# Patient Record
Sex: FEMALE | Race: WHITE | NOT HISPANIC OR LATINO | Employment: UNEMPLOYED | URBAN - METROPOLITAN AREA
[De-identification: names, ages, dates, MRNs, and addresses within clinical notes are randomized per-mention and may not be internally consistent; named-entity substitution may affect disease eponyms.]

---

## 2019-11-07 ENCOUNTER — HOSPITAL ENCOUNTER (OUTPATIENT)
Facility: MEDICAL CENTER | Age: 44
DRG: 477 | End: 2019-11-07
Admitting: HOSPITALIST
Payer: COMMERCIAL

## 2019-11-07 ENCOUNTER — HOSPITAL ENCOUNTER (INPATIENT)
Facility: MEDICAL CENTER | Age: 44
LOS: 6 days | DRG: 477 | End: 2019-11-13
Attending: HOSPITALIST | Admitting: INTERNAL MEDICINE
Payer: COMMERCIAL

## 2019-11-07 DIAGNOSIS — J44.1 CHRONIC OBSTRUCTIVE PULMONARY DISEASE WITH ACUTE EXACERBATION (HCC): ICD-10-CM

## 2019-11-07 DIAGNOSIS — J18.9 HCAP (HEALTHCARE-ASSOCIATED PNEUMONIA): ICD-10-CM

## 2019-11-07 DIAGNOSIS — Z85.3 HISTORY OF BREAST CANCER: ICD-10-CM

## 2019-11-07 DIAGNOSIS — R91.8 LUNG MASS: ICD-10-CM

## 2019-11-07 PROBLEM — J96.11 CHRONIC RESPIRATORY FAILURE WITH HYPOXIA (HCC): Status: ACTIVE | Noted: 2019-11-07

## 2019-11-07 PROBLEM — J44.9 COPD (CHRONIC OBSTRUCTIVE PULMONARY DISEASE) (HCC): Status: ACTIVE | Noted: 2019-11-07

## 2019-11-07 PROCEDURE — 87205 SMEAR GRAM STAIN: CPT

## 2019-11-07 PROCEDURE — 770020 HCHG ROOM/CARE - TELE (206)

## 2019-11-07 PROCEDURE — 36415 COLL VENOUS BLD VENIPUNCTURE: CPT

## 2019-11-07 PROCEDURE — 99223 1ST HOSP IP/OBS HIGH 75: CPT | Performed by: INTERNAL MEDICINE

## 2019-11-07 PROCEDURE — 87070 CULTURE OTHR SPECIMN AEROBIC: CPT

## 2019-11-07 PROCEDURE — 84145 PROCALCITONIN (PCT): CPT

## 2019-11-07 RX ORDER — TAMOXIFEN CITRATE 10 MG/1
20 TABLET ORAL DAILY
Status: DISCONTINUED | OUTPATIENT
Start: 2019-11-08 | End: 2019-11-09

## 2019-11-07 RX ORDER — CHOLECALCIFEROL (VITAMIN D3) 125 MCG
1000 CAPSULE ORAL DAILY
COMMUNITY

## 2019-11-07 RX ORDER — TAMOXIFEN CITRATE 10 MG/1
20 TABLET ORAL
COMMUNITY

## 2019-11-07 RX ORDER — TIZANIDINE 4 MG/1
4 TABLET ORAL EVERY 8 HOURS
Status: DISCONTINUED | OUTPATIENT
Start: 2019-11-07 | End: 2019-11-13 | Stop reason: HOSPADM

## 2019-11-07 RX ORDER — CLONAZEPAM 1 MG/1
1 TABLET ORAL 3 TIMES DAILY
COMMUNITY

## 2019-11-07 RX ORDER — IPRATROPIUM BROMIDE AND ALBUTEROL SULFATE 2.5; .5 MG/3ML; MG/3ML
3 SOLUTION RESPIRATORY (INHALATION)
Status: DISCONTINUED | OUTPATIENT
Start: 2019-11-07 | End: 2019-11-09

## 2019-11-07 RX ORDER — HEPARIN SODIUM 5000 [USP'U]/ML
5000 INJECTION, SOLUTION INTRAVENOUS; SUBCUTANEOUS EVERY 8 HOURS
Status: DISCONTINUED | OUTPATIENT
Start: 2019-11-08 | End: 2019-11-13 | Stop reason: HOSPADM

## 2019-11-07 RX ORDER — AMOXICILLIN 250 MG
2 CAPSULE ORAL 2 TIMES DAILY
Status: DISCONTINUED | OUTPATIENT
Start: 2019-11-07 | End: 2019-11-13 | Stop reason: HOSPADM

## 2019-11-07 RX ORDER — CITALOPRAM 40 MG/1
40 TABLET ORAL DAILY
COMMUNITY

## 2019-11-07 RX ORDER — CARVEDILOL 6.25 MG/1
6.25 TABLET ORAL 2 TIMES DAILY
COMMUNITY

## 2019-11-07 RX ORDER — DICYCLOMINE HYDROCHLORIDE 10 MG/1
10 CAPSULE ORAL 3 TIMES DAILY
Status: DISCONTINUED | OUTPATIENT
Start: 2019-11-08 | End: 2019-11-13 | Stop reason: HOSPADM

## 2019-11-07 RX ORDER — POLYETHYLENE GLYCOL 3350 17 G/17G
1 POWDER, FOR SOLUTION ORAL
Status: DISCONTINUED | OUTPATIENT
Start: 2019-11-07 | End: 2019-11-13 | Stop reason: HOSPADM

## 2019-11-07 RX ORDER — CLONAZEPAM 1 MG/1
1 TABLET ORAL 3 TIMES DAILY
Status: DISCONTINUED | OUTPATIENT
Start: 2019-11-08 | End: 2019-11-13 | Stop reason: HOSPADM

## 2019-11-07 RX ORDER — TIZANIDINE 4 MG/1
4 TABLET ORAL EVERY 8 HOURS
COMMUNITY

## 2019-11-07 RX ORDER — CARVEDILOL 6.25 MG/1
6.25 TABLET ORAL 2 TIMES DAILY
Status: DISCONTINUED | OUTPATIENT
Start: 2019-11-07 | End: 2019-11-10

## 2019-11-07 RX ORDER — CHOLECALCIFEROL (VITAMIN D3) 125 MCG
1000 CAPSULE ORAL DAILY
Status: DISCONTINUED | OUTPATIENT
Start: 2019-11-08 | End: 2019-11-13 | Stop reason: HOSPADM

## 2019-11-07 RX ORDER — LACTOBACILLUS RHAMNOSUS GG 10B CELL
1 CAPSULE ORAL
Status: DISCONTINUED | OUTPATIENT
Start: 2019-11-08 | End: 2019-11-13 | Stop reason: HOSPADM

## 2019-11-07 RX ORDER — BISACODYL 10 MG
10 SUPPOSITORY, RECTAL RECTAL
Status: DISCONTINUED | OUTPATIENT
Start: 2019-11-07 | End: 2019-11-13 | Stop reason: HOSPADM

## 2019-11-07 RX ORDER — DICYCLOMINE HYDROCHLORIDE 10 MG/1
10 CAPSULE ORAL 3 TIMES DAILY
COMMUNITY

## 2019-11-07 RX ORDER — OXYCODONE AND ACETAMINOPHEN 10; 325 MG/1; MG/1
0.5 TABLET ORAL 3 TIMES DAILY PRN
Status: DISCONTINUED | OUTPATIENT
Start: 2019-11-07 | End: 2019-11-11

## 2019-11-07 RX ORDER — DOXYCYCLINE 100 MG/1
100 TABLET ORAL EVERY 12 HOURS
Status: COMPLETED | OUTPATIENT
Start: 2019-11-07 | End: 2019-11-12

## 2019-11-07 RX ORDER — ALBUTEROL SULFATE 90 UG/1
2 AEROSOL, METERED RESPIRATORY (INHALATION) EVERY 6 HOURS
COMMUNITY

## 2019-11-07 RX ORDER — CETIRIZINE HYDROCHLORIDE 10 MG/1
10 TABLET ORAL DAILY
Status: DISCONTINUED | OUTPATIENT
Start: 2019-11-08 | End: 2019-11-13 | Stop reason: HOSPADM

## 2019-11-07 RX ORDER — OXYCODONE AND ACETAMINOPHEN 10; 325 MG/1; MG/1
0.5 TABLET ORAL 3 TIMES DAILY PRN
Status: ON HOLD | COMMUNITY
End: 2019-12-25 | Stop reason: SDUPTHER

## 2019-11-07 RX ORDER — CITALOPRAM 20 MG/1
40 TABLET ORAL DAILY
Status: DISCONTINUED | OUTPATIENT
Start: 2019-11-08 | End: 2019-11-13 | Stop reason: HOSPADM

## 2019-11-07 RX ORDER — METHYLPREDNISOLONE SODIUM SUCCINATE 40 MG/ML
40 INJECTION, POWDER, LYOPHILIZED, FOR SOLUTION INTRAMUSCULAR; INTRAVENOUS EVERY 12 HOURS
Status: COMPLETED | OUTPATIENT
Start: 2019-11-07 | End: 2019-11-10

## 2019-11-07 RX ORDER — ALBUTEROL SULFATE 90 UG/1
2 AEROSOL, METERED RESPIRATORY (INHALATION) EVERY 6 HOURS
Status: DISCONTINUED | OUTPATIENT
Start: 2019-11-08 | End: 2019-11-08

## 2019-11-07 RX ORDER — CETIRIZINE HYDROCHLORIDE 10 MG/1
10 TABLET ORAL DAILY
COMMUNITY

## 2019-11-07 ASSESSMENT — LIFESTYLE VARIABLES
TOTAL SCORE: 0
HAVE PEOPLE ANNOYED YOU BY CRITICIZING YOUR DRINKING: NO
AVERAGE NUMBER OF DAYS PER WEEK YOU HAVE A DRINK CONTAINING ALCOHOL: 0
EVER FELT BAD OR GUILTY ABOUT YOUR DRINKING: NO
HAVE YOU EVER FELT YOU SHOULD CUT DOWN ON YOUR DRINKING: NO
EVER HAD A DRINK FIRST THING IN THE MORNING TO STEADY YOUR NERVES TO GET RID OF A HANGOVER: NO
DOES PATIENT WANT TO STOP DRINKING: NO
EVER_SMOKED: YES
ALCOHOL_USE: NO
CONSUMPTION TOTAL: NEGATIVE
HOW MANY TIMES IN THE PAST YEAR HAVE YOU HAD 5 OR MORE DRINKS IN A DAY: 0
TOTAL SCORE: 0
ON A TYPICAL DAY WHEN YOU DRINK ALCOHOL HOW MANY DRINKS DO YOU HAVE: 0
TOTAL SCORE: 0

## 2019-11-07 ASSESSMENT — ENCOUNTER SYMPTOMS
INSOMNIA: 0
EYE PAIN: 0
SPUTUM PRODUCTION: 1
FEVER: 0
HEMOPTYSIS: 0
BLOOD IN STOOL: 0
SHORTNESS OF BREATH: 1
EYE REDNESS: 0
NERVOUS/ANXIOUS: 0
MYALGIAS: 0
LOSS OF CONSCIOUSNESS: 0
CONSTIPATION: 0
WEAKNESS: 0
NAUSEA: 0
VOMITING: 0
PALPITATIONS: 0
SEIZURES: 0
DIARRHEA: 0
HEADACHES: 0
ABDOMINAL PAIN: 0
TREMORS: 0
FALLS: 0
WHEEZING: 1
DIZZINESS: 0
FOCAL WEAKNESS: 0
COUGH: 1
CHILLS: 0

## 2019-11-07 ASSESSMENT — COGNITIVE AND FUNCTIONAL STATUS - GENERAL
SUGGESTED CMS G CODE MODIFIER MOBILITY: CH
MOBILITY SCORE: 24
SUGGESTED CMS G CODE MODIFIER DAILY ACTIVITY: CH
DAILY ACTIVITIY SCORE: 24

## 2019-11-07 ASSESSMENT — PATIENT HEALTH QUESTIONNAIRE - PHQ9
2. FEELING DOWN, DEPRESSED, IRRITABLE, OR HOPELESS: NOT AT ALL
1. LITTLE INTEREST OR PLEASURE IN DOING THINGS: NOT AT ALL
SUM OF ALL RESPONSES TO PHQ9 QUESTIONS 1 AND 2: 0

## 2019-11-08 ENCOUNTER — PATIENT OUTREACH (OUTPATIENT)
Dept: HEALTH INFORMATION MANAGEMENT | Facility: OTHER | Age: 44
End: 2019-11-08

## 2019-11-08 PROBLEM — J96.21 ACUTE ON CHRONIC RESPIRATORY FAILURE WITH HYPOXIA (HCC): Status: ACTIVE | Noted: 2019-11-07

## 2019-11-08 LAB
ALBUMIN SERPL BCP-MCNC: 3.3 G/DL (ref 3.2–4.9)
ALBUMIN/GLOB SERPL: 1.3 G/DL
ALP SERPL-CCNC: 65 U/L (ref 30–99)
ALT SERPL-CCNC: 5 U/L (ref 2–50)
ANION GAP SERPL CALC-SCNC: 8 MMOL/L (ref 0–11.9)
AST SERPL-CCNC: 9 U/L (ref 12–45)
BASOPHILS # BLD AUTO: 0.1 % (ref 0–1.8)
BASOPHILS # BLD: 0.04 K/UL (ref 0–0.12)
BILIRUB SERPL-MCNC: 0.2 MG/DL (ref 0.1–1.5)
BUN SERPL-MCNC: 15 MG/DL (ref 8–22)
CALCIUM SERPL-MCNC: 9.1 MG/DL (ref 8.5–10.5)
CHLORIDE SERPL-SCNC: 115 MMOL/L (ref 96–112)
CO2 SERPL-SCNC: 17 MMOL/L (ref 20–33)
CREAT SERPL-MCNC: 0.91 MG/DL (ref 0.5–1.4)
EOSINOPHIL # BLD AUTO: 0 K/UL (ref 0–0.51)
EOSINOPHIL NFR BLD: 0 % (ref 0–6.9)
ERYTHROCYTE [DISTWIDTH] IN BLOOD BY AUTOMATED COUNT: 53 FL (ref 35.9–50)
GLOBULIN SER CALC-MCNC: 2.5 G/DL (ref 1.9–3.5)
GLUCOSE SERPL-MCNC: 163 MG/DL (ref 65–99)
GRAM STN SPEC: NORMAL
HCT VFR BLD AUTO: 30.6 % (ref 37–47)
HGB BLD-MCNC: 10.3 G/DL (ref 12–16)
IMM GRANULOCYTES # BLD AUTO: 0.33 K/UL (ref 0–0.11)
IMM GRANULOCYTES NFR BLD AUTO: 1.2 % (ref 0–0.9)
LYMPHOCYTES # BLD AUTO: 1.3 K/UL (ref 1–4.8)
LYMPHOCYTES NFR BLD: 4.7 % (ref 22–41)
MCH RBC QN AUTO: 34 PG (ref 27–33)
MCHC RBC AUTO-ENTMCNC: 33.7 G/DL (ref 33.6–35)
MCV RBC AUTO: 101 FL (ref 81.4–97.8)
MONOCYTES # BLD AUTO: 1.03 K/UL (ref 0–0.85)
MONOCYTES NFR BLD AUTO: 3.7 % (ref 0–13.4)
NEUTROPHILS # BLD AUTO: 25.02 K/UL (ref 2–7.15)
NEUTROPHILS NFR BLD: 90.3 % (ref 44–72)
NRBC # BLD AUTO: 0.02 K/UL
NRBC BLD-RTO: 0.1 /100 WBC
PLATELET # BLD AUTO: 310 K/UL (ref 164–446)
PMV BLD AUTO: 9.9 FL (ref 9–12.9)
POTASSIUM SERPL-SCNC: 3.8 MMOL/L (ref 3.6–5.5)
PROCALCITONIN SERPL-MCNC: 1.97 NG/ML
PROT SERPL-MCNC: 5.8 G/DL (ref 6–8.2)
RBC # BLD AUTO: 3.03 M/UL (ref 4.2–5.4)
SIGNIFICANT IND 70042: NORMAL
SITE SITE: NORMAL
SODIUM SERPL-SCNC: 140 MMOL/L (ref 135–145)
SOURCE SOURCE: NORMAL
WBC # BLD AUTO: 27.7 K/UL (ref 4.8–10.8)

## 2019-11-08 PROCEDURE — 80053 COMPREHEN METABOLIC PANEL: CPT

## 2019-11-08 PROCEDURE — 700102 HCHG RX REV CODE 250 W/ 637 OVERRIDE(OP): Performed by: INTERNAL MEDICINE

## 2019-11-08 PROCEDURE — 700101 HCHG RX REV CODE 250: Performed by: INTERNAL MEDICINE

## 2019-11-08 PROCEDURE — 94669 MECHANICAL CHEST WALL OSCILL: CPT

## 2019-11-08 PROCEDURE — 700111 HCHG RX REV CODE 636 W/ 250 OVERRIDE (IP): Performed by: INTERNAL MEDICINE

## 2019-11-08 PROCEDURE — A9270 NON-COVERED ITEM OR SERVICE: HCPCS | Performed by: INTERNAL MEDICINE

## 2019-11-08 PROCEDURE — 99253 IP/OBS CNSLTJ NEW/EST LOW 45: CPT | Performed by: INTERNAL MEDICINE

## 2019-11-08 PROCEDURE — 94667 MNPJ CHEST WALL 1ST: CPT

## 2019-11-08 PROCEDURE — 770020 HCHG ROOM/CARE - TELE (206)

## 2019-11-08 PROCEDURE — 85025 COMPLETE CBC W/AUTO DIFF WBC: CPT

## 2019-11-08 PROCEDURE — 36415 COLL VENOUS BLD VENIPUNCTURE: CPT

## 2019-11-08 PROCEDURE — 700105 HCHG RX REV CODE 258: Performed by: INTERNAL MEDICINE

## 2019-11-08 PROCEDURE — 94668 MNPJ CHEST WALL SBSQ: CPT

## 2019-11-08 PROCEDURE — 94760 N-INVAS EAR/PLS OXIMETRY 1: CPT

## 2019-11-08 PROCEDURE — 94640 AIRWAY INHALATION TREATMENT: CPT

## 2019-11-08 PROCEDURE — 99233 SBSQ HOSP IP/OBS HIGH 50: CPT | Performed by: INTERNAL MEDICINE

## 2019-11-08 RX ORDER — ALBUTEROL SULFATE 90 UG/1
2 AEROSOL, METERED RESPIRATORY (INHALATION) EVERY 4 HOURS PRN
Status: DISCONTINUED | OUTPATIENT
Start: 2019-11-08 | End: 2019-11-13 | Stop reason: HOSPADM

## 2019-11-08 RX ADMIN — IPRATROPIUM BROMIDE AND ALBUTEROL SULFATE 3 ML: .5; 3 SOLUTION RESPIRATORY (INHALATION) at 14:33

## 2019-11-08 RX ADMIN — METHYLPREDNISOLONE SODIUM SUCCINATE 40 MG: 40 INJECTION, POWDER, FOR SOLUTION INTRAMUSCULAR; INTRAVENOUS at 17:59

## 2019-11-08 RX ADMIN — OXYCODONE HYDROCHLORIDE AND ACETAMINOPHEN 0.5 TABLET: 10; 325 TABLET ORAL at 12:40

## 2019-11-08 RX ADMIN — DICYCLOMINE HYDROCHLORIDE 10 MG: 10 CAPSULE ORAL at 17:58

## 2019-11-08 RX ADMIN — OXYCODONE HYDROCHLORIDE AND ACETAMINOPHEN 0.5 TABLET: 10; 325 TABLET ORAL at 17:58

## 2019-11-08 RX ADMIN — CARVEDILOL 6.25 MG: 6.25 TABLET, FILM COATED ORAL at 00:31

## 2019-11-08 RX ADMIN — HEPARIN SODIUM 5000 UNITS: 5000 INJECTION, SOLUTION INTRAVENOUS; SUBCUTANEOUS at 15:03

## 2019-11-08 RX ADMIN — DOXYCYCLINE 100 MG: 100 TABLET, FILM COATED ORAL at 00:31

## 2019-11-08 RX ADMIN — CLONAZEPAM 1 MG: 1 TABLET ORAL at 17:54

## 2019-11-08 RX ADMIN — METHYLPREDNISOLONE SODIUM SUCCINATE 40 MG: 40 INJECTION, POWDER, FOR SOLUTION INTRAMUSCULAR; INTRAVENOUS at 00:31

## 2019-11-08 RX ADMIN — IPRATROPIUM BROMIDE AND ALBUTEROL SULFATE 3 ML: .5; 3 SOLUTION RESPIRATORY (INHALATION) at 10:06

## 2019-11-08 RX ADMIN — DICYCLOMINE HYDROCHLORIDE 10 MG: 10 CAPSULE ORAL at 12:37

## 2019-11-08 RX ADMIN — CARVEDILOL 6.25 MG: 6.25 TABLET, FILM COATED ORAL at 17:54

## 2019-11-08 RX ADMIN — IPRATROPIUM BROMIDE AND ALBUTEROL SULFATE 3 ML: .5; 3 SOLUTION RESPIRATORY (INHALATION) at 03:10

## 2019-11-08 RX ADMIN — HEPARIN SODIUM 5000 UNITS: 5000 INJECTION, SOLUTION INTRAVENOUS; SUBCUTANEOUS at 04:34

## 2019-11-08 RX ADMIN — TAMOXIFEN CITRATE 20 MG: 10 TABLET, FILM COATED ORAL at 04:33

## 2019-11-08 RX ADMIN — CITALOPRAM HYDROBROMIDE 40 MG: 20 TABLET ORAL at 04:33

## 2019-11-08 RX ADMIN — TIZANIDINE 4 MG: 4 TABLET ORAL at 21:56

## 2019-11-08 RX ADMIN — DOXYCYCLINE 100 MG: 100 TABLET, FILM COATED ORAL at 17:54

## 2019-11-08 RX ADMIN — Medication 1 CAPSULE: at 04:33

## 2019-11-08 RX ADMIN — CYANOCOBALAMIN TAB 500 MCG 1000 MCG: 500 TAB at 04:33

## 2019-11-08 RX ADMIN — CLONAZEPAM 1 MG: 1 TABLET ORAL at 12:37

## 2019-11-08 RX ADMIN — METHYLPREDNISOLONE SODIUM SUCCINATE 40 MG: 40 INJECTION, POWDER, FOR SOLUTION INTRAMUSCULAR; INTRAVENOUS at 04:34

## 2019-11-08 RX ADMIN — CEFTRIAXONE SODIUM 2 G: 2 INJECTION, POWDER, FOR SOLUTION INTRAMUSCULAR; INTRAVENOUS at 17:53

## 2019-11-08 RX ADMIN — TIZANIDINE 4 MG: 4 TABLET ORAL at 15:04

## 2019-11-08 RX ADMIN — TIZANIDINE 4 MG: 4 TABLET ORAL at 04:33

## 2019-11-08 RX ADMIN — DOXYCYCLINE 100 MG: 100 TABLET, FILM COATED ORAL at 04:33

## 2019-11-08 RX ADMIN — DICYCLOMINE HYDROCHLORIDE 10 MG: 10 CAPSULE ORAL at 04:33

## 2019-11-08 RX ADMIN — CEFTRIAXONE SODIUM 2 G: 2 INJECTION, POWDER, FOR SOLUTION INTRAMUSCULAR; INTRAVENOUS at 00:31

## 2019-11-08 RX ADMIN — HEPARIN SODIUM 5000 UNITS: 5000 INJECTION, SOLUTION INTRAVENOUS; SUBCUTANEOUS at 21:56

## 2019-11-08 RX ADMIN — TIZANIDINE 4 MG: 4 TABLET ORAL at 00:30

## 2019-11-08 RX ADMIN — CARVEDILOL 6.25 MG: 6.25 TABLET, FILM COATED ORAL at 04:33

## 2019-11-08 RX ADMIN — CLONAZEPAM 1 MG: 1 TABLET ORAL at 04:34

## 2019-11-08 RX ADMIN — CETIRIZINE HYDROCHLORIDE 10 MG: 10 TABLET, FILM COATED ORAL at 04:33

## 2019-11-08 SDOH — ECONOMIC STABILITY: FOOD INSECURITY: WITHIN THE PAST 12 MONTHS, YOU WORRIED THAT YOUR FOOD WOULD RUN OUT BEFORE YOU GOT MONEY TO BUY MORE.: NEVER TRUE

## 2019-11-08 SDOH — ECONOMIC STABILITY: FOOD INSECURITY: WITHIN THE PAST 12 MONTHS, THE FOOD YOU BOUGHT JUST DIDN'T LAST AND YOU DIDN'T HAVE MONEY TO GET MORE.: NEVER TRUE

## 2019-11-08 SDOH — ECONOMIC STABILITY: TRANSPORTATION INSECURITY
IN THE PAST 12 MONTHS, HAS THE LACK OF TRANSPORTATION KEPT YOU FROM MEDICAL APPOINTMENTS OR FROM GETTING MEDICATIONS?: YES

## 2019-11-08 SDOH — ECONOMIC STABILITY: INCOME INSECURITY: HOW HARD IS IT FOR YOU TO PAY FOR THE VERY BASICS LIKE FOOD, HOUSING, MEDICAL CARE, AND HEATING?: NOT HARD AT ALL

## 2019-11-08 SDOH — ECONOMIC STABILITY: TRANSPORTATION INSECURITY
IN THE PAST 12 MONTHS, HAS LACK OF TRANSPORTATION KEPT YOU FROM MEETINGS, WORK, OR FROM GETTING THINGS NEEDED FOR DAILY LIVING?: NO

## 2019-11-08 ASSESSMENT — ENCOUNTER SYMPTOMS
NAUSEA: 0
CHILLS: 0
COUGH: 1
CONSTIPATION: 0
BLURRED VISION: 0
SEIZURES: 0
VOMITING: 0
FEVER: 0
SHORTNESS OF BREATH: 1
FALLS: 0
DIARRHEA: 0
ABDOMINAL PAIN: 0

## 2019-11-08 ASSESSMENT — PATIENT HEALTH QUESTIONNAIRE - PHQ9
1. LITTLE INTEREST OR PLEASURE IN DOING THINGS: NOT AT ALL
SUM OF ALL RESPONSES TO PHQ9 QUESTIONS 1 AND 2: 0
SUM OF ALL RESPONSES TO PHQ9 QUESTIONS 1 AND 2: 0
2. FEELING DOWN, DEPRESSED, IRRITABLE, OR HOPELESS: NOT AT ALL
1. LITTLE INTEREST OR PLEASURE IN DOING THINGS: NOT AT ALL

## 2019-11-08 ASSESSMENT — COPD QUESTIONNAIRES
DO YOU EVER COUGH UP ANY MUCUS OR PHLEGM?: YES, A FEW DAYS A WEEK OR MONTH
COPD SCREENING SCORE: 4
HAVE YOU SMOKED AT LEAST 100 CIGARETTES IN YOUR ENTIRE LIFE: YES
DURING THE PAST 4 WEEKS HOW MUCH DID YOU FEEL SHORT OF BREATH: NONE/LITTLE OF THE TIME

## 2019-11-08 ASSESSMENT — LIFESTYLE VARIABLES: EVER_SMOKED: YES

## 2019-11-08 NOTE — CONSULTS
Pulmonary  Care Consultation    Date of consult: 11/8/2019    Referring Physician  Rhonda Velásquez M.D.    Reason for Consultation  Pneumonia with discovery of lung mass, and possible spine lesion, history of breast cancer    History of Presenting Illness  44 y.o. female who presented 11/7/2019 with pneumonia and was found on imaging to have a lung mass.  She has breast cancer diagnosed over one year ago, had right mastectomy, and has been on tamoxifen.  Imaging not yet available from the outside hospital but reportedly has a lung mass as well as a spine lesion, lytic, unifying diagnosis may be breast cancer metastatic but also consider underlying bronchogenic malignancy with her prior smoking history.  She stopped smoking over one year ago and has been careful not to resume, does not presently have hemoptysis or significant purulence.  However underlying COPD, prior need for oxygen, also tells me she has a prior history of lung related damage from sepsis, I wonder if this might be post ARDS fibrotic change but that also remains to be determined with current imaging not yet available.    Code Status  Full Code    ROS    Constitutional: Recently had chills and fever. No complaints presently  HENT: Negative for congestion and sore throat.    Eyes: Negative for photophobia and discharge.   Respiratory:positive for cough, sputum production, shortness of breath and wheezing.    Cardiovascular: Negative for chest pain, palpitations and leg swelling.   Gastrointestinal: Negative for abdominal pain, diarrhea, nausea and vomiting.   Musculoskeletal: Negative for back pain and myalgias. Specifically no pain in the region of the reported lytic lesion  Neurological: Negative for focal weakness, weakness and headaches.   Past Medical History   has no past medical history on file.    Surgical History   has no past surgical history on file.    Family History  family history is not on file.    Social History        Medications  Home Medications     Reviewed by Shawn Redd R.N. (Registered Nurse) on 11/07/19 at 2250  Med List Status: Complete   Medication Last Dose Status   albuterol (VENTOLIN HFA) 108 (90 Base) MCG/ACT Aero Soln inhalation aerosol 11/7/2019 Active   carvedilol (COREG) 6.25 MG Tab 11/7/2019 Active   cetirizine (ZYRTEC) 10 MG Tab 11/7/2019 Active   citalopram (CELEXA) 40 MG Tab 11/7/2019 Active   clonazePAM (KLONOPIN) 1 MG Tab 11/7/2019 Active   cyanocobalamin (VITAMIN B-12) 500 MCG Tab 11/7/2019 Active   dicyclomine (BENTYL) 10 MG Cap na Active   oxyCODONE-acetaminophen (PERCOCET-10)  MG Tab 11/7/2019 Active   tamoxifen (NOLVADEX) 10 MG Tab na Active   tizanidine (ZANAFLEX) 4 MG Tab na Active              Current Facility-Administered Medications   Medication Dose Route Frequency Provider Last Rate Last Dose   • albuterol inhaler 2 Puff  2 Puff Inhalation Q4HRS PRN Yimi Javier M.D.       • senna-docusate (PERICOLACE or SENOKOT S) 8.6-50 MG per tablet 2 Tab  2 Tab Oral BID Jose R Etienne M.D.        And   • polyethylene glycol/lytes (MIRALAX) PACKET 1 Packet  1 Packet Oral QDAY PRN Jose R Etienne M.D.        And   • magnesium hydroxide (MILK OF MAGNESIA) suspension 30 mL  30 mL Oral QDAY PRN Jose R Etienne M.D.        And   • bisacodyl (DULCOLAX) suppository 10 mg  10 mg Rectal QDAY PRN Jose R Etienne M.D.       • heparin injection 5,000 Units  5,000 Units Subcutaneous Q8HRS Jose R Etienne M.D.   5,000 Units at 11/08/19 0434   • Respiratory Care per Protocol   Nebulization Continuous RT Jose R Etienne M.D.       • ipratropium-albuterol (DUONEB) nebulizer solution  3 mL Nebulization Q4HRS (RT) Jose R Etienne M.D.   3 mL at 11/08/19 1006   • doxycycline monohydrate (ADOXA) tablet 100 mg  100 mg Oral Q12HRS Jose R Etienne M.D.   100 mg at 11/08/19 0433   • methylPREDNISolone (SOLU-MEDROL) 40 MG injection 40 mg  40 mg Intravenous Q12HRS Jose R Etienne M.D.   40 mg at  11/08/19 0434   • cefTRIAXone (ROCEPHIN) 2 g in  mL IVPB  2 g Intravenous Q24HRS Jose R Etienne M.D.   Stopped at 11/08/19 0101   • lactobacillus rhamnosus (CULTURELLE) capsule 1 Cap  1 Cap Oral QDAY with Breakfast Jose R Etienne M.D.   1 Cap at 11/08/19 0433   • carvedilol (COREG) tablet 6.25 mg  6.25 mg Oral BID Jose R Etienne M.D.   6.25 mg at 11/08/19 0433   • cetirizine (ZYRTEC) tablet 10 mg  10 mg Oral DAILY Jose R Etienne M.D.   10 mg at 11/08/19 0433   • citalopram (CELEXA) tablet 40 mg  40 mg Oral DAILY Jose R Etienne M.D.   40 mg at 11/08/19 0433   • clonazePAM (KLONOPIN) tablet 1 mg  1 mg Oral TID Jose R Etienne M.D.   1 mg at 11/08/19 1237   • cyanocobalamin (VITAMIN B-12) tablet 1,000 mcg  1,000 mcg Oral DAILY Jose R Etienne M.D.   1,000 mcg at 11/08/19 0433   • dicyclomine (BENTYL) capsule 10 mg  10 mg Oral TID Jose R Etienne M.D.   10 mg at 11/08/19 1237   • oxyCODONE-acetaminophen (PERCOCET-10)  MG per tablet 0.5 Tab  0.5 Tab Oral TID PRN Jose R Etienne M.D.   0.5 Tab at 11/08/19 1240   • tamoxifen (NOLVADEX) tablet 20 mg  20 mg Oral DAILY Jose R Etienne M.D.   20 mg at 11/08/19 0433   • tizanidine (ZANAFLEX) tablet 4 mg  4 mg Oral Q8HRS Jose R Etienne M.D.   4 mg at 11/08/19 0433       Allergies  Allergies   Allergen Reactions   • Lyrica Hives   • Morphine Unspecified     Was told she is very allergic after surgery.    • Motrin [Ibuprofen] Hives   • Neurontin [Gabapentin] Swelling     Messes with kidneys   • Pcn [Penicillins] Hives     Burning sensation throughout body       Vital Signs last 24 hours  Temp:  [36.7 °C (98.1 °F)-36.9 °C (98.5 °F)] 36.8 °C (98.3 °F)  Pulse:  [61-80] 80  Resp:  [16-17] 16  BP: (102-127)/(43-69) 102/62  SpO2:  [94 %-99 %] 96 %      Physical Exam  PHYSICAL EXAM      Constitutional: Alert, cooperative, not in acute distress.  On nasal prong oxygen without tachypnea  HEENT: Normocephalic, Atraumatic.  Edentulous  Eyes: PERRLA,  EOMI, Conjunctiva not injected.  No scleral icterus    Neck: Trachea is midline;  no thyromegaly   Lymphatic: No lymphadenopathy noted cervical or supraclavicular  Cardiovascular:  Regular rate and rhythm, no distinct murmur or gallop; neck veins flat  Chest & Lungs: Scattered but not localizing wheezing, rhonchi; no  rales  Abdomen: Soft non-tender, no rebound, no guarding.    Skin: Warm, Dry, No erythema, No rash.   Neurologic: Alert & oriented , cranial nervesgrossly intact, Normal motor function;  No focal deficits noted.   Psychiatric: Affect normal,  Mood normal.   Extremities: Well-perfused, no mottling, no asymmetric edema  Joints: No new swelling or acute arthritis in visible joints  Fluids  No intake or output data in the 24 hours ending 11/08/19 1309    Laboratory  Recent Results (from the past 48 hour(s))   Procalcitonin    Collection Time: 11/07/19 11:23 PM   Result Value Ref Range    Procalcitonin 1.97 (H) <0.25 ng/mL   GRAM STAIN    Collection Time: 11/07/19 11:25 PM   Result Value Ref Range    Significant Indicator .     Source RESP     Site SPUTUM     Gram Stain Result       Moderate WBCs.  Rare Gram negative rods.  Rare Gram positive cocci.  Specimen Quality Score: 2+     CBC WITH DIFFERENTIAL    Collection Time: 11/08/19 11:48 AM   Result Value Ref Range    WBC 27.7 (H) 4.8 - 10.8 K/uL    RBC 3.03 (L) 4.20 - 5.40 M/uL    Hemoglobin 10.3 (L) 12.0 - 16.0 g/dL    Hematocrit 30.6 (L) 37.0 - 47.0 %    .0 (H) 81.4 - 97.8 fL    MCH 34.0 (H) 27.0 - 33.0 pg    MCHC 33.7 33.6 - 35.0 g/dL    RDW 53.0 (H) 35.9 - 50.0 fL    Platelet Count 310 164 - 446 K/uL    MPV 9.9 9.0 - 12.9 fL    Neutrophils-Polys 90.30 (H) 44.00 - 72.00 %    Lymphocytes 4.70 (L) 22.00 - 41.00 %    Monocytes 3.70 0.00 - 13.40 %    Eosinophils 0.00 0.00 - 6.90 %    Basophils 0.10 0.00 - 1.80 %    Immature Granulocytes 1.20 (H) 0.00 - 0.90 %    Nucleated RBC 0.10 /100 WBC    Neutrophils (Absolute) 25.02 (H) 2.00 - 7.15 K/uL    Lymphs  (Absolute) 1.30 1.00 - 4.80 K/uL    Monos (Absolute) 1.03 (H) 0.00 - 0.85 K/uL    Eos (Absolute) 0.00 0.00 - 0.51 K/uL    Baso (Absolute) 0.04 0.00 - 0.12 K/uL    Immature Granulocytes (abs) 0.33 (H) 0.00 - 0.11 K/uL    NRBC (Absolute) 0.02 K/uL   Comp Metabolic Panel    Collection Time: 11/08/19 11:48 AM   Result Value Ref Range    Sodium 140 135 - 145 mmol/L    Potassium 3.8 3.6 - 5.5 mmol/L    Chloride 115 (H) 96 - 112 mmol/L    Co2 17 (L) 20 - 33 mmol/L    Anion Gap 8.0 0.0 - 11.9    Glucose 163 (H) 65 - 99 mg/dL    Bun 15 8 - 22 mg/dL    Creatinine 0.91 0.50 - 1.40 mg/dL    Calcium 9.1 8.5 - 10.5 mg/dL    AST(SGOT) 9 (L) 12 - 45 U/L    ALT(SGPT) 5 2 - 50 U/L    Alkaline Phosphatase 65 30 - 99 U/L    Total Bilirubin 0.2 0.1 - 1.5 mg/dL    Albumin 3.3 3.2 - 4.9 g/dL    Total Protein 5.8 (L) 6.0 - 8.2 g/dL    Globulin 2.5 1.9 - 3.5 g/dL    A-G Ratio 1.3 g/dL   ESTIMATED GFR    Collection Time: 11/08/19 11:48 AM   Result Value Ref Range    GFR If African American >60 >60 mL/min/1.73 m 2    GFR If Non African American >60 >60 mL/min/1.73 m 2       Imaging  No orders to display       Assessment/Plan  * Acute on chronic respiratory failure with hypoxia (HCC)  Assessment & Plan  Multifactorial with underlying COPD, prior smoking history, pneumonia, and lung mass with prior breast cancer and potential for bronchogenic malignancy as well    Lung mass  Assessment & Plan  Reported from outside facility, may be related to underlying breast cancer, but does have a prior smoking history.  Review outside images, and may need needle biopsy.  Also reportedly has lytic spine lesion, unifying diagnosis more likely breast cancer, diagnosed over one year ago.  Remains to be determined.  In the meantime treating pneumonia with leukocytosis evident.    COPD (chronic obstructive pulmonary disease) ([facility])  Assessment & Plan  Prior smoking history, no active bronchospasm presently, but requiring oxygen and nebulized treatments and  respiratory therapy on board    History of breast cancer  Assessment & Plan  Currently on tamoxifen, prior right mastectomy, new lung lesion and spine lesion may be related but not yet determined        Discussed patient condition and risk of morbidity and/or mortality with Hospitalist and Patient     Nicole Moreland MD , [facility], Pulmonary Service.

## 2019-11-08 NOTE — PROGRESS NOTES
2 RN skin check complete with Sanjiv RN  Devices in place na.  Skin assessed under devices oxygen tubing  Confirmed pressure ulcers found on na.  New potential pressure ulcers noted on na. Wound consult placed na.  The following interventions in place na*    No skin issues noted

## 2019-11-08 NOTE — ASSESSMENT & PLAN NOTE
Multifactorial with underlying COPD, prior smoking history, pneumonia, and lung mass with prior breast cancer and potential for bronchogenic malignancy as well

## 2019-11-08 NOTE — ASSESSMENT & PLAN NOTE
Reported from outside facility, may be related to underlying breast cancer, but does have a prior smoking history.  Review outside images, and may need needle biopsy.  Also reportedly has lytic spine lesion, unifying diagnosis more likely breast cancer, diagnosed over one year ago.  Remains to be determined.  In the meantime treating pneumonia with leukocytosis evident.

## 2019-11-08 NOTE — PROGRESS NOTES
Direct admit from [facility], referred by Dr. Fields. For Lung mass. Admitting MD is Dr. Javier. Dr. Aniceto LANZA agreed to see the patient tomorrow for pulmonary medicine. Upon patient's arrival release signed and held orders, page admit hospitalist on call for orders.

## 2019-11-08 NOTE — ASSESSMENT & PLAN NOTE
H/o breast cancer, also with spine mets, given breast cancer history worry about recurrence with mets  Pulm consulted: recommending repeat CT thorax without tomorrow to assess mass for PNA vs malignancy, which showed decreased size of lesion however again showed spine mets, consult IR for bone bx tomorrow (NPO)  Meanwhile treat acute issues first: PNA and COPD with improving  Waiting for bone biopsy result.

## 2019-11-08 NOTE — PROGRESS NOTES
Community Health Worker Intake  • Social determinates of health intake complete.   • Identified barriers to tranportation.  • Contact information provided to Roxy Cota.  • Has PCP appointment scheduled for Dr. Story.  • Scheduled 11/20 at 7:45am.        11/8. CHW Veda met with pt to introduce CCM services. Pt indicated she sees Dr. Livia Story from [facility], but only temporarily because Dr. Story will be leaving January 2020. She sometimes have transportation issues getting to medical appointments. Currently relying on  for rides. No trouble paying for resources such as care, and housing. No food insecurity. She lives in a home with . Pt is unsure if she is confident in managing her health after d/c. She indicated her confidence will depend on what they find on her biopsy.     Plan: follow up call after d/c.     11/19. Outcome. Outbound call to Roxy. Roxy was sleeping, spoke to  Boone. He indicated she is doing well after d/c, but they are frustrated with her pcp Dr. Story. He indicated Dr. Story won't give her test results from the hospital until her pcp appointment on 11/20 at 7:45am. Boone asked me if I could look up her test results, and I informed him that I do not have access to that information. Roxy does not have transportation issues getting to the appointment as  will be taking her. Roxy picked up her medications. Boone indicated she is taking them regularly, and is assisting her. No further questions regarding how to take them. Boone indicated they do not need any other resources at this time. I advised Boone to give me a call if Roxy needs any other resources. Roxy met all goals, and will be d/c from CCM services.

## 2019-11-08 NOTE — H&P
Hospital Medicine History & Physical Note    Date of Service  11/7/2019    Primary Care Physician  Pcp Unknown    Consultants  Pulmonology    Code Status  full    Chief Complaint  Lung mass    History of Presenting Illness  44 y.o. female who presented 11/7/2019 transfer from [facility] for lung mass.  History of breast cancer on tamoxifen.   History of COPD on chronic O2  Presented to [facility] on 11/6 for a few days history of shortness of breath, productive cough.   There she was diagnosed to have acute upper lobe pneumonia along with COPD exacerbation. She was started on antibiotics and steroids along with O2 and respiratory care per protocol. She did improve. Imaging also showed a 2.5cm mass. According to the hospitalist there, her spine was imaged to have an osteolytic lesion. She mentioned it was an MRI but I do not have the record of that at [facility].  Dr. Douglass, Pulmonology consulted and patient was transferred.  Former smoker.   When I saw her at telemetry, no acute distress. Thin. Wheezing.     Review of Systems  Review of Systems   Constitutional: Negative for chills and fever.   HENT: Negative for congestion, hearing loss and nosebleeds.    Eyes: Negative for pain and redness.   Respiratory: Positive for cough, sputum production, shortness of breath and wheezing. Negative for hemoptysis.    Cardiovascular: Negative for chest pain and palpitations.   Gastrointestinal: Negative for abdominal pain, blood in stool, constipation, diarrhea, nausea and vomiting.   Genitourinary: Negative for dysuria, frequency and hematuria.   Musculoskeletal: Negative for falls, joint pain and myalgias.   Skin: Negative for rash.   Neurological: Negative for dizziness, tremors, focal weakness, seizures, loss of consciousness, weakness and headaches.   Psychiatric/Behavioral: The patient is not nervous/anxious and does not have insomnia.    All other systems reviewed and are negative.      Past Medical History   has no  past medical history on file.  Breast cancer  Fibromyalgia, hypothyroidism, COPD    Surgical History   has no past surgical history on file.     Family History  family history is not on file.   Mother had thyroid cancer  Grandmother had colon cancer.  Social History     Quit smoking.  Allergies  Allergies   Allergen Reactions   • Lyrica Hives   • Morphine Unspecified     Was told she is very allergic after surgery.    • Motrin [Ibuprofen] Hives   • Neurontin [Gabapentin] Swelling     Messes with kidneys   • Pcn [Penicillins] Hives     Burning sensation throughout body       Medications  Prior to Admission Medications   Prescriptions Last Dose Informant Patient Reported? Taking?   albuterol (VENTOLIN HFA) 108 (90 Base) MCG/ACT Aero Soln inhalation aerosol 11/7/2019 at 0600  Yes Yes   Sig: Inhale 2 Puffs by mouth every 6 hours.   carvedilol (COREG) 6.25 MG Tab 11/7/2019 at 0900  Yes Yes   Sig: Take 6.25 mg by mouth 2 times a day.   cetirizine (ZYRTEC) 10 MG Tab 11/7/2019 at 0900  Yes Yes   Sig: Take 10 mg by mouth every day.   citalopram (CELEXA) 40 MG Tab 11/7/2019 at 0900  Yes Yes   Sig: Take 40 mg by mouth every day.   clonazePAM (KLONOPIN) 1 MG Tab 11/7/2019 at 1400  Yes Yes   Sig: Take 1 mg by mouth 3 times a day.   cyanocobalamin (VITAMIN B-12) 500 MCG Tab 11/7/2019 at 0900  Yes Yes   Sig: Take 1,000 mcg by mouth every day.   dicyclomine (BENTYL) 10 MG Cap na  Yes Yes   Sig: Take 10 mg by mouth 3 times a day.   oxyCODONE-acetaminophen (PERCOCET-10)  MG Tab 11/7/2019 at 1545  Yes Yes   Sig: Take 0.5 Tabs by mouth 3 times a day as needed for Severe Pain.   tamoxifen (NOLVADEX) 10 MG Tab na  Yes Yes   Sig: Take 20 mg by mouth.   tizanidine (ZANAFLEX) 4 MG Tab na  Yes Yes   Sig: Take 4 mg by mouth every 8 hours.      Facility-Administered Medications: None       Physical Exam  Temp:  [36.8 °C (98.2 °F)] 36.8 °C (98.2 °F)  Pulse:  [76] 76  Resp:  [16] 16  BP: (127)/(69) 127/69  SpO2:  [99 %] 99 %    Physical  Exam  Vitals signs and nursing note reviewed.   Constitutional:       General: She is not in acute distress.     Comments: Thin, malaised   HENT:      Head: Normocephalic and atraumatic.      Right Ear: External ear normal.      Left Ear: External ear normal.      Nose: Nose normal.      Mouth/Throat:      Mouth: Mucous membranes are moist.   Eyes:      General: No scleral icterus.     Conjunctiva/sclera: Conjunctivae normal.   Neck:      Musculoskeletal: Normal range of motion and neck supple. No neck rigidity.   Cardiovascular:      Rate and Rhythm: Normal rate and regular rhythm.      Pulses: Normal pulses.      Heart sounds: No murmur. No friction rub. No gallop.    Pulmonary:      Effort: Pulmonary effort is normal. No respiratory distress.      Breath sounds: No stridor. Wheezing present. No rhonchi or rales.   Chest:      Chest wall: No tenderness.   Abdominal:      General: Abdomen is flat. Bowel sounds are normal. There is no distension.      Palpations: Abdomen is soft.      Tenderness: There is no tenderness. There is no guarding or rebound.   Musculoskeletal: Normal range of motion.         General: No swelling, tenderness or deformity.   Skin:     General: Skin is warm.      Coloration: Skin is not jaundiced.   Neurological:      General: No focal deficit present.      Mental Status: She is alert and oriented to person, place, and time. Mental status is at baseline.   Psychiatric:         Mood and Affect: Mood normal.         Behavior: Behavior normal.         Thought Content: Thought content normal.         Judgment: Judgment normal.         Laboratory:          No results for input(s): ALTSGPT, ASTSGOT, ALKPHOSPHAT, TBILIRUBIN, DBILIRUBIN, GAMMAGT, AMYLASE, LIPASE, ALB, PREALBUMIN, GLUCOSE in the last 72 hours.      No results for input(s): NTPROBNP in the last 72 hours.      No results for input(s): TROPONINT in the last 72 hours.    Urinalysis:    No results found     Imaging:  No orders to display          Assessment/Plan:  I anticipate this patient will require at least two midnights for appropriate medical management, necessitating inpatient admission.    * Lung mass  Assessment & Plan  Pulmonology consulted and will see tomorrow  Discuss with Pulmonology re: biopsy versus treating pneumonia and reimaging  Obtain MRI record regarding osteolytic lesion of the spine and consider having this biopsied through IR.  Meanwhile treat acute issues first: PNA and COPD.    Chronic respiratory failure with hypoxia (HCC)  Assessment & Plan  Resp and O2 per protocol    HCAP (healthcare-associated pneumonia)  Assessment & Plan  Continue Rocephin  Add doxycycline  Oredered flu screen  Follow procalcitonin    COPD (chronic obstructive pulmonary disease) (HCC)  Assessment & Plan  Resp and O2 per prtocol  COntinue IV steroids, titrate down    History of breast cancer  Assessment & Plan  Noted.      VTE prophylaxis: Heparin SQ hold prior to procedure  Reviewed vitals, labs, imaging, staff notes.  Discussed assessment and plan with Roxy Cota   Discussed with telemetry RN

## 2019-11-08 NOTE — ASSESSMENT & PLAN NOTE
Currently on tamoxifen, prior right mastectomy, new lung lesion and spine lesion may be related but not yet determined

## 2019-11-08 NOTE — ASSESSMENT & PLAN NOTE
In 2016   Treated with surgery and no chemo needed at that time   Holding tamoxifen per onc recs  Waiting for bone biopsy result.

## 2019-11-08 NOTE — PROGRESS NOTES
Pt in room resting, no s/s of distress. Admit profile done. No complaints at this time. Bed in low locked position, side rail sup x2, call light within reach, SR on monitor, will cont to monitor.

## 2019-11-08 NOTE — PROGRESS NOTES
Pulmonary evaluation requested by Dr. Velásquez, transferred from outside hospital with COPD, pneumonia, but also breast cancer on tamoxifen.  Reportedly has a lung mass and lytic lesion in the spine.  May need needle biopsy of either spine lesion or lung mass and oncology input if this is metastatic breast cancer, but awaiting imaging.  None available within the system presently, but Dr. Velásquez is going to contact the referring facility, obtain imaging and then formal consultation will follow.  Nicole Moreland MD , FCCP, Pulmonary Service

## 2019-11-08 NOTE — RESPIRATORY CARE
"COPD EDUCATION by COPD CLINICAL EDUCATOR  2019  at  10:25 AM by Vania Carolina     Patient interviewed by COPD education team.  Patient unable to participate in full program.  Short intervention has been conducted.  A comprehensive packet including information about COPD education, home treatments with spacer instruct. Action plan done. Patient quit smoking .       Roxy Cota   2019  9:43 PM   Admission   MRN: [id number]   Description: Female : 1975 [facility] [address] Dept Phone: [phone]   MY COPD ACTION PLAN      It is recommended that patients and physicians /healthcare providers complete this action plan together. This plan should be discussed at each physician visit and updated as needed.     The green, yellow and red zones show groups of symptoms of COPD. This list of symptoms is not comprehensive, and you may experience other symptoms. In the \"Actions\" column, your healthcare provider has recommended actions for you to take based on your symptoms.     Patient Name: Roxy Cota   YOB: 1975   Last Updated on:             Green Zone:  I am doing well today Actions   •  Usual activitiy and exercise level •  Take daily medications   •  Usual amounts of cough and phlegm/mucus •  Use oxygen as prescribed   •  Sleep well at night •  Continue regular exercise/diet plan   •  Appetite is good •  At all times avoid cigarette smoke, inhaled irritants      Daily Medications (these medications are taken every day):   Albuterol (Accuneb, Proair, Proventil, Ventolin) 2 Puffs Every 4 hours PRN         Yellow Zone:  I am having a bad day or a COPD flare Actions   •  More breathless than usual •  Continue daily medications   •  I have less energy for my daily activities •  Use quick relief inhaler as ordered   •  Increased or thicker phlegm/mucus •  Use oxygen as prescribed   •  Using quick relief inhaler/nebulizer more often •  Get plenty of rest   •  " "Swelling of ankles more than usual •  Use pursed lip breathing   •  More coughing than usual •  At all times avoid cigarette smoke, inhaled irritants   •  I feel like I have a \"chest cold\"   •  Poor sleep and my symptoms woke me up   •  My appetite is not good   •  My medicine is not helping    •  Call provider immediately if symptoms don’t improve      Continue daily medications, add rescue medications:   Albuterol 2 Puffs Every 4 hours         Medications to be used during a flare up, (as Discussed with Provider):               Red Zone:  I need urgent medical care Actions   •  Severe shortness of breath even at rest •  Call 911 or seek medical care immediately   •  Not able to do any activity because of breathing     •  Fever or shaking chills     •  Feeling confused or very drowsy      •  Chest pains     •  Coughing up blood             "

## 2019-11-08 NOTE — ASSESSMENT & PLAN NOTE
Continue Rocephin-->cefdinir (pcn allergy) today/ doxycycline 11/7-**, will plan to treat x 7 days  Procalcitonin downtrending  Sputum cx negative

## 2019-11-08 NOTE — CARE PLAN
Problem: Oxygenation:  Goal: Maintain adequate oxygenation dependent on patient condition  Outcome: PROGRESSING AS EXPECTED   2L Home O2 regimen    Problem: Bronchoconstriction:  Goal: Improve in air movement and diminished wheezing  Outcome: PROGRESSING AS EXPECTED  Duo Q4     Problem: Bronchopulmonary Hygiene:  Goal: Increase mobilization of retained secretions  Outcome: PROGRESSING AS EXPECTED   Flutter QID

## 2019-11-08 NOTE — ASSESSMENT & PLAN NOTE
Prior smoking history, no active bronchospasm presently, but requiring oxygen and nebulized treatments and respiratory therapy on board

## 2019-11-08 NOTE — PROGRESS NOTES
Chart Check Complete    Monitor Summary:    Rhythm- SR   Rate- 55-70  Ectopy- na  Measurements- 0.12/0.10/0.40

## 2019-11-09 ENCOUNTER — HOSPITAL ENCOUNTER (OUTPATIENT)
Dept: RADIOLOGY | Facility: MEDICAL CENTER | Age: 44
End: 2019-11-09

## 2019-11-09 PROBLEM — N17.9 AKI (ACUTE KIDNEY INJURY) (HCC): Status: ACTIVE | Noted: 2019-11-09

## 2019-11-09 PROBLEM — D53.9 MACROCYTIC ANEMIA: Status: ACTIVE | Noted: 2019-11-09

## 2019-11-09 PROBLEM — D72.829 LEUKOCYTOSIS: Status: ACTIVE | Noted: 2019-11-09

## 2019-11-09 LAB
ANION GAP SERPL CALC-SCNC: 9 MMOL/L (ref 0–11.9)
BASOPHILS # BLD AUTO: 0.3 % (ref 0–1.8)
BASOPHILS # BLD: 0.06 K/UL (ref 0–0.12)
BUN SERPL-MCNC: 19 MG/DL (ref 8–22)
CALCIUM SERPL-MCNC: 8.8 MG/DL (ref 8.5–10.5)
CHLORIDE SERPL-SCNC: 113 MMOL/L (ref 96–112)
CO2 SERPL-SCNC: 18 MMOL/L (ref 20–33)
CREAT SERPL-MCNC: 1.01 MG/DL (ref 0.5–1.4)
EOSINOPHIL # BLD AUTO: 0 K/UL (ref 0–0.51)
EOSINOPHIL NFR BLD: 0 % (ref 0–6.9)
ERYTHROCYTE [DISTWIDTH] IN BLOOD BY AUTOMATED COUNT: 56.4 FL (ref 35.9–50)
FOLATE SERPL-MCNC: 4.9 NG/ML
GLUCOSE SERPL-MCNC: 137 MG/DL (ref 65–99)
HCT VFR BLD AUTO: 30.3 % (ref 37–47)
HGB BLD-MCNC: 9.7 G/DL (ref 12–16)
IMM GRANULOCYTES # BLD AUTO: 0.26 K/UL (ref 0–0.11)
IMM GRANULOCYTES NFR BLD AUTO: 1.1 % (ref 0–0.9)
IRON SATN MFR SERPL: 50 % (ref 15–55)
IRON SERPL-MCNC: 124 UG/DL (ref 40–170)
LYMPHOCYTES # BLD AUTO: 1.39 K/UL (ref 1–4.8)
LYMPHOCYTES NFR BLD: 5.9 % (ref 22–41)
MCH RBC QN AUTO: 33.8 PG (ref 27–33)
MCHC RBC AUTO-ENTMCNC: 32 G/DL (ref 33.6–35)
MCV RBC AUTO: 105.6 FL (ref 81.4–97.8)
MONOCYTES # BLD AUTO: 0.8 K/UL (ref 0–0.85)
MONOCYTES NFR BLD AUTO: 3.4 % (ref 0–13.4)
NEUTROPHILS # BLD AUTO: 21.12 K/UL (ref 2–7.15)
NEUTROPHILS NFR BLD: 89.3 % (ref 44–72)
NRBC # BLD AUTO: 0.03 K/UL
NRBC BLD-RTO: 0.1 /100 WBC
PLATELET # BLD AUTO: 314 K/UL (ref 164–446)
PMV BLD AUTO: 10 FL (ref 9–12.9)
POTASSIUM SERPL-SCNC: 4.1 MMOL/L (ref 3.6–5.5)
PROCALCITONIN SERPL-MCNC: 0.88 NG/ML
RBC # BLD AUTO: 2.87 M/UL (ref 4.2–5.4)
SODIUM SERPL-SCNC: 140 MMOL/L (ref 135–145)
TIBC SERPL-MCNC: 246 UG/DL (ref 250–450)
VIT B12 SERPL-MCNC: 728 PG/ML (ref 211–911)
WBC # BLD AUTO: 23.6 K/UL (ref 4.8–10.8)

## 2019-11-09 PROCEDURE — 36415 COLL VENOUS BLD VENIPUNCTURE: CPT

## 2019-11-09 PROCEDURE — 700105 HCHG RX REV CODE 258: Performed by: INTERNAL MEDICINE

## 2019-11-09 PROCEDURE — 85025 COMPLETE CBC W/AUTO DIFF WBC: CPT

## 2019-11-09 PROCEDURE — 94760 N-INVAS EAR/PLS OXIMETRY 1: CPT

## 2019-11-09 PROCEDURE — 94668 MNPJ CHEST WALL SBSQ: CPT

## 2019-11-09 PROCEDURE — 99233 SBSQ HOSP IP/OBS HIGH 50: CPT | Performed by: INTERNAL MEDICINE

## 2019-11-09 PROCEDURE — 94669 MECHANICAL CHEST WALL OSCILL: CPT

## 2019-11-09 PROCEDURE — 82607 VITAMIN B-12: CPT

## 2019-11-09 PROCEDURE — A9270 NON-COVERED ITEM OR SERVICE: HCPCS | Performed by: INTERNAL MEDICINE

## 2019-11-09 PROCEDURE — 700102 HCHG RX REV CODE 250 W/ 637 OVERRIDE(OP): Performed by: INTERNAL MEDICINE

## 2019-11-09 PROCEDURE — 700111 HCHG RX REV CODE 636 W/ 250 OVERRIDE (IP): Performed by: INTERNAL MEDICINE

## 2019-11-09 PROCEDURE — 94640 AIRWAY INHALATION TREATMENT: CPT

## 2019-11-09 PROCEDURE — 84145 PROCALCITONIN (PCT): CPT

## 2019-11-09 PROCEDURE — 700101 HCHG RX REV CODE 250: Performed by: INTERNAL MEDICINE

## 2019-11-09 PROCEDURE — 83540 ASSAY OF IRON: CPT

## 2019-11-09 PROCEDURE — 82746 ASSAY OF FOLIC ACID SERUM: CPT

## 2019-11-09 PROCEDURE — 80048 BASIC METABOLIC PNL TOTAL CA: CPT

## 2019-11-09 PROCEDURE — 770020 HCHG ROOM/CARE - TELE (206)

## 2019-11-09 PROCEDURE — 83550 IRON BINDING TEST: CPT

## 2019-11-09 RX ORDER — SODIUM CHLORIDE, SODIUM LACTATE, POTASSIUM CHLORIDE, CALCIUM CHLORIDE 600; 310; 30; 20 MG/100ML; MG/100ML; MG/100ML; MG/100ML
INJECTION, SOLUTION INTRAVENOUS CONTINUOUS
Status: DISCONTINUED | OUTPATIENT
Start: 2019-11-09 | End: 2019-11-10

## 2019-11-09 RX ORDER — IPRATROPIUM BROMIDE AND ALBUTEROL SULFATE 2.5; .5 MG/3ML; MG/3ML
3 SOLUTION RESPIRATORY (INHALATION)
Status: DISCONTINUED | OUTPATIENT
Start: 2019-11-09 | End: 2019-11-09

## 2019-11-09 RX ORDER — IPRATROPIUM BROMIDE AND ALBUTEROL SULFATE 2.5; .5 MG/3ML; MG/3ML
3 SOLUTION RESPIRATORY (INHALATION)
Status: DISCONTINUED | OUTPATIENT
Start: 2019-11-09 | End: 2019-11-13 | Stop reason: HOSPADM

## 2019-11-09 RX ADMIN — DICYCLOMINE HYDROCHLORIDE 10 MG: 10 CAPSULE ORAL at 17:56

## 2019-11-09 RX ADMIN — Medication 1 CAPSULE: at 07:32

## 2019-11-09 RX ADMIN — SODIUM CHLORIDE, POTASSIUM CHLORIDE, SODIUM LACTATE AND CALCIUM CHLORIDE: 600; 310; 30; 20 INJECTION, SOLUTION INTRAVENOUS at 23:28

## 2019-11-09 RX ADMIN — TIZANIDINE 4 MG: 4 TABLET ORAL at 06:04

## 2019-11-09 RX ADMIN — DICYCLOMINE HYDROCHLORIDE 10 MG: 10 CAPSULE ORAL at 12:25

## 2019-11-09 RX ADMIN — METHYLPREDNISOLONE SODIUM SUCCINATE 40 MG: 40 INJECTION, POWDER, FOR SOLUTION INTRAMUSCULAR; INTRAVENOUS at 17:55

## 2019-11-09 RX ADMIN — HEPARIN SODIUM 5000 UNITS: 5000 INJECTION, SOLUTION INTRAVENOUS; SUBCUTANEOUS at 14:34

## 2019-11-09 RX ADMIN — HEPARIN SODIUM 5000 UNITS: 5000 INJECTION, SOLUTION INTRAVENOUS; SUBCUTANEOUS at 06:01

## 2019-11-09 RX ADMIN — DICYCLOMINE HYDROCHLORIDE 10 MG: 10 CAPSULE ORAL at 06:04

## 2019-11-09 RX ADMIN — OXYCODONE HYDROCHLORIDE AND ACETAMINOPHEN 0.5 TABLET: 10; 325 TABLET ORAL at 06:11

## 2019-11-09 RX ADMIN — HEPARIN SODIUM 5000 UNITS: 5000 INJECTION, SOLUTION INTRAVENOUS; SUBCUTANEOUS at 20:09

## 2019-11-09 RX ADMIN — CLONAZEPAM 1 MG: 1 TABLET ORAL at 12:25

## 2019-11-09 RX ADMIN — DOXYCYCLINE 100 MG: 100 TABLET, FILM COATED ORAL at 17:55

## 2019-11-09 RX ADMIN — CITALOPRAM HYDROBROMIDE 40 MG: 20 TABLET ORAL at 06:03

## 2019-11-09 RX ADMIN — CARVEDILOL 6.25 MG: 6.25 TABLET, FILM COATED ORAL at 06:04

## 2019-11-09 RX ADMIN — OXYCODONE HYDROCHLORIDE AND ACETAMINOPHEN 0.5 TABLET: 10; 325 TABLET ORAL at 23:13

## 2019-11-09 RX ADMIN — IPRATROPIUM BROMIDE AND ALBUTEROL SULFATE 3 ML: .5; 3 SOLUTION RESPIRATORY (INHALATION) at 06:52

## 2019-11-09 RX ADMIN — CLONAZEPAM 1 MG: 1 TABLET ORAL at 06:04

## 2019-11-09 RX ADMIN — SODIUM CHLORIDE, POTASSIUM CHLORIDE, SODIUM LACTATE AND CALCIUM CHLORIDE: 600; 310; 30; 20 INJECTION, SOLUTION INTRAVENOUS at 12:26

## 2019-11-09 RX ADMIN — METHYLPREDNISOLONE SODIUM SUCCINATE 40 MG: 40 INJECTION, POWDER, FOR SOLUTION INTRAMUSCULAR; INTRAVENOUS at 05:59

## 2019-11-09 RX ADMIN — TAMOXIFEN CITRATE 20 MG: 10 TABLET, FILM COATED ORAL at 06:03

## 2019-11-09 RX ADMIN — CLONAZEPAM 1 MG: 1 TABLET ORAL at 17:55

## 2019-11-09 RX ADMIN — CETIRIZINE HYDROCHLORIDE 10 MG: 10 TABLET, FILM COATED ORAL at 06:04

## 2019-11-09 RX ADMIN — DOXYCYCLINE 100 MG: 100 TABLET, FILM COATED ORAL at 06:03

## 2019-11-09 RX ADMIN — TIZANIDINE 4 MG: 4 TABLET ORAL at 14:35

## 2019-11-09 RX ADMIN — CEFTRIAXONE SODIUM 2 G: 2 INJECTION, POWDER, FOR SOLUTION INTRAMUSCULAR; INTRAVENOUS at 17:55

## 2019-11-09 RX ADMIN — TIZANIDINE 4 MG: 4 TABLET ORAL at 20:09

## 2019-11-09 RX ADMIN — CYANOCOBALAMIN TAB 500 MCG 1000 MCG: 500 TAB at 06:03

## 2019-11-09 RX ADMIN — OXYCODONE HYDROCHLORIDE AND ACETAMINOPHEN 0.5 TABLET: 10; 325 TABLET ORAL at 15:18

## 2019-11-09 ASSESSMENT — ENCOUNTER SYMPTOMS
CHILLS: 0
COUGH: 1
ABDOMINAL PAIN: 0
BLURRED VISION: 0
VOMITING: 0
SHORTNESS OF BREATH: 1
CONSTIPATION: 0
FEVER: 0
DIARRHEA: 0
SEIZURES: 0
NAUSEA: 0
FALLS: 0

## 2019-11-09 NOTE — ASSESSMENT & PLAN NOTE
In setting of PNA +/- inflammation from malignancy?  Improving with abx however today back up slightly, procalcitonin improving and patient clinically improving but will need to continue to monitor

## 2019-11-09 NOTE — CARE PLAN
Problem: Oxygenation:  Goal: Maintain adequate oxygenation dependent on patient condition  Intervention: Manage oxygen therapy by monitoring pulse oximetry and/or ABG values  Note:   2L [place] Home reg     Problem: Bronchoconstriction:  Goal: Improve in air movement and diminished wheezing  Intervention: Implement inhaled treatments  Note:   Duoneb Q4     Problem: Bronchopulmonary Hygiene:  Goal: Increase mobilization of retained secretions  Intervention: Perform bronchopulmonary therapy as indicated by assessment  Note:   Alice FAIRCHILDD

## 2019-11-09 NOTE — PROGRESS NOTES
Dr Teran, hospitalist notified of continued nausea and pt unable to hold down PO pain med.  MD does not want to add additional pain med due to pregnancy and will alternate antinausea meds.

## 2019-11-09 NOTE — PROGRESS NOTES
Assumed care of patient.  Pt A,Ox4.  Safety precautions in place:  Bed in lowest, locked position, call bell in reach and pt using appropriately.

## 2019-11-09 NOTE — CARE PLAN
Problem: Bronchoconstriction:  Goal: Improve in air movement and diminished wheezing  Outcome: PROGRESSING AS EXPECTED   Duoneb QIDONNA Jenkins QIDONNA

## 2019-11-09 NOTE — PROGRESS NOTES
[facility] Medicine Daily Progress Note    Date of Service  11/9/2019    Chief Complaint  44 y.o. female with a history of breast cancer (on oral tamoxifen), COPD (on chronic O2), who was transferred from [place] for pneumonia and lung mass on 11/7/2019.    Hospital Course    44 y.o. female with a history of breast cancer (on oral tamoxifen), COPD (on chronic O2), who was transferred from [place] for pneumonia and lung mass on 11/7/2019.        Interval Problem Update  - overall feeling better, breathing better  - afebrile, on RA  - WBC down slightly 27-->23  - poor po intake, no appetite, no n/v  - images up-loaded into our system  - patient asking about dc, has [facility]    Consultants/Specialty  Pulmonology    Code Status  Full    Disposition  Inpatient    Review of Systems  Review of Systems   Constitutional: Positive for malaise/fatigue. Negative for chills and fever.   HENT: Negative.    Eyes: Negative for blurred vision.   Respiratory: Positive for cough and shortness of breath.    Cardiovascular: Negative for leg swelling.   Gastrointestinal: Negative for abdominal pain, constipation, diarrhea, nausea and vomiting.   Genitourinary: Negative for dysuria.   Musculoskeletal: Negative for falls.   Skin: Negative for rash.   Neurological: Negative for seizures.        Physical Exam  Temp:  [36.3 °C (97.4 °F)-37.2 °C (98.9 °F)] 37.1 °C (98.7 °F)  Pulse:  [45-87] 45  Resp:  [16-18] 17  BP: (102-148)/(55-66) 145/63  SpO2:  [94 %-99 %] 99 %    Physical Exam  Constitutional:       General: She is not in acute distress.     Appearance: Normal appearance. She is not toxic-appearing or diaphoretic.   HENT:      Head: Normocephalic and atraumatic.      Nose: No congestion.      Mouth/Throat:      Mouth: Mucous membranes are moist.   Eyes:      General: No scleral icterus.     Conjunctiva/sclera: Conjunctivae normal.   Neck:      Musculoskeletal: Normal range of motion.      Vascular: No JVD.   Cardiovascular:      Rate and  Rhythm: Normal rate and regular rhythm.      Pulses: Normal pulses.      Heart sounds: No murmur. No friction rub. No gallop.    Pulmonary:      Effort: Pulmonary effort is normal. No respiratory distress.      Breath sounds: No wheezing, rhonchi or rales.      Comments: On RA, decreased breath sounds at bases  Abdominal:      General: Abdomen is flat. Bowel sounds are normal. There is no distension.      Palpations: Abdomen is soft.   Musculoskeletal:         General: No swelling.   Skin:     General: Skin is warm and dry.   Neurological:      General: No focal deficit present.      Mental Status: She is alert and oriented to person, place, and time. Mental status is at baseline.   Psychiatric:         Mood and Affect: Mood normal.         Fluids  No intake or output data in the 24 hours ending 11/09/19 1121    Laboratory  Recent Labs     11/08/19  1148 11/09/19  0219   WBC 27.7* 23.6*   RBC 3.03* 2.87*   HEMOGLOBIN 10.3* 9.7*   HEMATOCRIT 30.6* 30.3*   .0* 105.6*   MCH 34.0* 33.8*   MCHC 33.7 32.0*   RDW 53.0* 56.4*   PLATELETCT 310 314   MPV 9.9 10.0     Recent Labs     11/08/19  1148 11/09/19  0219   SODIUM 140 140   POTASSIUM 3.8 4.1   CHLORIDE 115* 113*   CO2 17* 18*   GLUCOSE 163* 137*   BUN 15 19   CREATININE 0.91 1.01   CALCIUM 9.1 8.8                   Imaging  OUTSIDE IMAGES-DX CHEST   Final Result      OUTSIDE IMAGES-CT CHEST   Final Result           Assessment/Plan  * Acute on chronic respiratory failure with hypoxia (HCC)  Assessment & Plan  Resp and O2 per protocol    Lung mass  Assessment & Plan  H/o breast cancer, also with spinal lesion, concerned about breast ca with mets  Pulmonology consulted  Pulm consulted, will look at imaging and decide if mass is at a spot that can bx  Will also talk with oncology today  Meanwhile treat acute issues first: PNA and COPD.  Poor appetite, consult nutrition    JANUSZ (acute kidney injury) (HCC)  Assessment & Plan  Likely in setting of infection  Cr  increased from yesterday, not eating well, will restart LR    Leukocytosis- (present on admission)  Assessment & Plan  In setting of PNA  Improving with abx  CTM    Macrocytic anemia  Assessment & Plan  Hemoglobin 9.7 today, , LFTs normal  Will check folate and B12 as well as iron studies    HCAP (healthcare-associated pneumonia)  Assessment & Plan  Continue Rocephin/ doxycycline 11/7-**  Follow procalcitonin  Sputum culture pending    COPD (chronic obstructive pulmonary disease) (HCC)  Assessment & Plan  Resp and O2 per prtocol  COntinue IV steroids, titrate down    History of breast cancer  Assessment & Plan  Noted.       VTE prophylaxis: heparin SC

## 2019-11-09 NOTE — CARE PLAN
Problem: Communication  Goal: The ability to communicate needs accurately and effectively will improve  Outcome: PROGRESSING AS EXPECTED   Pt's whiteboard updated. Pt has been updated on POC. All questions have been answered.    Problem: Knowledge Deficit  Goal: Knowledge of disease process/condition, treatment plan, diagnostic tests, and medications will improve  Outcome: PROGRESSING AS EXPECTED

## 2019-11-09 NOTE — PROGRESS NOTES
[facility] Medicine Daily Progress Note    Date of Service  11/8/2019    Chief Complaint  44 y.o. female with a history of breast cancer (on oral tamoxifen), COPD (on chronic O2), who was transferred from [place] for pneumonia and lung mass on 11/7/2019.    Hospital Course    44 y.o. female with a history of breast cancer (on oral tamoxifen), COPD (on chronic O2), who was transferred from [place] for pneumonia and lung mass on 11/7/2019.        Interval Problem Update  -Admitted overnight  - Started on ceftriaxone, doxycycline and methylprednisolone  -On 2 L nasal cannula  -Procalcitonin elevated 1.97, sputum culture pending  -White blood cell count 27    Consultants/Specialty  Pulmonology    Code Status  Full    Disposition  Inpatient    Review of Systems  Review of Systems   Constitutional: Positive for malaise/fatigue. Negative for chills and fever.   HENT: Negative.    Eyes: Negative for blurred vision.   Respiratory: Positive for cough and shortness of breath.    Cardiovascular: Negative for leg swelling.   Gastrointestinal: Negative for abdominal pain, constipation, diarrhea, nausea and vomiting.   Genitourinary: Negative for dysuria.   Musculoskeletal: Negative for falls.   Skin: Negative for rash.   Neurological: Negative for seizures.        Physical Exam  Temp:  [36.7 °C (98.1 °F)-36.9 °C (98.5 °F)] 36.7 °C (98.1 °F)  Pulse:  [61-87] 67  Resp:  [16-18] 16  BP: (102-131)/(43-69) 131/55  SpO2:  [94 %-99 %] 96 %    Physical Exam  Constitutional:       General: She is not in acute distress.     Appearance: Normal appearance. She is not toxic-appearing or diaphoretic.   HENT:      Head: Normocephalic and atraumatic.      Nose: No congestion.      Mouth/Throat:      Mouth: Mucous membranes are moist.   Eyes:      General: No scleral icterus.     Conjunctiva/sclera: Conjunctivae normal.   Neck:      Musculoskeletal: Normal range of motion.      Vascular: No JVD.   Cardiovascular:      Rate and Rhythm: Normal rate and  regular rhythm.      Pulses: Normal pulses.      Heart sounds: No murmur. No friction rub. No gallop.    Pulmonary:      Effort: Pulmonary effort is normal. No respiratory distress.      Breath sounds: Rhonchi present. No wheezing or rales.   Abdominal:      General: Abdomen is flat. Bowel sounds are normal. There is no distension.      Palpations: Abdomen is soft.   Musculoskeletal:         General: No swelling.   Skin:     General: Skin is warm and dry.   Neurological:      General: No focal deficit present.      Mental Status: She is alert and oriented to person, place, and time. Mental status is at baseline.   Psychiatric:         Mood and Affect: Mood normal.         Fluids  No intake or output data in the 24 hours ending 11/08/19 1923    Laboratory  Recent Labs     11/08/19  1148   WBC 27.7*   RBC 3.03*   HEMOGLOBIN 10.3*   HEMATOCRIT 30.6*   .0*   MCH 34.0*   MCHC 33.7   RDW 53.0*   PLATELETCT 310   MPV 9.9     Recent Labs     11/08/19  1148   SODIUM 140   POTASSIUM 3.8   CHLORIDE 115*   CO2 17*   GLUCOSE 163*   BUN 15   CREATININE 0.91   CALCIUM 9.1                   Imaging  No orders to display        Assessment/Plan  * Acute on chronic respiratory failure with hypoxia (HCC)  Assessment & Plan  Resp and O2 per protocol    Lung mass  Assessment & Plan  Pulmonology consulted  Discuss with Pulmonology re: biopsy versus treating pneumonia and reimaging, patient did not come with discs or outside hospital imaging we will try to obtain  Obtain MRI record regarding osteolytic lesion of the spine and consider having this biopsied through IR.  Meanwhile treat acute issues first: PNA and COPD.    HCAP (healthcare-associated pneumonia)  Assessment & Plan  Continue Rocephin/ doxycycline  Follow procalcitonin  Sputum culture pending    COPD (chronic obstructive pulmonary disease) (HCC)  Assessment & Plan  Resp and O2 per prtocol  COntinue IV steroids, titrate down    History of breast cancer  Assessment &  Plan  Noted.       VTE prophylaxis: heparin SC

## 2019-11-09 NOTE — PROGRESS NOTES
Pulmonary Care Progress Note    Date of admission  11/7/2019    Chief Complaint  44 y.o. female admitted 11/7/2019 with pneumonia and possible lung mass    Hospital Course    44 y.o. female who presented 11/7/2019 with pneumonia and was found on imaging to have a lung mass.  She has breast cancer diagnosed over one year ago, had right mastectomy, and has been on tamoxifen.  Imaging not yet available from the outside hospital but reportedly has a lung mass as well as a spine lesion, lytic, unifying diagnosis may be breast cancer metastatic but also consider underlying bronchogenic malignancy with her prior smoking history.  She stopped smoking over one year ago and has been careful not to resume, does not presently have hemoptysis or significant purulence.  However underlying COPD, prior need for oxygen, also tells me she has a prior history of lung related damage from sepsis, I wonder if this might be post ARDS fibrotic change but that also remains to be determined with current imaging not yet available.         Interval Problem Update  Chart review from the past 24 hours includes imaging, laboratory studies, vital signs and notes available.  Pertinent data for today's visit includes my personal review of her outside CT scan and imaging, by my review these appear to be more infiltrative than nodular or masslike.  Multifocal areas in the right lung and left lung and apex suggest this could be pneumonia rather than malignancy.  Might be valuable to continue antibiotics and then reimage and compare with CT scan noncontrast in 2 to 3 days and if these infiltrates are evolving, less likely to be related to metastatic breast cancer.  However the lytic lesion in the spine does raise that suspicion and this is discussed with her hospitalist, Dr. Christen VEGA   Constitutional: Recently had chills and fever. No complaints presently  HENT: Negative for congestion and sore throat.    Eyes: Negative for photophobia and  discharge.   Respiratory:positive for cough, sputum production, shortness of breath and wheezing.    Cardiovascular: Negative for chest pain, palpitations and leg swelling.   Gastrointestinal: Negative for abdominal pain, diarrhea, nausea and vomiting.   Musculoskeletal: Negative for back pain and myalgias. Specifically no pain in the region of the reported lytic lesion  Neurological: Negative for focal weakness, weakness and headaches.     Vital Signs for last 24 hours   Temp:  [36.3 °C (97.4 °F)-37.2 °C (98.9 °F)] 36.7 °C (98.1 °F)  Pulse:  [49-87] 52  Resp:  [16-18] 18  BP: (102-148)/(55-66) 148/66  SpO2:  [94 %-96 %] 95 %      Physical Exam  Constitutional: Alert, cooperative, not in acute distress.  On nasal prong oxygen without tachypnea  HEENT: Normocephalic, Atraumatic.  Edentulous  Eyes: PERRLA, EOMI, Conjunctiva not injected.  No scleral icterus    Neck: Trachea is midline;  no thyromegaly   Lymphatic: No lymphadenopathy noted cervical or supraclavicular  Cardiovascular:  Regular rate and rhythm, no distinct murmur or gallop; neck veins flat  Chest & Lungs: Scattered but not localizing wheezing, rhonchi; no  rales  Abdomen: Soft non-tender, no rebound, no guarding.    Skin: Warm, Dry, No erythema, No rash.   Neurologic: Alert & oriented , cranial nervesgrossly intact, Normal motor function;  No focal deficits noted.   Psychiatric: Affect normal,  Mood normal.   Extremities: Well-perfused, no mottling, no asymmetric edema  Joints: No new swelling or acute arthritis in visible joints    Medications  Current Facility-Administered Medications   Medication Dose Route Frequency Provider Last Rate Last Dose   • ipratropium-albuterol (DUONEB) nebulizer solution  3 mL Nebulization 4X/DAY (RT) Rhonda Velásquez M.D.       • albuterol inhaler 2 Puff  2 Puff Inhalation Q4HRS PRN Yimi Javier M.D.       • senna-docusate (PERICOLACE or SENOKOT S) 8.6-50 MG per tablet 2 Tab  2 Tab Oral BID Jose R Etienne M.D.         And   • polyethylene glycol/lytes (MIRALAX) PACKET 1 Packet  1 Packet Oral QDAY PRN Jose R Etienne M.D.        And   • magnesium hydroxide (MILK OF MAGNESIA) suspension 30 mL  30 mL Oral QDAY PRN Jose R Etienne M.D.        And   • bisacodyl (DULCOLAX) suppository 10 mg  10 mg Rectal QDAY PRN Jose R Etienne M.D.       • heparin injection 5,000 Units  5,000 Units Subcutaneous Q8HRS Jose R Etienne M.D.   5,000 Units at 11/09/19 0601   • Respiratory Care per Protocol   Nebulization Continuous RT Jose R Etienne M.D.       • doxycycline monohydrate (ADOXA) tablet 100 mg  100 mg Oral Q12HRS Jose R Etienne M.D.   100 mg at 11/09/19 0603   • methylPREDNISolone (SOLU-MEDROL) 40 MG injection 40 mg  40 mg Intravenous Q12HRS Jose R Etienne M.D.   40 mg at 11/09/19 0559   • cefTRIAXone (ROCEPHIN) 2 g in  mL IVPB  2 g Intravenous Q24HRS Jose R Etienne M.D.   Stopped at 11/08/19 1823   • lactobacillus rhamnosus (CULTURELLE) capsule 1 Cap  1 Cap Oral QDAY with Breakfast Jose R Etienne M.D.   1 Cap at 11/09/19 0732   • carvedilol (COREG) tablet 6.25 mg  6.25 mg Oral BID Jose R Etienne M.D.   6.25 mg at 11/09/19 0604   • cetirizine (ZYRTEC) tablet 10 mg  10 mg Oral DAILY Jose R Etienne M.D.   10 mg at 11/09/19 0604   • citalopram (CELEXA) tablet 40 mg  40 mg Oral DAILY Jose R Etienne M.D.   40 mg at 11/09/19 0603   • clonazePAM (KLONOPIN) tablet 1 mg  1 mg Oral TID Jose R Etienne M.D.   1 mg at 11/09/19 0604   • cyanocobalamin (VITAMIN B-12) tablet 1,000 mcg  1,000 mcg Oral DAILY Jose R Etienne M.D.   1,000 mcg at 11/09/19 0603   • dicyclomine (BENTYL) capsule 10 mg  10 mg Oral TID Jose R Etienne M.D.   10 mg at 11/09/19 0604   • oxyCODONE-acetaminophen (PERCOCET-10)  MG per tablet 0.5 Tab  0.5 Tab Oral TID PRN Jose R Etienne M.D.   0.5 Tab at 11/09/19 0611   • tamoxifen (NOLVADEX) tablet 20 mg  20 mg Oral DAILY Jose R Etienne M.D.   20 mg at 11/09/19 0603   •  tizanidine (ZANAFLEX) tablet 4 mg  4 mg Oral Q8HRS Jose R Etienne M.D.   4 mg at 11/09/19 0604       Fluids  No intake or output data in the 24 hours ending 11/09/19 0800    Laboratory          Recent Labs     11/08/19  1148 11/09/19  0219   SODIUM 140 140   POTASSIUM 3.8 4.1   CHLORIDE 115* 113*   CO2 17* 18*   BUN 15 19   CREATININE 0.91 1.01   CALCIUM 9.1 8.8     Recent Labs     11/08/19  1148 11/09/19  0219   ALTSGPT 5  --    ASTSGOT 9*  --    ALKPHOSPHAT 65  --    TBILIRUBIN 0.2  --    GLUCOSE 163* 137*     Recent Labs     11/08/19  1148 11/09/19 0219   WBC 27.7* 23.6*   NEUTSPOLYS 90.30* 89.30*   LYMPHOCYTES 4.70* 5.90*   MONOCYTES 3.70 3.40   EOSINOPHILS 0.00 0.00   BASOPHILS 0.10 0.30   ASTSGOT 9*  --    ALTSGPT 5  --    ALKPHOSPHAT 65  --    TBILIRUBIN 0.2  --      Recent Labs     11/08/19  1148 11/09/19 0219   RBC 3.03* 2.87*   HEMOGLOBIN 10.3* 9.7*   HEMATOCRIT 30.6* 30.3*   PLATELETCT 310 314         Assessment/Plan  * Acute on chronic respiratory failure with hypoxia (HCC)  Assessment & Plan  Multifactorial with underlying COPD, prior smoking history, pneumonia, and lung mass with prior breast cancer and potential for bronchogenic malignancy as well    Lung mass  Assessment & Plan  Reported from outside facility, may be related to underlying breast cancer, but does have a prior smoking history.  Review outside images, and may need needle biopsy.  Also reportedly has lytic spine lesion, unifying diagnosis more likely breast cancer, diagnosed over one year ago.  Remains to be determined.  In the meantime treating pneumonia with leukocytosis evident.    HCAP (healthcare-associated pneumonia)  Assessment & Plan  Patient has leukocytosis, elevated pro [employer], on Rocephin and doxycycline    COPD (chronic obstructive pulmonary disease) (HCC)  Assessment & Plan  Prior smoking history, no active bronchospasm presently, but requiring oxygen and nebulized treatments and respiratory therapy on board    History of  breast cancer  Assessment & Plan  Currently on tamoxifen, prior right mastectomy, new lung lesion and spine lesion may be related but not yet determined       11/9, my personal review of her outside CT scan and imaging, by my review these appear to be more infiltrative than nodular or masslike.  Multifocal areas in the right lung and left lung and apex suggest this could be pneumonia rather than malignancy.  Might be valuable to continue antibiotics and then reimage and compare with CT scan noncontrast in 2 to 3 days and if these infiltrates are evolving, less likely to be related to metastatic breast cancer.  However the lytic lesion in the spine does raise that suspicion and this is discussed with her hospitalist, Dr. Velásquez      I have performed a physical exam and reviewed and updated ROS and Plan today (11/9/2019). In review of yesterday's note (11/8/2019), there are no changes except as documented above.     Discussed patient condition and risk of morbidity and/or mortality with Hospitalist and Patient    Nicole Moreland MD , FCCP, Pulmonary Service

## 2019-11-09 NOTE — CARE PLAN
Problem: Respiratory:  Goal: Respiratory status will improve  Outcome: PROGRESSING AS EXPECTED     Problem: Pain Management  Goal: Pain level will decrease to patient's comfort goal  Outcome: PROGRESSING AS EXPECTED

## 2019-11-10 LAB
ANION GAP SERPL CALC-SCNC: 7 MMOL/L (ref 0–11.9)
BACTERIA SPEC RESP CULT: NORMAL
BUN SERPL-MCNC: 18 MG/DL (ref 8–22)
CALCIUM SERPL-MCNC: 8.5 MG/DL (ref 8.5–10.5)
CHLORIDE SERPL-SCNC: 109 MMOL/L (ref 96–112)
CO2 SERPL-SCNC: 20 MMOL/L (ref 20–33)
CREAT SERPL-MCNC: 0.92 MG/DL (ref 0.5–1.4)
GLUCOSE SERPL-MCNC: 151 MG/DL (ref 65–99)
GRAM STN SPEC: NORMAL
MAGNESIUM SERPL-MCNC: 1.8 MG/DL (ref 1.5–2.5)
POTASSIUM SERPL-SCNC: 4 MMOL/L (ref 3.6–5.5)
SIGNIFICANT IND 70042: NORMAL
SITE SITE: NORMAL
SODIUM SERPL-SCNC: 136 MMOL/L (ref 135–145)
SOURCE SOURCE: NORMAL

## 2019-11-10 PROCEDURE — 99233 SBSQ HOSP IP/OBS HIGH 50: CPT | Performed by: INTERNAL MEDICINE

## 2019-11-10 PROCEDURE — 700102 HCHG RX REV CODE 250 W/ 637 OVERRIDE(OP): Performed by: INTERNAL MEDICINE

## 2019-11-10 PROCEDURE — 36415 COLL VENOUS BLD VENIPUNCTURE: CPT

## 2019-11-10 PROCEDURE — 770020 HCHG ROOM/CARE - TELE (206)

## 2019-11-10 PROCEDURE — 83735 ASSAY OF MAGNESIUM: CPT

## 2019-11-10 PROCEDURE — 80048 BASIC METABOLIC PNL TOTAL CA: CPT

## 2019-11-10 PROCEDURE — 700111 HCHG RX REV CODE 636 W/ 250 OVERRIDE (IP): Performed by: INTERNAL MEDICINE

## 2019-11-10 PROCEDURE — A9270 NON-COVERED ITEM OR SERVICE: HCPCS | Performed by: INTERNAL MEDICINE

## 2019-11-10 PROCEDURE — 700105 HCHG RX REV CODE 258: Performed by: INTERNAL MEDICINE

## 2019-11-10 RX ORDER — CARVEDILOL 3.12 MG/1
3.12 TABLET ORAL 2 TIMES DAILY
Status: DISCONTINUED | OUTPATIENT
Start: 2019-11-10 | End: 2019-11-13 | Stop reason: HOSPADM

## 2019-11-10 RX ORDER — CEFDINIR 300 MG/1
300 CAPSULE ORAL EVERY 12 HOURS
Status: DISCONTINUED | OUTPATIENT
Start: 2019-11-10 | End: 2019-11-10

## 2019-11-10 RX ORDER — CEFDINIR 300 MG/1
300 CAPSULE ORAL EVERY 12 HOURS
Status: DISCONTINUED | OUTPATIENT
Start: 2019-11-10 | End: 2019-11-13 | Stop reason: HOSPADM

## 2019-11-10 RX ADMIN — SODIUM CHLORIDE, POTASSIUM CHLORIDE, SODIUM LACTATE AND CALCIUM CHLORIDE: 600; 310; 30; 20 INJECTION, SOLUTION INTRAVENOUS at 10:01

## 2019-11-10 RX ADMIN — DICYCLOMINE HYDROCHLORIDE 10 MG: 10 CAPSULE ORAL at 16:54

## 2019-11-10 RX ADMIN — OXYCODONE HYDROCHLORIDE AND ACETAMINOPHEN 0.5 TABLET: 10; 325 TABLET ORAL at 18:02

## 2019-11-10 RX ADMIN — CYANOCOBALAMIN TAB 500 MCG 1000 MCG: 500 TAB at 05:36

## 2019-11-10 RX ADMIN — CLONAZEPAM 1 MG: 1 TABLET ORAL at 05:36

## 2019-11-10 RX ADMIN — CITALOPRAM HYDROBROMIDE 40 MG: 20 TABLET ORAL at 05:36

## 2019-11-10 RX ADMIN — DOXYCYCLINE 100 MG: 100 TABLET, FILM COATED ORAL at 16:54

## 2019-11-10 RX ADMIN — CETIRIZINE HYDROCHLORIDE 10 MG: 10 TABLET, FILM COATED ORAL at 05:36

## 2019-11-10 RX ADMIN — HEPARIN SODIUM 5000 UNITS: 5000 INJECTION, SOLUTION INTRAVENOUS; SUBCUTANEOUS at 13:37

## 2019-11-10 RX ADMIN — Medication 1 CAPSULE: at 05:36

## 2019-11-10 RX ADMIN — CLONAZEPAM 1 MG: 1 TABLET ORAL at 11:37

## 2019-11-10 RX ADMIN — METHYLPREDNISOLONE SODIUM SUCCINATE 40 MG: 40 INJECTION, POWDER, FOR SOLUTION INTRAMUSCULAR; INTRAVENOUS at 05:37

## 2019-11-10 RX ADMIN — TIZANIDINE 4 MG: 4 TABLET ORAL at 05:36

## 2019-11-10 RX ADMIN — TIZANIDINE 4 MG: 4 TABLET ORAL at 13:37

## 2019-11-10 RX ADMIN — CLONAZEPAM 1 MG: 1 TABLET ORAL at 16:54

## 2019-11-10 RX ADMIN — HEPARIN SODIUM 5000 UNITS: 5000 INJECTION, SOLUTION INTRAVENOUS; SUBCUTANEOUS at 05:36

## 2019-11-10 RX ADMIN — OXYCODONE HYDROCHLORIDE AND ACETAMINOPHEN 0.5 TABLET: 10; 325 TABLET ORAL at 08:22

## 2019-11-10 RX ADMIN — DICYCLOMINE HYDROCHLORIDE 10 MG: 10 CAPSULE ORAL at 05:36

## 2019-11-10 RX ADMIN — HEPARIN SODIUM 5000 UNITS: 5000 INJECTION, SOLUTION INTRAVENOUS; SUBCUTANEOUS at 21:08

## 2019-11-10 RX ADMIN — DICYCLOMINE HYDROCHLORIDE 10 MG: 10 CAPSULE ORAL at 11:37

## 2019-11-10 RX ADMIN — TIZANIDINE 4 MG: 4 TABLET ORAL at 21:08

## 2019-11-10 RX ADMIN — DOXYCYCLINE 100 MG: 100 TABLET, FILM COATED ORAL at 05:36

## 2019-11-10 RX ADMIN — CEFDINIR 300 MG: 300 CAPSULE ORAL at 16:54

## 2019-11-10 ASSESSMENT — ENCOUNTER SYMPTOMS
SEIZURES: 0
BLURRED VISION: 0
DIARRHEA: 0
SHORTNESS OF BREATH: 0
NAUSEA: 0
WEIGHT LOSS: 0
CHILLS: 0
VOMITING: 0
FEVER: 0
FALLS: 0
CONSTIPATION: 0
COUGH: 1
ABDOMINAL PAIN: 0

## 2019-11-10 NOTE — DIETARY
Nutrition Services: Day 3 of admit. Roxy Cota is a 44 y.o. female with admitting DX of Pneumonia, Lung mass.  Consult received for poor oral intake.     Pt stated she eats 1 meal per day at home which mostly consists of vegetables. Appetite is currently consistent with baseline and was lower than usual prior to admission per patient verbalization. Pt reports 9.1 kg (20 lb) weight gain over the last year unintentionally. Patient offered nutrition supplements and is agreeable. MD consented to nutrition supplements. Pt stated she does not consume nutrition supplements outside of the hospital due cost. Spoke with MD and left voicemail for  for Care Chest prescription so patient can obtain Boost Plus TID for weight gain/maintenance post-discharge.     Assessment:  Ht: 162.6 cm, Wt 11/9: 58.1 kg via stand up scale, BMI: 21.99 - normal  Diet/Intake: regular - Per chart pt PO % x 2 meals     Evaluation:   1. Meds: vitamin B12, culturelle  2. Last BM: 11/9    Malnutrition Risk: No criteria met at this time.     Recommendations/Plan:  1. Boost Plus TID.   2. Encourage intake of meals/supplements.  3. Document intake of all meals/supplements as % taken in ADL's to provide interdisciplinary communication across all shifts.   4. Monitor weight.  5. Nutrition rep will continue to see patient for ongoing meal and snack preferences.    RD following.

## 2019-11-10 NOTE — PROGRESS NOTES
[facility] Medicine Daily Progress Note    Date of Service  11/10/2019    Chief Complaint  44 y.o. female with a history of breast cancer (on oral tamoxifen), COPD (on chronic O2), who was transferred from [place] for pneumonia and lung mass on 11/7/2019.    Hospital Course    44 y.o. female with a history of breast cancer (on oral tamoxifen), COPD (on chronic O2), who was transferred from [place] for pneumonia and lung mass on 11/7/2019.        Interval Problem Update  - overall feeling better, breathing better  - afebrile, on RA  - reports she's gained ~20lbs and unsure why in last few months, h/o anorexia  - Rn called OSH regarding MRI but no MRI spine since 2015    Consultants/Specialty  Pulmonology    Code Status  Full    Disposition  Inpatient    Review of Systems  Review of Systems   Constitutional: Positive for malaise/fatigue. Negative for chills, fever and weight loss.   HENT: Negative.    Eyes: Negative for blurred vision.   Respiratory: Positive for cough. Negative for shortness of breath.    Cardiovascular: Negative for leg swelling.   Gastrointestinal: Negative for abdominal pain, constipation, diarrhea, nausea and vomiting.   Genitourinary: Negative for dysuria.   Musculoskeletal: Negative for falls.   Skin: Negative for rash.   Neurological: Negative for seizures.        Physical Exam  Temp:  [36.5 °C (97.7 °F)-37.3 °C (99.1 °F)] 37.3 °C (99.1 °F)  Pulse:  [40-47] 47  Resp:  [14-16] 16  BP: (149-164)/(57-76) 164/68  SpO2:  [94 %-98 %] 94 %    Physical Exam  Constitutional:       General: She is not in acute distress.     Appearance: Normal appearance. She is not toxic-appearing or diaphoretic.   HENT:      Head: Normocephalic and atraumatic.      Nose: No congestion.      Mouth/Throat:      Mouth: Mucous membranes are moist.   Eyes:      General: No scleral icterus.     Conjunctiva/sclera: Conjunctivae normal.   Neck:      Musculoskeletal: Normal range of motion.      Vascular: No JVD.   Cardiovascular:       Rate and Rhythm: Normal rate and regular rhythm.      Pulses: Normal pulses.      Heart sounds: No murmur. No friction rub. No gallop.    Pulmonary:      Effort: Pulmonary effort is normal. No respiratory distress.      Breath sounds: No wheezing, rhonchi or rales.      Comments: On RA, decreased breath sounds at bases  Abdominal:      General: Abdomen is flat. Bowel sounds are normal. There is no distension.      Palpations: Abdomen is soft.   Musculoskeletal:         General: No swelling.   Skin:     General: Skin is warm and dry.   Neurological:      General: No focal deficit present.      Mental Status: She is alert and oriented to person, place, and time. Mental status is at baseline.   Psychiatric:         Mood and Affect: Mood normal.         Fluids    Intake/Output Summary (Last 24 hours) at 11/10/2019 1233  Last data filed at 11/10/2019 0800  Gross per 24 hour   Intake 360 ml   Output --   Net 360 ml       Laboratory  Recent Labs     11/08/19  1148 11/09/19  0219   WBC 27.7* 23.6*   RBC 3.03* 2.87*   HEMOGLOBIN 10.3* 9.7*   HEMATOCRIT 30.6* 30.3*   .0* 105.6*   MCH 34.0* 33.8*   MCHC 33.7 32.0*   RDW 53.0* 56.4*   PLATELETCT 310 314   MPV 9.9 10.0     Recent Labs     11/08/19  1148 11/09/19  0219 11/10/19  0302   SODIUM 140 140 136   POTASSIUM 3.8 4.1 4.0   CHLORIDE 115* 113* 109   CO2 17* 18* 20   GLUCOSE 163* 137* 151*   BUN 15 19 18   CREATININE 0.91 1.01 0.92   CALCIUM 9.1 8.8 8.5                   Imaging  OUTSIDE IMAGES-DX CHEST   Final Result      OUTSIDE IMAGES-CT CHEST   Final Result           Assessment/Plan  * Acute on chronic respiratory failure with hypoxia (HCC)  Assessment & Plan  Resolved  Resp and O2 per protocol    Lung mass  Assessment & Plan  H/o breast cancer, also with spinal lesion? (will talk with radiology today regarding OSH CT), concerned about breast ca with mets  Pulmonology consulted  Pulm consulted: recommending repeat CT thorax without tomorrow to assess mass for  PNA vs malignancy  Meanwhile treat acute issues first: PNA and COPD.  Poor appetite, consult nutrition    JANUSZ (acute kidney injury) (HCC)  Assessment & Plan  Likely in setting of infection  Cr increased from yesterday, not eating well, will restart LR    Leukocytosis- (present on admission)  Assessment & Plan  In setting of PNA  Improving with abx  CTM    Macrocytic anemia  Assessment & Plan  Hemoglobin 9.7 today, , LFTs normal  Folate and B12 normal, iron studies normal     HCAP (healthcare-associated pneumonia)  Assessment & Plan  Continue Rocephin-->cefdinir (pcn allergy) today/ doxycycline 11/7-**, will plan to treat x 10 days  Procalcitonin downtrending  Sputum cx negative    COPD (chronic obstructive pulmonary disease) ([facility])  Assessment & Plan  Resp and O2 per prtocol  COntinue IV steroids, titrate down    History of breast cancer  Assessment & Plan  Holding tamoxifen per onc recs       VTE prophylaxis: heparin SC

## 2019-11-10 NOTE — PROGRESS NOTES
Bedside report received from night RN, pt care assumed. Patient sleeping during this time. Pt SB on the monitor. Bed in lowest, locked position, treaded socks on, call light and belongings within reach.

## 2019-11-10 NOTE — PROGRESS NOTES
Pulmonary Care Progress Note    Date of admission  11/7/2019    Chief Complaint  44 y.o. female admitted 11/7/2019 with pneumonia and possible lung mass    Hospital Course    44 y.o. female who presented 11/7/2019 with pneumonia and was found on imaging to have a lung mass.  She has breast cancer diagnosed over one year ago, had right mastectomy, and has been on tamoxifen.  Imaging not yet available from the outside hospital but reportedly has a lung mass as well as a spine lesion, lytic, unifying diagnosis may be breast cancer metastatic but also consider underlying bronchogenic malignancy with her prior smoking history.  She stopped smoking over one year ago and has been careful not to resume, does not presently have hemoptysis or significant purulence.  However underlying COPD, prior need for oxygen, also tells me she has a prior history of lung related damage from sepsis, I wonder if this might be post ARDS fibrotic change but that also remains to be determined with current imaging not yet available.         Interval Problem Update  11/9,   Pertinent data for today's visit includes my personal review of her outside CT scan and imaging, by my review these appear to be more infiltrative than nodular or masslike.  Multifocal areas in the right lung and left lung and apex suggest this could be pneumonia rather than malignancy.  Might be valuable to continue antibiotics and then reimage and compare with CT scan noncontrast in 2 to 3 days and if these infiltrates are evolving, less likely to be related to metastatic breast cancer.  However the lytic lesion in the spine does raise that suspicion and this is discussed with her hospitalist, Dr. Velásquez    11/10, Chart review from the past 24 hours includes imaging, laboratory studies, vital signs and notes available.  Pertinent data for today's visit includes no new fever, cough less prominent, white count has been decreasing on antibiotic therapy.  Obtaining films  regarding spine and lytic lesion, hopefully the abnormality seen on outside CT scan of her lung are pneumonia and appear to be more infiltrate than mass, but if repeat CAT scan shows same pattern then we may need to consider invasive diagnosis given the possibility of metastatic breast cancer.  Discussed again today with patient and Dr. Velásquez, hospitalist      GARY   Constitutional: Recently had chills and fever. No complaints presently  HENT: Negative for congestion and sore throat.    Eyes: Negative for photophobia and discharge.   Respiratory:positive for cough, sputum production, shortness of breath and wheezing.    Cardiovascular: Negative for chest pain, palpitations and leg swelling.   Gastrointestinal: Negative for abdominal pain, diarrhea, nausea and vomiting.   Musculoskeletal: Negative for back pain and myalgias. Specifically no pain in the region of the reported lytic lesion  Neurological: Negative for focal weakness, weakness and headaches.     Vital Signs for last 24 hours   Temp:  [36.5 °C (97.7 °F)-36.9 °C (98.4 °F)] 36.5 °C (97.7 °F)  Pulse:  [40-54] 41  Resp:  [14-17] 14  BP: (146-161)/(57-76) 149/64  SpO2:  [95 %-98 %] 96 %      Physical Exam  Constitutional: Alert, cooperative, not in acute distress.  On nasal prong oxygen without tachypnea  HEENT: Normocephalic, Atraumatic.  Edentulous  Eyes: PERRLA, EOMI, Conjunctiva not injected.  No scleral icterus    Neck: Trachea is midline;  no thyromegaly   Lymphatic: No lymphadenopathy noted cervical or supraclavicular  Cardiovascular:  Regular rate and rhythm, no distinct murmur or gallop; neck veins flat  Chest & Lungs: Scattered but not localizing wheezing, rhonchi; no  rales  Abdomen: Soft non-tender, no rebound, no guarding.    Skin: Warm, Dry, No erythema, No rash.   Neurologic: Alert & oriented , cranial nervesgrossly intact, Normal motor function;  No focal deficits noted.   Psychiatric: Affect normal,  Mood normal.   Extremities:  Well-perfused, no mottling, no asymmetric edema  Joints: No new swelling or acute arthritis in visible joints    Medications  Current Facility-Administered Medications   Medication Dose Route Frequency Provider Last Rate Last Dose   • ipratropium-albuterol (DUONEB) nebulizer solution  3 mL Nebulization Q4H PRN (RT) Rhonda Velásquez M.D.       • lactated ringers infusion   Intravenous Continuous Rhonda Velásquez M.D. 100 mL/hr at 11/09/19 2328     • albuterol inhaler 2 Puff  2 Puff Inhalation Q4HRS PRN Yimi Javier M.D.       • senna-docusate (PERICOLACE or SENOKOT S) 8.6-50 MG per tablet 2 Tab  2 Tab Oral BID Jose R Etienne M.D.        And   • polyethylene glycol/lytes (MIRALAX) PACKET 1 Packet  1 Packet Oral QDAY PRN Jose R Etienne M.D.        And   • magnesium hydroxide (MILK OF MAGNESIA) suspension 30 mL  30 mL Oral QDAY PRN Jose R Etienne M.D.        And   • bisacodyl (DULCOLAX) suppository 10 mg  10 mg Rectal QDAY PRN Jose R Etienne M.D.       • heparin injection 5,000 Units  5,000 Units Subcutaneous Q8HRS Jose R Etienne M.D.   5,000 Units at 11/10/19 0536   • Respiratory Care per Protocol   Nebulization Continuous RT Jose R Etienne M.D.       • doxycycline monohydrate (ADOXA) tablet 100 mg  100 mg Oral Q12HRS Jose R Etienne M.D.   100 mg at 11/10/19 0536   • cefTRIAXone (ROCEPHIN) 2 g in  mL IVPB  2 g Intravenous Q24HRS Rhonda Velásquez M.D.   Stopped at 11/09/19 1825   • lactobacillus rhamnosus (CULTURELLE) capsule 1 Cap  1 Cap Oral QDAY with Breakfast Jose R Etienne M.D.   1 Cap at 11/10/19 0536   • carvedilol (COREG) tablet 6.25 mg  6.25 mg Oral BID Jose R Etienne M.D.   Stopped at 11/09/19 1800   • cetirizine (ZYRTEC) tablet 10 mg  10 mg Oral DAILY Jose R Etienne M.D.   10 mg at 11/10/19 0536   • citalopram (CELEXA) tablet 40 mg  40 mg Oral DAILY Jose R Etienne M.D.   40 mg at 11/10/19 0536   • clonazePAM (KLONOPIN) tablet 1 mg  1 mg Oral TID Jose R Etienne  M.D.   1 mg at 11/10/19 0536   • cyanocobalamin (VITAMIN B-12) tablet 1,000 mcg  1,000 mcg Oral DAILY Jose R Etienne M.D.   1,000 mcg at 11/10/19 0536   • dicyclomine (BENTYL) capsule 10 mg  10 mg Oral TID Jose R Etienne M.D.   10 mg at 11/10/19 0536   • oxyCODONE-acetaminophen (PERCOCET-10)  MG per tablet 0.5 Tab  0.5 Tab Oral TID PRN Jose R Etienne M.D.   0.5 Tab at 11/10/19 0822   • tizanidine (ZANAFLEX) tablet 4 mg  4 mg Oral Q8HRS Jose R Etienne M.D.   4 mg at 11/10/19 0536       Fluids    Intake/Output Summary (Last 24 hours) at 11/10/2019 0852  Last data filed at 11/10/2019 0800  Gross per 24 hour   Intake 360 ml   Output --   Net 360 ml       Laboratory          Recent Labs     11/08/19  1148 11/09/19  0219 11/10/19  0302   SODIUM 140 140 136   POTASSIUM 3.8 4.1 4.0   CHLORIDE 115* 113* 109   CO2 17* 18* 20   BUN 15 19 18   CREATININE 0.91 1.01 0.92   MAGNESIUM  --   --  1.8   CALCIUM 9.1 8.8 8.5     Recent Labs     11/08/19  1148 11/09/19 0219 11/10/19  0302   ALTSGPT 5  --   --    ASTSGOT 9*  --   --    ALKPHOSPHAT 65  --   --    TBILIRUBIN 0.2  --   --    GLUCOSE 163* 137* 151*     Recent Labs     11/08/19  1148 11/09/19 0219   WBC 27.7* 23.6*   NEUTSPOLYS 90.30* 89.30*   LYMPHOCYTES 4.70* 5.90*   MONOCYTES 3.70 3.40   EOSINOPHILS 0.00 0.00   BASOPHILS 0.10 0.30   ASTSGOT 9*  --    ALTSGPT 5  --    ALKPHOSPHAT 65  --    TBILIRUBIN 0.2  --      Recent Labs     11/08/19  1148 11/09/19 0219 11/09/19  0753   RBC 3.03* 2.87*  --    HEMOGLOBIN 10.3* 9.7*  --    HEMATOCRIT 30.6* 30.3*  --    PLATELETCT 310 314  --    IRON  --   --  124   TOTIRONBC  --   --  246*         Assessment/Plan  * Acute on chronic respiratory failure with hypoxia (HCC)  Assessment & Plan  Multifactorial with underlying COPD, prior smoking history, pneumonia, and lung mass with prior breast cancer and potential for bronchogenic malignancy as well    Lung mass  Assessment & Plan  Reported from outside facility, may be  related to underlying breast cancer, but does have a prior smoking history.  Review outside images, and may need needle biopsy.  Also reportedly has lytic spine lesion, unifying diagnosis more likely breast cancer, diagnosed over one year ago.  Remains to be determined.  In the meantime treating pneumonia with leukocytosis evident.    HCAP (healthcare-associated pneumonia)  Assessment & Plan  Patient has leukocytosis, elevated pro [employer], on Rocephin and doxycycline    COPD (chronic obstructive pulmonary disease) (HCC)  Assessment & Plan  Prior smoking history, no active bronchospasm presently, but requiring oxygen and nebulized treatments and respiratory therapy on board    History of breast cancer  Assessment & Plan  Currently on tamoxifen, prior right mastectomy, new lung lesion and spine lesion may be related but not yet determined     11/9, my personal review of her outside CT scan and imaging, by my review these appear to be more infiltrative than nodular or masslike.  Multifocal areas in the right lung and left lung and apex suggest this could be pneumonia rather than malignancy.  Might be valuable to continue antibiotics and then reimage and compare with CT scan noncontrast in 2 to 3 days and if these infiltrates are evolving, less likely to be related to metastatic breast cancer.  However the lytic lesion in the spine does raise that suspicion and this is discussed with her hospitalist, Dr. Velásquez      I have performed a physical exam and reviewed and updated ROS and Plan today (11/10/2019). In review of yesterday's note (11/9/2019), there are no changes except as documented above.     Discussed patient condition and risk of morbidity and/or mortality with Hospitalist and Patient    Nicole Moreland MD , [facility], Pulmonary Service

## 2019-11-10 NOTE — CARE PLAN
Problem: Nutritional:  Goal: Achieve adequate nutritional intake  Description  Patient will consume >50% of meals/supplements.   Outcome: PROGRESSING SLOWER THAN EXPECTED

## 2019-11-10 NOTE — CARE PLAN
Problem: Bowel/Gastric:  Goal: Normal bowel function is maintained or improved  Outcome: PROGRESSING AS EXPECTED     Problem: Pain Management  Goal: Pain level will decrease to patient's comfort goal  Outcome: PROGRESSING AS EXPECTED

## 2019-11-10 NOTE — DISCHARGE PLANNING
"Care Transition Team Assessment  LSW met with pt at bedside to complete assessment and discuss discharge plans/barriers. Pt reported her goal is to return home and her  will be providing her transportation from the hospital. She does report she would like to see if she can get a cane to help with her walking and balance. Pt reported she tends to steady herself on furniture at home and does fall at times.     LSW discussed possible food insecurities brought up by dietitian and possible need for additional resources. Dietitian placed referral to  to determine if pt would qualify for [facility] for nutritional supplement drinks. However [facility] is a [place] agency that provides services to [place] residents, pt will not qualify for program as she is a [place] resident.   · Pt reported some difficulties affording food at times but does report she uses the local food bank and is on SNAP benefits $17/month. She reported \"I just don't eat a lot and haven't for the last 20 years.\" She denies her lack of appetite being due to inability to afford food.     Pt otherwise reported independent with her ADLs/iADL needs. She does report some difficulties at times when her pain is bad. Pt reported she does use oxygen at home mainly at night and her supplier is [employer]. She reported good support from her . She reported both her and her  are on SSDI and their combined monthly income is around $1500.     Information Source  Orientation : Oriented x 4  Information Given By: Patient  Informant's Name: Roxy  Who is responsible for making decisions for patient? : Patient    Readmission Evaluation  Is this a readmission?: No    Elopement Risk  Legal Hold: No  Ambulatory or Self Mobile in Wheelchair: Yes  Disoriented: No  Psychiatric Symptoms: None  History of Wandering: No  Elopement this Admit: No  Vocalizing Wanting to Leave: No  Displays Behaviors, Body Language Wanting to Leave: No-Not at Risk for " Elopement  Elopement Risk: Not at Risk for Elopement    Interdisciplinary Discharge Planning  Lives with - Patient's Self Care Capacity: Spouse  Patient or legal guardian wants to designate a caregiver (see row info): No  Support Systems: Spouse / Significant Other  Prior Services: Home-Independent  Patient Expects to be Discharged to:: Home   Durable Medical Equipment: Home Oxygen  DME Provider / Phone: [place]    Discharge Preparedness  What is your plan after discharge?: Home with help  What are your discharge supports?: Spouse  Prior Functional Level: Ambulatory, Independent with Activities of Daily Living, Independent with Medication Management  Difficulity with ADLs: None  Difficulity with IADLs: None    Functional Assesment  Prior Functional Level: Ambulatory, Independent with Activities of Daily Living, Independent with Medication Management    Finances  Financial Barriers to Discharge: No  Prescription Coverage: Yes    Vision / Hearing Impairment  Vision Impairment : Yes  Hearing Impairment : Yes  Hearing Impairment: Both Ears  Does Pt Need Special Equipment for the Hearing Impaired?: No    Advance Directive  Advance Directive?: None  Advance Directive offered?: AD Booklet given    Domestic Abuse  Have you ever been the victim of abuse or violence?: No  Physical Abuse or Sexual Abuse: No  Verbal Abuse or Emotional Abuse: No  Possible Abuse Reported to:: Not Applicable    Discharge Risks or Barriers  Discharge risks or barriers?: No    Anticipated Discharge Information  Anticipated discharge disposition: Home

## 2019-11-11 ENCOUNTER — APPOINTMENT (OUTPATIENT)
Dept: RADIOLOGY | Facility: MEDICAL CENTER | Age: 44
DRG: 477 | End: 2019-11-11
Attending: INTERNAL MEDICINE
Payer: COMMERCIAL

## 2019-11-11 PROBLEM — E83.42 HYPOMAGNESEMIA: Status: ACTIVE | Noted: 2019-11-11

## 2019-11-11 PROBLEM — M54.40 ACUTE BILATERAL LOW BACK PAIN WITH SCIATICA: Status: ACTIVE | Noted: 2019-11-11

## 2019-11-11 LAB
ANION GAP SERPL CALC-SCNC: 7 MMOL/L (ref 0–11.9)
ANISOCYTOSIS BLD QL SMEAR: ABNORMAL
BASOPHILS # BLD AUTO: 0 % (ref 0–1.8)
BASOPHILS # BLD: 0 K/UL (ref 0–0.12)
BUN SERPL-MCNC: 19 MG/DL (ref 8–22)
CALCIUM SERPL-MCNC: 8.1 MG/DL (ref 8.5–10.5)
CHLORIDE SERPL-SCNC: 109 MMOL/L (ref 96–112)
CO2 SERPL-SCNC: 23 MMOL/L (ref 20–33)
CREAT SERPL-MCNC: 0.86 MG/DL (ref 0.5–1.4)
EOSINOPHIL # BLD AUTO: 0 K/UL (ref 0–0.51)
EOSINOPHIL NFR BLD: 0 % (ref 0–6.9)
ERYTHROCYTE [DISTWIDTH] IN BLOOD BY AUTOMATED COUNT: 49.1 FL (ref 35.9–50)
GLUCOSE SERPL-MCNC: 121 MG/DL (ref 65–99)
HCT VFR BLD AUTO: 30.3 % (ref 37–47)
HGB BLD-MCNC: 10.2 G/DL (ref 12–16)
LG PLATELETS BLD QL SMEAR: NORMAL
LYMPHOCYTES # BLD AUTO: 2.52 K/UL (ref 1–4.8)
LYMPHOCYTES NFR BLD: 10.3 % (ref 22–41)
MACROCYTES BLD QL SMEAR: ABNORMAL
MAGNESIUM SERPL-MCNC: 1.7 MG/DL (ref 1.5–2.5)
MANUAL DIFF BLD: NORMAL
MCH RBC QN AUTO: 33.7 PG (ref 27–33)
MCHC RBC AUTO-ENTMCNC: 33.7 G/DL (ref 33.6–35)
MCV RBC AUTO: 100 FL (ref 81.4–97.8)
MONOCYTES # BLD AUTO: 3.6 K/UL (ref 0–0.85)
MONOCYTES NFR BLD AUTO: 14.7 % (ref 0–13.4)
MORPHOLOGY BLD-IMP: NORMAL
NEUTROPHILS # BLD AUTO: 18.38 K/UL (ref 2–7.15)
NEUTROPHILS NFR BLD: 75 % (ref 44–72)
NRBC # BLD AUTO: 0.04 K/UL
NRBC BLD-RTO: 0.2 /100 WBC
PLATELET # BLD AUTO: 358 K/UL (ref 164–446)
PLATELET BLD QL SMEAR: NORMAL
PMV BLD AUTO: 10 FL (ref 9–12.9)
POTASSIUM SERPL-SCNC: 3.3 MMOL/L (ref 3.6–5.5)
PROCALCITONIN SERPL-MCNC: 0.15 NG/ML
RBC # BLD AUTO: 3.03 M/UL (ref 4.2–5.4)
RBC BLD AUTO: PRESENT
SODIUM SERPL-SCNC: 139 MMOL/L (ref 135–145)
TSH SERPL DL<=0.005 MIU/L-ACNC: 1.92 UIU/ML (ref 0.38–5.33)
WBC # BLD AUTO: 24.5 K/UL (ref 4.8–10.8)

## 2019-11-11 PROCEDURE — 85027 COMPLETE CBC AUTOMATED: CPT

## 2019-11-11 PROCEDURE — 700102 HCHG RX REV CODE 250 W/ 637 OVERRIDE(OP): Performed by: INTERNAL MEDICINE

## 2019-11-11 PROCEDURE — A9270 NON-COVERED ITEM OR SERVICE: HCPCS | Performed by: INTERNAL MEDICINE

## 2019-11-11 PROCEDURE — 700111 HCHG RX REV CODE 636 W/ 250 OVERRIDE (IP): Performed by: INTERNAL MEDICINE

## 2019-11-11 PROCEDURE — 700102 HCHG RX REV CODE 250 W/ 637 OVERRIDE(OP): Performed by: HOSPITALIST

## 2019-11-11 PROCEDURE — A9270 NON-COVERED ITEM OR SERVICE: HCPCS | Performed by: HOSPITALIST

## 2019-11-11 PROCEDURE — 74177 CT ABD & PELVIS W/CONTRAST: CPT

## 2019-11-11 PROCEDURE — 84145 PROCALCITONIN (PCT): CPT

## 2019-11-11 PROCEDURE — 84443 ASSAY THYROID STIM HORMONE: CPT

## 2019-11-11 PROCEDURE — 99233 SBSQ HOSP IP/OBS HIGH 50: CPT | Performed by: INTERNAL MEDICINE

## 2019-11-11 PROCEDURE — 770020 HCHG ROOM/CARE - TELE (206)

## 2019-11-11 PROCEDURE — 80048 BASIC METABOLIC PNL TOTAL CA: CPT

## 2019-11-11 PROCEDURE — 83735 ASSAY OF MAGNESIUM: CPT

## 2019-11-11 PROCEDURE — 71250 CT THORAX DX C-: CPT

## 2019-11-11 PROCEDURE — 700117 HCHG RX CONTRAST REV CODE 255: Performed by: INTERNAL MEDICINE

## 2019-11-11 PROCEDURE — 97166 OT EVAL MOD COMPLEX 45 MIN: CPT

## 2019-11-11 PROCEDURE — 85007 BL SMEAR W/DIFF WBC COUNT: CPT

## 2019-11-11 PROCEDURE — 36415 COLL VENOUS BLD VENIPUNCTURE: CPT

## 2019-11-11 RX ORDER — HYDROMORPHONE HYDROCHLORIDE 1 MG/ML
0.5 INJECTION, SOLUTION INTRAMUSCULAR; INTRAVENOUS; SUBCUTANEOUS EVERY 4 HOURS PRN
Status: DISCONTINUED | OUTPATIENT
Start: 2019-11-11 | End: 2019-11-13 | Stop reason: HOSPADM

## 2019-11-11 RX ORDER — POTASSIUM CHLORIDE 20 MEQ/1
40 TABLET, EXTENDED RELEASE ORAL 2 TIMES DAILY
Status: COMPLETED | OUTPATIENT
Start: 2019-11-11 | End: 2019-11-11

## 2019-11-11 RX ORDER — OXYCODONE AND ACETAMINOPHEN 10; 325 MG/1; MG/1
1 TABLET ORAL 3 TIMES DAILY PRN
Status: DISCONTINUED | OUTPATIENT
Start: 2019-11-11 | End: 2019-11-13 | Stop reason: HOSPADM

## 2019-11-11 RX ORDER — ACETAMINOPHEN 325 MG/1
650 TABLET ORAL EVERY 6 HOURS PRN
Status: DISCONTINUED | OUTPATIENT
Start: 2019-11-11 | End: 2019-11-13 | Stop reason: HOSPADM

## 2019-11-11 RX ORDER — MAGNESIUM SULFATE HEPTAHYDRATE 40 MG/ML
2 INJECTION, SOLUTION INTRAVENOUS ONCE
Status: COMPLETED | OUTPATIENT
Start: 2019-11-11 | End: 2019-11-11

## 2019-11-11 RX ADMIN — CITALOPRAM HYDROBROMIDE 40 MG: 20 TABLET ORAL at 05:12

## 2019-11-11 RX ADMIN — OXYCODONE HYDROCHLORIDE AND ACETAMINOPHEN 0.5 TABLET: 10; 325 TABLET ORAL at 11:55

## 2019-11-11 RX ADMIN — TIZANIDINE 4 MG: 4 TABLET ORAL at 14:16

## 2019-11-11 RX ADMIN — DOXYCYCLINE 100 MG: 100 TABLET, FILM COATED ORAL at 17:18

## 2019-11-11 RX ADMIN — Medication 1 CAPSULE: at 05:53

## 2019-11-11 RX ADMIN — HYDROMORPHONE HYDROCHLORIDE 0.5 MG: 1 INJECTION, SOLUTION INTRAMUSCULAR; INTRAVENOUS; SUBCUTANEOUS at 14:24

## 2019-11-11 RX ADMIN — DOXYCYCLINE 100 MG: 100 TABLET, FILM COATED ORAL at 05:12

## 2019-11-11 RX ADMIN — HEPARIN SODIUM 5000 UNITS: 5000 INJECTION, SOLUTION INTRAVENOUS; SUBCUTANEOUS at 14:16

## 2019-11-11 RX ADMIN — OXYCODONE HYDROCHLORIDE AND ACETAMINOPHEN 0.5 TABLET: 10; 325 TABLET ORAL at 02:14

## 2019-11-11 RX ADMIN — DICYCLOMINE HYDROCHLORIDE 10 MG: 10 CAPSULE ORAL at 11:55

## 2019-11-11 RX ADMIN — CEFDINIR 300 MG: 300 CAPSULE ORAL at 17:18

## 2019-11-11 RX ADMIN — CLONAZEPAM 1 MG: 1 TABLET ORAL at 05:12

## 2019-11-11 RX ADMIN — CEFDINIR 300 MG: 300 CAPSULE ORAL at 05:12

## 2019-11-11 RX ADMIN — MAGNESIUM SULFATE IN WATER 2 G: 40 INJECTION, SOLUTION INTRAVENOUS at 08:05

## 2019-11-11 RX ADMIN — CLONAZEPAM 1 MG: 1 TABLET ORAL at 17:18

## 2019-11-11 RX ADMIN — DICYCLOMINE HYDROCHLORIDE 10 MG: 10 CAPSULE ORAL at 17:18

## 2019-11-11 RX ADMIN — HYDROMORPHONE HYDROCHLORIDE 0.5 MG: 1 INJECTION, SOLUTION INTRAMUSCULAR; INTRAVENOUS; SUBCUTANEOUS at 20:13

## 2019-11-11 RX ADMIN — TIZANIDINE 4 MG: 4 TABLET ORAL at 20:13

## 2019-11-11 RX ADMIN — CETIRIZINE HYDROCHLORIDE 10 MG: 10 TABLET, FILM COATED ORAL at 05:12

## 2019-11-11 RX ADMIN — CLONAZEPAM 1 MG: 1 TABLET ORAL at 11:55

## 2019-11-11 RX ADMIN — ACETAMINOPHEN 650 MG: 325 TABLET, FILM COATED ORAL at 05:53

## 2019-11-11 RX ADMIN — HEPARIN SODIUM 5000 UNITS: 5000 INJECTION, SOLUTION INTRAVENOUS; SUBCUTANEOUS at 05:12

## 2019-11-11 RX ADMIN — POTASSIUM CHLORIDE 40 MEQ: 1500 TABLET, EXTENDED RELEASE ORAL at 17:18

## 2019-11-11 RX ADMIN — IOHEXOL 80 ML: 350 INJECTION, SOLUTION INTRAVENOUS at 16:58

## 2019-11-11 RX ADMIN — CARVEDILOL 3.12 MG: 3.12 TABLET, FILM COATED ORAL at 17:22

## 2019-11-11 RX ADMIN — DICYCLOMINE HYDROCHLORIDE 10 MG: 10 CAPSULE ORAL at 05:12

## 2019-11-11 RX ADMIN — CYANOCOBALAMIN TAB 500 MCG 1000 MCG: 500 TAB at 05:12

## 2019-11-11 RX ADMIN — TIZANIDINE 4 MG: 4 TABLET ORAL at 05:12

## 2019-11-11 RX ADMIN — POTASSIUM CHLORIDE 40 MEQ: 1500 TABLET, EXTENDED RELEASE ORAL at 08:05

## 2019-11-11 RX ADMIN — OXYCODONE HYDROCHLORIDE AND ACETAMINOPHEN 1 TABLET: 10; 325 TABLET ORAL at 18:31

## 2019-11-11 RX ADMIN — ACETAMINOPHEN 650 MG: 325 TABLET, FILM COATED ORAL at 13:23

## 2019-11-11 ASSESSMENT — ENCOUNTER SYMPTOMS
CHILLS: 0
SEIZURES: 0
HEADACHES: 0
ABDOMINAL PAIN: 0
BLURRED VISION: 0
BACK PAIN: 1
COUGH: 1
CONSTIPATION: 0
FALLS: 0
FEVER: 0
VOMITING: 0
NAUSEA: 0
SHORTNESS OF BREATH: 0
WEIGHT LOSS: 0
DIARRHEA: 0

## 2019-11-11 ASSESSMENT — COGNITIVE AND FUNCTIONAL STATUS - GENERAL
HELP NEEDED FOR BATHING: A LITTLE
DRESSING REGULAR UPPER BODY CLOTHING: A LITTLE
TOILETING: A LITTLE
SUGGESTED CMS G CODE MODIFIER DAILY ACTIVITY: CK
DRESSING REGULAR LOWER BODY CLOTHING: A LOT
DAILY ACTIVITIY SCORE: 18
PERSONAL GROOMING: A LITTLE

## 2019-11-11 ASSESSMENT — ACTIVITIES OF DAILY LIVING (ADL): TOILETING: INDEPENDENT

## 2019-11-11 NOTE — CARE PLAN
Problem: Safety  Goal: Will remain free from falls  Outcome: PROGRESSING AS EXPECTED     Problem: Infection  Goal: Will remain free from infection  Outcome: PROGRESSING AS EXPECTED     Problem: Venous Thromboembolism (VTW)/Deep Vein Thrombosis (DVT) Prevention:  Goal: Patient will participate in Venous Thrombosis (VTE)/Deep Vein Thrombosis (DVT)Prevention Measures  Outcome: PROGRESSING AS EXPECTED     Problem: Respiratory:  Goal: Respiratory status will improve  Outcome: PROGRESSING AS EXPECTED     Problem: Pain Management  Goal: Pain level will decrease to patient's comfort goal  Outcome: PROGRESSING SLOWER THAN EXPECTED

## 2019-11-11 NOTE — CARE PLAN
Problem: Communication  Goal: The ability to communicate needs accurately and effectively will improve  Outcome: PROGRESSING AS EXPECTED   Pt's whiteboard updated. Pt has been updated on POC. All questions have been answered.    Problem: Respiratory:  Goal: Respiratory status will improve  Outcome: PROGRESSING AS EXPECTED   Patient regularly using IS

## 2019-11-11 NOTE — PROGRESS NOTES
Received report from previous nurse, Petra, regarding prior 12 hours.  POC reviewed with pt, white board updated, pt verbalized understanding, call light within reach.  Pt encouraged to call before getting up.  Bed in lowest position, treaded slippers on.

## 2019-11-11 NOTE — PROGRESS NOTES
Monitor summary: 0.14/0.08/0.48   Sinus Bradycardia/Sinus Rhythm 42 - 91 with a low of 42,   rare PVCs

## 2019-11-11 NOTE — PROGRESS NOTES
Bedside report received from night RN, pt care assumed. Bed in lowest, locked position, treaded socks on, call light and belongings within reach. Transport arrived to take patient down to CT. Monitor room notified.

## 2019-11-12 ENCOUNTER — APPOINTMENT (OUTPATIENT)
Dept: RADIOLOGY | Facility: MEDICAL CENTER | Age: 44
DRG: 477 | End: 2019-11-12
Attending: INTERNAL MEDICINE
Payer: COMMERCIAL

## 2019-11-12 PROBLEM — F39 MOOD DISORDER (HCC): Chronic | Status: ACTIVE | Noted: 2019-11-12

## 2019-11-12 LAB
ANION GAP SERPL CALC-SCNC: 9 MMOL/L (ref 0–11.9)
APTT PPP: 31.6 SEC (ref 24.7–36)
BASOPHILS # BLD AUTO: 0 % (ref 0–1.8)
BASOPHILS # BLD: 0 K/UL (ref 0–0.12)
BUN SERPL-MCNC: 15 MG/DL (ref 8–22)
CALCIUM SERPL-MCNC: 8.1 MG/DL (ref 8.5–10.5)
CHLORIDE SERPL-SCNC: 104 MMOL/L (ref 96–112)
CO2 SERPL-SCNC: 22 MMOL/L (ref 20–33)
CREAT SERPL-MCNC: 0.8 MG/DL (ref 0.5–1.4)
EOSINOPHIL # BLD AUTO: 0 K/UL (ref 0–0.51)
EOSINOPHIL NFR BLD: 0 % (ref 0–6.9)
ERYTHROCYTE [DISTWIDTH] IN BLOOD BY AUTOMATED COUNT: 49.5 FL (ref 35.9–50)
GLUCOSE SERPL-MCNC: 100 MG/DL (ref 65–99)
HCT VFR BLD AUTO: 31.4 % (ref 37–47)
HGB BLD-MCNC: 10.4 G/DL (ref 12–16)
INR PPP: 1.16 (ref 0.87–1.13)
LYMPHOCYTES # BLD AUTO: 3.96 K/UL (ref 1–4.8)
LYMPHOCYTES NFR BLD: 15.4 % (ref 22–41)
MAGNESIUM SERPL-MCNC: 1.8 MG/DL (ref 1.5–2.5)
MANUAL DIFF BLD: NORMAL
MCH RBC QN AUTO: 33.3 PG (ref 27–33)
MCHC RBC AUTO-ENTMCNC: 33.1 G/DL (ref 33.6–35)
MCV RBC AUTO: 100.6 FL (ref 81.4–97.8)
MONOCYTES # BLD AUTO: 1.64 K/UL (ref 0–0.85)
MONOCYTES NFR BLD AUTO: 6.4 % (ref 0–13.4)
MORPHOLOGY BLD-IMP: NORMAL
NEUTROPHILS # BLD AUTO: 20.1 K/UL (ref 2–7.15)
NEUTROPHILS NFR BLD: 76.4 % (ref 44–72)
NEUTS BAND NFR BLD MANUAL: 1.8 % (ref 0–10)
NRBC # BLD AUTO: 0.04 K/UL
NRBC BLD-RTO: 0.2 /100 WBC
PATHOLOGY CONSULT NOTE: NORMAL
PLATELET # BLD AUTO: 361 K/UL (ref 164–446)
PLATELET BLD QL SMEAR: NORMAL
PMV BLD AUTO: 9.8 FL (ref 9–12.9)
POTASSIUM SERPL-SCNC: 4.2 MMOL/L (ref 3.6–5.5)
PROTHROMBIN TIME: 15 SEC (ref 12–14.6)
RBC # BLD AUTO: 3.12 M/UL (ref 4.2–5.4)
RBC BLD AUTO: NORMAL
SODIUM SERPL-SCNC: 135 MMOL/L (ref 135–145)
WBC # BLD AUTO: 25.7 K/UL (ref 4.8–10.8)

## 2019-11-12 PROCEDURE — 88342 IMHCHEM/IMCYTCHM 1ST ANTB: CPT

## 2019-11-12 PROCEDURE — 85610 PROTHROMBIN TIME: CPT

## 2019-11-12 PROCEDURE — 700102 HCHG RX REV CODE 250 W/ 637 OVERRIDE(OP): Performed by: HOSPITALIST

## 2019-11-12 PROCEDURE — 770020 HCHG ROOM/CARE - TELE (206)

## 2019-11-12 PROCEDURE — 88311 DECALCIFY TISSUE: CPT

## 2019-11-12 PROCEDURE — 85730 THROMBOPLASTIN TIME PARTIAL: CPT

## 2019-11-12 PROCEDURE — 83735 ASSAY OF MAGNESIUM: CPT

## 2019-11-12 PROCEDURE — 99232 SBSQ HOSP IP/OBS MODERATE 35: CPT | Performed by: INTERNAL MEDICINE

## 2019-11-12 PROCEDURE — 700111 HCHG RX REV CODE 636 W/ 250 OVERRIDE (IP): Performed by: INTERNAL MEDICINE

## 2019-11-12 PROCEDURE — 20225 BONE BIOPSY TROCAR/NDL DEEP: CPT

## 2019-11-12 PROCEDURE — 700102 HCHG RX REV CODE 250 W/ 637 OVERRIDE(OP): Performed by: INTERNAL MEDICINE

## 2019-11-12 PROCEDURE — 88307 TISSUE EXAM BY PATHOLOGIST: CPT

## 2019-11-12 PROCEDURE — 80048 BASIC METABOLIC PNL TOTAL CA: CPT

## 2019-11-12 PROCEDURE — 88360 TUMOR IMMUNOHISTOCHEM/MANUAL: CPT

## 2019-11-12 PROCEDURE — A9270 NON-COVERED ITEM OR SERVICE: HCPCS | Performed by: HOSPITALIST

## 2019-11-12 PROCEDURE — 85027 COMPLETE CBC AUTOMATED: CPT

## 2019-11-12 PROCEDURE — A9270 NON-COVERED ITEM OR SERVICE: HCPCS | Performed by: INTERNAL MEDICINE

## 2019-11-12 PROCEDURE — 77012 CT SCAN FOR NEEDLE BIOPSY: CPT

## 2019-11-12 PROCEDURE — 700111 HCHG RX REV CODE 636 W/ 250 OVERRIDE (IP)

## 2019-11-12 PROCEDURE — 36415 COLL VENOUS BLD VENIPUNCTURE: CPT

## 2019-11-12 PROCEDURE — 700102 HCHG RX REV CODE 250 W/ 637 OVERRIDE(OP): Performed by: RADIOLOGY

## 2019-11-12 PROCEDURE — 700111 HCHG RX REV CODE 636 W/ 250 OVERRIDE (IP): Performed by: HOSPITALIST

## 2019-11-12 PROCEDURE — 85007 BL SMEAR W/DIFF WBC COUNT: CPT

## 2019-11-12 PROCEDURE — 700111 HCHG RX REV CODE 636 W/ 250 OVERRIDE (IP): Performed by: RADIOLOGY

## 2019-11-12 PROCEDURE — 97162 PT EVAL MOD COMPLEX 30 MIN: CPT

## 2019-11-12 PROCEDURE — A9270 NON-COVERED ITEM OR SERVICE: HCPCS | Performed by: RADIOLOGY

## 2019-11-12 PROCEDURE — 88341 IMHCHEM/IMCYTCHM EA ADD ANTB: CPT

## 2019-11-12 PROCEDURE — 0QB33ZX EXCISION OF LEFT PELVIC BONE, PERCUTANEOUS APPROACH, DIAGNOSTIC: ICD-10-PCS | Performed by: RADIOLOGY

## 2019-11-12 RX ORDER — OXYCODONE HYDROCHLORIDE 5 MG/1
5 TABLET ORAL
Status: DISCONTINUED | OUTPATIENT
Start: 2019-11-12 | End: 2019-11-13 | Stop reason: HOSPADM

## 2019-11-12 RX ORDER — HYDROMORPHONE HYDROCHLORIDE 1 MG/ML
0.5 INJECTION, SOLUTION INTRAMUSCULAR; INTRAVENOUS; SUBCUTANEOUS ONCE
Status: COMPLETED | OUTPATIENT
Start: 2019-11-12 | End: 2019-11-12

## 2019-11-12 RX ORDER — OXYCODONE HYDROCHLORIDE 10 MG/1
10 TABLET ORAL
Status: DISCONTINUED | OUTPATIENT
Start: 2019-11-12 | End: 2019-11-13 | Stop reason: HOSPADM

## 2019-11-12 RX ORDER — MIDAZOLAM HYDROCHLORIDE 1 MG/ML
.5-2 INJECTION INTRAMUSCULAR; INTRAVENOUS PRN
Status: ACTIVE | OUTPATIENT
Start: 2019-11-12 | End: 2019-11-12

## 2019-11-12 RX ORDER — ONDANSETRON 2 MG/ML
4 INJECTION INTRAMUSCULAR; INTRAVENOUS PRN
Status: ACTIVE | OUTPATIENT
Start: 2019-11-12 | End: 2019-11-12

## 2019-11-12 RX ORDER — SODIUM CHLORIDE 9 MG/ML
500 INJECTION, SOLUTION INTRAVENOUS
Status: ACTIVE | OUTPATIENT
Start: 2019-11-12 | End: 2019-11-12

## 2019-11-12 RX ORDER — NALOXONE HYDROCHLORIDE 0.4 MG/ML
INJECTION, SOLUTION INTRAMUSCULAR; INTRAVENOUS; SUBCUTANEOUS
Status: COMPLETED
Start: 2019-11-12 | End: 2019-11-12

## 2019-11-12 RX ORDER — MIDAZOLAM HYDROCHLORIDE 1 MG/ML
INJECTION INTRAMUSCULAR; INTRAVENOUS
Status: COMPLETED
Start: 2019-11-12 | End: 2019-11-12

## 2019-11-12 RX ADMIN — FENTANYL CITRATE 50 MCG: 50 INJECTION, SOLUTION INTRAMUSCULAR; INTRAVENOUS at 13:20

## 2019-11-12 RX ADMIN — DICYCLOMINE HYDROCHLORIDE 10 MG: 10 CAPSULE ORAL at 04:47

## 2019-11-12 RX ADMIN — CITALOPRAM HYDROBROMIDE 40 MG: 20 TABLET ORAL at 04:48

## 2019-11-12 RX ADMIN — TIZANIDINE 4 MG: 4 TABLET ORAL at 23:04

## 2019-11-12 RX ADMIN — CLONAZEPAM 1 MG: 1 TABLET ORAL at 16:55

## 2019-11-12 RX ADMIN — OXYCODONE HYDROCHLORIDE 10 MG: 10 TABLET ORAL at 19:44

## 2019-11-12 RX ADMIN — DICYCLOMINE HYDROCHLORIDE 10 MG: 10 CAPSULE ORAL at 16:55

## 2019-11-12 RX ADMIN — DICYCLOMINE HYDROCHLORIDE 10 MG: 10 CAPSULE ORAL at 12:25

## 2019-11-12 RX ADMIN — CEFDINIR 300 MG: 300 CAPSULE ORAL at 04:48

## 2019-11-12 RX ADMIN — CLONAZEPAM 1 MG: 1 TABLET ORAL at 04:48

## 2019-11-12 RX ADMIN — MIDAZOLAM HYDROCHLORIDE 1 MG: 1 INJECTION, SOLUTION INTRAMUSCULAR; INTRAVENOUS at 13:13

## 2019-11-12 RX ADMIN — ACETAMINOPHEN 650 MG: 325 TABLET, FILM COATED ORAL at 22:04

## 2019-11-12 RX ADMIN — OXYCODONE HYDROCHLORIDE 10 MG: 10 TABLET ORAL at 15:53

## 2019-11-12 RX ADMIN — HEPARIN SODIUM 5000 UNITS: 5000 INJECTION, SOLUTION INTRAVENOUS; SUBCUTANEOUS at 23:04

## 2019-11-12 RX ADMIN — FENTANYL CITRATE 50 MCG: 50 INJECTION, SOLUTION INTRAMUSCULAR; INTRAVENOUS at 13:13

## 2019-11-12 RX ADMIN — TIZANIDINE 4 MG: 4 TABLET ORAL at 04:48

## 2019-11-12 RX ADMIN — DOXYCYCLINE 100 MG: 100 TABLET, FILM COATED ORAL at 04:48

## 2019-11-12 RX ADMIN — OXYCODONE HYDROCHLORIDE 10 MG: 10 TABLET ORAL at 23:04

## 2019-11-12 RX ADMIN — OXYCODONE HYDROCHLORIDE AND ACETAMINOPHEN 1 TABLET: 10; 325 TABLET ORAL at 08:58

## 2019-11-12 RX ADMIN — CETIRIZINE HYDROCHLORIDE 10 MG: 10 TABLET, FILM COATED ORAL at 04:48

## 2019-11-12 RX ADMIN — HYDROMORPHONE HYDROCHLORIDE 0.5 MG: 1 INJECTION, SOLUTION INTRAMUSCULAR; INTRAVENOUS; SUBCUTANEOUS at 02:36

## 2019-11-12 RX ADMIN — Medication 1 CAPSULE: at 04:49

## 2019-11-12 RX ADMIN — CARVEDILOL 3.12 MG: 3.12 TABLET, FILM COATED ORAL at 16:55

## 2019-11-12 RX ADMIN — OXYCODONE HYDROCHLORIDE AND ACETAMINOPHEN 1 TABLET: 10; 325 TABLET ORAL at 01:33

## 2019-11-12 RX ADMIN — MIDAZOLAM 1 MG: 1 INJECTION INTRAMUSCULAR; INTRAVENOUS at 13:13

## 2019-11-12 RX ADMIN — CYANOCOBALAMIN TAB 500 MCG 1000 MCG: 500 TAB at 04:48

## 2019-11-12 RX ADMIN — HYDROMORPHONE HYDROCHLORIDE 0.5 MG: 1 INJECTION, SOLUTION INTRAMUSCULAR; INTRAVENOUS; SUBCUTANEOUS at 00:33

## 2019-11-12 RX ADMIN — MIDAZOLAM 1 MG: 1 INJECTION INTRAMUSCULAR; INTRAVENOUS at 13:20

## 2019-11-12 RX ADMIN — CLONAZEPAM 1 MG: 1 TABLET ORAL at 12:25

## 2019-11-12 RX ADMIN — TIZANIDINE 4 MG: 4 TABLET ORAL at 15:53

## 2019-11-12 RX ADMIN — CEFDINIR 300 MG: 300 CAPSULE ORAL at 16:55

## 2019-11-12 ASSESSMENT — GAIT ASSESSMENTS
DISTANCE (FEET): 110
GAIT LEVEL OF ASSIST: MINIMAL ASSIST
DEVIATION: STEP TO;DECREASED BASE OF SUPPORT;BRADYKINETIC;SHUFFLED GAIT
ASSISTIVE DEVICE: FRONT WHEEL WALKER

## 2019-11-12 ASSESSMENT — ENCOUNTER SYMPTOMS
CONSTIPATION: 0
FALLS: 0
ABDOMINAL PAIN: 0
COUGH: 1
BLURRED VISION: 0
WEIGHT LOSS: 0
CHILLS: 0
HEADACHES: 0
VOMITING: 0
SEIZURES: 0
NAUSEA: 0
DIARRHEA: 0
SHORTNESS OF BREATH: 0
BACK PAIN: 1
FEVER: 0

## 2019-11-12 ASSESSMENT — COGNITIVE AND FUNCTIONAL STATUS - GENERAL
MOVING FROM LYING ON BACK TO SITTING ON SIDE OF FLAT BED: A LITTLE
STANDING UP FROM CHAIR USING ARMS: A LITTLE
SUGGESTED CMS G CODE MODIFIER MOBILITY: CK
CLIMB 3 TO 5 STEPS WITH RAILING: A LOT
MOVING TO AND FROM BED TO CHAIR: A LITTLE
MOBILITY SCORE: 18
WALKING IN HOSPITAL ROOM: A LITTLE

## 2019-11-12 NOTE — CARE PLAN
Problem: Safety  Goal: Will remain free from injury  Outcome: PROGRESSING AS EXPECTED     Problem: Venous Thromboembolism (VTW)/Deep Vein Thrombosis (DVT) Prevention:  Goal: Patient will participate in Venous Thrombosis (VTE)/Deep Vein Thrombosis (DVT)Prevention Measures  Outcome: PROGRESSING AS EXPECTED     Problem: Discharge Barriers/Planning  Goal: Patient's continuum of care needs will be met  Outcome: PROGRESSING AS EXPECTED     Problem: Pain Management  Goal: Pain level will decrease to patient's comfort goal  Outcome: PROGRESSING AS EXPECTED     Problem: Mobility  Goal: Risk for activity intolerance will decrease  Outcome: PROGRESSING SLOWER THAN EXPECTED

## 2019-11-12 NOTE — THERAPY
"Physical Therapy Evaluation completed.   Bed Mobility:  Supine to Sit: Minimal Assist  Transfers: Sit to Stand: Minimal Assist  Gait: Level Of Assist: Minimal Assist with Front-Wheel Walker       Plan of Care: Will benefit from Physical Therapy 3 times per week  Discharge Recommendations: Equipment: Front-Wheel Walker.Recommend post-acute placement for continued physical therapy services prior to discharge home. Patient can tolerate post-acute therapies at a 5x/week frequency.       See \"Rehab Therapy-Acute\" Patient Summary Report for complete documentation.     Pt was recently admitted for PNA, lung mass, respiratory failure, and possible mets to spine. Pt has a hx of breast CA. Pt was recently transferred from [facility]. Pt presented to PT with impaired balance, impaired gait, weakness, pain, dec gross motor function, and dec activity tolerance. These impairments are affecting the patients ability to safely perfrom bed mobility, transfers, sit<>stands, and ambulation. Pt required signficant use with FWW. Pt was able to demonstrate Min to Mod A for all functioanl mobility, and had most difficulty with 1 step. Pt will continue to benefit from skilled PT while in house, with recommendation for post acute therapy prior to d/c home given current objective findings, age, IPLOF, and limited social support.   "

## 2019-11-12 NOTE — DISCHARGE PLANNING
Received Choice form at [id number]  Agency/Facility Name: [facility]  Referral sent per Choice form @ [id number]

## 2019-11-12 NOTE — FACE TO FACE
Face to Face Supporting Documentation - Home Health    The encounter with this patient was in whole or in part the primary reason for home health admission.    Date of encounter:   Patient:                    MRN:                       YOB: 2020  Roxy Cota  [id number]  1975     Home health to see patient for:  Physical Therapy evaluation and treatment  And OT.    Skilled need for:  Exacerbation of Chronic Disease State COPD    Skilled nursing interventions to include:  Line/Drain/Airway education and care    Homebound status evidenced by:  Need the aid of supportive devices such as crutches, canes, wheelchairs or walkers. Leaving home requires a considerable and taxing effort. There is a normal inability to leave the home.    Community Physician to provide follow up care: SCARLET ReyesNJANE.     Optional Interventions? No      I certify the face to face encounter for this home health care referral meets the CMS requirements and the encounter/clinical assessment with the patient was, in whole, or in part, for the medical condition(s) listed above, which is the primary reason for home health care. Based on my clinical findings: the service(s) are medically necessary, support the need for home health care, and the homebound criteria are met.  I certify that this patient has had a face to face encounter by myself.  Gigi Dumont M.D. - NPI: [id number]

## 2019-11-12 NOTE — THERAPY
"Occupational Therapy Evaluation completed.   Functional Status:  Supervision supine to sit.  Mod A for sock management and to don sweatpants.  Pt stood with min A.  Pt walked hallway w/FWW supervised.  Pt returned to supine in bed with min A.  Plan of Care: Will benefit from Occupational Therapy 3 times per week  Discharge Recommendations:  Equipment: Front-Wheel Walker and Will Continue to Assess for Equipment Needs. .Recommend post-acute placement for additional occupational therapy services prior to discharge home. Patient can tolerate post-acute therapies at a 5x/week frequency.    Pt is 45 y/o F seen for OT evaluation.  Pt admitted with pneumonia and found to have lung mass with possible spine mets.  Pt has hx of breast cancer and COPD.  Pt reports she has been getting weaker over the past 2 months.  Pt presents with impaired strength, functional mobility, balance, and activity tolerance; impacting self-care and functional mobility.  Pt will continue to benefit from acute OT services.  Pt would benefit from post acute placement prior to DC home.     See \"Rehab Therapy-Acute\" Patient Summary Report for complete documentation.    "

## 2019-11-12 NOTE — DISCHARGE PLANNING
Hospital Care management Discharge Planning     Anticipated Discharge Disposition:  · Home w/ HH and Walker     Action:  · This RN CM met with pt at bedside to obtain HH and DME choice.  · Patient HH Choice:  · (1) [facility]  · Patient DME Choice:  · (1) [facility]  · (2) [facility]  · This RN CM faxed choice form to [facility].     Barriers to Discharge:  · HH/DME acceptance and delivery.     Plan:  · Follow up with [facility] and treatment team.

## 2019-11-12 NOTE — PROGRESS NOTES
IR RN NOTE:  Pt consented by Dr. Jaimes prior to procedure, all questions answered. Pt, RN, MD signed consent, consent placed in chart.     Left iliac crest lesion biopsy performed by Dr. Jaimes, assisted by RT Frandy.    Left lower back access site    Access site dressed with gauze and tegaderm, CDI, soft.     Cores x 3 in formalin sent to pathology    ETCO2 31-34 intraprocedure.    Pt tolerated procedure, report given to KELVIN Freitas. Pt transported to [facility] and care transferred to Elise jordan RN.

## 2019-11-12 NOTE — DISCHARGE PLANNING
Acute [facility] Transitional Care Coordination     Referral from:  Dr. Rhonda Velásquez     Facesheet indicates: [employer]    Potential Rehab Diagnosis: Debility - Pneumonia with noted lung mass, possible spine metastases.    Chart review indicates patient does have on going medical management and therapy needs to possibly meet inpatient rehab facility criteria with the goal of returning to community.    D/C support: Spouse    Physiatry consult denied per protocol.     Chart notes reflect [employer] insurance which is not a provider for [facility]. Anticipate post acute care at an in-network facility.     Thank you for the referral.

## 2019-11-12 NOTE — CARE PLAN
Problem: Communication  Goal: The ability to communicate needs accurately and effectively will improve  Outcome: PROGRESSING AS EXPECTED     Problem: Infection  Goal: Will remain free from infection  Outcome: PROGRESSING AS EXPECTED     Problem: Bowel/Gastric:  Goal: Normal bowel function is maintained or improved  Outcome: PROGRESSING AS EXPECTED     Problem: Knowledge Deficit  Goal: Knowledge of disease process/condition, treatment plan, diagnostic tests, and medications will improve  Outcome: PROGRESSING AS EXPECTED     Problem: Respiratory:  Goal: Respiratory status will improve  Outcome: PROGRESSING AS EXPECTED

## 2019-11-12 NOTE — DISCHARGE PLANNING
Received Choice form at [phone]  Agency/Facility Name: [facility]  Referral sent per Choice form @ [phone]

## 2019-11-12 NOTE — OR SURGEON
Immediate Post- Operative Note        PostOp Diagnosis: BREAST CA WITH BONE METS    Procedure(s): CT BX OF LEFT POSTERIOR ILIUM    Estimated Blood Loss: Less than 5 ml        Complications: None            11/12/2019     1:30 PM     Gen Jaimes

## 2019-11-12 NOTE — ASSESSMENT & PLAN NOTE
Patient with acute on chronic back pain likely in setting of bony mets as well as sciatica.  Pain regimen:tylenol, percocet (watch tylenol amount), dilaudid for breakthrough pain, tizanidine 4mg q8h prn

## 2019-11-12 NOTE — PROGRESS NOTES
Hospital Medicine Daily Progress Note    Date of Service  11/11/2019    Chief Complaint  44 y.o. female with a history of breast cancer (on oral tamoxifen), COPD (on chronic O2), who was transferred from [place] for pneumonia and lung mass on 11/7/2019.    Hospital Course    44 y.o. female with a history of breast cancer (2016, on oral tamoxifen), COPD (on chronic O2), who was transferred from [place] for pneumonia and lung mass on 11/7/2019.  She was started on antibiotics (ceftriaxone and doxycycline) with improvement in her respiratory status, by hospital day #2 she was on room air.  Her procalcitonin began downtrending and she was switched to cefdinir (penicillin allergy) and doxycycline to complete 7-day course.  Pulmonology was consulted who recommended repeating CT chest here after a few days of treating pneumonia to see if regression of lung mass was more consistent with pneumonia.  CT chest obtained 11/11 with focal juxtapleural right lower lobe opacity somewhat decreased compared to prior on 11/7 favoring pneumonia however upon talking with radiologist scan also showed multiple vertebral bony mets.  Patient scheduled for IR bone biopsy.      Interval Problem Update  - overall feeling better, breathing better  - afebrile, on RA  -  focal juxtapleural right lower lobe opacity somewhat decreased compared to prior on 11/7 favoring pneumonia however upon talking with radiologist scan also showed multiple vertebral bony mets  -Procalcitonin downtrending 1.97-->0.88-->0.15 (on oral abx and tolerating well)  -Reports severe back pain mostly lower back with radiation down her legs, took a walk with nurse this morning without difficulty    Consultants/Specialty  Pulmonology  IR    Code Status  Full    Disposition  Inpatient, OT recommending SNF/rehab (order placed)  Patient from [facility], has [facility] gets medical care in [facility], has PCP and can get pulm/onc follow up from them    Review of Systems  Review of  Systems   Constitutional: Positive for malaise/fatigue. Negative for chills, fever and weight loss.   HENT: Positive for hearing loss.    Eyes: Negative for blurred vision.   Respiratory: Positive for cough. Negative for shortness of breath.    Cardiovascular: Negative for leg swelling.   Gastrointestinal: Negative for abdominal pain, constipation, diarrhea, nausea and vomiting.   Genitourinary: Negative for dysuria.   Musculoskeletal: Positive for back pain. Negative for falls.   Skin: Negative for rash.   Neurological: Negative for seizures and headaches.        Physical Exam  Temp:  [36.1 °C (96.9 °F)-37.3 °C (99.2 °F)] 37.1 °C (98.7 °F)  Pulse:  [47-75] 61  Resp:  [16-17] 17  BP: (110-139)/(47-60) 110/54  SpO2:  [94 %-97 %] 94 %    Physical Exam  Constitutional:       General: She is not in acute distress.     Appearance: Normal appearance. She is not toxic-appearing or diaphoretic.   HENT:      Head: Normocephalic and atraumatic.      Nose: No congestion.      Mouth/Throat:      Mouth: Mucous membranes are moist.   Eyes:      General: No scleral icterus.     Conjunctiva/sclera: Conjunctivae normal.   Neck:      Musculoskeletal: Normal range of motion.      Vascular: No JVD.   Cardiovascular:      Rate and Rhythm: Normal rate and regular rhythm.      Pulses: Normal pulses.      Heart sounds: No murmur. No friction rub. No gallop.    Pulmonary:      Effort: Pulmonary effort is normal. No respiratory distress.      Breath sounds: No wheezing, rhonchi or rales.   Abdominal:      General: Abdomen is flat. Bowel sounds are normal. There is no distension.      Palpations: Abdomen is soft.   Musculoskeletal:         General: No swelling.   Skin:     General: Skin is warm and dry.   Neurological:      General: No focal deficit present.      Mental Status: She is alert and oriented to person, place, and time. Mental status is at baseline.   Psychiatric:         Mood and Affect: Affect is tearful.          Fluids    Intake/Output Summary (Last 24 hours) at 11/11/2019 1822  Last data filed at 11/11/2019 1250  Gross per 24 hour   Intake 960 ml   Output --   Net 960 ml       Laboratory  Recent Labs     11/09/19  0219 11/11/19  0053   WBC 23.6* 24.5*   RBC 2.87* 3.03*   HEMOGLOBIN 9.7* 10.2*   HEMATOCRIT 30.3* 30.3*   .6* 100.0*   MCH 33.8* 33.7*   MCHC 32.0* 33.7   RDW 56.4* 49.1   PLATELETCT 314 358   MPV 10.0 10.0     Recent Labs     11/09/19  0219 11/10/19  0302 11/11/19  0053   SODIUM 140 136 139   POTASSIUM 4.1 4.0 3.3*   CHLORIDE 113* 109 109   CO2 18* 20 23   GLUCOSE 137* 151* 121*   BUN 19 18 19   CREATININE 1.01 0.92 0.86   CALCIUM 8.8 8.5 8.1*                   Imaging  CT-ABDOMEN-PELVIS WITH   Final Result      1.  Lytic lesions are identified in the visualized axial skeleton which are suspicious for bone metastases.      2.  Trace bilateral pleural effusions are identified.      3.   There is a small cyst likely arising from the left ovary.      4.  Small cyst in the left kidney.         CT-CHEST (THORAX) W/O   Final Result      1.  Focal juxtapleural right lower lobe opacity as noted above. Its size is somewhat decreased compared with the recent prior scan of 11/7, which favors a focal airspace process (pneumonia). Pulmonary malignancy can still not be excluded, and continued    CT surveillance is recommended.   2.  Small bilateral pleural effusions, right greater than left.   3.  Lingular opacities consistent with pneumonia, improved.            OUTSIDE IMAGES-DX CHEST   Final Result      OUTSIDE IMAGES-CT CHEST   Final Result      IR-NEEDLE BX-DEEP BONE    (Results Pending)        Assessment/Plan  * Acute on chronic respiratory failure with hypoxia (HCC)  Assessment & Plan  Resolved  Resp and O2 per protocol    Lung mass  Assessment & Plan  H/o breast cancer, also with spine mets, given breast cancer history worry about recurrence with mets  Pulmonology consulted   Pulm consulted:  recommending repeat CT thorax without tomorrow to assess mass for PNA vs malignancy, which showed decreased size of lesion however again showed spine mets, consult IR for bone bx tomorrow (NPO)  Meanwhile treat acute issues first: PNA and COPD.      JANUSZ (acute kidney injury) (HCC)  Assessment & Plan  Likely in setting of infection  Resolving with IVF    Leukocytosis- (present on admission)  Assessment & Plan  In setting of PNA +/- inflammation from malignancy?  Improving with abx however today back up slightly, procalcitonin improving and patient clinically improving but will need to continue to monitor      Macrocytic anemia  Assessment & Plan  MCV high, LFTs normal  Folate and B12 normal, iron studies normal     HCAP (healthcare-associated pneumonia)  Assessment & Plan  Continue Rocephin-->cefdinir (pcn allergy) today/ doxycycline 11/7-**, will plan to treat x 7 days  Procalcitonin downtrending  Sputum cx negative    COPD (chronic obstructive pulmonary disease) (HCC)  Assessment & Plan  Resp and O2 per prtocol  COntinue IV steroids, titrate down    History of breast cancer  Assessment & Plan  Holding tamoxifen per onc recs       VTE prophylaxis: heparin SC

## 2019-11-12 NOTE — FACE TO FACE
Face to Face Note  -  Durable Medical Equipment    Rhonda Velásquez M.D. - NPI: [id number]  I certify that this patient is under my care and that they had a durable medical equipment(DME)face to face encounter by myself that meets the physician DME face-to-face encounter requirements with this patient on:    Date of encounter:   Patient:                    MRN:                       YOB: 2020  Roxy Cota  [id number]  1975     The encounter with the patient was in whole, or in part, for the following medical condition, which is the primary reason for durable medical equipment:  Other - PNA, h/o breast cancer and new bony mets    I certify that, based on my findings, the following durable medical equipment is medically necessary:  Walkers.    HOME O2 Saturation Measurements:(Values must be present for Home Oxygen orders)         ,     ,         My Clinical findings support the need for the above equipment due to:  Frequent Falls    Supporting Symptoms: weakness

## 2019-11-13 ENCOUNTER — PATIENT OUTREACH (OUTPATIENT)
Dept: HEALTH INFORMATION MANAGEMENT | Facility: OTHER | Age: 44
End: 2019-11-13

## 2019-11-13 VITALS
HEIGHT: 64 IN | HEART RATE: 60 BPM | SYSTOLIC BLOOD PRESSURE: 92 MMHG | WEIGHT: 120.81 LBS | RESPIRATION RATE: 18 BRPM | OXYGEN SATURATION: 92 % | BODY MASS INDEX: 20.63 KG/M2 | TEMPERATURE: 99.3 F | DIASTOLIC BLOOD PRESSURE: 49 MMHG

## 2019-11-13 PROBLEM — N17.9 AKI (ACUTE KIDNEY INJURY) (HCC): Status: RESOLVED | Noted: 2019-11-09 | Resolved: 2019-11-13

## 2019-11-13 PROBLEM — E83.42 HYPOMAGNESEMIA: Status: RESOLVED | Noted: 2019-11-11 | Resolved: 2019-11-13

## 2019-11-13 PROBLEM — J96.21 ACUTE ON CHRONIC RESPIRATORY FAILURE WITH HYPOXIA (HCC): Status: RESOLVED | Noted: 2019-11-07 | Resolved: 2019-11-13

## 2019-11-13 LAB
ALBUMIN SERPL BCP-MCNC: 2.9 G/DL (ref 3.2–4.9)
ALBUMIN/GLOB SERPL: 1.4 G/DL
ALP SERPL-CCNC: 93 U/L (ref 30–99)
ALT SERPL-CCNC: 12 U/L (ref 2–50)
ANION GAP SERPL CALC-SCNC: 7 MMOL/L (ref 0–11.9)
ANISOCYTOSIS BLD QL SMEAR: ABNORMAL
AST SERPL-CCNC: 15 U/L (ref 12–45)
BASOPHILS # BLD AUTO: 0 % (ref 0–1.8)
BASOPHILS # BLD: 0 K/UL (ref 0–0.12)
BILIRUB SERPL-MCNC: 0.4 MG/DL (ref 0.1–1.5)
BUN SERPL-MCNC: 18 MG/DL (ref 8–22)
CALCIUM SERPL-MCNC: 8.3 MG/DL (ref 8.5–10.5)
CHLORIDE SERPL-SCNC: 104 MMOL/L (ref 96–112)
CO2 SERPL-SCNC: 27 MMOL/L (ref 20–33)
CREAT SERPL-MCNC: 0.97 MG/DL (ref 0.5–1.4)
EOSINOPHIL # BLD AUTO: 0 K/UL (ref 0–0.51)
EOSINOPHIL NFR BLD: 0 % (ref 0–6.9)
ERYTHROCYTE [DISTWIDTH] IN BLOOD BY AUTOMATED COUNT: 50.4 FL (ref 35.9–50)
GLOBULIN SER CALC-MCNC: 2.1 G/DL (ref 1.9–3.5)
GLUCOSE SERPL-MCNC: 125 MG/DL (ref 65–99)
HCT VFR BLD AUTO: 29.2 % (ref 37–47)
HGB BLD-MCNC: 9.6 G/DL (ref 12–16)
LYMPHOCYTES # BLD AUTO: 4.33 K/UL (ref 1–4.8)
LYMPHOCYTES NFR BLD: 18.2 % (ref 22–41)
MAGNESIUM SERPL-MCNC: 1.9 MG/DL (ref 1.5–2.5)
MANUAL DIFF BLD: NORMAL
MCH RBC QN AUTO: 33.3 PG (ref 27–33)
MCHC RBC AUTO-ENTMCNC: 32.9 G/DL (ref 33.6–35)
MCV RBC AUTO: 101.4 FL (ref 81.4–97.8)
MONOCYTES # BLD AUTO: 1.52 K/UL (ref 0–0.85)
MONOCYTES NFR BLD AUTO: 6.4 % (ref 0–13.4)
MORPHOLOGY BLD-IMP: NORMAL
MYELOCYTES NFR BLD MANUAL: 0.9 %
NEUTROPHILS # BLD AUTO: 17.73 K/UL (ref 2–7.15)
NEUTROPHILS NFR BLD: 70 % (ref 44–72)
NEUTS BAND NFR BLD MANUAL: 4.5 % (ref 0–10)
NRBC # BLD AUTO: 0 K/UL
NRBC BLD-RTO: 0 /100 WBC
PLATELET # BLD AUTO: 313 K/UL (ref 164–446)
PLATELET BLD QL SMEAR: NORMAL
PMV BLD AUTO: 10 FL (ref 9–12.9)
POLYCHROMASIA BLD QL SMEAR: NORMAL
POTASSIUM SERPL-SCNC: 4.1 MMOL/L (ref 3.6–5.5)
PROT SERPL-MCNC: 5 G/DL (ref 6–8.2)
RBC # BLD AUTO: 2.88 M/UL (ref 4.2–5.4)
RBC BLD AUTO: PRESENT
SODIUM SERPL-SCNC: 138 MMOL/L (ref 135–145)
WBC # BLD AUTO: 23.8 K/UL (ref 4.8–10.8)

## 2019-11-13 PROCEDURE — 700111 HCHG RX REV CODE 636 W/ 250 OVERRIDE (IP): Performed by: INTERNAL MEDICINE

## 2019-11-13 PROCEDURE — 99239 HOSP IP/OBS DSCHRG MGMT >30: CPT | Performed by: INTERNAL MEDICINE

## 2019-11-13 PROCEDURE — 85007 BL SMEAR W/DIFF WBC COUNT: CPT

## 2019-11-13 PROCEDURE — A9270 NON-COVERED ITEM OR SERVICE: HCPCS | Performed by: RADIOLOGY

## 2019-11-13 PROCEDURE — 700102 HCHG RX REV CODE 250 W/ 637 OVERRIDE(OP): Performed by: INTERNAL MEDICINE

## 2019-11-13 PROCEDURE — 700102 HCHG RX REV CODE 250 W/ 637 OVERRIDE(OP): Performed by: RADIOLOGY

## 2019-11-13 PROCEDURE — 83735 ASSAY OF MAGNESIUM: CPT

## 2019-11-13 PROCEDURE — 80053 COMPREHEN METABOLIC PANEL: CPT

## 2019-11-13 PROCEDURE — 85027 COMPLETE CBC AUTOMATED: CPT

## 2019-11-13 PROCEDURE — 36415 COLL VENOUS BLD VENIPUNCTURE: CPT

## 2019-11-13 PROCEDURE — A9270 NON-COVERED ITEM OR SERVICE: HCPCS | Performed by: INTERNAL MEDICINE

## 2019-11-13 RX ORDER — OXYCODONE HYDROCHLORIDE 5 MG/1
10 TABLET ORAL EVERY 8 HOURS PRN
Qty: 7 TAB | Refills: 0 | Status: SHIPPED | OUTPATIENT
Start: 2019-11-13 | End: 2019-11-13

## 2019-11-13 RX ORDER — OXYCODONE HYDROCHLORIDE 5 MG/1
10 TABLET ORAL EVERY 8 HOURS PRN
Qty: 7 TAB | Refills: 0 | Status: SHIPPED | OUTPATIENT
Start: 2019-11-13 | End: 2019-11-16

## 2019-11-13 RX ORDER — CEFDINIR 300 MG/1
300 CAPSULE ORAL EVERY 12 HOURS
Qty: 10 CAP | Refills: 0 | Status: SHIPPED | OUTPATIENT
Start: 2019-11-13 | End: 2019-11-18

## 2019-11-13 RX ADMIN — CETIRIZINE HYDROCHLORIDE 10 MG: 10 TABLET, FILM COATED ORAL at 06:03

## 2019-11-13 RX ADMIN — DICYCLOMINE HYDROCHLORIDE 10 MG: 10 CAPSULE ORAL at 06:04

## 2019-11-13 RX ADMIN — CEFDINIR 300 MG: 300 CAPSULE ORAL at 06:03

## 2019-11-13 RX ADMIN — OXYCODONE HYDROCHLORIDE 10 MG: 10 TABLET ORAL at 04:23

## 2019-11-13 RX ADMIN — CYANOCOBALAMIN TAB 500 MCG 1000 MCG: 500 TAB at 06:03

## 2019-11-13 RX ADMIN — HEPARIN SODIUM 5000 UNITS: 5000 INJECTION, SOLUTION INTRAVENOUS; SUBCUTANEOUS at 06:06

## 2019-11-13 RX ADMIN — CITALOPRAM HYDROBROMIDE 40 MG: 20 TABLET ORAL at 06:03

## 2019-11-13 RX ADMIN — SENNOSIDES AND DOCUSATE SODIUM 2 TABLET: 8.6; 5 TABLET ORAL at 06:04

## 2019-11-13 RX ADMIN — CARVEDILOL 3.12 MG: 3.12 TABLET, FILM COATED ORAL at 06:04

## 2019-11-13 RX ADMIN — Medication 1 CAPSULE: at 06:11

## 2019-11-13 RX ADMIN — TIZANIDINE 4 MG: 4 TABLET ORAL at 06:04

## 2019-11-13 RX ADMIN — CLONAZEPAM 1 MG: 1 TABLET ORAL at 06:03

## 2019-11-13 NOTE — DISCHARGE INSTRUCTIONS
Follow up with the oncologist in 1-2 weeks with the biopsy result.  Follow up with primary care for pain management.   Come back to the ED for any new symptoms   Gigi Dumont M.D.    Discharge Instructions    Discharged to home by car with relative. Discharged via wheelchair, hospital escort: Yes.  Special equipment needed: Oxygen    Be sure to schedule a follow-up appointment with your primary care doctor or any specialists as instructed.     Discharge Plan:   Diet Plan: Discussed  Activity Level: Discussed  Smoking Cessation Offered: Patient Refused  Confirmed Follow up Appointment: Patient to Call and Schedule Appointment  Confirmed Symptoms Management: Discussed  Medication Reconciliation Updated: Yes  Influenza Vaccine Indication: Not indicated: Previously immunized this influenza season and > 8 years of age    I understand that a diet low in cholesterol, fat, and sodium is recommended for good health. Unless I have been given specific instructions below for another diet, I accept this instruction as my diet prescription.   Other diet: Regular    Special Instructions: None    · Is patient discharged on Warfarin / Coumadin?   No     Depression / Suicide Risk    As you are discharged from this [facility] facility, it is important to learn how to keep safe from harming yourself.    Recognize the warning signs:  · Abrupt changes in personality, positive or negative- including increase in energy   · Giving away possessions  · Change in eating patterns- significant weight changes-  positive or negative  · Change in sleeping patterns- unable to sleep or sleeping all the time   · Unwillingness or inability to communicate  · Depression  · Unusual sadness, discouragement and loneliness  · Talk of wanting to die  · Neglect of personal appearance   · Rebelliousness- reckless behavior  · Withdrawal from people/activities they love  · Confusion- inability to concentrate     If you or a loved one observes any of  these behaviors or has concerns about self-harm, here's what you can do:  · Talk about it- your feelings and reasons for harming yourself  · Remove any means that you might use to hurt yourself (examples: pills, rope, extension cords, firearm)  · Get professional help from the community (Mental Health, Substance Abuse, psychological counseling)  · Do not be alone:Call your Safe Contact- someone whom you trust who will be there for you.  · Call your local CRISIS HOTLINE 148-3417 or 425-906-9019  · Call your local Children's Mobile Crisis Response Team [place] (105) 757-3062 or www.BuildMyMove  · Call the toll free National Suicide Prevention Hotlines   · National Suicide Prevention Lifeline 387-758-IWJK (4579)  · [employer] Line Network 800-SUICIDE (671-3093)      Acute Respiratory Failure, Adult  Acute respiratory failure occurs when there is not enough oxygen passing from your lungs to your body. When this happens, your lungs have trouble removing carbon dioxide from the blood. This causes your blood oxygen level to drop too low as carbon dioxide builds up.  Acute respiratory failure is a medical emergency. It can develop quickly, but it is temporary if treated promptly. Your lung capacity, or how much air your lungs can hold, may improve with time, exercise, and treatment.  What are the causes?  There are many possible causes of acute respiratory failure, including:  · Lung injury.  · Chest injury or damage to the ribs or tissues near the lungs.  · Lung conditions that affect the flow of air and blood into and out of the lungs, such as pneumonia, acute respiratory distress syndrome, and cystic fibrosis.  · Medical conditions, such as strokes or spinal cord injuries, that affect the muscles and nerves that control breathing.  · Blood infection (sepsis).  · Inflammation of the pancreas (pancreatitis).  · A blood clot in the lungs (pulmonary embolism).  · A large-volume blood  transfusion.  · Burns.  · Near-drowning.  · Seizure.  · Smoke inhalation.  · Reaction to medicines.  · Alcohol or drug overdose.  What increases the risk?  This condition is more likely to develop in people who have:  · A blocked airway.  · Asthma.  · A condition or disease that damages or weakens the muscles, nerves, bones, or tissues that are involved in breathing.  · A serious infection.  · A health problem that blocks the unconscious reflex that is involved in breathing, such as hypothyroidism or sleep apnea.  · A lung injury or trauma.  What are the signs or symptoms?  Trouble breathing is the main symptom of acute respiratory failure. Symptoms may also include:  · Rapid breathing.  · Restlessness or anxiety.  · Skin, lips, or fingernails that appear blue (cyanosis).  · Rapid heart rate.  · Abnormal heart rhythms (arrhythmias).  · Confusion or changes in behavior.  · Tiredness or loss of energy.  · Feeling sleepy or having a loss of consciousness.  How is this diagnosed?  Your health care provider can diagnose acute respiratory failure with a medical history and physical exam. During the exam, your health care provider will listen to your heart and check for crackling or wheezing sounds in your lungs. Your may also have tests to confirm the diagnosis and determine what is causing respiratory failure. These tests may include:  · Measuring the amount of oxygen in your blood (pulse oximetry). The measurement comes from a small device that is placed on your finger, earlobe, or toe.  · Other blood tests to measure blood gases and to look for signs of infection.  · Sampling your cerebral spinal fluid or tracheal fluid to check for infections.  · Chest X-ray to look for fluid in spaces that should be filled with air.  · Electrocardiogram (ECG) to look at the heart's electrical activity.  How is this treated?  Treatment for this condition usually takes places in a hospital intensive care unit (ICU). Treatment depends  on what is causing the condition. It may include one or more treatments until your symptoms improve. Treatment may include:  · Supplemental oxygen. Extra oxygen is given through a tube in the nose, a face mask, or a kee.  · A device such as a continuous positive airway pressure (CPAP) or bi-level positive airway pressure (BiPAP or BPAP) machine. This treatment uses mild air pressure to keep the airways open. A mask or other device will be placed over your nose or mouth. A tube that is connected to a motor will deliver oxygen through the mask.  · Ventilator. This treatment helps move air into and out of the lungs. This may be done with a bag and mask or a machine. For this treatment, a tube is placed in your windpipe (trachea) so air and oxygen can flow to the lungs.  · Extracorporeal membrane oxygenation (ECMO). This treatment temporarily takes over the function of the heart and lungs, supplying oxygen and removing carbon dioxide. ECMO gives the lungs a chance to recover. It may be used if a ventilator is not effective.  · Tracheostomy. This is a procedure that creates a hole in the neck to insert a breathing tube.  · Receiving fluids and medicines.  · Rocking the bed to help breathing.  Follow these instructions at home:  · Take over-the-counter and prescription medicines only as told by your health care provider.  · Return to normal activities as told by your health care provider. Ask your health care provider what activities are safe for you.  · Keep all follow-up visits as told by your health care provider. This is important.  How is this prevented?  Treating infections and medical conditions that may lead to acute respiratory failure can help prevent the condition from developing.  Contact a health care provider if:  · You have a fever.  · Your symptoms do not improve or they get worse.  Get help right away if:  · You are having trouble breathing.  · You lose consciousness.  · Your have cyanosis or turn  blue.  · You develop a rapid heart rate.  · You are confused.  These symptoms may represent a serious problem that is an emergency. Do not wait to see if the symptoms will go away. Get medical help right away. Call your local emergency services (911 in the [place] Do not drive yourself to the hospital.   This information is not intended to replace advice given to you by your health care provider. Make sure you discuss any questions you have with your health care provider.  Document Released: 12/23/2014 Document Revised: 07/15/2017 Document Reviewed: 07/05/2017  [employer] Interactive Patient Education © 2017 [employer]      Biopsy  Care After  These instructions give you information on caring for yourself after your procedure. Your doctor may also give you more specific instructions. Call your doctor if you have any problems or questions after your procedure.  HOME CARE   · Return to your normal diet and activities as told by your doctor.  · Change your bandages (dressings) as told by your doctor. If skin glue (adhesive) was used, it will peel off in 7 days.  · Only take medicines as told by your doctor.  · Ask your doctor when you can bathe and get your wound wet.  GET HELP RIGHT AWAY IF:  · You see more than a small spot of blood coming from the wound.  · You have redness, puffiness (swelling), or pain.  · You see yellowish-white fluid (pus) coming from the wound.  · You have a fever.  · You notice a bad smell coming from the wound or bandage.  · You have a rash, trouble breathing, or any allergy problems.  MAKE SURE YOU:   · Understand these instructions.  · Will watch your condition.  · Will get help right away if you are not doing well or get worse.  Document Released: 08/21/2012 Document Revised: 03/11/2013 Document Reviewed: 08/21/2012  [employer]® Patient Information ©2014 [employer].

## 2019-11-13 NOTE — PROGRESS NOTES
[facility] Medicine Daily Progress Note    Date of Service  11/12/2019    Chief Complaint  44 y.o. female was transferred from [place] for pneumonia and lung mass on 11/7/2019.    Hospital Course    44 y.o. female with a history of breast cancer ( treated with surgery and no chemo 2016, on oral tamoxifen), COPD (on chronic O2), who was transferred from [place] for pneumonia and lung mass on 11/7/2019.  She was started on antibiotics (ceftriaxone and doxycycline) with improvement in her respiratory status, by hospital day #2 she was on room air.  Her procalcitonin began downtrending and she was switched to cefdinir (penicillin allergy) and doxycycline to complete 7-day course.  Pulmonology was consulted who recommended repeating CT chest here after a few days of treating pneumonia to see if regression of lung mass was more consistent with pneumonia.  CT chest obtained 11/11 with focal juxtapleural right lower lobe opacity somewhat decreased compared to prior on 11/7 favoring pneumonia however upon talking with radiologist scan also showed multiple vertebral bony mets and bone biopsy was done on 11/12.        Interval Problem Update  -Overall doing better.   -biopsy was done today.   -refer to      Consultants/Specialty  Pulmonology  IR    Code Status  Full    Disposition  Inpatient, OT recommending SNF/rehab (order placed)  Patient from [facility], has [facility] medical care in [facility], has PCP and can get pulm/onc follow up from them    Review of Systems  Review of Systems   Constitutional: Positive for malaise/fatigue. Negative for chills, fever and weight loss.   HENT: Positive for hearing loss.    Eyes: Negative for blurred vision.   Respiratory: Positive for cough. Negative for shortness of breath.    Cardiovascular: Negative for leg swelling.   Gastrointestinal: Negative for abdominal pain, constipation, diarrhea, nausea and vomiting.   Genitourinary: Negative for dysuria.   Musculoskeletal: Positive for back  pain. Negative for falls.   Skin: Negative for rash.   Neurological: Negative for seizures and headaches.        Physical Exam  Temp:  [36.1 °C (96.9 °F)-37.7 °C (99.8 °F)] 37.5 °C (99.5 °F)  Pulse:  [55-86] 67  Resp:  [14-20] 20  BP: ()/(49-72) 99/59  SpO2:  [90 %-100 %] 94 %    Physical Exam  Constitutional:       General: She is not in acute distress.     Appearance: Normal appearance. She is not toxic-appearing or diaphoretic.   HENT:      Head: Normocephalic and atraumatic.      Nose: No congestion.      Mouth/Throat:      Mouth: Mucous membranes are moist.   Eyes:      General: No scleral icterus.     Conjunctiva/sclera: Conjunctivae normal.   Neck:      Musculoskeletal: Normal range of motion.      Vascular: No JVD.   Cardiovascular:      Rate and Rhythm: Normal rate and regular rhythm.      Pulses: Normal pulses.      Heart sounds: No murmur. No friction rub. No gallop.    Pulmonary:      Effort: Pulmonary effort is normal. No respiratory distress.      Breath sounds: No wheezing, rhonchi or rales.   Abdominal:      General: Abdomen is flat. Bowel sounds are normal. There is no distension.      Palpations: Abdomen is soft.   Musculoskeletal:         General: No swelling.   Skin:     General: Skin is warm and dry.   Neurological:      General: No focal deficit present.      Mental Status: She is alert and oriented to person, place, and time. Mental status is at baseline.   Psychiatric:         Mood and Affect: Affect is tearful.         Fluids  No intake or output data in the 24 hours ending 11/12/19 1754    Laboratory  Recent Labs     11/11/19 0053 11/12/19  0240   WBC 24.5* 25.7*   RBC 3.03* 3.12*   HEMOGLOBIN 10.2* 10.4*   HEMATOCRIT 30.3* 31.4*   .0* 100.6*   MCH 33.7* 33.3*   MCHC 33.7 33.1*   RDW 49.1 49.5   PLATELETCT 358 361   MPV 10.0 9.8     Recent Labs     11/10/19  0302 11/11/19  0053 11/12/19  0240   SODIUM 136 139 135   POTASSIUM 4.0 3.3* 4.2   CHLORIDE 109 109 104   CO2 20 23 22    GLUCOSE 151* 121* 100*   BUN 18 19 15   CREATININE 0.92 0.86 0.80   CALCIUM 8.5 8.1* 8.1*     Recent Labs     11/12/19  0240   APTT 31.6   INR 1.16*               Imaging  CT-NEEDLE BX-DEEP BONE   Final Result         1.  Successful CT-guided core biopsy of left posterior ilium.      CT-ABDOMEN-PELVIS WITH   Final Result      1.  Lytic lesions are identified in the visualized axial skeleton which are suspicious for bone metastases.      2.  Trace bilateral pleural effusions are identified.      3.   There is a small cyst likely arising from the left ovary.      4.  Small cyst in the left kidney.         CT-CHEST (THORAX) W/O   Final Result      1.  Focal juxtapleural right lower lobe opacity as noted above. Its size is somewhat decreased compared with the recent prior scan of 11/7, which favors a focal airspace process (pneumonia). Pulmonary malignancy can still not be excluded, and continued    CT surveillance is recommended.   2.  Small bilateral pleural effusions, right greater than left.   3.  Lingular opacities consistent with pneumonia, improved.            OUTSIDE IMAGES-DX CHEST   Final Result      OUTSIDE IMAGES-CT CHEST   Final Result           Assessment/Plan  * Acute on chronic respiratory failure with hypoxia (HCC)  Assessment & Plan  Due to CAP   improving   Resp and O2 per protocol  Cont abx for 7 days     Lung mass  Assessment & Plan  H/o breast cancer, also with spine mets, given breast cancer history worry about recurrence with mets  Pulm consulted: recommending repeat CT thorax without tomorrow to assess mass for PNA vs malignancy, which showed decreased size of lesion however again showed spine mets, consult IR for bone bx tomorrow (NPO)  Meanwhile treat acute issues first: PNA and COPD with improving  Waiting for bone biopsy result.       Acute bilateral low back pain with sciatica- (present on admission)  Assessment & Plan  Patient with acute on chronic back pain likely in setting of bony  mets as well as sciatica.  Pain regimen:tylenol, percocet (watch tylenol amount), dilaudid for breakthrough pain, tizanidine 4mg q8h prn    Hypomagnesemia  Assessment & Plan  S/p supplementation  CTM    JANUSZ (acute kidney injury) ([facility])  Assessment & Plan  Likely in setting of infection  Resolving with IVF    Leukocytosis- (present on admission)  Assessment & Plan  In setting of PNA +/- inflammation from malignancy?  Improving with abx however today back up slightly, procalcitonin improving and patient clinically improving but will need to continue to monitor      Macrocytic anemia  Assessment & Plan  MCV high, LFTs normal  Folate and B12 normal, iron studies normal     HCAP (healthcare-associated pneumonia)  Assessment & Plan  Continue Rocephin-->cefdinir (pcn allergy) today/ doxycycline 11/7-**, will plan to treat x 7 days  Procalcitonin downtrending  Sputum cx negative    COPD (chronic obstructive pulmonary disease) ([facility])  Assessment & Plan  Resp and O2 per prtocol  Finished IV steroids 3 days  Improving       History of breast cancer  Assessment & Plan  In 2016   Treated with surgery and no chemo needed at that time   Holding tamoxifen per onc recs  Waiting for bone biopsy result.     Mood disorder ([facility])- (present on admission)  Assessment & Plan  Stable   Cont home meds (celexa and clonazepam)         VTE prophylaxis: heparin SC

## 2019-11-13 NOTE — DISCHARGE SUMMARY
Discharge Summary    CHIEF COMPLAINT ON ADMISSION  No chief complaint on file.      Reason for Admission  Pneumonia     Admission Date  11/7/2019    CODE STATUS  Prior    HPI & HOSPITAL COURSE    44 y.o. female with a history of breast cancer ( treated with surgery and no chemo 2016, on oral tamoxifen), COPD (on chronic O2), who was transferred from [place] for pneumonia and lung mass on 11/7/2019.  She was started on antibiotics (ceftriaxone and doxycycline) with improvement in her respiratory status, by hospital day #2 she was on room air.  Her procalcitonin began downtrending and she was switched to cefdinir (penicillin allergy) and doxycycline to complete 7-day course.  Pulmonology was consulted who recommended repeating CT chest here after a few days of treating pneumonia to see if regression of lung mass was more consistent with pneumonia.  CT chest obtained 11/11 with focal juxtapleural right lower lobe opacity somewhat decreased compared to prior on 11/7 favoring pneumonia however upon talking with radiologist scan also showed multiple vertebral bony mets and bone biopsy was done on 11/12, the results of this is still pending on the day of discharge, I had long discussion with the patient and her  about the risk of recurrent cancer and the importance of following with oncology clinic.    The patient will speak and per physical therapy evaluation she needs physical therapy or home health, [place] health agency covers [place] area did not accept the patient, the patient and her  refused skilled nursing facility transition, I discussed the risk of fall weakness with the patient and her  and encouraged them for physical therapy however they insisted to leave against our recommendation, we provided her with a walker.     The patient has leukocytosis since admission slightly improved with antibiotics, procalcitonin was normal, and no fever, likely her  leukocytosis related to cancer activity, to follow-up with primary care doctor and oncology clinic.    Therefore, she is discharged in guarded and stable condition to home with close outpatient follow-up.    The patient met 2-midnight criteria for an inpatient stay at the time of discharge.    Discharge Date  11/13/19      FOLLOW UP ITEMS POST DISCHARGE  Follow-up the bone biopsy with oncology clinic  Follow-up with primary care doctor for home health agency      DISCHARGE DIAGNOSES  Principal Problem (Resolved):    Acute on chronic respiratory failure with hypoxia (HCC) POA: Unknown  Active Problems:    Lung mass POA: Unknown    History of breast cancer POA: Unknown    COPD (chronic obstructive pulmonary disease) (HCC) POA: Unknown    HCAP (healthcare-associated pneumonia) POA: Unknown    Macrocytic anemia POA: Unknown    Leukocytosis POA: Yes    Acute bilateral low back pain with sciatica POA: Yes    Mood disorder (HCC) (Chronic) POA: Yes  Resolved Problems:    JANUSZ (acute kidney injury) (HCC) POA: Unknown    Hypomagnesemia POA: Unknown      FOLLOW UP    Livia Story, F.N.P.  [address] [place] [address]  [phone]    Go on 11/20/2019  Please arrive at 7:45Am for your follow up appointment with your PCP. Thank you.      MEDICATIONS ON DISCHARGE     Medication List      START taking these medications      Instructions   cefdinir 300 MG Caps  Commonly known as:  OMNICEF   Take 1 Cap by mouth every 12 hours for 5 days.  Dose:  300 mg     oxyCODONE immediate-release 5 MG Tabs  Commonly known as:  ROXICODONE   Take 2 Tabs by mouth every 8 hours as needed for up to 3 days.  Dose:  10 mg        CONTINUE taking these medications      Instructions   carvedilol 6.25 MG Tabs  Commonly known as:  COREG   Take 6.25 mg by mouth 2 times a day.  Dose:  6.25 mg     cetirizine 10 MG Tabs  Commonly known as:  ZYRTEC   Take 10 mg by mouth every day.  Dose:  10 mg     citalopram 40 MG Tabs  Commonly known as:   CELEXA   Take 40 mg by mouth every day.  Dose:  40 mg     clonazePAM 1 MG Tabs  Commonly known as:  KLONOPIN   Take 1 mg by mouth 3 times a day.  Dose:  1 mg     cyanocobalamin 500 MCG Tabs  Commonly known as:  VITAMIN B-12   Take 1,000 mcg by mouth every day.  Dose:  1,000 mcg     dicyclomine 10 MG Caps  Commonly known as:  BENTYL   Take 10 mg by mouth 3 times a day.  Dose:  10 mg     oxyCODONE-acetaminophen  MG Tabs  Commonly known as:  PERCOCET-10   Take 0.5 Tabs by mouth 3 times a day as needed for Severe Pain.  Dose:  0.5 Tab     tamoxifen 10 MG Tabs  Commonly known as:  NOLVADEX   Take 20 mg by mouth.  Dose:  20 mg     tizanidine 4 MG Tabs  Commonly known as:  ZANAFLEX   Take 4 mg by mouth every 8 hours.  Dose:  4 mg     VENTOLIN  (90 Base) MCG/ACT Aers inhalation aerosol  Generic drug:  albuterol   Inhale 2 Puffs by mouth every 6 hours.  Dose:  2 Puff            Allergies  Allergies   Allergen Reactions   • Fish    • Lyrica Hives   • Morphine Unspecified     Was told she is very allergic after surgery.    • Motrin [Ibuprofen] Hives   • Neurontin [Gabapentin] Swelling     Messes with kidneys   • Pcn [Penicillins] Hives     Burning sensation throughout body       DIET  No orders of the defined types were placed in this encounter.      ACTIVITY  As tolerated.  Weight bearing as tolerated    CONSULTATIONS  Pulmonary    PROCEDURES  Bone biopsy on 11/12 by IR    LABORATORY  Lab Results   Component Value Date    SODIUM 138 11/13/2019    POTASSIUM 4.1 11/13/2019    CHLORIDE 104 11/13/2019    CO2 27 11/13/2019    GLUCOSE 125 (H) 11/13/2019    BUN 18 11/13/2019    CREATININE 0.97 11/13/2019        Lab Results   Component Value Date    WBC 23.8 (H) 11/13/2019    HEMOGLOBIN 9.6 (L) 11/13/2019    HEMATOCRIT 29.2 (L) 11/13/2019    PLATELETCT 313 11/13/2019        Total time of the discharge process exceeds 34 minutes.

## 2019-11-13 NOTE — DISCHARGE PLANNING
Agency/Facility Name: [facility]  Spoke To: Edin  Outcome: Patient declined as he stated they do not service the area.    Agency/Facility Name: [facility] (formerly [facility])  Spoke To: Lilly  Outcome: Per Lilly, they have the patient on service with them and will just need the order faxed over. I faxed order today. ( FAX: [phone])

## 2019-11-13 NOTE — PROGRESS NOTES
Received report from day shift RNFortino. Assumed care of pt. Pt reports 10/10 pain at this time, pain medicine given. Updated pt on plan of care. Pt resting in bed. Bed alarm on. Educated on use of call light. Hourly rounding and continuous monitoring in place.

## 2019-11-13 NOTE — DISCHARGE PLANNING
Agency/Facility Name: [facility]   Spoke To: fax notification  Outcome: Patient declined as they are not contracted with the patient's insurance.     Informed CLARA Mosher

## 2019-11-13 NOTE — DISCHARGE PLANNING
Agency/Facility Name: [facility]  Spoke To: Lilly  Outcome: Auth was submitted to insurance. However, with [employer] it can take up to 3 days for auth to be approved.     Informed CLARA Mosher

## 2019-11-19 ENCOUNTER — TELEPHONE (OUTPATIENT)
Dept: HOSPITALIST | Facility: MEDICAL CENTER | Age: 44
End: 2019-11-19

## 2019-11-19 NOTE — TELEPHONE ENCOUNTER
Called patient to discuss biopsy results.  Results of bone biopsy were metastatic breast carcinoma (HER2 results pending).  Patient has PCP appointment tomorrow (11/20) and was going to call prior oncologist to get appointment to be seen.  Will try and forward results of biopsy results to PCP as well as Dr. Brian Simon (prior oncologist).

## 2019-12-21 ENCOUNTER — APPOINTMENT (OUTPATIENT)
Dept: RADIOLOGY | Facility: MEDICAL CENTER | Age: 44
DRG: 542 | End: 2019-12-21
Attending: EMERGENCY MEDICINE
Payer: COMMERCIAL

## 2019-12-21 ENCOUNTER — HOSPITAL ENCOUNTER (INPATIENT)
Facility: MEDICAL CENTER | Age: 44
LOS: 4 days | DRG: 542 | End: 2019-12-25
Attending: EMERGENCY MEDICINE | Admitting: HOSPITALIST
Payer: COMMERCIAL

## 2019-12-21 DIAGNOSIS — R62.7 FTT (FAILURE TO THRIVE) IN ADULT: ICD-10-CM

## 2019-12-21 DIAGNOSIS — N17.9 AKI (ACUTE KIDNEY INJURY) (HCC): ICD-10-CM

## 2019-12-21 DIAGNOSIS — S72.111A CLOSED DISPLACED FRACTURE OF GREATER TROCHANTER OF RIGHT FEMUR, INITIAL ENCOUNTER (HCC): ICD-10-CM

## 2019-12-21 DIAGNOSIS — C50.919 METASTATIC BREAST CANCER: Primary | ICD-10-CM

## 2019-12-21 LAB
ALBUMIN SERPL BCP-MCNC: 3.3 G/DL (ref 3.2–4.9)
ALBUMIN/GLOB SERPL: 1.1 G/DL
ALP SERPL-CCNC: 128 U/L (ref 30–99)
ALT SERPL-CCNC: 7 U/L (ref 2–50)
ANION GAP SERPL CALC-SCNC: 10 MMOL/L (ref 0–11.9)
AST SERPL-CCNC: 13 U/L (ref 12–45)
BASOPHILS # BLD AUTO: 0.2 % (ref 0–1.8)
BASOPHILS # BLD: 0.05 K/UL (ref 0–0.12)
BILIRUB SERPL-MCNC: 0.3 MG/DL (ref 0.1–1.5)
BUN SERPL-MCNC: 50 MG/DL (ref 8–22)
CALCIUM SERPL-MCNC: 8.9 MG/DL (ref 8.5–10.5)
CHLORIDE SERPL-SCNC: 111 MMOL/L (ref 96–112)
CO2 SERPL-SCNC: 18 MMOL/L (ref 20–33)
CREAT SERPL-MCNC: 1.93 MG/DL (ref 0.5–1.4)
EOSINOPHIL # BLD AUTO: 0.11 K/UL (ref 0–0.51)
EOSINOPHIL NFR BLD: 0.4 % (ref 0–6.9)
ERYTHROCYTE [DISTWIDTH] IN BLOOD BY AUTOMATED COUNT: 56.8 FL (ref 35.9–50)
GLOBULIN SER CALC-MCNC: 3 G/DL (ref 1.9–3.5)
GLUCOSE SERPL-MCNC: 115 MG/DL (ref 65–99)
HCT VFR BLD AUTO: 26.8 % (ref 37–47)
HGB BLD-MCNC: 8.8 G/DL (ref 12–16)
IMM GRANULOCYTES # BLD AUTO: 0.41 K/UL (ref 0–0.11)
IMM GRANULOCYTES NFR BLD AUTO: 1.6 % (ref 0–0.9)
LACTATE BLD-SCNC: 1 MMOL/L (ref 0.5–2)
LYMPHOCYTES # BLD AUTO: 2.33 K/UL (ref 1–4.8)
LYMPHOCYTES NFR BLD: 9.2 % (ref 22–41)
MAGNESIUM SERPL-MCNC: 2 MG/DL (ref 1.5–2.5)
MCH RBC QN AUTO: 33 PG (ref 27–33)
MCHC RBC AUTO-ENTMCNC: 32.8 G/DL (ref 33.6–35)
MCV RBC AUTO: 100.4 FL (ref 81.4–97.8)
MONOCYTES # BLD AUTO: 2.01 K/UL (ref 0–0.85)
MONOCYTES NFR BLD AUTO: 7.9 % (ref 0–13.4)
NEUTROPHILS # BLD AUTO: 20.45 K/UL (ref 2–7.15)
NEUTROPHILS NFR BLD: 80.7 % (ref 44–72)
NRBC # BLD AUTO: 0.19 K/UL
NRBC BLD-RTO: 0.7 /100 WBC
NT-PROBNP SERPL IA-MCNC: 7432 PG/ML (ref 0–125)
PLATELET # BLD AUTO: 246 K/UL (ref 164–446)
PMV BLD AUTO: 9.8 FL (ref 9–12.9)
POTASSIUM SERPL-SCNC: 3.1 MMOL/L (ref 3.6–5.5)
PROCALCITONIN SERPL-MCNC: 0.94 NG/ML
PROT SERPL-MCNC: 6.3 G/DL (ref 6–8.2)
RBC # BLD AUTO: 2.67 M/UL (ref 4.2–5.4)
SODIUM SERPL-SCNC: 139 MMOL/L (ref 135–145)
TROPONIN T SERPL-MCNC: 24 NG/L (ref 6–19)
WBC # BLD AUTO: 25.4 K/UL (ref 4.8–10.8)

## 2019-12-21 PROCEDURE — 36415 COLL VENOUS BLD VENIPUNCTURE: CPT

## 2019-12-21 PROCEDURE — 71045 X-RAY EXAM CHEST 1 VIEW: CPT

## 2019-12-21 PROCEDURE — 770001 HCHG ROOM/CARE - MED/SURG/GYN PRIV*

## 2019-12-21 PROCEDURE — 700105 HCHG RX REV CODE 258: Performed by: EMERGENCY MEDICINE

## 2019-12-21 PROCEDURE — 83735 ASSAY OF MAGNESIUM: CPT

## 2019-12-21 PROCEDURE — 84484 ASSAY OF TROPONIN QUANT: CPT

## 2019-12-21 PROCEDURE — 96365 THER/PROPH/DIAG IV INF INIT: CPT

## 2019-12-21 PROCEDURE — 700111 HCHG RX REV CODE 636 W/ 250 OVERRIDE (IP): Performed by: EMERGENCY MEDICINE

## 2019-12-21 PROCEDURE — 99285 EMERGENCY DEPT VISIT HI MDM: CPT

## 2019-12-21 PROCEDURE — 83605 ASSAY OF LACTIC ACID: CPT

## 2019-12-21 PROCEDURE — 80053 COMPREHEN METABOLIC PANEL: CPT

## 2019-12-21 PROCEDURE — 84145 PROCALCITONIN (PCT): CPT

## 2019-12-21 PROCEDURE — 96367 TX/PROPH/DG ADDL SEQ IV INF: CPT

## 2019-12-21 PROCEDURE — 83880 ASSAY OF NATRIURETIC PEPTIDE: CPT

## 2019-12-21 PROCEDURE — 87040 BLOOD CULTURE FOR BACTERIA: CPT

## 2019-12-21 PROCEDURE — 85025 COMPLETE CBC W/AUTO DIFF WBC: CPT

## 2019-12-21 PROCEDURE — 71250 CT THORAX DX C-: CPT

## 2019-12-21 PROCEDURE — 96366 THER/PROPH/DIAG IV INF ADDON: CPT

## 2019-12-21 RX ORDER — SODIUM CHLORIDE 9 MG/ML
1000 INJECTION, SOLUTION INTRAVENOUS ONCE
Status: COMPLETED | OUTPATIENT
Start: 2019-12-21 | End: 2019-12-21

## 2019-12-21 RX ADMIN — CEFEPIME 2 G: 2 INJECTION, POWDER, FOR SOLUTION INTRAVENOUS at 21:42

## 2019-12-21 RX ADMIN — SODIUM CHLORIDE 1000 ML: 9 INJECTION, SOLUTION INTRAVENOUS at 21:11

## 2019-12-21 RX ADMIN — VANCOMYCIN HYDROCHLORIDE 1400 MG: 500 INJECTION, POWDER, LYOPHILIZED, FOR SOLUTION INTRAVENOUS at 22:29

## 2019-12-22 PROBLEM — R62.7 FAILURE TO THRIVE IN ADULT: Status: ACTIVE | Noted: 2019-12-22

## 2019-12-22 PROBLEM — C50.919 BREAST CANCER (HCC): Chronic | Status: ACTIVE | Noted: 2019-12-22

## 2019-12-22 PROBLEM — C50.919 BREAST CANCER (HCC): Status: ACTIVE | Noted: 2019-12-22

## 2019-12-22 PROBLEM — E87.6 HYPOKALEMIA: Status: ACTIVE | Noted: 2019-12-22

## 2019-12-22 PROBLEM — F39 MOOD DISORDER (HCC): Status: ACTIVE | Noted: 2019-11-12

## 2019-12-22 PROBLEM — R29.898 WEAKNESS OF RIGHT LOWER EXTREMITY: Status: ACTIVE | Noted: 2019-12-22

## 2019-12-22 LAB
ALBUMIN SERPL BCP-MCNC: 2.9 G/DL (ref 3.2–4.9)
ALBUMIN/GLOB SERPL: 1.1 G/DL
ALP SERPL-CCNC: 113 U/L (ref 30–99)
ALT SERPL-CCNC: 8 U/L (ref 2–50)
ANION GAP SERPL CALC-SCNC: 9 MMOL/L (ref 0–11.9)
APPEARANCE UR: CLEAR
AST SERPL-CCNC: 20 U/L (ref 12–45)
BACTERIA #/AREA URNS HPF: ABNORMAL /HPF
BASOPHILS # BLD AUTO: 0.2 % (ref 0–1.8)
BASOPHILS # BLD: 0.03 K/UL (ref 0–0.12)
BILIRUB SERPL-MCNC: 0.4 MG/DL (ref 0.1–1.5)
BILIRUB UR QL STRIP.AUTO: NEGATIVE
BUN SERPL-MCNC: 29 MG/DL (ref 8–22)
CALCIUM SERPL-MCNC: 9.2 MG/DL (ref 8.5–10.5)
CHLORIDE SERPL-SCNC: 116 MMOL/L (ref 96–112)
CO2 SERPL-SCNC: 18 MMOL/L (ref 20–33)
COLOR UR: YELLOW
CREAT SERPL-MCNC: 1.19 MG/DL (ref 0.5–1.4)
EKG IMPRESSION: NORMAL
EOSINOPHIL # BLD AUTO: 0.18 K/UL (ref 0–0.51)
EOSINOPHIL NFR BLD: 1.1 % (ref 0–6.9)
EPI CELLS #/AREA URNS HPF: NEGATIVE /HPF
ERYTHROCYTE [DISTWIDTH] IN BLOOD BY AUTOMATED COUNT: 58.4 FL (ref 35.9–50)
GLOBULIN SER CALC-MCNC: 2.7 G/DL (ref 1.9–3.5)
GLUCOSE SERPL-MCNC: 113 MG/DL (ref 65–99)
GLUCOSE UR STRIP.AUTO-MCNC: NEGATIVE MG/DL
HCT VFR BLD AUTO: 24.2 % (ref 37–47)
HGB BLD-MCNC: 8 G/DL (ref 12–16)
HGB RETIC QN AUTO: 25.8 PG/CELL (ref 29–35)
HYALINE CASTS #/AREA URNS LPF: ABNORMAL /LPF
IMM GRANULOCYTES # BLD AUTO: 0.39 K/UL (ref 0–0.11)
IMM GRANULOCYTES NFR BLD AUTO: 2.4 % (ref 0–0.9)
IMM RETICS NFR: 36.7 % (ref 9.3–17.4)
KETONES UR STRIP.AUTO-MCNC: NEGATIVE MG/DL
LEUKOCYTE ESTERASE UR QL STRIP.AUTO: NEGATIVE
LYMPHOCYTES # BLD AUTO: 2.23 K/UL (ref 1–4.8)
LYMPHOCYTES NFR BLD: 13.6 % (ref 22–41)
MAGNESIUM SERPL-MCNC: 1.8 MG/DL (ref 1.5–2.5)
MCH RBC QN AUTO: 33.6 PG (ref 27–33)
MCHC RBC AUTO-ENTMCNC: 33.1 G/DL (ref 33.6–35)
MCV RBC AUTO: 101.7 FL (ref 81.4–97.8)
MICRO URNS: ABNORMAL
MONOCYTES # BLD AUTO: 1.19 K/UL (ref 0–0.85)
MONOCYTES NFR BLD AUTO: 7.2 % (ref 0–13.4)
NEUTROPHILS # BLD AUTO: 12.43 K/UL (ref 2–7.15)
NEUTROPHILS NFR BLD: 75.5 % (ref 44–72)
NITRITE UR QL STRIP.AUTO: NEGATIVE
NRBC # BLD AUTO: 0.26 K/UL
NRBC BLD-RTO: 1.6 /100 WBC
PH UR STRIP.AUTO: 5.5 [PH] (ref 5–8)
PLATELET # BLD AUTO: 229 K/UL (ref 164–446)
PMV BLD AUTO: 9.6 FL (ref 9–12.9)
POTASSIUM SERPL-SCNC: 3.5 MMOL/L (ref 3.6–5.5)
PREALB SERPL-MCNC: 9 MG/DL (ref 18–38)
PROT SERPL-MCNC: 5.6 G/DL (ref 6–8.2)
PROT UR QL STRIP: 30 MG/DL
RBC # BLD AUTO: 2.38 M/UL (ref 4.2–5.4)
RBC # URNS HPF: ABNORMAL /HPF
RBC UR QL AUTO: ABNORMAL
RETICS # AUTO: 0.05 M/UL (ref 0.04–0.06)
RETICS/RBC NFR: 1.9 % (ref 0.8–2.1)
SODIUM SERPL-SCNC: 143 MMOL/L (ref 135–145)
SP GR UR STRIP.AUTO: 1.01
UROBILINOGEN UR STRIP.AUTO-MCNC: 0.2 MG/DL
VIT B12 SERPL-MCNC: 423 PG/ML (ref 211–911)
WBC # BLD AUTO: 16.5 K/UL (ref 4.8–10.8)
WBC #/AREA URNS HPF: ABNORMAL /HPF

## 2019-12-22 PROCEDURE — 93005 ELECTROCARDIOGRAM TRACING: CPT | Performed by: STUDENT IN AN ORGANIZED HEALTH CARE EDUCATION/TRAINING PROGRAM

## 2019-12-22 PROCEDURE — 700102 HCHG RX REV CODE 250 W/ 637 OVERRIDE(OP): Performed by: INTERNAL MEDICINE

## 2019-12-22 PROCEDURE — 700111 HCHG RX REV CODE 636 W/ 250 OVERRIDE (IP): Performed by: INTERNAL MEDICINE

## 2019-12-22 PROCEDURE — 81001 URINALYSIS AUTO W/SCOPE: CPT

## 2019-12-22 PROCEDURE — 700111 HCHG RX REV CODE 636 W/ 250 OVERRIDE (IP): Performed by: STUDENT IN AN ORGANIZED HEALTH CARE EDUCATION/TRAINING PROGRAM

## 2019-12-22 PROCEDURE — 99406 BEHAV CHNG SMOKING 3-10 MIN: CPT

## 2019-12-22 PROCEDURE — 700101 HCHG RX REV CODE 250: Performed by: STUDENT IN AN ORGANIZED HEALTH CARE EDUCATION/TRAINING PROGRAM

## 2019-12-22 PROCEDURE — 93010 ELECTROCARDIOGRAM REPORT: CPT | Performed by: INTERNAL MEDICINE

## 2019-12-22 PROCEDURE — 770001 HCHG ROOM/CARE - MED/SURG/GYN PRIV*

## 2019-12-22 PROCEDURE — A9270 NON-COVERED ITEM OR SERVICE: HCPCS | Performed by: INTERNAL MEDICINE

## 2019-12-22 PROCEDURE — 82607 VITAMIN B-12: CPT

## 2019-12-22 PROCEDURE — A9270 NON-COVERED ITEM OR SERVICE: HCPCS | Performed by: STUDENT IN AN ORGANIZED HEALTH CARE EDUCATION/TRAINING PROGRAM

## 2019-12-22 PROCEDURE — 94664 DEMO&/EVAL PT USE INHALER: CPT

## 2019-12-22 PROCEDURE — 36415 COLL VENOUS BLD VENIPUNCTURE: CPT

## 2019-12-22 PROCEDURE — 83735 ASSAY OF MAGNESIUM: CPT

## 2019-12-22 PROCEDURE — 99221 1ST HOSP IP/OBS SF/LOW 40: CPT | Mod: GC | Performed by: INTERNAL MEDICINE

## 2019-12-22 PROCEDURE — 80053 COMPREHEN METABOLIC PANEL: CPT

## 2019-12-22 PROCEDURE — 700102 HCHG RX REV CODE 250 W/ 637 OVERRIDE(OP): Performed by: STUDENT IN AN ORGANIZED HEALTH CARE EDUCATION/TRAINING PROGRAM

## 2019-12-22 PROCEDURE — 700105 HCHG RX REV CODE 258: Performed by: INTERNAL MEDICINE

## 2019-12-22 PROCEDURE — 85025 COMPLETE CBC W/AUTO DIFF WBC: CPT

## 2019-12-22 PROCEDURE — 85046 RETICYTE/HGB CONCENTRATE: CPT

## 2019-12-22 PROCEDURE — 700105 HCHG RX REV CODE 258: Performed by: STUDENT IN AN ORGANIZED HEALTH CARE EDUCATION/TRAINING PROGRAM

## 2019-12-22 PROCEDURE — 84134 ASSAY OF PREALBUMIN: CPT

## 2019-12-22 PROCEDURE — 97165 OT EVAL LOW COMPLEX 30 MIN: CPT

## 2019-12-22 RX ORDER — MORPHINE SULFATE 15 MG/1
7.5 TABLET ORAL EVERY 4 HOURS PRN
Status: DISCONTINUED | OUTPATIENT
Start: 2019-12-22 | End: 2019-12-22

## 2019-12-22 RX ORDER — LIDOCAINE 50 MG/G
1 PATCH TOPICAL EVERY 24 HOURS
Status: DISCONTINUED | OUTPATIENT
Start: 2019-12-22 | End: 2019-12-25 | Stop reason: HOSPADM

## 2019-12-22 RX ORDER — HYDROMORPHONE HYDROCHLORIDE 1 MG/ML
0.5 INJECTION, SOLUTION INTRAMUSCULAR; INTRAVENOUS; SUBCUTANEOUS
Status: DISCONTINUED | OUTPATIENT
Start: 2019-12-22 | End: 2019-12-25 | Stop reason: HOSPADM

## 2019-12-22 RX ORDER — NICOTINE 21 MG/24HR
14 PATCH, TRANSDERMAL 24 HOURS TRANSDERMAL
Status: DISCONTINUED | OUTPATIENT
Start: 2019-12-22 | End: 2019-12-25 | Stop reason: HOSPADM

## 2019-12-22 RX ORDER — TIZANIDINE 4 MG/1
4 TABLET ORAL EVERY 8 HOURS PRN
Status: DISCONTINUED | OUTPATIENT
Start: 2019-12-22 | End: 2019-12-25 | Stop reason: HOSPADM

## 2019-12-22 RX ORDER — CLONAZEPAM 1 MG/1
1 TABLET ORAL 3 TIMES DAILY
Status: DISCONTINUED | OUTPATIENT
Start: 2019-12-22 | End: 2019-12-22

## 2019-12-22 RX ORDER — CITALOPRAM 20 MG/1
40 TABLET ORAL DAILY
Status: DISCONTINUED | OUTPATIENT
Start: 2019-12-22 | End: 2019-12-25 | Stop reason: HOSPADM

## 2019-12-22 RX ORDER — BISACODYL 10 MG
10 SUPPOSITORY, RECTAL RECTAL
Status: DISCONTINUED | OUTPATIENT
Start: 2019-12-22 | End: 2019-12-22

## 2019-12-22 RX ORDER — ALBUTEROL SULFATE 90 UG/1
2 AEROSOL, METERED RESPIRATORY (INHALATION) EVERY 6 HOURS
Status: DISCONTINUED | OUTPATIENT
Start: 2019-12-22 | End: 2019-12-25 | Stop reason: HOSPADM

## 2019-12-22 RX ORDER — CLONAZEPAM 1 MG/1
1 TABLET ORAL 3 TIMES DAILY PRN
Status: DISCONTINUED | OUTPATIENT
Start: 2019-12-22 | End: 2019-12-25 | Stop reason: HOSPADM

## 2019-12-22 RX ORDER — HEPARIN SODIUM 5000 [USP'U]/ML
5000 INJECTION, SOLUTION INTRAVENOUS; SUBCUTANEOUS EVERY 8 HOURS
Status: DISCONTINUED | OUTPATIENT
Start: 2019-12-22 | End: 2019-12-22

## 2019-12-22 RX ORDER — MORPHINE SULFATE 15 MG/1
7.5 TABLET ORAL EVERY 8 HOURS PRN
Status: DISCONTINUED | OUTPATIENT
Start: 2019-12-22 | End: 2019-12-22

## 2019-12-22 RX ORDER — POLYETHYLENE GLYCOL 3350 17 G/17G
1 POWDER, FOR SOLUTION ORAL DAILY
Status: DISCONTINUED | OUTPATIENT
Start: 2019-12-22 | End: 2019-12-25 | Stop reason: HOSPADM

## 2019-12-22 RX ORDER — ACETAMINOPHEN 325 MG/1
650 TABLET ORAL EVERY 6 HOURS
Status: DISCONTINUED | OUTPATIENT
Start: 2019-12-22 | End: 2019-12-24

## 2019-12-22 RX ORDER — DOXYCYCLINE 100 MG/1
100 TABLET ORAL EVERY 12 HOURS
Status: DISCONTINUED | OUTPATIENT
Start: 2019-12-22 | End: 2019-12-25 | Stop reason: HOSPADM

## 2019-12-22 RX ORDER — OXYCODONE HYDROCHLORIDE 5 MG/1
5 TABLET ORAL EVERY 4 HOURS PRN
Status: DISCONTINUED | OUTPATIENT
Start: 2019-12-22 | End: 2019-12-24

## 2019-12-22 RX ORDER — TAMOXIFEN CITRATE 10 MG/1
20 TABLET ORAL DAILY
Status: DISCONTINUED | OUTPATIENT
Start: 2019-12-22 | End: 2019-12-25 | Stop reason: HOSPADM

## 2019-12-22 RX ORDER — BISACODYL 10 MG
10 SUPPOSITORY, RECTAL RECTAL
Status: DISCONTINUED | OUTPATIENT
Start: 2019-12-22 | End: 2019-12-25 | Stop reason: HOSPADM

## 2019-12-22 RX ORDER — MIRTAZAPINE 15 MG/1
15 TABLET, FILM COATED ORAL
Status: DISCONTINUED | OUTPATIENT
Start: 2019-12-22 | End: 2019-12-25 | Stop reason: HOSPADM

## 2019-12-22 RX ORDER — AMOXICILLIN 250 MG
2 CAPSULE ORAL 2 TIMES DAILY
Status: DISCONTINUED | OUTPATIENT
Start: 2019-12-22 | End: 2019-12-22

## 2019-12-22 RX ORDER — OXYCODONE HYDROCHLORIDE AND ACETAMINOPHEN 5; 325 MG/1; MG/1
1 TABLET ORAL 3 TIMES DAILY PRN
Status: DISCONTINUED | OUTPATIENT
Start: 2019-12-22 | End: 2019-12-22

## 2019-12-22 RX ORDER — OXYCODONE HYDROCHLORIDE AND ACETAMINOPHEN 5; 325 MG/1; MG/1
1 TABLET ORAL EVERY 4 HOURS PRN
Status: DISCONTINUED | OUTPATIENT
Start: 2019-12-22 | End: 2019-12-22

## 2019-12-22 RX ORDER — POTASSIUM CHLORIDE 20 MEQ/1
40 TABLET, EXTENDED RELEASE ORAL ONCE
Status: COMPLETED | OUTPATIENT
Start: 2019-12-22 | End: 2019-12-22

## 2019-12-22 RX ORDER — POLYETHYLENE GLYCOL 3350 17 G/17G
1 POWDER, FOR SOLUTION ORAL
Status: DISCONTINUED | OUTPATIENT
Start: 2019-12-22 | End: 2019-12-22

## 2019-12-22 RX ORDER — TIZANIDINE 4 MG/1
4 TABLET ORAL EVERY 8 HOURS
Status: DISCONTINUED | OUTPATIENT
Start: 2019-12-22 | End: 2019-12-22

## 2019-12-22 RX ORDER — SODIUM CHLORIDE, SODIUM LACTATE, POTASSIUM CHLORIDE, CALCIUM CHLORIDE 600; 310; 30; 20 MG/100ML; MG/100ML; MG/100ML; MG/100ML
INJECTION, SOLUTION INTRAVENOUS CONTINUOUS
Status: DISCONTINUED | OUTPATIENT
Start: 2019-12-22 | End: 2019-12-25 | Stop reason: HOSPADM

## 2019-12-22 RX ORDER — CARVEDILOL 6.25 MG/1
6.25 TABLET ORAL 2 TIMES DAILY
Status: DISCONTINUED | OUTPATIENT
Start: 2019-12-22 | End: 2019-12-25 | Stop reason: HOSPADM

## 2019-12-22 RX ORDER — DEXAMETHASONE 4 MG/1
4 TABLET ORAL 3 TIMES DAILY
Status: DISCONTINUED | OUTPATIENT
Start: 2019-12-22 | End: 2019-12-24

## 2019-12-22 RX ORDER — AMOXICILLIN 250 MG
2 CAPSULE ORAL 2 TIMES DAILY
Status: DISCONTINUED | OUTPATIENT
Start: 2019-12-22 | End: 2019-12-25 | Stop reason: HOSPADM

## 2019-12-22 RX ADMIN — GUAIFENESIN 200 MG: 100 SOLUTION ORAL at 07:26

## 2019-12-22 RX ADMIN — LIDOCAINE 1 PATCH: 50 PATCH TOPICAL at 07:26

## 2019-12-22 RX ADMIN — OXYCODONE HYDROCHLORIDE 5 MG: 5 TABLET ORAL at 21:29

## 2019-12-22 RX ADMIN — ALBUTEROL SULFATE 2 PUFF: 90 AEROSOL, METERED RESPIRATORY (INHALATION) at 17:29

## 2019-12-22 RX ADMIN — TIZANIDINE 4 MG: 4 TABLET ORAL at 13:28

## 2019-12-22 RX ADMIN — HEPARIN SODIUM 5000 UNITS: 5000 INJECTION, SOLUTION INTRAVENOUS; SUBCUTANEOUS at 13:28

## 2019-12-22 RX ADMIN — CARVEDILOL 6.25 MG: 6.25 TABLET, FILM COATED ORAL at 02:08

## 2019-12-22 RX ADMIN — DOXYCYCLINE 100 MG: 100 TABLET, FILM COATED ORAL at 18:22

## 2019-12-22 RX ADMIN — GUAIFENESIN 200 MG: 100 SOLUTION ORAL at 13:28

## 2019-12-22 RX ADMIN — MIRTAZAPINE 15 MG: 15 TABLET, FILM COATED ORAL at 21:29

## 2019-12-22 RX ADMIN — DEXAMETHASONE 4 MG: 4 TABLET ORAL at 13:28

## 2019-12-22 RX ADMIN — ACETAMINOPHEN 650 MG: 325 TABLET, FILM COATED ORAL at 13:28

## 2019-12-22 RX ADMIN — CLONAZEPAM 1 MG: 1 TABLET ORAL at 13:28

## 2019-12-22 RX ADMIN — CARVEDILOL 6.25 MG: 6.25 TABLET, FILM COATED ORAL at 13:28

## 2019-12-22 RX ADMIN — ALBUTEROL SULFATE 2 PUFF: 90 AEROSOL, METERED RESPIRATORY (INHALATION) at 13:29

## 2019-12-22 RX ADMIN — ACETAMINOPHEN 650 MG: 325 TABLET, FILM COATED ORAL at 17:29

## 2019-12-22 RX ADMIN — TIZANIDINE 4 MG: 4 TABLET ORAL at 05:33

## 2019-12-22 RX ADMIN — POLYETHYLENE GLYCOL 3350 1 PACKET: 17 POWDER, FOR SOLUTION ORAL at 07:26

## 2019-12-22 RX ADMIN — GUAIFENESIN 200 MG: 100 SOLUTION ORAL at 17:29

## 2019-12-22 RX ADMIN — SENNOSIDES AND DOCUSATE SODIUM 2 TABLET: 8.6; 5 TABLET ORAL at 06:00

## 2019-12-22 RX ADMIN — DEXAMETHASONE 4 MG: 4 TABLET ORAL at 17:29

## 2019-12-22 RX ADMIN — HEPARIN SODIUM 5000 UNITS: 5000 INJECTION, SOLUTION INTRAVENOUS; SUBCUTANEOUS at 05:33

## 2019-12-22 RX ADMIN — CLONAZEPAM 1 MG: 1 TABLET ORAL at 05:33

## 2019-12-22 RX ADMIN — MIRTAZAPINE 15 MG: 15 TABLET, FILM COATED ORAL at 01:27

## 2019-12-22 RX ADMIN — CITALOPRAM HYDROBROMIDE 40 MG: 20 TABLET ORAL at 05:33

## 2019-12-22 RX ADMIN — POTASSIUM CHLORIDE 40 MEQ: 1500 TABLET, EXTENDED RELEASE ORAL at 01:31

## 2019-12-22 RX ADMIN — ALBUTEROL SULFATE 2 PUFF: 90 AEROSOL, METERED RESPIRATORY (INHALATION) at 06:00

## 2019-12-22 RX ADMIN — OXYCODONE HYDROCHLORIDE AND ACETAMINOPHEN 1 TABLET: 5; 325 TABLET ORAL at 05:32

## 2019-12-22 RX ADMIN — SODIUM CHLORIDE, POTASSIUM CHLORIDE, SODIUM LACTATE AND CALCIUM CHLORIDE: 600; 310; 30; 20 INJECTION, SOLUTION INTRAVENOUS at 17:51

## 2019-12-22 RX ADMIN — OXYCODONE HYDROCHLORIDE AND ACETAMINOPHEN 1 TABLET: 5; 325 TABLET ORAL at 02:08

## 2019-12-22 RX ADMIN — CLONAZEPAM 1 MG: 1 TABLET ORAL at 17:28

## 2019-12-22 RX ADMIN — HEPARIN SODIUM 5000 UNITS: 5000 INJECTION, SOLUTION INTRAVENOUS; SUBCUTANEOUS at 01:31

## 2019-12-22 RX ADMIN — TAMOXIFEN CITRATE 20 MG: 10 TABLET, FILM COATED ORAL at 06:00

## 2019-12-22 RX ADMIN — OXYCODONE HYDROCHLORIDE 5 MG: 5 TABLET ORAL at 17:29

## 2019-12-22 RX ADMIN — SODIUM CHLORIDE, POTASSIUM CHLORIDE, SODIUM LACTATE AND CALCIUM CHLORIDE: 600; 310; 30; 20 INJECTION, SOLUTION INTRAVENOUS at 07:37

## 2019-12-22 RX ADMIN — CEFTRIAXONE SODIUM 1 G: 1 INJECTION, POWDER, FOR SOLUTION INTRAMUSCULAR; INTRAVENOUS at 18:23

## 2019-12-22 RX ADMIN — SENNOSIDES AND DOCUSATE SODIUM 2 TABLET: 8.6; 5 TABLET ORAL at 17:28

## 2019-12-22 RX ADMIN — NICOTINE 14 MG: 14 PATCH TRANSDERMAL at 05:31

## 2019-12-22 ASSESSMENT — LIFESTYLE VARIABLES
EVER HAD A DRINK FIRST THING IN THE MORNING TO STEADY YOUR NERVES TO GET RID OF A HANGOVER: NO
EVER FELT BAD OR GUILTY ABOUT YOUR DRINKING: NO
HAVE PEOPLE ANNOYED YOU BY CRITICIZING YOUR DRINKING: NO
HOW MANY TIMES IN THE PAST YEAR HAVE YOU HAD 5 OR MORE DRINKS IN A DAY: 0
ON A TYPICAL DAY WHEN YOU DRINK ALCOHOL HOW MANY DRINKS DO YOU HAVE: 0
TOTAL SCORE: 0
HAVE YOU EVER FELT YOU SHOULD CUT DOWN ON YOUR DRINKING: NO
ALCOHOL_USE: NO
TOTAL SCORE: 0
AVERAGE NUMBER OF DAYS PER WEEK YOU HAVE A DRINK CONTAINING ALCOHOL: 0
DOES PATIENT WANT TO STOP DRINKING: NO
EVER_SMOKED: YES
TOTAL SCORE: 0
CONSUMPTION TOTAL: NEGATIVE

## 2019-12-22 ASSESSMENT — COGNITIVE AND FUNCTIONAL STATUS - GENERAL
CLIMB 3 TO 5 STEPS WITH RAILING: A LOT
DAILY ACTIVITIY SCORE: 14
MOVING TO AND FROM BED TO CHAIR: A LITTLE
TOILETING: A LITTLE
DAILY ACTIVITIY SCORE: 19
EATING MEALS: A LOT
MOBILITY SCORE: 16
TOILETING: A LOT
SUGGESTED CMS G CODE MODIFIER MOBILITY: CK
DRESSING REGULAR UPPER BODY CLOTHING: A LITTLE
SUGGESTED CMS G CODE MODIFIER DAILY ACTIVITY: CK
SUGGESTED CMS G CODE MODIFIER DAILY ACTIVITY: CK
WALKING IN HOSPITAL ROOM: A LITTLE
DRESSING REGULAR LOWER BODY CLOTHING: A LITTLE
MOVING FROM LYING ON BACK TO SITTING ON SIDE OF FLAT BED: A LITTLE
TURNING FROM BACK TO SIDE WHILE IN FLAT BAD: A LITTLE
DRESSING REGULAR LOWER BODY CLOTHING: A LOT
PERSONAL GROOMING: A LITTLE
HELP NEEDED FOR BATHING: A LOT
STANDING UP FROM CHAIR USING ARMS: A LOT
HELP NEEDED FOR BATHING: A LITTLE
EATING MEALS: A LOT

## 2019-12-22 ASSESSMENT — ENCOUNTER SYMPTOMS
DIAPHORESIS: 0
WEAKNESS: 1
FOCAL WEAKNESS: 1
ABDOMINAL PAIN: 0
SEIZURES: 0
HEMOPTYSIS: 0
SENSORY CHANGE: 0
LOSS OF CONSCIOUSNESS: 0
DIZZINESS: 0
COUGH: 1
BLURRED VISION: 0
FEVER: 0
WHEEZING: 0
SPUTUM PRODUCTION: 0
BACK PAIN: 0
VOMITING: 0
NECK PAIN: 0
DOUBLE VISION: 0
PHOTOPHOBIA: 0
CHILLS: 0
PALPITATIONS: 0
HEADACHES: 0
ORTHOPNEA: 0
SPEECH CHANGE: 0
MYALGIAS: 0
DIARRHEA: 0
COUGH: 0
EYE PAIN: 0
TINGLING: 0
NAUSEA: 0
SHORTNESS OF BREATH: 0
HEARTBURN: 0
SPUTUM PRODUCTION: 1
TREMORS: 0

## 2019-12-22 ASSESSMENT — COPD QUESTIONNAIRES
DURING THE PAST 4 WEEKS HOW MUCH DID YOU FEEL SHORT OF BREATH: SOME OF THE TIME
HAVE YOU SMOKED AT LEAST 100 CIGARETTES IN YOUR ENTIRE LIFE: YES
DO YOU EVER COUGH UP ANY MUCUS OR PHLEGM?: YES, A FEW DAYS A WEEK OR MONTH
COPD SCREENING SCORE: 5

## 2019-12-22 ASSESSMENT — ACTIVITIES OF DAILY LIVING (ADL): TOILETING: INDEPENDENT

## 2019-12-22 NOTE — DIETARY
"Nutrition services: Day 1 of admit.  Roxy Cota is a 44 y.o. female with admitting DX of JANUSZ.    Problem list per Resident note:    #Acute kidney injury secondary to dehydration  #Adult failure to thrive  #Malignant neoplasm of the breast with osseous metastasis  #New onset right lower extremity weakness, concern for brain metastasis  #Adjustment disorder with depressed mood  #Hypokalemia  #Metabolic acidosis, lactic acid normal  #Macrocytic anemia    Consult received for FTT. Pt reports no appetite and upset stomach for past week, eating <50% of usual intake (bites of meals) with suspected wt loss as she noticed wt loss in her face. Pt states UBW is ~ 120#. Interview limited as pt appeared very tired/weak. Pt states she tries to do Boost at home but states it is expensive, pt agreeable to supplements while in hospital.       Assessment:  Height: 157.5 cm (5' 2\")  Weight: 55.6 kg/ 122 lbs per bedscale today.   Body mass index is 21.95 kg/m²., BMI classification: Normal wt.   Diet/Intake: Regular    Evaluation:   1. Pertinent Labs: K+ 3.1, BUN 50, Creat 1.93, Alk Phos 128.  2. Pertinent Meds: LR @ 100 ml/hr, Bowel protocol.  3. GI: last BM noted on 12/19.   4. Pt noted to have moderate muscle loss (slightly depressed temporal area and prominent/protruding clavicle bone).     Malnutrition Risk: Pt meets criteria for severe acute illness related malnutrition related to FTT as evidenced by PO less than 50% of energy needs for past week per pt report with moderate muscle loss.     Recommendations/Plan:  1. Supplement orders received this am, will add Boost Plus with all meals.   2. Encourage PO and document all intake as % taken in ADL's to provide interdisciplinary communication across all shifts.   3. Monitor weight, provide measured wt as able.   4. Nutrition rep will continue to see patient for ongoing meal and snack preferences.     RD following.             "

## 2019-12-22 NOTE — CARE PLAN
Problem: Nutritional:  Goal: Achieve adequate nutritional intake  Description  Patient will consume >50% of meals/supplements   Outcome: NOT MET

## 2019-12-22 NOTE — ASSESSMENT & PLAN NOTE
Creatinine 1.93 on admission. Likely pre-renal due to volume depletion , poor PO intake.   - IV maintenance fluids on admission.  -Resolved now.  -Cr 0.7 this AM.

## 2019-12-22 NOTE — ED NOTES
Patient is resting comfortably. Family at bedside. Pt sitting up in stretcher tolerating PO fluids.

## 2019-12-22 NOTE — ED PROVIDER NOTES
ED Provider Note    Scribed for Jesusita Vogt M.D. by Gian Vega. 12/21/2019  9:06 PM    Means of arrival: Med Flight   History obtained from: Patient  History limited by: none       CHIEF COMPLAINT  Chief Complaint   Patient presents with   • Failure to Thrive     Poor PO intake, pt recently admitted for PNA on 12/17/19       HPI  Roxy Cota is a 44 y.o. female with a history of breast cancer who presents to the Emergency Department for for failure to thrive which is worsened over the last week. She has associated weakness, fatigue, shortness of breath secondary to COPD, cough, and right sided chest wall pain. She states that she has not been able to tolerate food recently, but has had normal bowel movements. Her flank pain is exasperated when coughing. She denies any vomiting, diarrhea, or dysuria. She usually is on oxygen while at home. She has an upcoming appointment with an oncologist on 12/26 in [place]. She is currently taking Oxycodone.     REVIEW OF SYSTEMS  Pertinent positive include failure to thrive, weakness, fatigue, shortness of breath secondary to COPD, cough, and right sided flank pain . Pertinent negative include vomiting, diarrhea, or dysuria. All other systems reviewed and are negative.      PAST MEDICAL HISTORY   has a past medical history of Cancer (HCC).    SOCIAL HISTORY  Social History     Tobacco Use   • Smoking status: Former Smoker     Packs/day: 0.00   • Smokeless tobacco: Never Used   Substance and Sexual Activity   • Alcohol use: Not reviewed   • Drug use: Not on file   • Sexual activity: Not on file       SURGICAL HISTORY   has a past surgical history that includes mastectomy (Right).    CURRENT MEDICATIONS  Home Medications     Reviewed by Birdie Hameed R.N. (Registered Nurse) on 12/21/19 at 2101  Med List Status: <None>   Medication Last Dose Status   albuterol (VENTOLIN HFA) 108 (90 Base) MCG/ACT Aero Soln inhalation aerosol  Active   carvedilol (COREG) 6.25 MG Tab  " Active   cetirizine (ZYRTEC) 10 MG Tab  Active   citalopram (CELEXA) 40 MG Tab  Active   clonazePAM (KLONOPIN) 1 MG Tab  Active   cyanocobalamin (VITAMIN B-12) 500 MCG Tab  Active   dicyclomine (BENTYL) 10 MG Cap  Active   oxyCODONE-acetaminophen (PERCOCET-10)  MG Tab  Active   tamoxifen (NOLVADEX) 10 MG Tab  Active   tizanidine (ZANAFLEX) 4 MG Tab  Active                ALLERGIES  See char    PHYSICAL EXAM   VITAL SIGNS: BP (!) 84/52   Pulse (!) 108   Temp 36.7 °C (98.1 °F) (Temporal)   Resp 20   Ht 1.575 m (5' 2\")   Wt 54.4 kg (120 lb)   SpO2 89%   BMI 21.95 kg/m²    Constitutional: Ill appearing middle-aged female.  Alert in no apparent distress.  HENT: Normocephalic, Atraumatic. Bilateral external ears normal. Nose normal.  Moist mucous membranes.  Oropharynx clear.  Eyes: Pupils are equal and reactive. Conjunctiva normal.   Neck: Supple, full range of motion  Heart: Tachycardic rate and normal rhythm.  No murmurs.    Lungs: diffuse expiratory wheezing throughout lung field without respiratory distress  Abdomen Soft, no distention.  No tenderness to palpation.  Musculoskeletal: Atraumatic. Tenderness of anterior and lateral chest wall. No obvious deformities noted.   Skin: Warm, Dry.  No erythema, No rash.   Neurologic: Alert and oriented x3. Moving all extremities spontaneously without focal deficits.  Psychiatric: Affect normal, Mood normal, Appears appropriate and not intoxicated.      DIAGNOSTIC STUDIES      LABS  Personally reviewed by me  Labs Reviewed   CBC WITH DIFFERENTIAL - Abnormal; Notable for the following components:       Result Value    WBC 25.4 (*)     RBC 2.67 (*)     Hemoglobin 8.8 (*)     Hematocrit 26.8 (*)     .4 (*)     MCHC 32.8 (*)     RDW 56.8 (*)     Neutrophils-Polys 80.70 (*)     Lymphocytes 9.20 (*)     Immature Granulocytes 1.60 (*)     Neutrophils (Absolute) 20.45 (*)     Monos (Absolute) 2.01 (*)     Immature Granulocytes (abs) 0.41 (*)     All other " "components within normal limits   COMP METABOLIC PANEL - Abnormal; Notable for the following components:    Potassium 3.1 (*)     Co2 18 (*)     Glucose 115 (*)     Bun 50 (*)     Creatinine 1.93 (*)     Alkaline Phosphatase 128 (*)     All other components within normal limits   TROPONIN - Abnormal; Notable for the following components:    Troponin T 24 (*)     All other components within normal limits   PROBRAIN NATRIURETIC PEPTIDE, NT - Abnormal; Notable for the following components:    NT-proBNP 7432 (*)     All other components within normal limits   PROCALCITONIN - Abnormal; Notable for the following components:    Procalcitonin 0.94 (*)     All other components within normal limits   ESTIMATED GFR - Abnormal; Notable for the following components:    GFR If  34 (*)     GFR If Non  28 (*)     All other components within normal limits   LACTIC ACID   BLOOD CULTURE    Narrative:     Per Hospital Policy: Only change Specimen Src: to \"Line\" if  specified by physician order.   BLOOD CULTURE    Narrative:     Per Hospital Policy: Only change Specimen Src: to \"Line\" if  specified by physician order.   MAGNESIUM   URINALYSIS,CULTURE IF INDICATED          RADIOLOGY  Personally reviewed by me  CT-CHEST (THORAX) W/O   Final Result      1.  Pneumonia has resolved. No acute abnormality in the chest.   2.  Innumerable osseous metastases in the visualized axial and proximal appendicular skeleton.   3.  Stable sequela of right mastectomy.               DX-CHEST-PORTABLE (1 VIEW)   Final Result      Slight hyperinflation. No focal consolidation or pleural effusions.             ED COURSE  Vitals:    12/21/19 2052 12/21/19 2055 12/21/19 2105 12/21/19 2131   BP:   (!) 84/52 100/58   Pulse:  (!) 108  (!) 107   Resp:  20  (!) 11   Temp:  36.7 °C (98.1 °F)     TempSrc:  Temporal     SpO2:  89%  95%   Weight: 54.4 kg (120 lb)      Height: 1.575 m (5' 2\")            Medications " administered:  Medications   vancomycin (VANCOCIN) 1,400 mg in  mL IVPB (1,400 mg Intravenous New Bag 12/21/19 2229)   NS infusion 1,000 mL (0 mL Intravenous Stopped 12/21/19 2228)   cefepime (MAXIPIME) 2 g in  mL IVPB (0 g Intravenous Stopped 12/21/19 2228)       Patient was given IV fluids for tachycardia and elevated creatinine concerning for dehydration.  IV hydration was used because oral hydration was not adequate alone.  Following fluid administration patient's signs and hydration status were improved.     Old records personally reviewed:  seen in ED and admitted for pnenomia from 11/7-11/13/19. She has a history of breast cancer and COPD. She was diagnosied with a lung mass and multiple vertibal micheline matassisis for concerns of recurrent cancer.    9:06 PM Patient seen and examined at bedside. The patient presents with failure to thrive. Ordered for Ct-CTA chest pulmonary artery, Dx-chest, CBC with diff, CMP, Troponin, BNP, lactic acid, blood culture, UA with culture, and Procalcitonin to evaluate. Patient will be treated with Vancocin 1,400 mg IV, and maxipime 2 g IV for her symptoms.     MEDICAL DECISION MAKING  Patient with a diagnosis of metastatic breast cancer, not yet started on treatment who presents with generalized weakness and essentially failure to thrive.  The patient is afebrile, tachycardic and borderline hypotensive on arrival which improved with IV fluids.  Her labs demonstrate a significant leukocytosis however it is stable from her last admission 1 month prior.  Lactate is normal without signs of severe sepsis.  She has a slightly worsening chronic anemia.  She appears dehydrated with acute kidney injury.  Her troponin and BNP are elevated as well which may be due to volume overload and should be further evaluated with echocardiogram.  CT scan of the chest was performed showing no evidence of pneumonia however she does unfortunately have increasing burden of her metastatic  cancer.  The patient will be admitted for hydration and oncology consultation.  She was updated with the plan of care for admission and agreeable at this time.      11:05 PM I discussed the patient's case and the above findings with Dr. Flores ([facility] Internal med) who agrees to evaluate the patient for admission.       DISPOSITION:  Patient will be hospitalized by Dr. Flores in guarded condition.    IMPRESSION  (R62.7) FTT (failure to thrive) in adult  (N17.9) JANUSZ (acute kidney injury) (HCC)  (C50.919) Metastatic breast cancer (HCC)    Results, diagnoses, and treatment options were discussed with the patient and/or family. Patient verbalized understanding of plan of care.     Gian ALVARADO ([place]), am scribing for, and in the presence of, Jesusita Vogt M.D..    Electronically signed by: Gian Vega ([place]), 12/21/2019    IJesusita M.D. personally performed the services described in this documentation, as scribed by Gian Vega in my presence, and it is both accurate and complete. C.    The note accurately reflects work and decisions made by me.  Jesusita Vogt  12/22/2019  4:03 AM

## 2019-12-22 NOTE — ASSESSMENT & PLAN NOTE
Secondary to malignancy  - Improved PO intake since admission.  - Nutrition consult  - Check pre-albumin   - Boost supplements   - Trial of mirtazapine

## 2019-12-22 NOTE — ASSESSMENT & PLAN NOTE
-Chronic, likely reactive secondary to cancer. No signs of infectious at this time.   -f/u with PCP as out patient with repeat labs and  monitoring.

## 2019-12-22 NOTE — H&P
Internal Medicine Admitting History and Physical    Note Author: Violeta Carvalho M.D.       Name Roxy Cota     1975   Age/Sex 44 y.o. female   MRN [id number]   Code Status Full     After 5PM or if no immediate response to page, please call for cross-coverage  Attending/Team:  See Patient List for primary contact information  Call [phone] to page    1st Call - Day Intern (R1):    2nd Call - Day Sr. Resident (R2/R3):          Chief Complaint:   Failure to thrive    HPI:    Cipriano is 43 y/o Female with PMHx of Rt. breast cancer s/p mastectomy, COPD(3L O2 at home) presents to the hospital with complaints of failure to thrive.    Pt is very emotional and crying for most of the time and history is primarily elicited from her  who is at bedside reports that over the last 3-4 weeks patient has been withdrawn and didn't have any appetite, reportedly not eating anything which got progressively worse over the last 4-5 days and had only couple of eggs today and didn't eat anything over the last week.    Per chart review, pt. was recently admitted to the hospital  From - for pneumonia but continues to have cough which hasn't been resolved. She also complains of right lower abdominal pain which started over the last week.    Denies hematuria, chest pain, shortness of breath, nausea/vomiting, palpitations.  Pt's  also complains of new onset Rt.lower extremity weakness over the past 1 week.    CT chest done in the ER shows lytic and sclerotic osseous metastases in the axial and appendicular skeleton        Review of Systems   Constitutional: Positive for malaise/fatigue. Negative for chills, diaphoresis and fever.        Distressed female who has been crying and is worried   HENT: Negative for ear discharge, ear pain, hearing loss and tinnitus.    Eyes: Negative for blurred vision, double vision, photophobia and pain.   Respiratory: Positive for cough and  sputum production. Negative for hemoptysis, shortness of breath and wheezing.    Cardiovascular: Negative for chest pain, palpitations, orthopnea and leg swelling.   Gastrointestinal: Negative for abdominal pain, diarrhea, heartburn, nausea and vomiting.   Genitourinary: Negative for dysuria, frequency, hematuria and urgency.   Musculoskeletal: Negative for back pain, joint pain, myalgias and neck pain.   Neurological: Positive for weakness. Negative for dizziness, tingling, tremors, sensory change, speech change, seizures, loss of consciousness and headaches.             Past Medical History (Chronic medical problem, known complications and current treatment)    COPD, Breast cancer s/p mastectomy     Past Surgical History:  Past Surgical History:   Procedure Laterality Date   • MASTECTOMY Right        Current Outpatient Medications:  Home Medications     Reviewed by Annabelle Higgins R.N. (Registered Nurse) on 12/22/19 at 0258  Med List Status: Complete   Medication Last Dose Status   albuterol (VENTOLIN HFA) 108 (90 Base) MCG/ACT Aero Soln inhalation aerosol  Active   carvedilol (COREG) 6.25 MG Tab  Active   cetirizine (ZYRTEC) 10 MG Tab  Active   citalopram (CELEXA) 40 MG Tab  Active   clonazePAM (KLONOPIN) 1 MG Tab  Active   cyanocobalamin (VITAMIN B-12) 500 MCG Tab  Active   dicyclomine (BENTYL) 10 MG Cap  Active   oxyCODONE-acetaminophen (PERCOCET-10)  MG Tab  Active   tamoxifen (NOLVADEX) 10 MG Tab  Active   tizanidine (ZANAFLEX) 4 MG Tab  Active                Medication Allergy/Sensitivities:  Allergies   Allergen Reactions   • Fish    • Levaquin    • Lyrica Hives   • Morphine Unspecified     Was told she is very allergic after surgery.    • Motrin [Ibuprofen] Hives   • Neurontin [Gabapentin] Swelling     Messes with kidneys   • Pcn [Penicillins] Hives     Burning sensation throughout body         Family History (mandatory)   No family history on file.    Social History (mandatory)   Social History      Socioeconomic History   • Marital status:      Spouse name: Not on file   • Number of children: Not on file   • Years of education: Not on file   • Highest education level: Not on file   Occupational History   • Not on file   Social Needs   • Financial resource strain: Not hard at all   • Food insecurity:     Worry: Never true     Inability: Never true   • Transportation needs:     Medical: Yes     Non-medical: No   Tobacco Use   • Smoking status: Former Smoker     Packs/day: 0.00   • Smokeless tobacco: Never Used   Substance and Sexual Activity   • Alcohol use: Not on file   • Drug use: Not on file   • Sexual activity: Not on file   Lifestyle   • Physical activity:     Days per week: Not on file     Minutes per session: Not on file   • Stress: Not on file   Relationships   • Social connections:     Talks on phone: Not on file     Gets together: Not on file     Attends Rastafarian service: Not on file     Active member of club or organization: Not on file     Attends meetings of clubs or organizations: Not on file     Relationship status: Not on file   • Intimate partner violence:     Fear of current or ex partner: Not on file     Emotionally abused: Not on file     Physically abused: Not on file     Forced sexual activity: Not on file   Other Topics Concern   • Not on file   Social History Narrative   • Not on file     Living situation: Home  PCP : SCARLET ReyesNANTHONY    Physical Exam     Vitals:    12/22/19 0001 12/22/19 0056 12/22/19 0335 12/22/19 0400   BP: 100/73 100/67  110/62   Pulse: (!) 112 (!) 116 89 100   Resp: 19 18 17 18   Temp:  37.1 °C (98.8 °F)  36.9 °C (98.5 °F)   TempSrc:  Temporal  Temporal   SpO2: 92% 93% 97% 97%   Weight:       Height:         Body mass index is 21.95 kg/m².  O2 therapy: Pulse Oximetry: 97 %, O2 (LPM): 3, O2 Delivery: Nasal Cannula    Physical Exam   Constitutional: She is oriented to person, place, and time. She appears distressed.   HENT:   Head: Normocephalic and  atraumatic.   Decreased hearing in b/L ears   Eyes: Pupils are equal, round, and reactive to light. Conjunctivae and EOM are normal.   Neck: Normal range of motion. Neck supple. No tracheal deviation present. No thyromegaly present.   Cardiovascular: Normal rate, regular rhythm and normal heart sounds.   Pulmonary/Chest: Effort normal and breath sounds normal. No respiratory distress.   Abdominal: Soft. Bowel sounds are normal. She exhibits no distension. There is no tenderness. There is no rebound and no guarding.   c/o pain in right hypochondriac region with no abdominal guarding/rigidity   Neurological: She is alert and oriented to person, place, and time.   Complete neurological exam could be performed as pt. has been mostly crying          Data Review       Old Records Request:   Completed  Current Records review/summary: Completed    Lab Data Review:  Recent Results (from the past 24 hour(s))   CBC WITH DIFFERENTIAL    Collection Time: 12/21/19  9:00 PM   Result Value Ref Range    WBC 25.4 (H) 4.8 - 10.8 K/uL    RBC 2.67 (L) 4.20 - 5.40 M/uL    Hemoglobin 8.8 (L) 12.0 - 16.0 g/dL    Hematocrit 26.8 (L) 37.0 - 47.0 %    .4 (H) 81.4 - 97.8 fL    MCH 33.0 27.0 - 33.0 pg    MCHC 32.8 (L) 33.6 - 35.0 g/dL    RDW 56.8 (H) 35.9 - 50.0 fL    Platelet Count 246 164 - 446 K/uL    MPV 9.8 9.0 - 12.9 fL    Neutrophils-Polys 80.70 (H) 44.00 - 72.00 %    Lymphocytes 9.20 (L) 22.00 - 41.00 %    Monocytes 7.90 0.00 - 13.40 %    Eosinophils 0.40 0.00 - 6.90 %    Basophils 0.20 0.00 - 1.80 %    Immature Granulocytes 1.60 (H) 0.00 - 0.90 %    Nucleated RBC 0.70 /100 WBC    Neutrophils (Absolute) 20.45 (H) 2.00 - 7.15 K/uL    Lymphs (Absolute) 2.33 1.00 - 4.80 K/uL    Monos (Absolute) 2.01 (H) 0.00 - 0.85 K/uL    Eos (Absolute) 0.11 0.00 - 0.51 K/uL    Baso (Absolute) 0.05 0.00 - 0.12 K/uL    Immature Granulocytes (abs) 0.41 (H) 0.00 - 0.11 K/uL    NRBC (Absolute) 0.19 K/uL   COMP METABOLIC PANEL    Collection Time:  12/21/19  9:00 PM   Result Value Ref Range    Sodium 139 135 - 145 mmol/L    Potassium 3.1 (L) 3.6 - 5.5 mmol/L    Chloride 111 96 - 112 mmol/L    Co2 18 (L) 20 - 33 mmol/L    Anion Gap 10.0 0.0 - 11.9    Glucose 115 (H) 65 - 99 mg/dL    Bun 50 (H) 8 - 22 mg/dL    Creatinine 1.93 (H) 0.50 - 1.40 mg/dL    Calcium 8.9 8.5 - 10.5 mg/dL    AST(SGOT) 13 12 - 45 U/L    ALT(SGPT) 7 2 - 50 U/L    Alkaline Phosphatase 128 (H) 30 - 99 U/L    Total Bilirubin 0.3 0.1 - 1.5 mg/dL    Albumin 3.3 3.2 - 4.9 g/dL    Total Protein 6.3 6.0 - 8.2 g/dL    Globulin 3.0 1.9 - 3.5 g/dL    A-G Ratio 1.1 g/dL   TROPONIN    Collection Time: 12/21/19  9:00 PM   Result Value Ref Range    Troponin T 24 (H) 6 - 19 ng/L   proBrain Natriuretic Peptide, NT    Collection Time: 12/21/19  9:00 PM   Result Value Ref Range    NT-proBNP 7432 (H) 0 - 125 pg/mL   LACTIC ACID    Collection Time: 12/21/19  9:00 PM   Result Value Ref Range    Lactic Acid 1.0 0.5 - 2.0 mmol/L   PROCALCITONIN    Collection Time: 12/21/19  9:00 PM   Result Value Ref Range    Procalcitonin 0.94 (H) <0.25 ng/mL   MAGNESIUM    Collection Time: 12/21/19  9:00 PM   Result Value Ref Range    Magnesium 2.0 1.5 - 2.5 mg/dL   ESTIMATED GFR    Collection Time: 12/21/19  9:00 PM   Result Value Ref Range    GFR If  34 (A) >60 mL/min/1.73 m 2    GFR If Non  28 (A) >60 mL/min/1.73 m 2   URINALYSIS CULTURE, IF INDICATED    Collection Time: 12/22/19 12:35 AM   Result Value Ref Range    Color Yellow     Character Clear     Specific Gravity 1.015 <1.035    Ph 5.5 5.0 - 8.0    Glucose Negative Negative mg/dL    Ketones Negative Negative mg/dL    Protein 30 (A) Negative mg/dL    Bilirubin Negative Negative    Urobilinogen, Urine 0.2 Negative    Nitrite Negative Negative    Leukocyte Esterase Negative Negative    Occult Blood Trace (A) Negative    Micro Urine Req Microscopic    URINE MICROSCOPIC (W/UA)    Collection Time: 12/22/19 12:35 AM   Result Value Ref Range     WBC 0-2 /hpf    RBC 2-5 (A) /hpf    Bacteria Few (A) None /hpf    Epithelial Cells Negative /hpf    Hyaline Cast 0-2 /lpf       Imaging/Procedures Review:    Independant Imaging Review: Completed  CT-CHEST (THORAX) W/O   Final Result      1.  Pneumonia has resolved. No acute abnormality in the chest.   2.  Innumerable osseous metastases in the visualized axial and proximal appendicular skeleton.   3.  Stable sequela of right mastectomy.               DX-CHEST-PORTABLE (1 VIEW)   Final Result      Slight hyperinflation. No focal consolidation or pleural effusions.                   Records reviewed and summarized in current documentation :  Yes  [employer] teaching service handout given to patient:  Yes         Assessment/Plan     * Breast cancer (HCC)- (present on admission)  Assessment & Plan  Breast cancer s/p mastectomy in 2016 and currently has metastasis  Currently only on Tamoxifen. Did not receive radiation/chemotherapy  CT chest - lytic and sclerotic osseous metastases in the axial and appendicular skeleton  Plan:  - Continue Tamoxifen  - Oncology consult to plan further care       Acute kidney injury (HCC)  Assessment & Plan  BUN-50, Cr-1.93  Likely pre-renal due to volume depletion  Plan:  - IV maintenance fluids  - Monitor    Leukocytosis  Assessment & Plan  Chronic, likely reactive secondary to cancer.    COPD (chronic obstructive pulmonary disease) (HCC)- (present on admission)  Assessment & Plan  Chronic, on 3L O2 at home  No signs of acute exacerbation  Saturating in high 90's on 2-3L o2  Plan:  - RT protocol  - Albuterol Q6  - Guaifenesin scheduled              Anticipated Hospital stay:  >2 midnights        Quality Measures  Quality-Core Measures  PCP: SCARLET ReyesN.P.

## 2019-12-22 NOTE — CARE PLAN
Problem: Infection  Goal: Will remain free from infection  Outcome: PROGRESSING AS EXPECTED     Problem: Pain Management  Goal: Pain level will decrease to patient's comfort goal  Outcome: PROGRESSING AS EXPECTED     Patient's  and son are actively involved in her care.

## 2019-12-22 NOTE — ED TRIAGE NOTES
Pt bib care flight. As per pt's , pt has not been eating and has become weaker. Pt was recently admitted for PNA on 12/17/19.

## 2019-12-22 NOTE — ASSESSMENT & PLAN NOTE
Chronic respiratory failure with 3L O2 at home. No signs of acute exacerbation  - RT protocol  - Albuterol Q6  - Guaifenesin scheduled  - Incentive spirometry

## 2019-12-22 NOTE — ASSESSMENT & PLAN NOTE
Likely secondary to metastatic bone disease. No signs of acute stroke at this time.   - MRI brain 12/23/2019 negative for any ischemic events.  -X Ray right femur 12/23/2019 Right displaced fracture of greater trochanter.  -CT Hip w/o contrast confirmed fracture with suspicion of iliac fracture as well. Diffuse osseous metastatic disease noted.  -Per ortho closed treatment w/o manipulation. PT/OT rehab as in patient.

## 2019-12-22 NOTE — PROGRESS NOTES
Internal Medicine Interval Note  Note Author: Latoshatqjoe Murray D.O.     Name Roxy Cota     1975   Age/Sex 44 y.o. female   MRN [id number]   Code Status Full Code     After 5PM or if no immediate response to page, please call for cross-coverage  Attending/Team: Dr. Ceron / LUCY Barnhart See Patient List for primary contact information  Call [phone] to page    1st Call - Day Intern (R1):   Dr. Cantu 2nd Call - Day Sr. Resident (R2/R3):   Dr. Murray         Reason for interval visit  (Principal Problem)   Breast cancer (HCC)    Interval Problem Daily Status Update  (24 hours)   Ms. Cota is a 44-year old female who presented to the hospital with failure to thrive and right lower extremity weakness.     This morning, patient is lethargic, but arousable. Attempted to call , but unable to get in contact. Left voicemail.     During attending rounds, patient reports that she is significant amount of pain. Denies any numbness or tingling. Will titrate pain regimen with scheduled tylenol, oxycodone and morphine IR. Will also add dexamethasone. Will optimize electrolytes and renal function. Will wait on getting Brain MRI. Palliative/PT/OT consults placed. She states she follows with Oncology in [place], with who she is yet to see. She has been on tamoxifen since when she was diagnosed.     Review of Systems   Constitutional: Negative for chills and fever.   Respiratory: Negative for cough, sputum production and shortness of breath.    Gastrointestinal: Negative for nausea and vomiting.   Neurological: Positive for focal weakness. Negative for tingling, tremors, sensory change and speech change.       Consultants/Specialty  N/A  PCP: Livia Story FNP    Disposition  Remain inpatient for medical management     Quality Measures  Quality-Core Measures   Reviewed items::  Labs reviewed, Medications reviewed and Radiology images reviewed  Morales catheter::  No Morales  DVT prophylaxis pharmacological::   Heparin          Physical Exam       Vitals:    12/22/19 0001 12/22/19 0056 12/22/19 0335 12/22/19 0400   BP: 100/73 100/67  110/62   Pulse: (!) 112 (!) 116 89 100   Resp: 19 18 17 18   Temp:  37.1 °C (98.8 °F)  36.9 °C (98.5 °F)   TempSrc:  Temporal  Temporal   SpO2: 92% 93% 97% 97%   Weight:       Height:         Body mass index is 21.95 kg/m². Weight: 54.4 kg (120 lb)  Oxygen Therapy:  Pulse Oximetry: 97 %, O2 (LPM): 3, O2 Delivery: Nasal Cannula    Physical Exam   Constitutional:    female who appears older than stated age. Appears chronically ill    HENT:   Head: Normocephalic and atraumatic.   Dry mucous membranes    Eyes: EOM are normal. No scleral icterus.   Neck: Normal range of motion.   Cardiovascular: Normal rate, regular rhythm and intact distal pulses.   No murmur heard.  Pulmonary/Chest: Effort normal and breath sounds normal. No respiratory distress.   Abdominal: Soft. Bowel sounds are normal. She exhibits no distension. There is no tenderness.   Musculoskeletal:         General: No deformity or edema.      Comments: Tender to palpation over right thigh. Intact range of motion.    Neurological: She is alert. She displays facial symmetry.   Lethargic, easily arousable.RLE 4/5, LLE 5/5   Skin: Skin is warm and dry. No erythema.   Psychiatric: Affect normal.   Nursing note and vitals reviewed.    Lab Data Review:         12/22/2019  6:22 AM    Recent Labs     12/21/19  2100   SODIUM 139   POTASSIUM 3.1*   CHLORIDE 111   CO2 18*   BUN 50*   CREATININE 1.93*   MAGNESIUM 2.0   CALCIUM 8.9       Recent Labs     12/21/19  2100   ALTSGPT 7   ASTSGOT 13   ALKPHOSPHAT 128*   TBILIRUBIN 0.3   GLUCOSE 115*       Recent Labs     12/21/19  2100   RBC 2.67*   HEMOGLOBIN 8.8*   HEMATOCRIT 26.8*   PLATELETCT 246       Recent Labs     12/21/19  2100   WBC 25.4*   NEUTSPOLYS 80.70*   LYMPHOCYTES 9.20*   MONOCYTES 7.90   EOSINOPHILS 0.40   BASOPHILS 0.20   ASTSGOT 13   ALTSGPT 7   ALKPHOSPHAT 128*   TBILIRUBIN  0.3           Assessment/Plan     * Breast cancer (HCC)- (present on admission)  Assessment & Plan  Patient with history of breast cancer s/p right mastectomy in 2016 currently found found to have metastasis. Currently only on Tamoxifen. During the last admission, patient had a bonee biopsy which was consistent with metastatic bone cancer. Follos with oncologin in [place] (next appointment 12/26). CT chest on admission consistent with lytic and sclerotic osseous metastases in the axial and appendicular skeleton.   - Continue Tamoxifen  - Oncology consult to plan further care   - Palliative care consult to discuss goals of care   - Obtain MRI brain with contrast when JANUSZ improves  - Scheduled Tylenol  - Percocet and Morphine IR prn   - Continue home klonopin   - Continue Lidocaine patch  - Dexamethasone     Weakness of right lower extremity- (present on admission)  Assessment & Plan  Likely secondary to metastatic bone disease. No signs of acute stroke at this time.   - Obtain MRI brain with contrast when JANUSZ improves    Failure to thrive in adult- (present on admission)  Assessment & Plan  Secondary to malignancy  - Nutrition consult  - Check pre-albumin   - Boost supplements   - Palliative care consult   - Trial of mirtazapine     Hypokalemia- (present on admission)  Assessment & Plan  - Replete as needed     Acute kidney injury (HCC)- (present on admission)  Assessment & Plan  Creatinine 1.93 on admission. Likely pre-renal due to volume depletion.   - IV maintenance fluids  - Monitor  - If does not improve, consider renal ultrasound     Leukocytosis- (present on admission)  Assessment & Plan  Chronic, likely reactive secondary to cancer. No signs of infectious at this time.     Mood disorder (HCC)- (present on admission)  Assessment & Plan  - Continue celexa   - Continue klonopin     COPD (chronic obstructive pulmonary disease) (HCC)- (present on admission)  Assessment & Plan  Chronic respiratory failure with 3L  O2 at home. No signs of acute exacerbation  - RT protocol  - Albuterol Q6  - Guaifenesin scheduled  - Incentive spirometry

## 2019-12-22 NOTE — SENIOR ADMIT NOTE
Senior Admit Note    Patient: Roxy Cota.  MRN: [id number].                               Chief complaint: Generalized malaise    HPI:  Ms. Cota is a 44-year-old female with history of COPD, breast cancer, s/p Rt mastectomy on tamoxifen, was brought into the emergency department by ambulance for persistent generalized weakness, fatigue, anorexia and dyspnea for about 5 days.    In the ED, she appeared chronically ill, she was in distress due to the pain and was tearful throughout the encounter. She had reduced strength in the right lower extremity. She was hypotensive, tachycardic, afebrile initially required supplemental oxygen to maintain acceptable saturations.  She had leukocytosis which was unchanged from prior.  Procalcitonin was elevated.  NT-proBNP significantly elevated. She received a dose of vancomycin and cefepime as well as 1 L of NS.         Problem list:   #Acute kidney injury secondary to dehydration  #Adult failure to thrive  #Malignant neoplasm of the breast with osseous metastasis  #New onset right lower extremity weakness, concern for brain metastasis  #Adjustment disorder with depressed mood  #Hypokalemia  #Metabolic acidosis, lactic acid normal  #Macrocytic anemia    Plan:   Admit to Oncology as inpatient  Holding antibiotics for now as there is no clear identifiable source of infection  Oncology consult in the AM  Consider brain MRI with contrast, query brain metastasis in the setting of new onset focal neurological deficit.   Morphine for pain control, topical analgesic  Gentle LR fluid resuscitation  Mirtazapine, consider nutrition consult  Consider HPM consult, she is still full code  Monitor replete electrolytes      DVT prophylaxis: Heparin  Code status: FULL    For complete details, please refer to H&P by Dr. Maia Flores M.D.

## 2019-12-22 NOTE — PROGRESS NOTES
A&Ox4, lethargic but easy to arouse and [place]. Palliative consults in place. Pt reports little appetite and consumed less than 25% of breakfast. No other questions or concerns expressed.  Pain regimen placed and will monitor BP and lethargy closely.

## 2019-12-22 NOTE — ASSESSMENT & PLAN NOTE
Patient with history of breast cancer s/p right mastectomy in 2016 currently found found to have metastasis. Currently only on Tamoxifen. During the last admission, patient had a bonee biopsy which was consistent with metastatic bone cancer. Tim with oncologin in [facility] (next appointment 12/26). CT chest on admission consistent with lytic and sclerotic osseous metastases in the axial and appendicular skeleton. CT Hip w/o showed diffuse osseous mets involving lower lumbar spine , proximal femurs and pelvic bone.    Plan:  - Continue Tamoxifen  - Patient to f/u with Oncologist at [place] on 12/26/2019    - MRI brain 12/23/2019 normal. No signs of metastatic intracranial disease.  Findings suspicious for osseous metastatic disease.  - Scheduled Tylenol  - IV Dilaudid PRN ,Oxycodone PRN, celebrex BID for pain  - Continue home klonopin   - Continue Lidocaine patch

## 2019-12-23 ENCOUNTER — APPOINTMENT (OUTPATIENT)
Dept: RADIOLOGY | Facility: MEDICAL CENTER | Age: 44
DRG: 542 | End: 2019-12-23
Attending: STUDENT IN AN ORGANIZED HEALTH CARE EDUCATION/TRAINING PROGRAM
Payer: COMMERCIAL

## 2019-12-23 ENCOUNTER — APPOINTMENT (OUTPATIENT)
Dept: RADIOLOGY | Facility: MEDICAL CENTER | Age: 44
DRG: 542 | End: 2019-12-23
Attending: ORTHOPAEDIC SURGERY
Payer: COMMERCIAL

## 2019-12-23 PROBLEM — S72.111A: Status: ACTIVE | Noted: 2019-12-23

## 2019-12-23 LAB
ALBUMIN SERPL BCP-MCNC: 2.7 G/DL (ref 3.2–4.9)
ALBUMIN/GLOB SERPL: 1.1 G/DL
ALP SERPL-CCNC: 140 U/L (ref 30–99)
ALT SERPL-CCNC: 16 U/L (ref 2–50)
ANION GAP SERPL CALC-SCNC: 8 MMOL/L (ref 0–11.9)
AST SERPL-CCNC: 16 U/L (ref 12–45)
BASOPHILS # BLD AUTO: 0.2 % (ref 0–1.8)
BASOPHILS # BLD: 0.03 K/UL (ref 0–0.12)
BILIRUB SERPL-MCNC: 0.2 MG/DL (ref 0.1–1.5)
BUN SERPL-MCNC: 19 MG/DL (ref 8–22)
CALCIUM SERPL-MCNC: 8.7 MG/DL (ref 8.5–10.5)
CHLORIDE SERPL-SCNC: 114 MMOL/L (ref 96–112)
CO2 SERPL-SCNC: 20 MMOL/L (ref 20–33)
CREAT SERPL-MCNC: 0.79 MG/DL (ref 0.5–1.4)
EOSINOPHIL # BLD AUTO: 0 K/UL (ref 0–0.51)
EOSINOPHIL NFR BLD: 0 % (ref 0–6.9)
ERYTHROCYTE [DISTWIDTH] IN BLOOD BY AUTOMATED COUNT: 55.6 FL (ref 35.9–50)
ERYTHROCYTE [DISTWIDTH] IN BLOOD BY AUTOMATED COUNT: 57.7 FL (ref 35.9–50)
GLOBULIN SER CALC-MCNC: 2.4 G/DL (ref 1.9–3.5)
GLUCOSE SERPL-MCNC: 232 MG/DL (ref 65–99)
HCT VFR BLD AUTO: 21.9 % (ref 37–47)
HCT VFR BLD AUTO: 22.8 % (ref 37–47)
HGB BLD-MCNC: 7.5 G/DL (ref 12–16)
HGB BLD-MCNC: 7.6 G/DL (ref 12–16)
IMM GRANULOCYTES # BLD AUTO: 0.82 K/UL (ref 0–0.11)
IMM GRANULOCYTES NFR BLD AUTO: 4.3 % (ref 0–0.9)
LYMPHOCYTES # BLD AUTO: 1.16 K/UL (ref 1–4.8)
LYMPHOCYTES NFR BLD: 6.1 % (ref 22–41)
MCH RBC QN AUTO: 33.2 PG (ref 27–33)
MCH RBC QN AUTO: 33.6 PG (ref 27–33)
MCHC RBC AUTO-ENTMCNC: 33.3 G/DL (ref 33.6–35)
MCHC RBC AUTO-ENTMCNC: 34.2 G/DL (ref 33.6–35)
MCV RBC AUTO: 98.2 FL (ref 81.4–97.8)
MCV RBC AUTO: 99.6 FL (ref 81.4–97.8)
MONOCYTES # BLD AUTO: 0.59 K/UL (ref 0–0.85)
MONOCYTES NFR BLD AUTO: 3.1 % (ref 0–13.4)
NEUTROPHILS # BLD AUTO: 16.45 K/UL (ref 2–7.15)
NEUTROPHILS NFR BLD: 86.3 % (ref 44–72)
NRBC # BLD AUTO: 0.08 K/UL
NRBC BLD-RTO: 0.4 /100 WBC
PLATELET # BLD AUTO: 240 K/UL (ref 164–446)
PLATELET # BLD AUTO: 250 K/UL (ref 164–446)
PMV BLD AUTO: 9.8 FL (ref 9–12.9)
PMV BLD AUTO: 9.8 FL (ref 9–12.9)
POTASSIUM SERPL-SCNC: 3.3 MMOL/L (ref 3.6–5.5)
PROT SERPL-MCNC: 5.1 G/DL (ref 6–8.2)
RBC # BLD AUTO: 2.23 M/UL (ref 4.2–5.4)
RBC # BLD AUTO: 2.29 M/UL (ref 4.2–5.4)
SODIUM SERPL-SCNC: 142 MMOL/L (ref 135–145)
WBC # BLD AUTO: 19.1 K/UL (ref 4.8–10.8)
WBC # BLD AUTO: 29.1 K/UL (ref 4.8–10.8)

## 2019-12-23 PROCEDURE — 85027 COMPLETE CBC AUTOMATED: CPT

## 2019-12-23 PROCEDURE — A9270 NON-COVERED ITEM OR SERVICE: HCPCS | Performed by: STUDENT IN AN ORGANIZED HEALTH CARE EDUCATION/TRAINING PROGRAM

## 2019-12-23 PROCEDURE — 73560 X-RAY EXAM OF KNEE 1 OR 2: CPT | Mod: RT

## 2019-12-23 PROCEDURE — 85025 COMPLETE CBC W/AUTO DIFF WBC: CPT

## 2019-12-23 PROCEDURE — A9576 INJ PROHANCE MULTIPACK: HCPCS | Performed by: STUDENT IN AN ORGANIZED HEALTH CARE EDUCATION/TRAINING PROGRAM

## 2019-12-23 PROCEDURE — 700102 HCHG RX REV CODE 250 W/ 637 OVERRIDE(OP): Performed by: INTERNAL MEDICINE

## 2019-12-23 PROCEDURE — 700117 HCHG RX CONTRAST REV CODE 255: Performed by: STUDENT IN AN ORGANIZED HEALTH CARE EDUCATION/TRAINING PROGRAM

## 2019-12-23 PROCEDURE — 73552 X-RAY EXAM OF FEMUR 2/>: CPT | Mod: RT

## 2019-12-23 PROCEDURE — 72156 MRI NECK SPINE W/O & W/DYE: CPT

## 2019-12-23 PROCEDURE — A9270 NON-COVERED ITEM OR SERVICE: HCPCS | Performed by: INTERNAL MEDICINE

## 2019-12-23 PROCEDURE — 700102 HCHG RX REV CODE 250 W/ 637 OVERRIDE(OP): Performed by: STUDENT IN AN ORGANIZED HEALTH CARE EDUCATION/TRAINING PROGRAM

## 2019-12-23 PROCEDURE — 70553 MRI BRAIN STEM W/O & W/DYE: CPT

## 2019-12-23 PROCEDURE — 36415 COLL VENOUS BLD VENIPUNCTURE: CPT

## 2019-12-23 PROCEDURE — 700105 HCHG RX REV CODE 258: Performed by: STUDENT IN AN ORGANIZED HEALTH CARE EDUCATION/TRAINING PROGRAM

## 2019-12-23 PROCEDURE — 72158 MRI LUMBAR SPINE W/O & W/DYE: CPT

## 2019-12-23 PROCEDURE — 700105 HCHG RX REV CODE 258: Performed by: INTERNAL MEDICINE

## 2019-12-23 PROCEDURE — 700111 HCHG RX REV CODE 636 W/ 250 OVERRIDE (IP): Performed by: INTERNAL MEDICINE

## 2019-12-23 PROCEDURE — 770001 HCHG ROOM/CARE - MED/SURG/GYN PRIV*

## 2019-12-23 PROCEDURE — 72157 MRI CHEST SPINE W/O & W/DYE: CPT

## 2019-12-23 PROCEDURE — 73700 CT LOWER EXTREMITY W/O DYE: CPT | Mod: RT

## 2019-12-23 PROCEDURE — 99233 SBSQ HOSP IP/OBS HIGH 50: CPT | Mod: GC | Performed by: INTERNAL MEDICINE

## 2019-12-23 PROCEDURE — 80053 COMPREHEN METABOLIC PANEL: CPT

## 2019-12-23 PROCEDURE — 700101 HCHG RX REV CODE 250: Performed by: STUDENT IN AN ORGANIZED HEALTH CARE EDUCATION/TRAINING PROGRAM

## 2019-12-23 RX ORDER — POTASSIUM CHLORIDE 20 MEQ/1
40 TABLET, EXTENDED RELEASE ORAL DAILY
Status: DISCONTINUED | OUTPATIENT
Start: 2019-12-23 | End: 2019-12-25 | Stop reason: HOSPADM

## 2019-12-23 RX ADMIN — GUAIFENESIN 200 MG: 100 SOLUTION ORAL at 16:54

## 2019-12-23 RX ADMIN — ACETAMINOPHEN 650 MG: 325 TABLET, FILM COATED ORAL at 08:52

## 2019-12-23 RX ADMIN — GUAIFENESIN 200 MG: 100 SOLUTION ORAL at 04:57

## 2019-12-23 RX ADMIN — ACETAMINOPHEN 650 MG: 325 TABLET, FILM COATED ORAL at 23:48

## 2019-12-23 RX ADMIN — OXYCODONE HYDROCHLORIDE 5 MG: 5 TABLET ORAL at 15:27

## 2019-12-23 RX ADMIN — SENNOSIDES AND DOCUSATE SODIUM 2 TABLET: 8.6; 5 TABLET ORAL at 04:57

## 2019-12-23 RX ADMIN — OXYCODONE HYDROCHLORIDE 5 MG: 5 TABLET ORAL at 19:34

## 2019-12-23 RX ADMIN — MIRTAZAPINE 15 MG: 15 TABLET, FILM COATED ORAL at 19:34

## 2019-12-23 RX ADMIN — POLYETHYLENE GLYCOL 3350 1 PACKET: 17 POWDER, FOR SOLUTION ORAL at 04:57

## 2019-12-23 RX ADMIN — ALBUTEROL SULFATE 2 PUFF: 90 AEROSOL, METERED RESPIRATORY (INHALATION) at 05:05

## 2019-12-23 RX ADMIN — DEXAMETHASONE 4 MG: 4 TABLET ORAL at 04:58

## 2019-12-23 RX ADMIN — CITALOPRAM HYDROBROMIDE 40 MG: 20 TABLET ORAL at 04:58

## 2019-12-23 RX ADMIN — ALBUTEROL SULFATE 2 PUFF: 90 AEROSOL, METERED RESPIRATORY (INHALATION) at 16:53

## 2019-12-23 RX ADMIN — TAMOXIFEN CITRATE 20 MG: 10 TABLET, FILM COATED ORAL at 04:58

## 2019-12-23 RX ADMIN — TIZANIDINE 4 MG: 4 TABLET ORAL at 23:53

## 2019-12-23 RX ADMIN — ALBUTEROL SULFATE 2 PUFF: 90 AEROSOL, METERED RESPIRATORY (INHALATION) at 00:19

## 2019-12-23 RX ADMIN — POTASSIUM CHLORIDE 40 MEQ: 1500 TABLET, EXTENDED RELEASE ORAL at 08:52

## 2019-12-23 RX ADMIN — OXYCODONE HYDROCHLORIDE 5 MG: 5 TABLET ORAL at 04:58

## 2019-12-23 RX ADMIN — GADOTERIDOL 10 ML: 279.3 INJECTION, SOLUTION INTRAVENOUS at 10:51

## 2019-12-23 RX ADMIN — DOXYCYCLINE 100 MG: 100 TABLET, FILM COATED ORAL at 16:57

## 2019-12-23 RX ADMIN — OXYCODONE HYDROCHLORIDE 5 MG: 5 TABLET ORAL at 23:53

## 2019-12-23 RX ADMIN — LIDOCAINE 1 PATCH: 50 PATCH TOPICAL at 04:56

## 2019-12-23 RX ADMIN — CARVEDILOL 6.25 MG: 6.25 TABLET, FILM COATED ORAL at 15:24

## 2019-12-23 RX ADMIN — CEFTRIAXONE SODIUM 1 G: 1 INJECTION, POWDER, FOR SOLUTION INTRAMUSCULAR; INTRAVENOUS at 04:59

## 2019-12-23 RX ADMIN — ACETAMINOPHEN 650 MG: 325 TABLET, FILM COATED ORAL at 16:57

## 2019-12-23 RX ADMIN — NICOTINE 14 MG: 14 PATCH TRANSDERMAL at 04:57

## 2019-12-23 RX ADMIN — DEXAMETHASONE 4 MG: 4 TABLET ORAL at 16:57

## 2019-12-23 RX ADMIN — SODIUM CHLORIDE, POTASSIUM CHLORIDE, SODIUM LACTATE AND CALCIUM CHLORIDE: 600; 310; 30; 20 INJECTION, SOLUTION INTRAVENOUS at 04:59

## 2019-12-23 RX ADMIN — SENNOSIDES AND DOCUSATE SODIUM 2 TABLET: 8.6; 5 TABLET ORAL at 16:57

## 2019-12-23 RX ADMIN — DOXYCYCLINE 100 MG: 100 TABLET, FILM COATED ORAL at 04:58

## 2019-12-23 RX ADMIN — OXYCODONE HYDROCHLORIDE 5 MG: 5 TABLET ORAL at 08:55

## 2019-12-23 RX ADMIN — ACETAMINOPHEN 650 MG: 325 TABLET, FILM COATED ORAL at 00:18

## 2019-12-23 ASSESSMENT — ENCOUNTER SYMPTOMS
COUGH: 0
SPEECH CHANGE: 0
SENSORY CHANGE: 0
FOCAL WEAKNESS: 1
SPUTUM PRODUCTION: 0
FEVER: 0
TINGLING: 0
SHORTNESS OF BREATH: 0
CHILLS: 0
NAUSEA: 0
VOMITING: 0
TREMORS: 0

## 2019-12-23 NOTE — CARE PLAN
Problem: Bowel/Gastric:  Goal: Normal bowel function is maintained or improved  Outcome: PROGRESSING SLOWER THAN EXPECTED  Goal: Will not experience complications related to bowel motility  Outcome: PROGRESSING SLOWER THAN EXPECTED     Problem: Safety  Goal: Will remain free from injury  Outcome: MET     Problem: Pain Management  Goal: Pain level will decrease to patient's comfort goal  Outcome: MET

## 2019-12-23 NOTE — THERAPY
"Occupational Therapy Evaluation completed.   Functional Status:  Tyree supine>sit, SPV sit>stand, SPV socks doff/don, SPV toilet txf with verbal cues for body mechanics/grab bar use, extra time for all ADLs and mobility with FWW  Plan of Care: DC needs only  Discharge Recommendations:  Equipment: Bedside Commode. Post-acute therapy Recommend home health transitional care for continued occupational therapy services.     See \"Rehab Therapy-Acute\" Patient Summary Report for complete documentation.    Pt is 45yo female admitted with JANUSZ \"likely related to volume depletion\" per MD and failure to thrive, pmhx includes breast CA s/p mastectomy in 2016, now with lytic and osseous metastases. Pt presents to OT eval pleasant and cooperative, reports weakness and pain in R knee from \"twisting it wrong\" a few weeks ago. Pt completed functional transfers and basic ADLs with SPV, required extra time. Pt will benefit from HHOT upon d/c for home safey evaluation and compensatory strategy education, recommended pt acquire a bedside commode for home to assist with toilet txfs as pt reports the suction grab bars she has at home do not stick to the wall. Pt reports her daughter in law quit her job to care for pt, is available 24hrs/day. Recommend up OOB to toilet and up to chair for all meals to preserve functional strength while admitted. Patient will not be actively followed for occupational therapy services at this time, however may be seen if requested by physician for 1 more visit within 30 days to address any discharge or equipment needs.     "

## 2019-12-23 NOTE — PROGRESS NOTES
Internal Medicine Interval Note  Note Author: Chidi Hercules M.D.     Name Roxy Cota     1975   Age/Sex 44 y.o. female   MRN [id number]   Code Status Full Code     After 5PM or if no immediate response to page, please call for cross-coverage  Attending/Team: Dr. Olivier / LUCY Barnhart See Patient List for primary contact information  Call [phone] to page    1st Call - Day Intern (R1):   Dr. Hercules 2nd Call - Day Sr. Resident (R2/R3):   Dr. Murray         Reason for interval visit  (Principal Problem)   Breast cancer (HCC)    Interval Problem Daily Status Update  (24 hours)   Ms. Cota is a 44-year old female who presented to the hospital with failure to thrive and right lower extremity weakness. HD # 2    -This morning patient is sitting comfortably in her bed but states when she gets out of her bed or tries to stand that's when she feels pain his her right thigh and knee region.  -Overnight fevers spikes x 2 were noted. Blood clx drawn. Pt was started overnight on Rocephin and Doxycycline (suspicion for unresolved PNA)  -Per nurse pt's  adamant to not work with PT/OT until pt's limb pain is resolved.  -Palliative team to see the patient today.  -Will consult Oncology. Patient amenable to establish with Oncology here.        Review of Systems   Constitutional: Negative for chills and fever.   Respiratory: Negative for cough, sputum production and shortness of breath.    Gastrointestinal: Negative for nausea and vomiting.   Musculoskeletal:        Right thigh/knee region pain   Neurological: Positive for focal weakness. Negative for tingling, tremors, sensory change and speech change.       Consultants/Specialty  N/A  PCP: Livia Story FNP    Disposition  Remain inpatient for medical management. Oncology consult and Palliative team to meet patient today.    Quality Measures  Quality-Core Measures   Reviewed items::  Labs reviewed, Medications reviewed and Radiology images reviewed  Carmen  catheter::  No Morales  DVT prophylaxis pharmacological::  Heparin          Physical Exam       Vitals:    12/23/19 0400 12/23/19 0458 12/23/19 0726 12/23/19 1522   BP: 101/64  112/61 115/62   Pulse: 75  81 96   Resp: 16 16 16 18   Temp: 36.7 °C (98.1 °F)  (!) 38.9 °C (102 °F) 36.7 °C (98.1 °F)   TempSrc: Temporal  Temporal Temporal   SpO2: 94%  96% 97%   Weight:       Height:         Body mass index is 22.98 kg/m². Weight: 57 kg (125 lb 10.6 oz)  Oxygen Therapy:  Pulse Oximetry: 97 %, O2 (LPM): 3, O2 Delivery: Silicone Nasal Cannula    Physical Exam   Constitutional:    female who appears older than stated age. Appears chronically ill    HENT:   Head: Normocephalic and atraumatic.   Dry mucous membranes    Eyes: EOM are normal. No scleral icterus.   Neck: Normal range of motion.   Cardiovascular: Normal rate, regular rhythm and intact distal pulses.   No murmur heard.  Pulmonary/Chest: Effort normal and breath sounds normal. No respiratory distress.   Abdominal: Soft. Bowel sounds are normal. She exhibits no distension. There is no tenderness.   Musculoskeletal:         General: No deformity or edema.      Comments: Tender to palpation over right thigh. Intact passive range of motion of right lower extremity (per patient pain with active ROM)   Neurological: She is alert. She displays facial symmetry.   Lethargic, easily arousable.RLE 4/5, LLE 5/5   Skin: Skin is warm and dry. No erythema.   Psychiatric: Affect normal.   Nursing note and vitals reviewed.    Lab Data Review:         12/22/2019  6:22 AM    Recent Labs     12/21/19  2100 12/22/19  1016 12/23/19  0251   SODIUM 139 143 142   POTASSIUM 3.1* 3.5* 3.3*   CHLORIDE 111 116* 114*   CO2 18* 18* 20   BUN 50* 29* 19   CREATININE 1.93* 1.19 0.79   MAGNESIUM 2.0 1.8  --    CALCIUM 8.9 9.2 8.7       Recent Labs     12/21/19  2100 12/22/19  1016 12/23/19  0251   ALTSGPT 7 8 16   ASTSGOT 13 20 16   ALKPHOSPHAT 128* 113* 140*   TBILIRUBIN 0.3 0.4 0.2    PREALBUMIN  --  9.0*  --    GLUCOSE 115* 113* 232*       Recent Labs     12/21/19  2100 12/22/19  1016 12/23/19  0251   RBC 2.67* 2.38* 2.23*   HEMOGLOBIN 8.8* 8.0* 7.5*   HEMATOCRIT 26.8* 24.2* 21.9*   PLATELETCT 246 229 240       Recent Labs     12/21/19  2100 12/22/19  1016 12/23/19  0251   WBC 25.4* 16.5* 19.1*   NEUTSPOLYS 80.70* 75.50* 86.30*   LYMPHOCYTES 9.20* 13.60* 6.10*   MONOCYTES 7.90 7.20 3.10   EOSINOPHILS 0.40 1.10 0.00   BASOPHILS 0.20 0.20 0.20   ASTSGOT 13 20 16   ALTSGPT 7 8 16   ALKPHOSPHAT 128* 113* 140*   TBILIRUBIN 0.3 0.4 0.2           Assessment/Plan     * Breast cancer (HCC)- (present on admission)  Assessment & Plan  Patient with history of breast cancer s/p right mastectomy in 2016 currently found found to have metastasis. Currently only on Tamoxifen. During the last admission, patient had a bonee biopsy which was consistent with metastatic bone cancer. Follos with oncologin in [place] (next appointment 12/26). CT chest on admission consistent with lytic and sclerotic osseous metastases in the axial and appendicular skeleton.   - Continue Tamoxifen  - Oncology consult  Placed. Will see the patient tomorrow.  - Palliative care consult to discuss goals of care.  - MRI brain 12/23/2019 normal. No signs of metastatic intracranial disease.  Findings suspicious for osseous metastatic disease.  - Scheduled Tylenol  - Percocet and Morphine IR prn   - Continue home klonopin   - Continue Lidocaine patch  - Dexamethasone     Displaced fracture of greater trochanter of right femur (HCC)  Assessment & Plan  -Pt continued to complain of right thigh and knee pain. Aggravated with weight bearing and ambulation.  -Xray done 12/23/2019 showed Rt displaced fracture of the greater trochanter.    Plan:  -Orthopedics on board. CT w/o contrast tmrw.   -Will follow up.    Weakness of right lower extremity- (present on admission)  Assessment & Plan  Likely secondary to metastatic bone disease. No signs of  acute stroke at this time.   - MRI brain 12/23/2019 negative for any ischemic events.  -X Ray right femur 12/23/2019 Right displaced fracture of greater trochanter.  -Orthopedics on board. CT w/o contrast tmrw.    Failure to thrive in adult- (present on admission)  Assessment & Plan  Secondary to malignancy  - Nutrition consult  - Check pre-albumin   - Boost supplements   - Palliative care consult   - Trial of mirtazapine     Hypokalemia- (present on admission)  Assessment & Plan  - Replete as needed     Acute kidney injury (HCC)- (present on admission)  Assessment & Plan  Creatinine 1.93 on admission. Likely pre-renal due to volume depletion , poor PO intake.   - IV maintenance fluids on admission.  -Resolved now.  -Cr 0.7 this AM.      Leukocytosis- (present on admission)  Assessment & Plan  Chronic, likely reactive secondary to cancer. No signs of infectious at this time.     Mood disorder (HCC)- (present on admission)  Assessment & Plan  - Continue celexa   - Continue klonopin     COPD (chronic obstructive pulmonary disease) ([facility])- (present on admission)  Assessment & Plan  Chronic respiratory failure with 3L O2 at home. No signs of acute exacerbation  - RT protocol  - Albuterol Q6  - Guaifenesin scheduled  - Incentive spirometry

## 2019-12-23 NOTE — THERAPY
PT eval order received and attempted. RN requesting hold as she just spoke with  who is adamant PT not see his wife, he believes acute PT will hurt her more. Will hold until further clarification from MD is provided. If  continues to take this stance will DC order.

## 2019-12-23 NOTE — PROGRESS NOTES
"Pt's  called RN.  He does not want physical therapy to work with his wife until the doctor knows what is going on with her knee.  He is afraid the knee will get worse.  States \"she needs a walker and help at home and is prone to falls.\"    "

## 2019-12-24 PROCEDURE — 700111 HCHG RX REV CODE 636 W/ 250 OVERRIDE (IP): Performed by: INTERNAL MEDICINE

## 2019-12-24 PROCEDURE — 700102 HCHG RX REV CODE 250 W/ 637 OVERRIDE(OP): Performed by: STUDENT IN AN ORGANIZED HEALTH CARE EDUCATION/TRAINING PROGRAM

## 2019-12-24 PROCEDURE — 700102 HCHG RX REV CODE 250 W/ 637 OVERRIDE(OP): Performed by: INTERNAL MEDICINE

## 2019-12-24 PROCEDURE — 700101 HCHG RX REV CODE 250: Performed by: STUDENT IN AN ORGANIZED HEALTH CARE EDUCATION/TRAINING PROGRAM

## 2019-12-24 PROCEDURE — 97162 PT EVAL MOD COMPLEX 30 MIN: CPT

## 2019-12-24 PROCEDURE — 99232 SBSQ HOSP IP/OBS MODERATE 35: CPT | Mod: GC | Performed by: INTERNAL MEDICINE

## 2019-12-24 PROCEDURE — 700105 HCHG RX REV CODE 258: Performed by: INTERNAL MEDICINE

## 2019-12-24 PROCEDURE — A9270 NON-COVERED ITEM OR SERVICE: HCPCS | Performed by: INTERNAL MEDICINE

## 2019-12-24 PROCEDURE — 770001 HCHG ROOM/CARE - MED/SURG/GYN PRIV*

## 2019-12-24 PROCEDURE — A9270 NON-COVERED ITEM OR SERVICE: HCPCS | Performed by: STUDENT IN AN ORGANIZED HEALTH CARE EDUCATION/TRAINING PROGRAM

## 2019-12-24 RX ORDER — OXYCODONE HYDROCHLORIDE 5 MG/1
5-10 TABLET ORAL EVERY 4 HOURS PRN
Status: DISCONTINUED | OUTPATIENT
Start: 2019-12-24 | End: 2019-12-25 | Stop reason: HOSPADM

## 2019-12-24 RX ORDER — OMEPRAZOLE 20 MG/1
20 CAPSULE, DELAYED RELEASE ORAL DAILY
Status: DISCONTINUED | OUTPATIENT
Start: 2019-12-24 | End: 2019-12-25 | Stop reason: HOSPADM

## 2019-12-24 RX ORDER — CELECOXIB 100 MG/1
100 CAPSULE ORAL 2 TIMES DAILY
Status: DISCONTINUED | OUTPATIENT
Start: 2019-12-24 | End: 2019-12-25 | Stop reason: HOSPADM

## 2019-12-24 RX ORDER — ACETAMINOPHEN 500 MG
1000 TABLET ORAL EVERY 8 HOURS
Status: DISCONTINUED | OUTPATIENT
Start: 2019-12-24 | End: 2019-12-25 | Stop reason: HOSPADM

## 2019-12-24 RX ORDER — CELECOXIB 100 MG/1
100 CAPSULE ORAL DAILY
Status: DISCONTINUED | OUTPATIENT
Start: 2019-12-24 | End: 2019-12-24

## 2019-12-24 RX ADMIN — GUAIFENESIN 200 MG: 100 SOLUTION ORAL at 17:44

## 2019-12-24 RX ADMIN — OXYCODONE HYDROCHLORIDE 5 MG: 5 TABLET ORAL at 08:20

## 2019-12-24 RX ADMIN — CLONAZEPAM 1 MG: 1 TABLET ORAL at 04:47

## 2019-12-24 RX ADMIN — ENOXAPARIN SODIUM 40 MG: 100 INJECTION SUBCUTANEOUS at 04:47

## 2019-12-24 RX ADMIN — DOXYCYCLINE 100 MG: 100 TABLET, FILM COATED ORAL at 04:46

## 2019-12-24 RX ADMIN — MIRTAZAPINE 15 MG: 15 TABLET, FILM COATED ORAL at 22:00

## 2019-12-24 RX ADMIN — CLONAZEPAM 1 MG: 1 TABLET ORAL at 20:21

## 2019-12-24 RX ADMIN — CEFTRIAXONE SODIUM 1 G: 1 INJECTION, POWDER, FOR SOLUTION INTRAMUSCULAR; INTRAVENOUS at 04:48

## 2019-12-24 RX ADMIN — ALBUTEROL SULFATE 2 PUFF: 90 AEROSOL, METERED RESPIRATORY (INHALATION) at 00:00

## 2019-12-24 RX ADMIN — OXYCODONE HYDROCHLORIDE 10 MG: 5 TABLET ORAL at 17:45

## 2019-12-24 RX ADMIN — POTASSIUM CHLORIDE 40 MEQ: 1500 TABLET, EXTENDED RELEASE ORAL at 04:47

## 2019-12-24 RX ADMIN — DOXYCYCLINE 100 MG: 100 TABLET, FILM COATED ORAL at 17:44

## 2019-12-24 RX ADMIN — DEXAMETHASONE 4 MG: 4 TABLET ORAL at 04:47

## 2019-12-24 RX ADMIN — ACETAMINOPHEN 1000 MG: 500 TABLET ORAL at 13:06

## 2019-12-24 RX ADMIN — GUAIFENESIN 200 MG: 100 SOLUTION ORAL at 04:47

## 2019-12-24 RX ADMIN — ACETAMINOPHEN 1000 MG: 500 TABLET ORAL at 22:00

## 2019-12-24 RX ADMIN — CARVEDILOL 6.25 MG: 6.25 TABLET, FILM COATED ORAL at 22:00

## 2019-12-24 RX ADMIN — OMEPRAZOLE 20 MG: 20 CAPSULE, DELAYED RELEASE ORAL at 11:25

## 2019-12-24 RX ADMIN — CELECOXIB 100 MG: 100 CAPSULE ORAL at 17:44

## 2019-12-24 RX ADMIN — TIZANIDINE 4 MG: 4 TABLET ORAL at 11:25

## 2019-12-24 RX ADMIN — NICOTINE 14 MG: 14 PATCH TRANSDERMAL at 04:47

## 2019-12-24 RX ADMIN — CITALOPRAM HYDROBROMIDE 40 MG: 20 TABLET ORAL at 04:47

## 2019-12-24 RX ADMIN — ALBUTEROL SULFATE 2 PUFF: 90 AEROSOL, METERED RESPIRATORY (INHALATION) at 18:00

## 2019-12-24 RX ADMIN — SENNOSIDES AND DOCUSATE SODIUM 2 TABLET: 8.6; 5 TABLET ORAL at 04:47

## 2019-12-24 RX ADMIN — POLYETHYLENE GLYCOL 3350 1 PACKET: 17 POWDER, FOR SOLUTION ORAL at 04:46

## 2019-12-24 RX ADMIN — OXYCODONE HYDROCHLORIDE 10 MG: 5 TABLET ORAL at 22:00

## 2019-12-24 RX ADMIN — ACETAMINOPHEN 650 MG: 325 TABLET, FILM COATED ORAL at 04:47

## 2019-12-24 RX ADMIN — OXYCODONE HYDROCHLORIDE 10 MG: 5 TABLET ORAL at 13:06

## 2019-12-24 RX ADMIN — TIZANIDINE 4 MG: 4 TABLET ORAL at 22:00

## 2019-12-24 RX ADMIN — CELECOXIB 100 MG: 100 CAPSULE ORAL at 11:25

## 2019-12-24 RX ADMIN — GUAIFENESIN 200 MG: 100 SOLUTION ORAL at 11:25

## 2019-12-24 RX ADMIN — TAMOXIFEN CITRATE 20 MG: 10 TABLET, FILM COATED ORAL at 04:47

## 2019-12-24 RX ADMIN — ALBUTEROL SULFATE 2 PUFF: 90 AEROSOL, METERED RESPIRATORY (INHALATION) at 06:00

## 2019-12-24 RX ADMIN — ALBUTEROL SULFATE 2 PUFF: 90 AEROSOL, METERED RESPIRATORY (INHALATION) at 12:00

## 2019-12-24 RX ADMIN — LIDOCAINE 1 PATCH: 50 PATCH TOPICAL at 04:47

## 2019-12-24 RX ADMIN — CARVEDILOL 6.25 MG: 6.25 TABLET, FILM COATED ORAL at 08:20

## 2019-12-24 ASSESSMENT — GAIT ASSESSMENTS
ASSISTIVE DEVICE: FRONT WHEEL WALKER
DISTANCE (FEET): 100
DEVIATION: STEP TO;BRADYKINETIC
GAIT LEVEL OF ASSIST: SUPERVISED

## 2019-12-24 ASSESSMENT — ENCOUNTER SYMPTOMS
VOMITING: 0
TREMORS: 0
SPUTUM PRODUCTION: 0
FEVER: 0
NAUSEA: 0
SPEECH CHANGE: 0
SHORTNESS OF BREATH: 0
TINGLING: 0
CHILLS: 0
FOCAL WEAKNESS: 1
COUGH: 0
SENSORY CHANGE: 0

## 2019-12-24 ASSESSMENT — COGNITIVE AND FUNCTIONAL STATUS - GENERAL
SUGGESTED CMS G CODE MODIFIER MOBILITY: CK
CLIMB 3 TO 5 STEPS WITH RAILING: A LOT
MOVING TO AND FROM BED TO CHAIR: A LITTLE
WALKING IN HOSPITAL ROOM: A LITTLE
STANDING UP FROM CHAIR USING ARMS: A LITTLE
TURNING FROM BACK TO SIDE WHILE IN FLAT BAD: A LITTLE
MOVING FROM LYING ON BACK TO SITTING ON SIDE OF FLAT BED: A LITTLE
MOBILITY SCORE: 17

## 2019-12-24 ASSESSMENT — PATIENT HEALTH QUESTIONNAIRE - PHQ9
SUM OF ALL RESPONSES TO PHQ9 QUESTIONS 1 AND 2: 0
1. LITTLE INTEREST OR PLEASURE IN DOING THINGS: NOT AT ALL
2. FEELING DOWN, DEPRESSED, IRRITABLE, OR HOPELESS: NOT AT ALL

## 2019-12-24 NOTE — CONSULTS
Date of Service: 12/23/19    CC: Right hip pain    HPI: Roxy Cota is a 44 y.o. female who presents with complaints of pain to right hip.  This started 3 weeks ago after ground-level fall.  She had a second fall but a week ago that made the pain significantly worse.  The pain is 8/10 and is described as sharp.  The pain is made worse by palpation of the area and made better by rest and immobilization.  She has a history of metastatic breast cancer.  She was not having pain in this hip prior to these falls.  She denies any other pain in the left hip or bilateral upper extremities with weightbearing activities    PMH:   Past Medical History:   Diagnosis Date   • Cancer (HCC)     breast ca with mets       PSH:   Past Surgical History:   Procedure Laterality Date   • MASTECTOMY Right        FH: No family history on file.    SH:   Social History     Socioeconomic History   • Marital status:      Spouse name: Not on file   • Number of children: Not on file   • Years of education: Not on file   • Highest education level: Not on file   Occupational History   • Not on file   Social Needs   • Financial resource strain: Not hard at all   • Food insecurity:     Worry: Never true     Inability: Never true   • Transportation needs:     Medical: Yes     Non-medical: No   Tobacco Use   • Smoking status: Former Smoker     Packs/day: 0.00   • Smokeless tobacco: Never Used   Substance and Sexual Activity   • Alcohol use: Not on file   • Drug use: Not on file   • Sexual activity: Not on file   Lifestyle   • Physical activity:     Days per week: Not on file     Minutes per session: Not on file   • Stress: Not on file   Relationships   • Social connections:     Talks on phone: Not on file     Gets together: Not on file     Attends Confucianism service: Not on file     Active member of club or organization: Not on file     Attends meetings of clubs or organizations: Not on file     Relationship status: Not on file   • Intimate  "partner violence:     Fear of current or ex partner: Not on file     Emotionally abused: Not on file     Physically abused: Not on file     Forced sexual activity: Not on file   Other Topics Concern   • Not on file   Social History Narrative   • Not on file       ROS: A 10 system review of systems was negative outside what was listed in the HPI    /66   Pulse 69   Temp 36.8 °C (98.3 °F) (Temporal)   Resp 20   Ht 1.575 m (5' 2\")   Wt 57 kg (125 lb 10.6 oz)   SpO2 99%     Physical Exam:  General: AAOx3, NAD  HEENT: normocephalic, atraumatic  Psych: Normal mood and affect  Neck: supple, no pain to motion  Chest/Pulmonary: breathing unlabored, no audible wheezing  Cardio: regular heart rate and rhythm  Neuro: sensation grossly intact to BUE and BLE, moving all four extremities  Skin: intact with no full thickness abrasions or lacerations  MSK: Right hip shows pain with internal rotation at maximum.  Passive flexion extension of hips nontender.  No swelling or tenderness at the knee.  Left lower extremity bilateral upper extremities are atraumatic and have no tenderness to palpation or range of motion    Imaging and labs: Right hip x-rays shows a greater trochanter avulsion fracture.  There is a concerning line on the x-ray that could represent extension of the fracture.  CT scan of the hip confirmed this did not extend and this is isolated to the greater trochanter.  There are multiple bony metastases throughout the pelvis and proximal femurs, no significant cortical destruction in the proximal femurs    Assessment:   1. FTT (failure to thrive) in adult     2. JANUSZ (acute kidney injury) (HCC)     3. Metastatic breast cancer (HCC)         We discussed the diagnosis and findings with the patient at length.  We reviewed possible non operative and operative interventions and the risks and benefits of these.  Given the lack of involvement of the intertrochanteric or femoral neck regions of the fracture, this can " likely be continued to be treated nonoperatively.  She had a chance to ask questions and all of these were answered to her satisfaction.        Plan:  Weightbearing as tolerated right lower extremity  Mobilize with PT/OT  Nonoperative treatment of greater trochanter  Follow-up in clinic in 2 weeks time for check on mobility

## 2019-12-24 NOTE — CONSULTS
"Reason for PC Consult: Advance Care Planning    Consulted by: Dr. Murray    Assessment:  General:   Ms. Cota is a 44-year-old female with history of COPD, breast cancer, s/p Rt mastectomy on tamoxifen, was brought into the emergency department by ambulance for persistent generalized weakness, fatigue, anorexia and dyspnea for about 5 days. During the last admission, patient had a bonee biopsy which was consistent with metastatic bone cancer. Pt continued to complain of right thigh and knee pain. Aggravated with weight bearing and ambulation. Xray done 12/23/2019 showed Rt displaced fracture of the greater trochanter. MRI brain 12/23/2019 normal. No signs of metastatic intracranial disease.  Findings suspicious for osseous metastatic disease.    Dyspnea: No  Last BM: 12/24/19  Pain: Yes  Depression: Mood appropriate for situation  Dementia: No     Spiritual:  Is Buddhism or spirituality important for coping with this illness? No  Has a  or spiritual provider visit been requested? No    Palliative Performance Scale: 40%    Advance Directive: None-pt states copy at home  DPOA: No-pt states it is her , Boone Cota.   POLST: No-full code    Code Status: Full-discussed at this encounter.    Social: Pt is  to Boone Cota. Pt has one son, Sd and a step son, Boone. Pt's DIL Shruti and son Sd live with her. Shruti is pt's primary caregiver and is home with her everyday. Pt's son Sd is bipolar and per pt \"struggling to cope\" with pt's health changes.     Outcome:  Introduced self and role of Palliative Care to pt at bedside.  Assessed pt's understanding of current medical status, overall health picture, and options for future care. Pt states that she was diagnosed with breast cancer four years ago, pt had a mastectomy at that time. Pt was placed on Tomoxifen. Pt states that to the best of her knowledge she was cancer free until 1.5 months ago when they determined she had metastasis to her bones. Pt states " that she is relieved that her brain MRI was negative for metastasis. Pt also states that she has been falling frequently lately, pt states her coordination and mentation were impacting since 2016 when she was intubated for sepsis. Pt states that at this time she has a fractured hip. Pt's goal is to learn what the treatment options are from oncology.     Explored pt's values, beliefs, and preferences in order to identify GOC. Pt states that she would like to seek treatment to extend her life. Pt states that she has full time care at home from her daughter in law, Shruti. Pt states that Shruti helps her with everything and her  is also very supportive. PC RN encourages pt to weigh the benefits and burdens of treatment when options are provided. PC RN explains hospice as future care option for symptom management and quality of life if treatment is unsuccessful or not possible. Pt states she is familiar with hospice and understands it's role.     Advance directive discussed, pt will have her  Boone bring in a copy. Code status discussed including mechanical ventilation and what resuscitation looks like. Pt elects to be full code.     Spiritual visit declined.     Active listening, reflection, reminiscing, validation & normalization, and empathic support utilized throughout this encounter.  All questions answered.  PC contact information given.         Updated: Dr. Murray and Dr. Wynne     Plan: full code and full treat. PC RN will follow along as clinical picture evolves. Call PC RN if pt's  brings copy of AD so it can be scanned into Epic.     Thank you for allowing Palliative Care to participate in this patient's care. Please feel free to call [phone] with any questions or concerns.

## 2019-12-24 NOTE — THERAPY
"Physical Therapy Evaluation completed.   Bed Mobility:  Supine to Sit: Supervised  Transfers: Sit to Stand: Supervised  Gait: Level Of Assist: Supervised with Front-Wheel Walker       Plan of Care: Patient with no further skilled PT needs in the acute care setting at this time  Discharge Recommendations: Equipment: No Equipment Needed. Post-acute therapy Discharge to home with outpatient or home health for additional skilled therapy services.    Pt is a 44 year old female admitted to the hospital for FTT and JANUSZ. Pt with history of breast CA with mets. Pt had recent hospitalization due to PNA and has been getting weaker at home. Pt reports that she does have 24/7 assistance at home from family and has FWW, WC and shower chair. At time of initial evaluation, pt completed all mobility tasks at SPV level. Pt does present with mild B LE weakness, R>L but strength is adequate for mobility tasks. Pt did require verbal cues for compensatory strategies to limit pain during mobility tasks. Pt ambulated in hallway with FWW, SPV. Pt with slow, antalgic, step to gait pattern. Pt cued to take larger steps with L LE, however, unable due to increased pain through R LE with WB. Pt returned to bed and issued HEP to help maintain/improve LE strength upon DC. Pt is limited with what she can do with L LE due to pain. At this time, pt is at/near baseline in regards to functional mobility. Pt does not demonstrate the need for ongoing PT intervention while in the acute care setting. Pt would benefit from home health upon DC to optimize functional independence    See \"Rehab Therapy-Acute\" Patient Summary Report for complete documentation.     "

## 2019-12-24 NOTE — CARE PLAN
Problem: Nutritional:  Goal: Achieve adequate nutritional intake  Description  Patient will consume >50% of meals/supplements   Outcome: MET      PO intake generally >50% + supplements. Weight trend up. RD to follow weekly.

## 2019-12-24 NOTE — PROGRESS NOTES
Internal Medicine Interval Note  Note Author: Chidi Hercules M.D.     Name Roxy Cota     1975   Age/Sex 44 y.o. female   MRN [id number]   Code Status Full Code     After 5PM or if no immediate response to page, please call for cross-coverage  Attending/Team: Dr. Olivier / LUCY Barnhart See Patient List for primary contact information  Call [phone] to page    1st Call - Day Intern (R1):   Dr. Hercules 2nd Call - Day Sr. Resident (R2/R3):   Dr. Murray         Reason for interval visit  (Principal Problem)   Breast cancer (HCC)    Interval Problem Daily Status Update  (24 hours)   Ms. Cota is a 44-year old female who presented to the hospital with failure to thrive and right lower extremity weakness. HD # 3    -No acute overnight events reported. Patient lying comfortably in the bed in a pleasant mood this am.  -Patient states pain bearable with rest , aggravates with weight bearing.   -During the rounds patient mentions she is seen by the Oncology this AM and different option were discussed. Patient desires to see Oncologist at [place] as well (appointment scheduled 2019).  -Per orthopedics closed treatment w/o manipulation of Rt greater trochanteric fracture. PT/OT as inpatient. Repeat Xray and follow up with ortho in 2 weeks as out patient.  -Patient amneable to work with PT/OT for functional status assessment.  -Palliative team saw the patient , patient wants to remain full code. Pt's  to bring the AD copy.          Review of Systems   Constitutional: Negative for chills and fever.   Respiratory: Negative for cough, sputum production and shortness of breath.    Gastrointestinal: Negative for nausea and vomiting.   Musculoskeletal:        Right thigh/knee region pain   Neurological: Positive for focal weakness. Negative for tingling, tremors, sensory change and speech change.       Consultants/Specialty  N/A  PCP: Livia Story FNP    Disposition  Remain inpatient for medical management.  Oncology consult and Palliative team to meet patient today.    Quality Measures  Quality-Core Measures   Reviewed items::  Labs reviewed, Medications reviewed and Radiology images reviewed  Morales catheter::  No Morales  DVT prophylaxis pharmacological::  Heparin          Physical Exam       Vitals:    12/23/19 1522 12/23/19 2000 12/24/19 0400 12/24/19 0725   BP: 115/62 108/60 109/66 103/57   Pulse: 96 90 69 62   Resp: 18 20 20 16   Temp: 36.7 °C (98.1 °F) 37.2 °C (99 °F) 36.8 °C (98.3 °F) 37.2 °C (98.9 °F)   TempSrc: Temporal Temporal Temporal Temporal   SpO2: 97% 98% 99% 99%   Weight:       Height:         Body mass index is 22.98 kg/m².    Oxygen Therapy:  Pulse Oximetry: 99 %, O2 (LPM): 3, O2 Delivery: Nasal Cannula    Physical Exam   Constitutional:    female who appears older than stated age. Appears chronically ill    HENT:   Head: Normocephalic and atraumatic.   moist mucous membranes    Eyes: Pupils are equal, round, and reactive to light. Conjunctivae and EOM are normal. Right eye exhibits no discharge. No scleral icterus.   Neck: Normal range of motion.   Cardiovascular: Normal rate, regular rhythm and intact distal pulses.   No murmur heard.  Pulmonary/Chest: Effort normal and breath sounds normal. No respiratory distress.   Abdominal: Soft. Bowel sounds are normal. She exhibits no distension. There is no tenderness.   Musculoskeletal:         General: No deformity or edema.      Comments: Tender to palpation over right thigh. Intact passive range of motion of right lower extremity (per patient pain with active ROM)   Neurological: She is alert. She displays facial symmetry.   Lethargic, easily arousable.RLE 4/5, LLE 5/5   Skin: Skin is warm and dry. No erythema.   Psychiatric: Affect normal.   Nursing note and vitals reviewed.    Lab Data Review:         12/22/2019  6:22 AM    Recent Labs     12/21/19  2100 12/22/19  1016 12/23/19  0251   SODIUM 139 143 142   POTASSIUM 3.1* 3.5* 3.3*   CHLORIDE  111 116* 114*   CO2 18* 18* 20   BUN 50* 29* 19   CREATININE 1.93* 1.19 0.79   MAGNESIUM 2.0 1.8  --    CALCIUM 8.9 9.2 8.7       Recent Labs     12/21/19  2100 12/22/19  1016 12/23/19  0251   ALTSGPT 7 8 16   ASTSGOT 13 20 16   ALKPHOSPHAT 128* 113* 140*   TBILIRUBIN 0.3 0.4 0.2   PREALBUMIN  --  9.0*  --    GLUCOSE 115* 113* 232*       Recent Labs     12/22/19  1016 12/23/19  0251 12/23/19  1746   RBC 2.38* 2.23* 2.29*   HEMOGLOBIN 8.0* 7.5* 7.6*   HEMATOCRIT 24.2* 21.9* 22.8*   PLATELETCT 229 240 250       Recent Labs     12/21/19 2100 12/22/19  1016 12/23/19  0251 12/23/19  1746   WBC 25.4* 16.5* 19.1* 29.1*   NEUTSPOLYS 80.70* 75.50* 86.30*  --    LYMPHOCYTES 9.20* 13.60* 6.10*  --    MONOCYTES 7.90 7.20 3.10  --    EOSINOPHILS 0.40 1.10 0.00  --    BASOPHILS 0.20 0.20 0.20  --    ASTSGOT 13 20 16  --    ALTSGPT 7 8 16  --    ALKPHOSPHAT 128* 113* 140*  --    TBILIRUBIN 0.3 0.4 0.2  --            Assessment/Plan     * Breast cancer (HCC)- (present on admission)  Assessment & Plan  Patient with history of breast cancer s/p right mastectomy in 2016 currently found found to have metastasis. Currently only on Tamoxifen. During the last admission, patient had a bonee biopsy which was consistent with metastatic bone cancer. Follos with oncologin in [place] (next appointment 12/26). CT chest on admission consistent with lytic and sclerotic osseous metastases in the axial and appendicular skeleton. CT Hip w/o showed diffuse osseous mets involving lower lumbar spine , proximal femurs and pelvic bone.    Plan:  - Will discontinue Dexamethasone  - Continue Tamoxifen  - Oncology following up.    - MRI brain 12/23/2019 normal. No signs of metastatic intracranial disease.  Findings suspicious for osseous metastatic disease.  - Scheduled Tylenol  - IV Dilaudid PRN ,Oxycodone PRN, celebrex BID for pain  - Continue home klonopin   - Continue Lidocaine patch  - Dexamethasone    Displaced fracture of greater trochanter of right  femur (HCC)  Assessment & Plan  -Pt continued to complain of right thigh and knee pain. Aggravated with weight bearing and ambulation.  -Xray done 12/23/2019 showed Rt displaced fracture of the greater trochanter.    Plan:  -CT Hip w/o contrast confirmed fracture with suspicion of iliac fracture as well. Diffuse osseous metastatic disease noted.  -Per ortho closed treatment w/o manipulation. PT/OT rehab as in patient. Weight bearing as tolerated by the patient.  -Oncology wants to reconsider non surgical management of the fracture. Message relayed to Orthopedics. Will follow up.     Weakness of right lower extremity- (present on admission)  Assessment & Plan  Likely secondary to metastatic bone disease. No signs of acute stroke at this time.   - MRI brain 12/23/2019 negative for any ischemic events.  -X Ray right femur 12/23/2019 Right displaced fracture of greater trochanter.  -CT Hip w/o contrast confirmed fracture with suspicion of iliac fracture as well. Diffuse osseous metastatic disease noted.  -Per ortho closed treatment w/o manipulation. PT/OT rehab as in patient.    Failure to thrive in adult- (present on admission)  Assessment & Plan  Secondary to malignancy  - Improved PO intake since admission.  - Nutrition consult  - Check pre-albumin   - Boost supplements   - Trial of mirtazapine     Hypokalemia- (present on admission)  Assessment & Plan  - Replete as needed     Acute kidney injury (HCC)- (present on admission)  Assessment & Plan  Creatinine 1.93 on admission. Likely pre-renal due to volume depletion , poor PO intake.   - IV maintenance fluids on admission.  -Resolved now.  -Cr 0.7 this AM.      Leukocytosis- (present on admission)  Assessment & Plan  Chronic, likely reactive secondary to cancer. No signs of infectious at this time.     Mood disorder (HCC)- (present on admission)  Assessment & Plan  - Continue celexa   - Continue klonopin     COPD (chronic obstructive pulmonary disease) (HCC)- (present  on admission)  Assessment & Plan  Chronic respiratory failure with 3L O2 at home. No signs of acute exacerbation  - RT protocol  - Albuterol Q6  - Guaifenesin scheduled  - Incentive spirometry

## 2019-12-24 NOTE — CARE PLAN
Problem: Communication  Goal: The ability to communicate needs accurately and effectively will improve  Outcome: PROGRESSING AS EXPECTED     Problem: Safety  Goal: Will remain free from falls  Outcome: PROGRESSING AS EXPECTED     Problem: Infection  Goal: Will remain free from infection  Outcome: PROGRESSING AS EXPECTED     Problem: Knowledge Deficit  Goal: Knowledge of disease process/condition, treatment plan, diagnostic tests, and medications will improve  Outcome: PROGRESSING AS EXPECTED  Goal: Knowledge of the prescribed therapeutic regimen will improve  Outcome: PROGRESSING AS EXPECTED     Problem: Skin Integrity  Goal: Risk for impaired skin integrity will decrease  Outcome: PROGRESSING AS EXPECTED

## 2019-12-24 NOTE — CONSULTS
DATE OF SERVICE:  12/24/2019    Consultation was called by Dr. Kemar Ch.    REASON FOR CONSULTATION:    1.  Metastatic carcinoma of the breast, ER positive (5%), WA negative,   HER-2/kimo nonoverexpressing tumor.  2.  Right greater trochanter mildly displaced comminuted fracture.    HISTORY OF PRESENT ILLNESS:  The patient is a very pleasant 44-year-old lady   who was seen by me as an inpatient at [facility] on   12/24/2019 for the above-mentioned problems.  Patient originally was diagnosed   with right-sided breast cancer in approximately 2016 and underwent a right   mastectomy.  Subsequently, she was put on tamoxifen, which she took regularly;   however, most recently, the patient has been having worsening back and bony   pain and underwent a bone biopsy at [facility] in November, which showed   metastatic breast cancer, which was ER positive (5%) and WA negative and did   not overexpress HER-2/kimo.  Now, the patient had a ground level fall at home a   few weeks ago.  Then, she had another fall a few days ago and was admitted to   the hospital with pain.  Orthopedics was consulted and CT scan of her hip   showed the above-mentioned findings.  The patient was not deemed to be a   surgical candidate.  Heme/onc consultation was called to discuss her further   management options with me.  Otherwise, the patient is perimenopausal at this   time.      PAST MEDICAL HISTORY:  History of carcinoma of the breast, status post right   mastectomy.  COPD.  Depression.      PAST SURGICAL HISTORY:  Status post right mastectomy.    ALLERGIES:  FISH, LEVAQUIN, LYRICA, MORPHINE, MOTRIN, NEURONTIN, PENICILLIN.    FAMILY HISTORY:  Denies any history of breast or ovarian cancer in the   extended family.    PERSONAL AND SOCIAL HISTORY:  The patient lives in [place].  Smoker until about   10 days ago.  Denies any history of alcohol abuse or any drug abuse.      REVIEW OF SYSTEMS:    GENERAL AND CONSTITUTIONAL:  Complaining  of fatigue.  No recent weight loss,   no fevers.    HEAD AND NECK, EARS, NOSE AND THROAT:  Denies any headaches or any visual   symptoms.    RESPIRATORY:  Has occasional cough, no hemoptysis.    CARDIOVASCULAR:  No chest pain or palpitations.    GASTROINTESTINAL:  No nausea or vomiting.    MUSCULOSKELETAL:  Has right hip pain.  There is some difficulty with   ambulation.    NEUROLOGICAL:  Unable to move her right hip freely because of the pain she is   experiencing.  Otherwise, reflexes looked fairly unremarkable bilaterally.    CRANIAL NERVES:  Denies any history of stroke or any weakness.    PSYCHIATRIC:  Anxious, but denies any depression.    SKIN/INTEGUMENTARY:  No rash or any bruising.    HEMATOLOGICAL AND IMMUNOLOGICAL:  No complaints pertaining to this system.    Rest of her review of systems is negative per CMS/AMA criteria unless as   mentioned in history of present or past illness.      PHYSICAL EXAMINATION:    GENERAL:  The patient is alert and oriented x3.    VITAL SIGNS:  Temperature 37.2, pulse 62, respirations 16, /57, and   oxygen saturation 99% on room air.    HEENT:  Pupils are equal.  There is no icterus.  Conjunctivae normal.  Oral   mucosa reveals no mucositis.  She has upper dentures.  Dentition in the lower   jaw is very poor.   NECK:  Supple with no JVD or lymphadenopathy.    LUNGS:  Clear to auscultation.  There is good bilateral air entry.    HEART:  Reveals no S3, S4.    ABDOMEN:  Reveals no hepatosplenomegaly.    EXTREMITIES:  There is no edema of lower extremities.  Movements of the right   hip are very limited.  Upper extremity strength bilaterally is equal.  Lower   extremities strength on the right side could not be checked because of the   pain that she is experiencing.      IMAGING:  CT scan chest, 12/21/2019, shows no pneumonia and showed innumerable   osseous metastasis and stable sequelae of right mastectomy.  CT scan of the   abdomen and pelvis from November showed lytic  lesions in the bone and trace   bilateral pleural effusions and a small cyst in the left kidney.  CT of the   hip showed the above-mentioned findings.  MRI of the thoracic spine did not   show any evidence of cord compression, but showed trace bilateral pleural   effusions.  Lumbar spine MRI also did not show any evidence of cord   compression, but showed distention of the bladder with bilateral   hydronephrosis.  C-spine MRI also showed degenerative changes and metastatic   disease with areas of central canal and neural foramen narrowing.  MRI of the   brain did not show any metastasis to the brain.      LABORATORY DATA:  Creatinine 0.79.    ASSESSMENT AND PLAN:  1.  Breast cancer.  I was called to discuss her other management options with   the patient in detail considering her history of metastatic breast cancer.  I   explained to the patient that she has a stage IV malignancy and any treatment   would be palliative and would extend her life span, but is not be curative.    Currently, the patient is perimenopausal.  I did talk to her about using CD4/6   inhibitor, which is an oral therapy in conjunction with the hormone   manipulation therapy.  Since the patient is perimenopausal, she also need LHRH   agonist therapy to suppress her menstruation.  Hopefully, she will respond to   this therapy.  Otherwise, further treatments can be decided after next in   sequencing.  Currently, the patient has an appointment in [place] with her   oncologist for the 26th.  Hopefully, she will be able to keep that   appointment.  If not, she will cancel that appointment and we will see them in   the near future.  However, the patient would certainly like to follow up with   her oncologist in [place] in the future.  She is aware that her treatment   would be palliative and disease is not considered curative.    2.  Right hip fracture.  I discussed her case with her orthopedician Dr. Osborne.  Currently, the patient is not a  surgical candidate.  I discussed her   case with Dr. Mac who will see the patient in the near future.  I can be   reconsulted p.rRiton.       ____________________________________     MD GURPREET Lee / SAMARA    DD:  12/24/2019 14:20:07  DT:  12/24/2019 15:51:00    D#:  [id number]  Job#:  [id number]

## 2019-12-24 NOTE — ASSESSMENT & PLAN NOTE
-Pt continued to complain of right thigh and knee pain. Aggravated with weight bearing and ambulation.  -Xray done 12/23/2019 showed Rt displaced fracture of the greater trochanter.    Plan:  -CT Hip w/o contrast confirmed fracture with suspicion of iliac fracture as well. Diffuse osseous metastatic disease noted.  -Per ortho closed treatment w/o manipulation. PT/OT rehab as in patient. Weight bearing as tolerated by the patient.  -f/u with Orthopedics in 2 weeks with repeat X-Ray

## 2019-12-24 NOTE — OP REPORT
Date of Service: 12/23/19    Diagnosis: Right greater trochanteric fracture    Treatment/Procedure: Closed treatment without manipulation of right greater trochanteric fracture    Surgeon: Ian Osborne    Treatment plan: We discussed the above injury and recommended nonoperative treatment.  This would include therapy in the hospital for range of motion and ADLs.  As tolerated range of motion and as tolerated weight bearing to the injured extremity.  We will repeat xrays and check on functional status in the office in 2 weeks.

## 2019-12-25 VITALS
HEART RATE: 71 BPM | HEIGHT: 62 IN | DIASTOLIC BLOOD PRESSURE: 43 MMHG | TEMPERATURE: 99.1 F | SYSTOLIC BLOOD PRESSURE: 98 MMHG | BODY MASS INDEX: 23.12 KG/M2 | WEIGHT: 125.66 LBS | RESPIRATION RATE: 15 BRPM | OXYGEN SATURATION: 93 %

## 2019-12-25 PROBLEM — R62.7 FAILURE TO THRIVE IN ADULT: Status: RESOLVED | Noted: 2019-12-22 | Resolved: 2019-12-25

## 2019-12-25 PROBLEM — E87.6 HYPOKALEMIA: Status: RESOLVED | Noted: 2019-12-22 | Resolved: 2019-12-25

## 2019-12-25 PROBLEM — N17.9 ACUTE KIDNEY INJURY (HCC): Status: RESOLVED | Noted: 2019-11-09 | Resolved: 2019-12-25

## 2019-12-25 LAB
ANION GAP SERPL CALC-SCNC: 8 MMOL/L (ref 0–11.9)
ANISOCYTOSIS BLD QL SMEAR: ABNORMAL
BASOPHILS # BLD AUTO: 0 % (ref 0–1.8)
BASOPHILS # BLD: 0 K/UL (ref 0–0.12)
BUN SERPL-MCNC: 21 MG/DL (ref 8–22)
CALCIUM SERPL-MCNC: 8.5 MG/DL (ref 8.5–10.5)
CHLORIDE SERPL-SCNC: 112 MMOL/L (ref 96–112)
CO2 SERPL-SCNC: 22 MMOL/L (ref 20–33)
CREAT SERPL-MCNC: 0.91 MG/DL (ref 0.5–1.4)
EOSINOPHIL # BLD AUTO: 0 K/UL (ref 0–0.51)
EOSINOPHIL NFR BLD: 0 % (ref 0–6.9)
ERYTHROCYTE [DISTWIDTH] IN BLOOD BY AUTOMATED COUNT: 60.2 FL (ref 35.9–50)
GLUCOSE SERPL-MCNC: 91 MG/DL (ref 65–99)
HCT VFR BLD AUTO: 24.4 % (ref 37–47)
HGB BLD-MCNC: 8.1 G/DL (ref 12–16)
LYMPHOCYTES # BLD AUTO: 4.25 K/UL (ref 1–4.8)
LYMPHOCYTES NFR BLD: 14.3 % (ref 22–41)
MACROCYTES BLD QL SMEAR: ABNORMAL
MANUAL DIFF BLD: NORMAL
MCH RBC QN AUTO: 33.6 PG (ref 27–33)
MCHC RBC AUTO-ENTMCNC: 33.2 G/DL (ref 33.6–35)
MCV RBC AUTO: 101.2 FL (ref 81.4–97.8)
METAMYELOCYTES NFR BLD MANUAL: 1.8 %
MONOCYTES # BLD AUTO: 1.6 K/UL (ref 0–0.85)
MONOCYTES NFR BLD AUTO: 5.4 % (ref 0–13.4)
MORPHOLOGY BLD-IMP: NORMAL
MYELOCYTES NFR BLD MANUAL: 2.7 %
NEUTROPHILS # BLD AUTO: 22.51 K/UL (ref 2–7.15)
NEUTROPHILS NFR BLD: 68.7 % (ref 44–72)
NEUTS BAND NFR BLD MANUAL: 7.1 % (ref 0–10)
NRBC # BLD AUTO: 0.16 K/UL
NRBC BLD-RTO: 0.5 /100 WBC
PLATELET # BLD AUTO: 314 K/UL (ref 164–446)
PLATELET BLD QL SMEAR: NORMAL
PMV BLD AUTO: 10 FL (ref 9–12.9)
POIKILOCYTOSIS BLD QL SMEAR: NORMAL
POLYCHROMASIA BLD QL SMEAR: NORMAL
POTASSIUM SERPL-SCNC: 4.1 MMOL/L (ref 3.6–5.5)
RBC # BLD AUTO: 2.41 M/UL (ref 4.2–5.4)
RBC BLD AUTO: PRESENT
SODIUM SERPL-SCNC: 142 MMOL/L (ref 135–145)
TARGETS BLD QL SMEAR: NORMAL
WBC # BLD AUTO: 29.7 K/UL (ref 4.8–10.8)

## 2019-12-25 PROCEDURE — 36415 COLL VENOUS BLD VENIPUNCTURE: CPT

## 2019-12-25 PROCEDURE — 700105 HCHG RX REV CODE 258: Performed by: INTERNAL MEDICINE

## 2019-12-25 PROCEDURE — A9270 NON-COVERED ITEM OR SERVICE: HCPCS | Performed by: STUDENT IN AN ORGANIZED HEALTH CARE EDUCATION/TRAINING PROGRAM

## 2019-12-25 PROCEDURE — 700102 HCHG RX REV CODE 250 W/ 637 OVERRIDE(OP): Performed by: STUDENT IN AN ORGANIZED HEALTH CARE EDUCATION/TRAINING PROGRAM

## 2019-12-25 PROCEDURE — 80048 BASIC METABOLIC PNL TOTAL CA: CPT

## 2019-12-25 PROCEDURE — 85007 BL SMEAR W/DIFF WBC COUNT: CPT

## 2019-12-25 PROCEDURE — 700101 HCHG RX REV CODE 250: Performed by: STUDENT IN AN ORGANIZED HEALTH CARE EDUCATION/TRAINING PROGRAM

## 2019-12-25 PROCEDURE — A9270 NON-COVERED ITEM OR SERVICE: HCPCS | Performed by: INTERNAL MEDICINE

## 2019-12-25 PROCEDURE — 700102 HCHG RX REV CODE 250 W/ 637 OVERRIDE(OP): Performed by: INTERNAL MEDICINE

## 2019-12-25 PROCEDURE — 85027 COMPLETE CBC AUTOMATED: CPT

## 2019-12-25 PROCEDURE — 99239 HOSP IP/OBS DSCHRG MGMT >30: CPT | Mod: GC | Performed by: INTERNAL MEDICINE

## 2019-12-25 PROCEDURE — 700111 HCHG RX REV CODE 636 W/ 250 OVERRIDE (IP): Performed by: INTERNAL MEDICINE

## 2019-12-25 RX ORDER — AMOXICILLIN 250 MG
2 CAPSULE ORAL 2 TIMES DAILY
Qty: 120 TAB | Refills: 0 | Status: SHIPPED | OUTPATIENT
Start: 2019-12-25 | End: 2020-02-03

## 2019-12-25 RX ORDER — ACETAMINOPHEN 500 MG
1000 TABLET ORAL EVERY 8 HOURS
Qty: 180 TAB | Refills: 0 | Status: SHIPPED | OUTPATIENT
Start: 2019-12-25 | End: 2020-02-03

## 2019-12-25 RX ORDER — POLYETHYLENE GLYCOL 3350 17 G/17G
17 POWDER, FOR SOLUTION ORAL DAILY
Qty: 30 EACH | Refills: 0 | Status: ON HOLD | OUTPATIENT
Start: 2019-12-26 | End: 2020-02-03

## 2019-12-25 RX ORDER — DOXYCYCLINE 100 MG/1
100 TABLET ORAL EVERY 12 HOURS
Qty: 2 TAB | Refills: 0 | Status: SHIPPED | OUTPATIENT
Start: 2019-12-25 | End: 2019-12-26

## 2019-12-25 RX ORDER — OXYCODONE AND ACETAMINOPHEN 10; 325 MG/1; MG/1
0.5 TABLET ORAL EVERY 4 HOURS PRN
Qty: 15 TAB | Refills: 0 | Status: SHIPPED | OUTPATIENT
Start: 2019-12-25 | End: 2019-12-30

## 2019-12-25 RX ORDER — CELECOXIB 100 MG/1
100 CAPSULE ORAL DAILY
Qty: 30 CAP | Refills: 0 | Status: SHIPPED | OUTPATIENT
Start: 2019-12-25 | End: 2020-02-03

## 2019-12-25 RX ORDER — LIDOCAINE 50 MG/G
1 PATCH TOPICAL EVERY 24 HOURS
Qty: 10 PATCH | Refills: 0 | Status: SHIPPED | OUTPATIENT
Start: 2019-12-26

## 2019-12-25 RX ORDER — OMEPRAZOLE 20 MG/1
20 CAPSULE, DELAYED RELEASE ORAL DAILY
Qty: 30 CAP | Refills: 0 | Status: SHIPPED | OUTPATIENT
Start: 2019-12-26

## 2019-12-25 RX ORDER — AMOXICILLIN AND CLAVULANATE POTASSIUM 875; 125 MG/1; MG/1
1 TABLET, FILM COATED ORAL 2 TIMES DAILY
Qty: 2 TAB | Refills: 0 | Status: SHIPPED | OUTPATIENT
Start: 2019-12-25 | End: 2019-12-26

## 2019-12-25 RX ADMIN — GUAIFENESIN 200 MG: 100 SOLUTION ORAL at 11:46

## 2019-12-25 RX ADMIN — CITALOPRAM HYDROBROMIDE 40 MG: 20 TABLET ORAL at 04:48

## 2019-12-25 RX ADMIN — NICOTINE 14 MG: 14 PATCH TRANSDERMAL at 04:47

## 2019-12-25 RX ADMIN — HYDROMORPHONE HYDROCHLORIDE 0.5 MG: 1 INJECTION, SOLUTION INTRAMUSCULAR; INTRAVENOUS; SUBCUTANEOUS at 08:49

## 2019-12-25 RX ADMIN — POTASSIUM CHLORIDE 40 MEQ: 1500 TABLET, EXTENDED RELEASE ORAL at 04:48

## 2019-12-25 RX ADMIN — ALBUTEROL SULFATE 2 PUFF: 90 AEROSOL, METERED RESPIRATORY (INHALATION) at 04:47

## 2019-12-25 RX ADMIN — CLONAZEPAM 1 MG: 1 TABLET ORAL at 04:48

## 2019-12-25 RX ADMIN — OXYCODONE HYDROCHLORIDE 10 MG: 5 TABLET ORAL at 11:46

## 2019-12-25 RX ADMIN — GUAIFENESIN 200 MG: 100 SOLUTION ORAL at 04:47

## 2019-12-25 RX ADMIN — CELECOXIB 100 MG: 100 CAPSULE ORAL at 04:48

## 2019-12-25 RX ADMIN — ENOXAPARIN SODIUM 40 MG: 100 INJECTION SUBCUTANEOUS at 04:46

## 2019-12-25 RX ADMIN — OXYCODONE HYDROCHLORIDE 10 MG: 5 TABLET ORAL at 03:39

## 2019-12-25 RX ADMIN — LIDOCAINE 1 PATCH: 50 PATCH TOPICAL at 04:47

## 2019-12-25 RX ADMIN — OMEPRAZOLE 20 MG: 20 CAPSULE, DELAYED RELEASE ORAL at 04:48

## 2019-12-25 RX ADMIN — TAMOXIFEN CITRATE 20 MG: 10 TABLET, FILM COATED ORAL at 04:48

## 2019-12-25 RX ADMIN — ACETAMINOPHEN 1000 MG: 500 TABLET ORAL at 04:48

## 2019-12-25 RX ADMIN — DOXYCYCLINE 100 MG: 100 TABLET, FILM COATED ORAL at 04:48

## 2019-12-25 RX ADMIN — CEFTRIAXONE SODIUM 1 G: 1 INJECTION, POWDER, FOR SOLUTION INTRAMUSCULAR; INTRAVENOUS at 04:46

## 2019-12-25 ASSESSMENT — ENCOUNTER SYMPTOMS
ORTHOPNEA: 0
SHORTNESS OF BREATH: 0
SENSORY CHANGE: 0
EYE PAIN: 0
BLURRED VISION: 0
BACK PAIN: 1
SPUTUM PRODUCTION: 0
SPEECH CHANGE: 0
FOCAL WEAKNESS: 1
TREMORS: 0
PALPITATIONS: 0
COUGH: 0
VOMITING: 0
CHILLS: 0
TINGLING: 0
NAUSEA: 0
DOUBLE VISION: 0
FEVER: 0

## 2019-12-25 NOTE — PROGRESS NOTES
Discharge instructions reviewed with pt including prescriptions and follow up appointments. IV removed by charge RN.

## 2019-12-25 NOTE — CARE PLAN
Problem: Infection  Goal: Will remain free from infection  Outcome: PROGRESSING AS EXPECTED  Note:   Assessed patient for signs and symptoms of infection.  Educated patient regarding hand hygiene.  Foamed in/out.       Problem: Pain Management  Goal: Pain level will decrease to patient's comfort goal  12/25/2019 0000 by Dusty Iyer, R.RAINA.  Outcome: PROGRESSING AS EXPECTED  Note:   Discussed pain management POC with pt.  Assessed pain Q2hr.  Administered oxycodone, Klonopin, and Zanaflex PRN.   12/25/2019 0000 by Dusty Iyer RRitoN.  Reactivated

## 2019-12-25 NOTE — DISCHARGE SUMMARY
Internal Medicine Discharge Summary  Note Author: Jose David Murray D.O.       Name Roxy Cota     1975   Age/Sex 44 y.o. female   MRN [id number]         Admit Date:  2019       Discharge Date:   2019    Service:   [facility] Internal Medicine Gray Team  Attending Physician(s):   Dr. Ch       Senior Resident(s):   Dr. Murray  Seun Resident(s):   Dr. Hercules  PCP: DAVID Reyes      Primary Diagnosis:   Right lower extremity weakness secondary to right hip fracture    Secondary Diagnoses:                Principal Problem:    Breast cancer (HCC) (Chronic) POA: Yes  Active Problems:    Leukocytosis POA: Yes    Weakness of right lower extremity POA: Yes    Displaced fracture of greater trochanter of right femur (HCC) POA: Yes    COPD (chronic obstructive pulmonary disease) (HCC) POA: Yes    Mood disorder (HCC) POA: Yes  Resolved Problems:    Acute kidney injury (HCC) POA: Yes    Hypokalemia POA: Yes    Failure to thrive in adult POA: Yes      Hospital Summary (Brief Narrative):       Ms. Cota is a 44-year old female with past medical history of breast cancer status post right mastectomy in 2016 recently found to have recently found to have metastatic disease on Tamoxifen who presented to the hospital with right lower extremity weakness. Patient had a CT chest on admission consistent with osseous metastases in the axial and appendicular skeleton. MRI brain did not show any evidence of metastatic brain disease. Furthermore, MRI cervical, thoracic and lumbar spines were obtained which was showed diffuse osseous metastatic disease without areas of high grade central canal narrowing. Plain films of the right hip were obtained which showed numerous lytic and sclerotic lesions in the right pelvis and proximal femur as well as a fracture of the right greater trochanter. Orthopedic surgery was consulted who recommended further workup with CT scan of the hip which showed mildly displaced comminuted  fracture of the right greater trochanter, suspicious for pathologic fracture. Patient was deemed a non-surgical candidate and underwent bedside closed treatment without manipulation on 12/23 with Dr. Osborne. Physical therapy was consulted and recommended home health. Since patient had a new patient appointment with oncology in [place], she wanted to be discharged so she could make it to the appointment event though home health was not yet set up. She was also given a physical prescription for physical therapy and a referral to home health was placed.     Breast cancer: Patient with history of breast cancer s/p right mastectomy in 2016 currently found found to have metastasis currently on Tamoxifen. During the last admission, patient had a bone biopsy which was consistent with metastatic bone cancer. Will be establishing with with oncology in [place], [place] due to insurance issues, (next appointment 12/26). Imaging consistent with high burden of metastatic disease. Oncology (Dr.Tejvir Wynne)  was consulted during this admission at the patient's request who recommended palliative treatment and possibly using a CD4/6 inhibitor along with tamoxifen and LHRH agonist therapy to suppress menstruation. Patient elected to follow up with Oncology in [place] due to insurance issues.     Advanced Care planning: Palliative team was consulted during this admission and patient elected to remain full code and full treatment. Patient has an advanced directed and her  is the DPOA, Boone Cota.     Leukocytosis: Patient has had chronic leukocytosis. This has been thought to be reactive. However, during this admission patient was noted to be febrile. She was admitted in November with a pneumonia. The CT thorax from this admission showed, improved right lower lobe and lingular opacities. Patient was treated with Ceftriaxone and Doxycycline for possible residual pneumonia while inpatient and will be discharged with Augmentin and  Doxycycline to complete a five day course.     Acute Kidney injury: Patient was noted to have an acute kidney injury on admission which resolved with IV fluids. Imaging showed bilateral hydronephrosis but patient was not found to be retaining. Kidney function returned to baseline.     Follow up:   - Follow up with Orthopedics in 2 weeks (Dr. Osborne)   - Follow up with PT or home health set up   - Repeat labs for leukocytosis   - Follow up with Oncology as scheduled     Consultants:     Oncology, Dr. Wynne   Orthopedic surgery, Dr. Osborne    Procedures:        Right hip reduction 12/23    Imaging/ Testing:      CT-HIP W/O PLUS RECONS RIGHT   Final Result      1. Numerous lytic lesions throughout the visualized lower lumbar spine, pelvic bone, bilateral proximal femurs, in keeping with widespread osseous metastasis.      2. Redemonstration of acute mildly displaced comminuted fracture of the right greater trochanter, suspicious for pathologic fracture.      3. Apparent linear lucencies seen in the left posterior ilium, concerning for pathologic fracture as well, may be subacute.      DX-KNEE 2- RIGHT   Final Result         1. No acute osseous abnormality.      DX-FEMUR-2+ RIGHT   Final Result      1.  Numerous lytic and sclerotic lesions involving the RIGHT pelvis and proximal femur consistent with known metastatic disease.   2.  Fracture of the RIGHT greater trochanter, displaced and potentially pathologic.   3.  RIGHT femur shaft is intact.      MR-THORACIC SPINE-WITH & W/O   Final Result      1.  Diffuse osseous metastatic disease   2.  Trace BILATERAL pleural effusions      MR-LUMBAR SPINE-WITH & W/O   Final Result      1.  Diffuse osseous metastatic disease   2.  Distention of the urinary bladder with mild BILATERAL hydronephrosis      MR-CERVICAL SPINE-WITH & W/O   Final Result      1.  Diffuse osseous metastatic disease   2.  Multilevel multifactorial degenerative changes   3.  No areas of high-grade central  canal narrowing   4.  Areas of central canal and neural foraminal narrowing as described above      MR-BRAIN-WITH & W/O   Final Result      1.  MRI of the brain without and with contrast within normal limits. No evidence of intracranial metastatic disease.   2.  Findings moderately suspicious for diffuse osseous metastatic disease.      CT-CHEST (THORAX) W/O   Final Result      1.  Pneumonia has resolved. No acute abnormality in the chest.   2.  Innumerable osseous metastases in the visualized axial and proximal appendicular skeleton.   3.  Stable sequela of right mastectomy.               DX-CHEST-PORTABLE (1 VIEW)   Final Result      Slight hyperinflation. No focal consolidation or pleural effusions.            Discharge Medications:         Medication Reconciliation: Completed       Medication List      START taking these medications      Instructions   acetaminophen 500 MG Tabs  Commonly known as:  TYLENOL   Take 2 Tabs by mouth every 8 hours for 30 days.  Dose:  1,000 mg     amoxicillin-clavulanate 875-125 MG Tabs  Commonly known as:  AUGMENTIN   Take 1 Tab by mouth 2 times a day for 1 day.  Dose:  1 Tab     celecoxib 100 MG Caps  Commonly known as:  CELEBREX   Take 1 Cap by mouth every day for 30 days.  Dose:  100 mg     doxycycline monohydrate 100 MG tablet  Commonly known as:  ADOXA   Take 1 Tab by mouth every 12 hours for 1 day.  Dose:  100 mg     lidocaine 5 % Ptch  Start taking on:  December 26, 2019  Commonly known as:  LIDODERM   Apply 1 Patch to skin as directed every 24 hours.  Dose:  1 Patch     omeprazole 20 MG delayed-release capsule  Start taking on:  December 26, 2019  Commonly known as:  PRILOSEC   Take 1 Cap by mouth every day.  Dose:  20 mg     polyethylene glycol/lytes Pack  Start taking on:  December 26, 2019  Commonly known as:  MIRALAX   Take 1 Packet by mouth every day for 30 days.  Dose:  17 g     senna-docusate 8.6-50 MG Tabs  Commonly known as:  PERICOLACE or SENOKOT S   Take 2 Tabs by  mouth 2 Times a Day for 30 days.  Dose:  2 Tab        CHANGE how you take these medications      Instructions   oxyCODONE-acetaminophen  MG Tabs  What changed:  when to take this  Commonly known as:  PERCOCET-10   Take 0.5 Tabs by mouth every four hours as needed for Severe Pain for up to 5 days.  Dose:  0.5 Tab        CONTINUE taking these medications      Instructions   carvedilol 6.25 MG Tabs  Commonly known as:  COREG   Take 6.25 mg by mouth 2 times a day.  Dose:  6.25 mg     cetirizine 10 MG Tabs  Commonly known as:  ZYRTEC   Take 10 mg by mouth every day.  Dose:  10 mg     citalopram 40 MG Tabs  Commonly known as:  CELEXA   Take 40 mg by mouth every day.  Dose:  40 mg     clonazePAM 1 MG Tabs  Commonly known as:  KLONOPIN   Take 1 mg by mouth 3 times a day.  Dose:  1 mg     cyanocobalamin 500 MCG Tabs  Commonly known as:  VITAMIN B-12   Take 1,000 mcg by mouth every day.  Dose:  1,000 mcg     dicyclomine 10 MG Caps  Commonly known as:  BENTYL   Take 10 mg by mouth 3 times a day.  Dose:  10 mg     tamoxifen 10 MG Tabs  Commonly known as:  NOLVADEX   Take 20 mg by mouth.  Dose:  20 mg     tizanidine 4 MG Tabs  Commonly known as:  ZANAFLEX   Take 4 mg by mouth every 8 hours.  Dose:  4 mg     VENTOLIN  (90 Base) MCG/ACT Aers inhalation aerosol  Generic drug:  albuterol   Inhale 2 Puffs by mouth every 6 hours.  Dose:  2 Puff              Disposition:   Home with home health pending     Diet:   Healthy    Activity:   Weight bearing as tolerated     Instructions:      The patient was instructed to return to the ER in the event of worsening symptoms. I have counseled the patient on the importance of compliance and the patient has agreed to proceed with all medical recommendations and follow up plan indicated above.   The patient understands that all medications come with benefits and risks. Risks may include permanent injury or death and these risks can be minimized with close reassessment and monitoring.         Primary Care Provider:     Discharge summary faxed to primary care provider:  Completed  Copy of discharge summary given to the patient: Deferred      Follow up appointment details :      No future appointments.  No follow-up provider specified.      Pending Studies:        N/A  Time spent on discharge day patient visit, preparing discharge paperwork and arranging for patient follow up.    Summary of follow up issues:   See above     Discharge Time (Minutes) :    55 minutes   Hospital Course Type:  Inpatient Stay >2 midnights      Condition on Discharge  Stable, Alert and oriented x 4, ambulatory to the bathroom  ______________________________________________________________________    Most Recent Labs:    Lab Results   Component Value Date/Time    WBC 29.7 (H) 12/25/2019 04:14 AM    RBC 2.41 (L) 12/25/2019 04:14 AM    HEMOGLOBIN 8.1 (L) 12/25/2019 04:14 AM    HEMATOCRIT 24.4 (L) 12/25/2019 04:14 AM    .2 (H) 12/25/2019 04:14 AM    MCH 33.6 (H) 12/25/2019 04:14 AM    MCHC 33.2 (L) 12/25/2019 04:14 AM    MPV 10.0 12/25/2019 04:14 AM    NEUTSPOLYS 68.70 12/25/2019 04:14 AM    LYMPHOCYTES 14.30 (L) 12/25/2019 04:14 AM    MONOCYTES 5.40 12/25/2019 04:14 AM    EOSINOPHILS 0.00 12/25/2019 04:14 AM    BASOPHILS 0.00 12/25/2019 04:14 AM    ANISOCYTOSIS 2+ 12/25/2019 04:14 AM      Lab Results   Component Value Date/Time    SODIUM 142 12/25/2019 04:14 AM    POTASSIUM 4.1 12/25/2019 04:14 AM    CHLORIDE 112 12/25/2019 04:14 AM    CO2 22 12/25/2019 04:14 AM    GLUCOSE 91 12/25/2019 04:14 AM    BUN 21 12/25/2019 04:14 AM    CREATININE 0.91 12/25/2019 04:14 AM      Lab Results   Component Value Date/Time    ALTSGPT 16 12/23/2019 02:51 AM    ASTSGOT 16 12/23/2019 02:51 AM    ALKPHOSPHAT 140 (H) 12/23/2019 02:51 AM    TBILIRUBIN 0.2 12/23/2019 02:51 AM    ALBUMIN 2.7 (L) 12/23/2019 02:51 AM    GLOBULIN 2.4 12/23/2019 02:51 AM    PREALBUMIN 9.0 (L) 12/22/2019 10:16 AM    INR 1.16 (H) 11/12/2019 02:40 AM    MACROCYTOSIS  1+ 12/25/2019 04:14 AM     Lab Results   Component Value Date/Time    PROTHROMBTM 15.0 (H) 11/12/2019 02:40 AM    INR 1.16 (H) 11/12/2019 02:40 AM

## 2019-12-25 NOTE — PROGRESS NOTES
Internal Medicine Interval Note  Note Author: Chidi Hercules M.D.     Name Roxy Cota     1975   Age/Sex 44 y.o. female   MRN [id number]   Code Status Full Code     After 5PM or if no immediate response to page, please call for cross-coverage  Attending/Team: Dr. Olivier / LUCY Barnhart See Patient List for primary contact information  Call [phone] to page    1st Call - Day Intern (R1):   Dr. Hercules 2nd Call - Day Sr. Resident (R2/R3):   Dr. Murray         Reason for interval visit  (Principal Problem)   Breast cancer (HCC)    Interval Problem Daily Status Update  (24 hours)   Ms. Cota is a 44-year old female who presented to the hospital with failure to thrive and right lower extremity weakness. HD # 4    -No acute overnight events reported.   -Patient states pain bearable with rest , aggravates with weight bearing.   -Patient desires to be discharged today so that she can f/u with her oncologist in [place] tomorrow.  -Per PT patient to benefit from Home Health with PT. Patient to be discharged today with HH with PT. Will also give written prescirption for PT to establish as out patient. Also told the patient that her PCP can send in a referral for PT as well.   -Will adjust Percocet to Q4hrs for pain , add Scheduled Tyelenol , Celebrex and Lidocaine patch for better pain control.  -Palliative team saw the patient , patient wants to remain full code. Pt's  to bring the AD copy.          Review of Systems   Constitutional: Negative for chills and fever.   HENT: Negative for congestion, ear discharge and ear pain.    Eyes: Negative for blurred vision, double vision and pain.   Respiratory: Negative for cough, sputum production and shortness of breath.    Cardiovascular: Positive for leg swelling. Negative for chest pain, palpitations and orthopnea.   Gastrointestinal: Negative for nausea and vomiting.   Genitourinary: Negative for hematuria.   Musculoskeletal: Positive for back pain.         Right thigh/knee region pain   Neurological: Positive for focal weakness. Negative for tingling, tremors, sensory change and speech change.       Consultants/Specialty  N/A  PCP: Livia Story FNP    Disposition  Will be discharged today with HH and PT.    Quality Measures  Quality-Core Measures   Reviewed items::  Labs reviewed, Medications reviewed and Radiology images reviewed  Morales catheter::  No Morales  DVT prophylaxis pharmacological::  Heparin          Physical Exam       Vitals:    12/25/19 0400 12/25/19 0740 12/25/19 0849 12/25/19 1146   BP: (!) 90/54 (!) 98/43     Pulse: 68 71     Resp: 16 16 15 15   Temp: 36.8 °C (98.2 °F) 37.3 °C (99.1 °F)     TempSrc: Temporal Temporal     SpO2: 96% 93%     Weight:       Height:         Body mass index is 22.98 kg/m².    Oxygen Therapy:  Pulse Oximetry: 93 %, O2 (LPM): 2, O2 Delivery: Nasal Cannula    Physical Exam   Constitutional:    female who appears older than stated age. Appears chronically ill    HENT:   Head: Normocephalic and atraumatic.   moist mucous membranes    Eyes: Pupils are equal, round, and reactive to light. Conjunctivae and EOM are normal. Right eye exhibits no discharge. No scleral icterus.   Neck: Normal range of motion.   Cardiovascular: Normal rate, regular rhythm and intact distal pulses.   No murmur heard.  Pulmonary/Chest: Effort normal and breath sounds normal. No respiratory distress.   Abdominal: Soft. Bowel sounds are normal. She exhibits no distension. There is no tenderness.   Musculoskeletal:         General: No deformity or edema.      Comments: Tender to palpation over right thigh. Intact passive range of motion of right lower extremity (per patient pain with active ROM)   Neurological: She is alert. She displays facial symmetry.   Lethargic, easily arousable.RLE 4/5, LLE 5/5   Skin: Skin is warm and dry. No erythema.   Psychiatric: Affect normal.   Nursing note and vitals reviewed.    Lab Data Review:         12/22/2019  6:22  AM    Recent Labs     12/23/19 0251 12/25/19  0414   SODIUM 142 142   POTASSIUM 3.3* 4.1   CHLORIDE 114* 112   CO2 20 22   BUN 19 21   CREATININE 0.79 0.91   CALCIUM 8.7 8.5       Recent Labs     12/23/19 0251 12/25/19  0414   ALTSGPT 16  --    ASTSGOT 16  --    ALKPHOSPHAT 140*  --    TBILIRUBIN 0.2  --    GLUCOSE 232* 91       Recent Labs     12/23/19 0251 12/23/19 1746 12/25/19  0414   RBC 2.23* 2.29* 2.41*   HEMOGLOBIN 7.5* 7.6* 8.1*   HEMATOCRIT 21.9* 22.8* 24.4*   PLATELETCT 240 250 314       Recent Labs     12/23/19 0251 12/23/19 1746 12/25/19  0414   WBC 19.1* 29.1* 29.7*   NEUTSPOLYS 86.30*  --  68.70   LYMPHOCYTES 6.10*  --  14.30*   MONOCYTES 3.10  --  5.40   EOSINOPHILS 0.00  --  0.00   BASOPHILS 0.20  --  0.00   ASTSGOT 16  --   --    ALTSGPT 16  --   --    ALKPHOSPHAT 140*  --   --    TBILIRUBIN 0.2  --   --            Assessment/Plan     * Breast cancer (HCC)- (present on admission)  Assessment & Plan  Patient with history of breast cancer s/p right mastectomy in 2016 currently found found to have metastasis. Currently only on Tamoxifen. During the last admission, patient had a bonee biopsy which was consistent with metastatic bone cancer. Tim with oncologin in [facility] (next appointment 12/26). CT chest on admission consistent with lytic and sclerotic osseous metastases in the axial and appendicular skeleton. CT Hip w/o showed diffuse osseous mets involving lower lumbar spine , proximal femurs and pelvic bone.    Plan:  - Continue Tamoxifen  - Patient to f/u with Oncologist at [place] on 12/26/2019    - MRI brain 12/23/2019 normal. No signs of metastatic intracranial disease.  Findings suspicious for osseous metastatic disease.  - Scheduled Tylenol  - IV Dilaudid PRN ,Oxycodone PRN, celebrex BID for pain  - Continue home klonopin   - Continue Lidocaine patch    Displaced fracture of greater trochanter of right femur (HCC)- (present on admission)  Assessment & Plan  -Pt continued to complain  of right thigh and knee pain. Aggravated with weight bearing and ambulation.  -Xray done 12/23/2019 showed Rt displaced fracture of the greater trochanter.    Plan:  -CT Hip w/o contrast confirmed fracture with suspicion of iliac fracture as well. Diffuse osseous metastatic disease noted.  -Per ortho closed treatment w/o manipulation. PT/OT rehab as in patient. Weight bearing as tolerated by the patient.  -f/u with Orthopedics in 2 weeks with repeat X-Ray     Weakness of right lower extremity- (present on admission)  Assessment & Plan  Likely secondary to metastatic bone disease. No signs of acute stroke at this time.   - MRI brain 12/23/2019 negative for any ischemic events.  -X Ray right femur 12/23/2019 Right displaced fracture of greater trochanter.  -CT Hip w/o contrast confirmed fracture with suspicion of iliac fracture as well. Diffuse osseous metastatic disease noted.  -Per ortho closed treatment w/o manipulation. PT/OT rehab as in patient.    Leukocytosis- (present on admission)  Assessment & Plan  -Chronic, likely reactive secondary to cancer. No signs of infectious at this time.   -f/u with PCP as out patient with repeat labs and  monitoring.    Mood disorder (HCC)- (present on admission)  Assessment & Plan  - Continue celexa   - Continue klonopin     COPD (chronic obstructive pulmonary disease) (HCC)- (present on admission)  Assessment & Plan  Chronic respiratory failure with 3L O2 at home. No signs of acute exacerbation  - RT protocol  - Albuterol Q6  - Guaifenesin scheduled  - Incentive spirometry

## 2019-12-25 NOTE — PROGRESS NOTES
0837 - MD paged to ask if pt can still get her IV dilaudid today if she is being discharged today.    0838 - Per MD Hercules, ok to continue giving pt IV dilaudid when she needs it

## 2019-12-25 NOTE — FACE TO FACE
Face to Face Supporting Documentation - Home Health    The encounter with this patient was in whole or in part the primary reason for home health admission.    Date of encounter:   Patient:                    MRN:                       YOB: 2020  Roxy Cota  [id number]  1975     Home health to see patient for:  Skilled Nursing care for assessment, interventions & education, Physical Therapy evaluation and treatment and Occupational therapy evaluation and treatment    Skilled need for:  Recent Deterioration of Health Status Metastatic breast cancer    Skilled nursing interventions to include:  Line/Drain/Airway education and care    Homebound status evidenced by:  Needs the assistance of another person in order to leave the home. Leaving home requires a considerable and taxing effort. There is a normal inability to leave the home.    Community Physician to provide follow up care: SERA Reyes.     Optional Interventions? No      I certify the face to face encounter for this home health care referral meets the CMS requirements and the encounter/clinical assessment with the patient was, in whole, or in part, for the medical condition(s) listed above, which is the primary reason for home health care. Based on my clinical findings: the service(s) are medically necessary, support the need for home health care, and the homebound criteria are met.  I certify that this patient has had a face to face encounter by myself.  Chidi Hercules M.D. - NPI: [id number]

## 2019-12-25 NOTE — DISCHARGE PLANNING
Received Choice form at 7084  Agency/Facility Name: [facility]  Referral sent per Choice form @ 2917

## 2019-12-25 NOTE — PROGRESS NOTES
Pt stated that she needs to discharge on 12/25 early morning because she has an oncology appt in [place] schedule on 12/26 that she already has transportation arranged for.

## 2019-12-25 NOTE — DISCHARGE INSTRUCTIONS
Discharge Instructions    Discharged to home by car with relative. Discharged via wheelchair, hospital escort: Yes.  Special equipment needed: Walker    Be sure to schedule a follow-up appointment with your primary care doctor or any specialists as instructed.     Discharge Plan:   Influenza Vaccine Indication: Patient Refuses    I understand that a diet low in cholesterol, fat, and sodium is recommended for good health. Unless I have been given specific instructions below for another diet, I accept this instruction as my diet prescription.   Other diet: Regular    Special Instructions: None    · Is patient discharged on Warfarin / Coumadin?   No     Depression / Suicide Risk    As you are discharged from this [facility] facility, it is important to learn how to keep safe from harming yourself.    Recognize the warning signs:  · Abrupt changes in personality, positive or negative- including increase in energy   · Giving away possessions  · Change in eating patterns- significant weight changes-  positive or negative  · Change in sleeping patterns- unable to sleep or sleeping all the time   · Unwillingness or inability to communicate  · Depression  · Unusual sadness, discouragement and loneliness  · Talk of wanting to die  · Neglect of personal appearance   · Rebelliousness- reckless behavior  · Withdrawal from people/activities they love  · Confusion- inability to concentrate     If you or a loved one observes any of these behaviors or has concerns about self-harm, here's what you can do:  · Talk about it- your feelings and reasons for harming yourself  · Remove any means that you might use to hurt yourself (examples: pills, rope, extension cords, firearm)  · Get professional help from the community (Mental Health, Substance Abuse, psychological counseling)  · Do not be alone:Call your Safe Contact- someone whom you trust who will be there for you.  · Call your local CRISIS HOTLINE 179-0269 or 609-509-5355  · Call  your local Children's Mobile Crisis Response Team [place] (951) 347-2295 or www.CyberX  · Call the toll free National Suicide Prevention Hotlines   · National Suicide Prevention Lifeline 282-389-YTTO (9110)  · [employer] Line Network 800-SUICIDE (926-1845)      Hip Fracture  A hip fracture is a fracture of the upper part of your thigh bone (femur).  What are the causes?  A hip fracture is caused by a direct blow to the side of your hip. This is usually the result of a fall but can occur in other circumstances, such as an automobile accident.  What increases the risk?  There is an increased risk of hip fractures in people with:  · An unsteady walking pattern (gait) and those with conditions that contribute to poor balance, such as Parkinson's disease or dementia.  · Osteopenia and osteoporosis.  · Cancer that spreads to the leg bones.  · Certain metabolic diseases.  What are the signs or symptoms?  Symptoms of hip fracture include:  · Pain over the injured hip.  · Inability to put weight on the leg in which the fracture occurred (although, some patients are able to walk after a hip fracture).  · Toes and foot of the affected leg point outward when you lie down.  How is this diagnosed?      Acute Kidney Injury, Adult  Acute kidney injury (JANUSZ) occurs when there is sudden (acute) damage to the kidneys. A small amount of kidney damage may not cause problems, but a large amount of damage may make it difficult or impossible for the kidneys to work the way they should. JANUSZ may develop into long-lasting (chronic) kidney disease. Early detection and treatment of JANSUZ may prevent kidney damage from becoming permanent or getting worse.  What are the causes?  Common causes of this condition include:  · A problem with blood flow to the kidneys. This may be caused by:  ¨ Blood loss.  ¨ Heart and blood vessel (cardiovascular) disease.  ¨ Severe burns.  ¨ Liver disease.  · Direct damage to the kidneys. This may be  caused by:  ¨ Some medicines.  ¨ A kidney infection.  ¨ Poisoning.  ¨ Being around or in contact with poisonous (toxic) substances.  ¨ A surgical wound.  ¨ A hard, direct force to the kidney area.  · A sudden blockage of urine flow. This may be caused by:  ¨ Cancer.  ¨ Kidney stones.  ¨ Enlarged prostate in males.  What are the signs or symptoms?  Symptoms develop slowly and may not be obvious until the kidney damage becomes severe. It is possible to have JANUSZ for years without showing any symptoms. Symptoms of this condition can include:  · Swelling (edema) of the face, legs, ankles, or feet.  · Numbness, tingling, or loss of feeling (sensation) in the hands or feet.  · Tiredness (lethargy).  · Nausea or vomiting.  · Confusion or trouble concentrating.  · Problems with urination, such as:  ¨ Painful or burning feeling during urination.  ¨ Decreased urine production.  ¨ Frequent urination, especially at night.  ¨ Bloody urine.  · Muscle twitches and cramps, especially in the legs.  · Shortness of breath.  · Weakness.  · Constant itchiness.  · Loss of appetite.  · Metallic taste in the mouth.  · Trouble sleeping.  · Pale lining of the eyelids and surface of the eye (conjunctiva).  How is this diagnosed?  This condition may be diagnosed with various tests. Tests may include:  · Blood tests.  · Urine tests.  · Imaging tests.  · A test in which a sample of tissue is removed from the kidneys to be looked at under a microscope (kidney biopsy).  How is this treated?  Treatment of JANUSZ varies depending on the cause and severity of the kidney damage. In mild cases, treatment may not be needed. The kidneys may heal on their own.  If JANUSZ is more severe, your health care provider will treat the cause of the kidney damage, help the kidneys heal, and prevent problems from occurring. Severe cases may require a procedure to remove toxic wastes from the body (dialysis) or surgery to repair kidney damage. Surgery may involve:  · Repair  of a torn kidney.  · Removal of a urine flow obstruction.  Follow these instructions at home:  · Follow your prescribed diet.  · Take over-the-counter and prescription medicines only as told by your health care provider.  ¨ Do not take any new medicines unless approved by your health care provider. Many medicines can worsen your kidney damage.  ¨ Do not take any vitamin and mineral supplements unless approved by your health care provider. Many nutritional supplements can worsen your kidney damage.  ¨ The dose of some medicines that you take may need to be adjusted.  · Do not use any tobacco products, such as cigarettes, chewing tobacco, and e-cigarettes. If you need help quitting, ask your health care provider.  · Keep all follow-up visits as told by your health care provider. This is important.  · Keep track of your blood pressure. Report changes in your blood pressure as told by your health care provider.  · Achieve and maintain a healthy weight. If you need help with this, ask your health care provider.  · Start or continue an exercise plan. Try to exercise at least 30 minutes a day, 5 days a week.  · Stay current with immunizations as told by your health care provider.  Where to find more information:  · American Association of Kidney Patients: www.aakp.org  · National Kidney Foundation: www.kidney.org  · American Kidney Fund: www.akfinc.org  · [employer] [facility]: www.lifeoptions.org and www.kidneyschool.org  Contact a health care provider if:  · Your symptoms get worse.  · You develop new symptoms.  Get help right away if:  · You develop symptoms of end-stage kidney disease, which include:  ¨ Headaches.  ¨ Abnormally dark or light skin.  ¨ Numbness in the hands or feet.  ¨ Easy bruising.  ¨ Frequent hiccups.  ¨ Chest pain.  ¨ Shortness of breath.  ¨ End of menstruation in women.  · You have a fever.  · You have decreased urine production.  · You have pain or bleeding when you urinate.  This  information is not intended to replace advice given to you by your health care provider. Make sure you discuss any questions you have with your health care provider.  Document Released: 07/02/2012 Document Revised: 07/27/2017 Document Reviewed: 08/16/2013  [employer] Interactive Patient Education © 2017 Elsevier Inc.    A physical exam can determine if a hip fracture is likely to have occurred. X-ray exams are needed to confirm the fracture and to look for other injuries. The X-ray exam can help to determine the type of hip fracture. Rarely, the fracture is not visible on an X-ray image and a CT scan or MRI will have to be done.  How is this treated?  The treatment for a fracture is usually surgery. This means using a screw, nail, or keshav to hold the bones in place.  Follow these instructions at home:  Take all medicines as directed by your health care provider.  Contact a health care provider if:  Pain continues, even after taking pain medicine.  This information is not intended to replace advice given to you by your health care provider. Make sure you discuss any questions you have with your health care provider.  Document Released: 12/18/2006 Document Revised: 05/25/2017 Document Reviewed: 07/30/2014  [employer] Interactive Patient Education © 2017 Elsevier Inc.

## 2019-12-25 NOTE — PROGRESS NOTES
Pt A&Ox4, denies any numbness and tingling, pain is 10/10 (pain medication given).    Pt refused bed alarm despite education, call light within reach, pt educated on importance of using the call light when she needs assistance, pt verbalized understanding.

## 2019-12-25 NOTE — PROGRESS NOTES
1149 - MD paged to clarify if they want home health to be established prior to pt being discharged.    1150 - Per MD, make sure pt has HH before discharging pt.

## 2019-12-27 LAB
BACTERIA BLD CULT: NORMAL
BACTERIA BLD CULT: NORMAL
SIGNIFICANT IND 70042: NORMAL
SIGNIFICANT IND 70042: NORMAL
SITE SITE: NORMAL
SITE SITE: NORMAL
SOURCE SOURCE: NORMAL
SOURCE SOURCE: NORMAL

## 2020-01-24 ENCOUNTER — APPOINTMENT (OUTPATIENT)
Dept: RADIOLOGY | Facility: MEDICAL CENTER | Age: 45
DRG: 871 | End: 2020-01-24
Attending: EMERGENCY MEDICINE
Payer: COMMERCIAL

## 2020-01-24 ENCOUNTER — HOSPITAL ENCOUNTER (INPATIENT)
Facility: MEDICAL CENTER | Age: 45
LOS: 9 days | DRG: 871 | End: 2020-02-03
Attending: EMERGENCY MEDICINE | Admitting: HOSPITALIST
Payer: COMMERCIAL

## 2020-01-24 DIAGNOSIS — A41.9 SEPSIS WITH ACUTE HYPOXIC RESPIRATORY FAILURE AND SEPTIC SHOCK, DUE TO UNSPECIFIED ORGANISM (HCC): ICD-10-CM

## 2020-01-24 DIAGNOSIS — J96.01 SEPSIS WITH ACUTE HYPOXIC RESPIRATORY FAILURE AND SEPTIC SHOCK, DUE TO UNSPECIFIED ORGANISM (HCC): ICD-10-CM

## 2020-01-24 DIAGNOSIS — C50.919 METASTATIC BREAST CANCER: ICD-10-CM

## 2020-01-24 DIAGNOSIS — R41.0 CONFUSION: ICD-10-CM

## 2020-01-24 DIAGNOSIS — R41.3 MEMORY LOSS: ICD-10-CM

## 2020-01-24 DIAGNOSIS — R65.21 SEPSIS WITH ACUTE HYPOXIC RESPIRATORY FAILURE AND SEPTIC SHOCK, DUE TO UNSPECIFIED ORGANISM (HCC): ICD-10-CM

## 2020-01-24 DIAGNOSIS — J10.1 INFLUENZA A: ICD-10-CM

## 2020-01-24 DIAGNOSIS — J44.9 CHRONIC OBSTRUCTIVE PULMONARY DISEASE, UNSPECIFIED COPD TYPE (HCC): ICD-10-CM

## 2020-01-24 LAB
ALBUMIN SERPL BCP-MCNC: 3.3 G/DL (ref 3.2–4.9)
ALBUMIN/GLOB SERPL: 1.1 G/DL
ALP SERPL-CCNC: 112 U/L (ref 30–99)
ALT SERPL-CCNC: 8 U/L (ref 2–50)
ANION GAP SERPL CALC-SCNC: 9 MMOL/L (ref 0–11.9)
ANISOCYTOSIS BLD QL SMEAR: ABNORMAL
APPEARANCE UR: CLEAR
AST SERPL-CCNC: 35 U/L (ref 12–45)
BACTERIA #/AREA URNS HPF: NEGATIVE /HPF
BASOPHILS # BLD AUTO: 0.2 % (ref 0–1.8)
BASOPHILS # BLD: 0.02 K/UL (ref 0–0.12)
BILIRUB SERPL-MCNC: 0.2 MG/DL (ref 0.1–1.5)
BILIRUB UR QL STRIP.AUTO: NEGATIVE
BUN SERPL-MCNC: 14 MG/DL (ref 8–22)
CALCIUM SERPL-MCNC: 7.7 MG/DL (ref 8.5–10.5)
CHLORIDE SERPL-SCNC: 114 MMOL/L (ref 96–112)
CO2 SERPL-SCNC: 15 MMOL/L (ref 20–33)
COLOR UR: YELLOW
COMMENT 1642: NORMAL
CREAT SERPL-MCNC: 1.24 MG/DL (ref 0.5–1.4)
EOSINOPHIL # BLD AUTO: 0.04 K/UL (ref 0–0.51)
EOSINOPHIL NFR BLD: 0.5 % (ref 0–6.9)
EPI CELLS #/AREA URNS HPF: NEGATIVE /HPF
ERYTHROCYTE [DISTWIDTH] IN BLOOD BY AUTOMATED COUNT: 69.2 FL (ref 35.9–50)
GLOBULIN SER CALC-MCNC: 2.9 G/DL (ref 1.9–3.5)
GLUCOSE SERPL-MCNC: 109 MG/DL (ref 65–99)
GLUCOSE UR STRIP.AUTO-MCNC: NEGATIVE MG/DL
HCT VFR BLD AUTO: 30.6 % (ref 37–47)
HGB BLD-MCNC: 9.4 G/DL (ref 12–16)
HYALINE CASTS #/AREA URNS LPF: NORMAL /LPF
IMM GRANULOCYTES # BLD AUTO: 0.13 K/UL (ref 0–0.11)
IMM GRANULOCYTES NFR BLD AUTO: 1.5 % (ref 0–0.9)
KETONES UR STRIP.AUTO-MCNC: NEGATIVE MG/DL
LACTATE BLD-SCNC: 1.1 MMOL/L (ref 0.5–2)
LEUKOCYTE ESTERASE UR QL STRIP.AUTO: NEGATIVE
LYMPHOCYTES # BLD AUTO: 1.88 K/UL (ref 1–4.8)
LYMPHOCYTES NFR BLD: 21.6 % (ref 22–41)
MACROCYTES BLD QL SMEAR: ABNORMAL
MCH RBC QN AUTO: 32.2 PG (ref 27–33)
MCHC RBC AUTO-ENTMCNC: 30.7 G/DL (ref 33.6–35)
MCV RBC AUTO: 104.8 FL (ref 81.4–97.8)
MICRO URNS: ABNORMAL
MONOCYTES # BLD AUTO: 0.61 K/UL (ref 0–0.85)
MONOCYTES NFR BLD AUTO: 7 % (ref 0–13.4)
MORPHOLOGY BLD-IMP: NORMAL
NEUTROPHILS # BLD AUTO: 6.02 K/UL (ref 2–7.15)
NEUTROPHILS NFR BLD: 69.2 % (ref 44–72)
NITRITE UR QL STRIP.AUTO: NEGATIVE
NRBC # BLD AUTO: 0.08 K/UL
NRBC BLD-RTO: 0.9 /100 WBC
PH UR STRIP.AUTO: 6 [PH] (ref 5–8)
PLATELET # BLD AUTO: 238 K/UL (ref 164–446)
PLATELET BLD QL SMEAR: NORMAL
PMV BLD AUTO: 9.6 FL (ref 9–12.9)
POTASSIUM SERPL-SCNC: 3.5 MMOL/L (ref 3.6–5.5)
PROT SERPL-MCNC: 6.2 G/DL (ref 6–8.2)
PROT UR QL STRIP: 100 MG/DL
RBC # BLD AUTO: 2.92 M/UL (ref 4.2–5.4)
RBC # URNS HPF: NORMAL /HPF
RBC BLD AUTO: PRESENT
RBC UR QL AUTO: ABNORMAL
SODIUM SERPL-SCNC: 138 MMOL/L (ref 135–145)
SP GR UR STRIP.AUTO: 1.01
UROBILINOGEN UR STRIP.AUTO-MCNC: 0.2 MG/DL
WBC # BLD AUTO: 8.7 K/UL (ref 4.8–10.8)
WBC #/AREA URNS HPF: NORMAL /HPF

## 2020-01-24 PROCEDURE — 93005 ELECTROCARDIOGRAM TRACING: CPT | Performed by: EMERGENCY MEDICINE

## 2020-01-24 PROCEDURE — 96374 THER/PROPH/DIAG INJ IV PUSH: CPT

## 2020-01-24 PROCEDURE — 81001 URINALYSIS AUTO W/SCOPE: CPT

## 2020-01-24 PROCEDURE — 87040 BLOOD CULTURE FOR BACTERIA: CPT

## 2020-01-24 PROCEDURE — 83605 ASSAY OF LACTIC ACID: CPT

## 2020-01-24 PROCEDURE — 87086 URINE CULTURE/COLONY COUNT: CPT

## 2020-01-24 PROCEDURE — 80053 COMPREHEN METABOLIC PANEL: CPT

## 2020-01-24 PROCEDURE — 85025 COMPLETE CBC W/AUTO DIFF WBC: CPT

## 2020-01-24 PROCEDURE — 700105 HCHG RX REV CODE 258: Performed by: EMERGENCY MEDICINE

## 2020-01-24 PROCEDURE — 99285 EMERGENCY DEPT VISIT HI MDM: CPT

## 2020-01-24 RX ORDER — SODIUM CHLORIDE, SODIUM LACTATE, POTASSIUM CHLORIDE, CALCIUM CHLORIDE 600; 310; 30; 20 MG/100ML; MG/100ML; MG/100ML; MG/100ML
1000 INJECTION, SOLUTION INTRAVENOUS ONCE
Status: COMPLETED | OUTPATIENT
Start: 2020-01-24 | End: 2020-01-24

## 2020-01-24 RX ADMIN — SODIUM CHLORIDE, POTASSIUM CHLORIDE, SODIUM LACTATE AND CALCIUM CHLORIDE 1000 ML: 600; 310; 30; 20 INJECTION, SOLUTION INTRAVENOUS at 23:21

## 2020-01-24 ASSESSMENT — LIFESTYLE VARIABLES: DO YOU DRINK ALCOHOL: NO

## 2020-01-24 ASSESSMENT — PAIN SCALES - WONG BAKER: WONGBAKER_NUMERICALRESPONSE: HURTS EVEN MORE

## 2020-01-25 ENCOUNTER — HOSPITAL ENCOUNTER (OUTPATIENT)
Dept: RADIOLOGY | Facility: MEDICAL CENTER | Age: 45
End: 2020-01-25
Attending: EMERGENCY MEDICINE

## 2020-01-25 ENCOUNTER — APPOINTMENT (OUTPATIENT)
Dept: RADIOLOGY | Facility: MEDICAL CENTER | Age: 45
DRG: 871 | End: 2020-01-25
Attending: STUDENT IN AN ORGANIZED HEALTH CARE EDUCATION/TRAINING PROGRAM
Payer: COMMERCIAL

## 2020-01-25 PROBLEM — A41.9 SEPSIS (HCC): Status: ACTIVE | Noted: 2020-01-25

## 2020-01-25 LAB
ALBUMIN SERPL BCP-MCNC: 3.3 G/DL (ref 3.2–4.9)
ALBUMIN/GLOB SERPL: 1.1 G/DL
ALP SERPL-CCNC: 112 U/L (ref 30–99)
ALT SERPL-CCNC: 9 U/L (ref 2–50)
ANION GAP SERPL CALC-SCNC: 8 MMOL/L (ref 0–11.9)
AST SERPL-CCNC: 37 U/L (ref 12–45)
BASOPHILS # BLD AUTO: 0.2 % (ref 0–1.8)
BASOPHILS # BLD: 0.02 K/UL (ref 0–0.12)
BILIRUB SERPL-MCNC: 0.2 MG/DL (ref 0.1–1.5)
BUN SERPL-MCNC: 13 MG/DL (ref 8–22)
CALCIUM SERPL-MCNC: 7.8 MG/DL (ref 8.5–10.5)
CHLORIDE SERPL-SCNC: 113 MMOL/L (ref 96–112)
CO2 SERPL-SCNC: 18 MMOL/L (ref 20–33)
CREAT SERPL-MCNC: 1.14 MG/DL (ref 0.5–1.4)
EKG IMPRESSION: NORMAL
EKG IMPRESSION: NORMAL
EOSINOPHIL # BLD AUTO: 0.02 K/UL (ref 0–0.51)
EOSINOPHIL NFR BLD: 0.2 % (ref 0–6.9)
ERYTHROCYTE [DISTWIDTH] IN BLOOD BY AUTOMATED COUNT: 68.2 FL (ref 35.9–50)
GLOBULIN SER CALC-MCNC: 3 G/DL (ref 1.9–3.5)
GLUCOSE SERPL-MCNC: 147 MG/DL (ref 65–99)
HCT VFR BLD AUTO: 30.2 % (ref 37–47)
HGB BLD-MCNC: 9.3 G/DL (ref 12–16)
IMM GRANULOCYTES # BLD AUTO: 0.1 K/UL (ref 0–0.11)
IMM GRANULOCYTES NFR BLD AUTO: 1.1 % (ref 0–0.9)
LYMPHOCYTES # BLD AUTO: 1.99 K/UL (ref 1–4.8)
LYMPHOCYTES NFR BLD: 22.1 % (ref 22–41)
MAGNESIUM SERPL-MCNC: 2 MG/DL (ref 1.5–2.5)
MCH RBC QN AUTO: 32 PG (ref 27–33)
MCHC RBC AUTO-ENTMCNC: 30.8 G/DL (ref 33.6–35)
MCV RBC AUTO: 103.8 FL (ref 81.4–97.8)
MONOCYTES # BLD AUTO: 0.3 K/UL (ref 0–0.85)
MONOCYTES NFR BLD AUTO: 3.3 % (ref 0–13.4)
NEUTROPHILS # BLD AUTO: 6.58 K/UL (ref 2–7.15)
NEUTROPHILS NFR BLD: 73.1 % (ref 44–72)
NRBC # BLD AUTO: 0.04 K/UL
NRBC BLD-RTO: 0.4 /100 WBC
PLATELET # BLD AUTO: 230 K/UL (ref 164–446)
PMV BLD AUTO: 9.1 FL (ref 9–12.9)
POTASSIUM SERPL-SCNC: 4.1 MMOL/L (ref 3.6–5.5)
PROCALCITONIN SERPL-MCNC: 7.92 NG/ML
PROT SERPL-MCNC: 6.3 G/DL (ref 6–8.2)
RBC # BLD AUTO: 2.91 M/UL (ref 4.2–5.4)
SODIUM SERPL-SCNC: 139 MMOL/L (ref 135–145)
WBC # BLD AUTO: 9 K/UL (ref 4.8–10.8)

## 2020-01-25 PROCEDURE — 770006 HCHG ROOM/CARE - MED/SURG/GYN SEMI*

## 2020-01-25 PROCEDURE — A9270 NON-COVERED ITEM OR SERVICE: HCPCS | Performed by: STUDENT IN AN ORGANIZED HEALTH CARE EDUCATION/TRAINING PROGRAM

## 2020-01-25 PROCEDURE — 94667 MNPJ CHEST WALL 1ST: CPT

## 2020-01-25 PROCEDURE — 700105 HCHG RX REV CODE 258: Performed by: STUDENT IN AN ORGANIZED HEALTH CARE EDUCATION/TRAINING PROGRAM

## 2020-01-25 PROCEDURE — 70450 CT HEAD/BRAIN W/O DYE: CPT

## 2020-01-25 PROCEDURE — 700102 HCHG RX REV CODE 250 W/ 637 OVERRIDE(OP): Performed by: STUDENT IN AN ORGANIZED HEALTH CARE EDUCATION/TRAINING PROGRAM

## 2020-01-25 PROCEDURE — 85025 COMPLETE CBC W/AUTO DIFF WBC: CPT

## 2020-01-25 PROCEDURE — 99223 1ST HOSP IP/OBS HIGH 75: CPT | Mod: GC | Performed by: HOSPITALIST

## 2020-01-25 PROCEDURE — 700111 HCHG RX REV CODE 636 W/ 250 OVERRIDE (IP): Performed by: EMERGENCY MEDICINE

## 2020-01-25 PROCEDURE — 83735 ASSAY OF MAGNESIUM: CPT

## 2020-01-25 PROCEDURE — 93005 ELECTROCARDIOGRAM TRACING: CPT | Performed by: STUDENT IN AN ORGANIZED HEALTH CARE EDUCATION/TRAINING PROGRAM

## 2020-01-25 PROCEDURE — 93010 ELECTROCARDIOGRAM REPORT: CPT | Performed by: INTERNAL MEDICINE

## 2020-01-25 PROCEDURE — 71045 X-RAY EXAM CHEST 1 VIEW: CPT

## 2020-01-25 PROCEDURE — 700111 HCHG RX REV CODE 636 W/ 250 OVERRIDE (IP): Performed by: STUDENT IN AN ORGANIZED HEALTH CARE EDUCATION/TRAINING PROGRAM

## 2020-01-25 PROCEDURE — 94640 AIRWAY INHALATION TREATMENT: CPT

## 2020-01-25 PROCEDURE — 87040 BLOOD CULTURE FOR BACTERIA: CPT | Mod: 91

## 2020-01-25 PROCEDURE — 80053 COMPREHEN METABOLIC PANEL: CPT

## 2020-01-25 PROCEDURE — 94668 MNPJ CHEST WALL SBSQ: CPT

## 2020-01-25 PROCEDURE — 94669 MECHANICAL CHEST WALL OSCILL: CPT

## 2020-01-25 PROCEDURE — 51798 US URINE CAPACITY MEASURE: CPT

## 2020-01-25 PROCEDURE — 700101 HCHG RX REV CODE 250: Performed by: STUDENT IN AN ORGANIZED HEALTH CARE EDUCATION/TRAINING PROGRAM

## 2020-01-25 PROCEDURE — 84145 PROCALCITONIN (PCT): CPT

## 2020-01-25 RX ORDER — OXYCODONE HYDROCHLORIDE AND ACETAMINOPHEN 5; 325 MG/1; MG/1
1 TABLET ORAL EVERY 4 HOURS PRN
Status: DISCONTINUED | OUTPATIENT
Start: 2020-01-25 | End: 2020-01-25

## 2020-01-25 RX ORDER — IPRATROPIUM BROMIDE AND ALBUTEROL SULFATE 2.5; .5 MG/3ML; MG/3ML
3 SOLUTION RESPIRATORY (INHALATION)
Status: DISCONTINUED | OUTPATIENT
Start: 2020-01-25 | End: 2020-01-29

## 2020-01-25 RX ORDER — IPRATROPIUM BROMIDE AND ALBUTEROL SULFATE 2.5; .5 MG/3ML; MG/3ML
3 SOLUTION RESPIRATORY (INHALATION)
Status: DISCONTINUED | OUTPATIENT
Start: 2020-01-25 | End: 2020-01-25

## 2020-01-25 RX ORDER — OXYCODONE HCL 10 MG/1
10 TABLET, FILM COATED, EXTENDED RELEASE ORAL EVERY 12 HOURS
Status: DISCONTINUED | OUTPATIENT
Start: 2020-01-25 | End: 2020-01-25

## 2020-01-25 RX ORDER — DOXYCYCLINE 100 MG/1
100 TABLET ORAL EVERY 12 HOURS
Status: DISCONTINUED | OUTPATIENT
Start: 2020-01-25 | End: 2020-01-26

## 2020-01-25 RX ORDER — AMOXICILLIN 250 MG
2 CAPSULE ORAL 2 TIMES DAILY
Status: DISCONTINUED | OUTPATIENT
Start: 2020-01-25 | End: 2020-01-27

## 2020-01-25 RX ORDER — ALBUTEROL SULFATE 90 UG/1
2 AEROSOL, METERED RESPIRATORY (INHALATION) EVERY 6 HOURS
Status: DISCONTINUED | OUTPATIENT
Start: 2020-01-25 | End: 2020-01-25

## 2020-01-25 RX ORDER — ALBUTEROL SULFATE 90 UG/1
2 AEROSOL, METERED RESPIRATORY (INHALATION)
Status: DISCONTINUED | OUTPATIENT
Start: 2020-01-25 | End: 2020-02-03 | Stop reason: HOSPADM

## 2020-01-25 RX ORDER — POLYETHYLENE GLYCOL 3350 17 G/17G
1 POWDER, FOR SOLUTION ORAL DAILY
Status: DISCONTINUED | OUTPATIENT
Start: 2020-01-25 | End: 2020-01-25

## 2020-01-25 RX ORDER — OSELTAMIVIR PHOSPHATE 75 MG/1
75 CAPSULE ORAL 2 TIMES DAILY
Status: DISCONTINUED | OUTPATIENT
Start: 2020-01-25 | End: 2020-01-25

## 2020-01-25 RX ORDER — ACETAMINOPHEN 325 MG/1
650 TABLET ORAL EVERY 6 HOURS PRN
Status: DISCONTINUED | OUTPATIENT
Start: 2020-01-25 | End: 2020-01-27

## 2020-01-25 RX ORDER — SODIUM CHLORIDE, SODIUM LACTATE, POTASSIUM CHLORIDE, AND CALCIUM CHLORIDE .6; .31; .03; .02 G/100ML; G/100ML; G/100ML; G/100ML
500 INJECTION, SOLUTION INTRAVENOUS
Status: COMPLETED | OUTPATIENT
Start: 2020-01-25 | End: 2020-01-25

## 2020-01-25 RX ORDER — OXYCODONE HYDROCHLORIDE AND ACETAMINOPHEN 5; 325 MG/1; MG/1
1-2 TABLET ORAL EVERY 4 HOURS PRN
Status: DISCONTINUED | OUTPATIENT
Start: 2020-01-25 | End: 2020-01-27

## 2020-01-25 RX ORDER — BISACODYL 10 MG
10 SUPPOSITORY, RECTAL RECTAL
Status: DISCONTINUED | OUTPATIENT
Start: 2020-01-25 | End: 2020-01-27

## 2020-01-25 RX ORDER — SODIUM CHLORIDE, SODIUM LACTATE, POTASSIUM CHLORIDE, AND CALCIUM CHLORIDE .6; .31; .03; .02 G/100ML; G/100ML; G/100ML; G/100ML
1000 INJECTION, SOLUTION INTRAVENOUS ONCE
Status: COMPLETED | OUTPATIENT
Start: 2020-01-25 | End: 2020-01-25

## 2020-01-25 RX ORDER — SODIUM CHLORIDE, SODIUM LACTATE, POTASSIUM CHLORIDE, CALCIUM CHLORIDE 600; 310; 30; 20 MG/100ML; MG/100ML; MG/100ML; MG/100ML
INJECTION, SOLUTION INTRAVENOUS CONTINUOUS
Status: DISCONTINUED | OUTPATIENT
Start: 2020-01-25 | End: 2020-01-26

## 2020-01-25 RX ORDER — LIDOCAINE 50 MG/G
1 PATCH TOPICAL EVERY 24 HOURS
Status: DISCONTINUED | OUTPATIENT
Start: 2020-01-25 | End: 2020-02-03 | Stop reason: HOSPADM

## 2020-01-25 RX ORDER — IPRATROPIUM BROMIDE AND ALBUTEROL SULFATE 2.5; .5 MG/3ML; MG/3ML
3 SOLUTION RESPIRATORY (INHALATION)
Status: DISCONTINUED | OUTPATIENT
Start: 2020-01-26 | End: 2020-01-27

## 2020-01-25 RX ORDER — SODIUM CHLORIDE 9 MG/ML
1000 INJECTION, SOLUTION INTRAVENOUS ONCE
Status: COMPLETED | OUTPATIENT
Start: 2020-01-25 | End: 2020-01-25

## 2020-01-25 RX ORDER — POLYETHYLENE GLYCOL 3350 17 G/17G
1 POWDER, FOR SOLUTION ORAL
Status: DISCONTINUED | OUTPATIENT
Start: 2020-01-25 | End: 2020-01-27

## 2020-01-25 RX ORDER — OXYCODONE HCL 10 MG/1
10 TABLET, FILM COATED, EXTENDED RELEASE ORAL EVERY 12 HOURS
Status: DISCONTINUED | OUTPATIENT
Start: 2020-01-25 | End: 2020-01-27

## 2020-01-25 RX ORDER — OSELTAMIVIR PHOSPHATE 30 MG/1
30 CAPSULE ORAL 2 TIMES DAILY
Status: DISCONTINUED | OUTPATIENT
Start: 2020-01-25 | End: 2020-01-27

## 2020-01-25 RX ORDER — OMEPRAZOLE 20 MG/1
20 CAPSULE, DELAYED RELEASE ORAL DAILY
Status: DISCONTINUED | OUTPATIENT
Start: 2020-01-25 | End: 2020-01-27

## 2020-01-25 RX ORDER — CALCIUM CARBONATE 500 MG/1
500 TABLET, CHEWABLE ORAL 2 TIMES DAILY
Status: DISCONTINUED | OUTPATIENT
Start: 2020-01-25 | End: 2020-01-27

## 2020-01-25 RX ORDER — AMOXICILLIN 250 MG
2 CAPSULE ORAL 2 TIMES DAILY
Status: DISCONTINUED | OUTPATIENT
Start: 2020-01-25 | End: 2020-01-25

## 2020-01-25 RX ORDER — CARVEDILOL 6.25 MG/1
6.25 TABLET ORAL 2 TIMES DAILY
Status: DISCONTINUED | OUTPATIENT
Start: 2020-01-25 | End: 2020-01-25

## 2020-01-25 RX ORDER — SODIUM CHLORIDE 9 MG/ML
INJECTION, SOLUTION INTRAVENOUS
Status: COMPLETED
Start: 2020-01-25 | End: 2020-01-25

## 2020-01-25 RX ADMIN — OXYCODONE HYDROCHLORIDE AND ACETAMINOPHEN 2 TABLET: 5; 325 TABLET ORAL at 10:39

## 2020-01-25 RX ADMIN — IPRATROPIUM BROMIDE AND ALBUTEROL SULFATE 3 ML: .5; 3 SOLUTION RESPIRATORY (INHALATION) at 19:00

## 2020-01-25 RX ADMIN — FENTANYL CITRATE 50 MCG: 0.05 INJECTION, SOLUTION INTRAMUSCULAR; INTRAVENOUS at 00:17

## 2020-01-25 RX ADMIN — DOXYCYCLINE 100 MG: 100 TABLET, FILM COATED ORAL at 18:15

## 2020-01-25 RX ADMIN — OXYCODONE HYDROCHLORIDE 10 MG: 10 TABLET, FILM COATED, EXTENDED RELEASE ORAL at 18:15

## 2020-01-25 RX ADMIN — DOXYCYCLINE 100 MG: 100 TABLET, FILM COATED ORAL at 07:55

## 2020-01-25 RX ADMIN — ALBUTEROL SULFATE 2 PUFF: 90 AEROSOL, METERED RESPIRATORY (INHALATION) at 05:56

## 2020-01-25 RX ADMIN — LIDOCAINE 1 PATCH: 50 PATCH CUTANEOUS at 05:55

## 2020-01-25 RX ADMIN — IPRATROPIUM BROMIDE AND ALBUTEROL SULFATE 3 ML: .5; 3 SOLUTION RESPIRATORY (INHALATION) at 23:00

## 2020-01-25 RX ADMIN — OXYCODONE HYDROCHLORIDE 10 MG: 10 TABLET, FILM COATED, EXTENDED RELEASE ORAL at 07:55

## 2020-01-25 RX ADMIN — CEFTRIAXONE SODIUM 1 G: 1 INJECTION, POWDER, FOR SOLUTION INTRAMUSCULAR; INTRAVENOUS at 07:56

## 2020-01-25 RX ADMIN — ACETAMINOPHEN 650 MG: 325 TABLET, FILM COATED ORAL at 23:31

## 2020-01-25 RX ADMIN — SODIUM CHLORIDE, POTASSIUM CHLORIDE, SODIUM LACTATE AND CALCIUM CHLORIDE 1000 ML: 600; 310; 30; 20 INJECTION, SOLUTION INTRAVENOUS at 03:13

## 2020-01-25 RX ADMIN — ACETAMINOPHEN 650 MG: 325 TABLET, FILM COATED ORAL at 05:56

## 2020-01-25 RX ADMIN — OMEPRAZOLE 20 MG: 20 CAPSULE, DELAYED RELEASE ORAL at 05:56

## 2020-01-25 RX ADMIN — IPRATROPIUM BROMIDE AND ALBUTEROL SULFATE 3 ML: .5; 3 SOLUTION RESPIRATORY (INHALATION) at 10:50

## 2020-01-25 RX ADMIN — ACETAMINOPHEN 650 MG: 325 TABLET, FILM COATED ORAL at 17:27

## 2020-01-25 RX ADMIN — SODIUM CHLORIDE, POTASSIUM CHLORIDE, SODIUM LACTATE AND CALCIUM CHLORIDE: 600; 310; 30; 20 INJECTION, SOLUTION INTRAVENOUS at 08:13

## 2020-01-25 RX ADMIN — OXYCODONE HYDROCHLORIDE AND ACETAMINOPHEN 2 TABLET: 5; 325 TABLET ORAL at 14:43

## 2020-01-25 RX ADMIN — SODIUM CHLORIDE 1000 ML: 9 INJECTION, SOLUTION INTRAVENOUS at 20:36

## 2020-01-25 RX ADMIN — SODIUM CHLORIDE, POTASSIUM CHLORIDE, SODIUM LACTATE AND CALCIUM CHLORIDE: 600; 310; 30; 20 INJECTION, SOLUTION INTRAVENOUS at 20:36

## 2020-01-25 RX ADMIN — OXYCODONE HYDROCHLORIDE AND ACETAMINOPHEN 2 TABLET: 5; 325 TABLET ORAL at 20:38

## 2020-01-25 RX ADMIN — SODIUM CHLORIDE, POTASSIUM CHLORIDE, SODIUM LACTATE AND CALCIUM CHLORIDE 500 ML: 600; 310; 30; 20 INJECTION, SOLUTION INTRAVENOUS at 17:27

## 2020-01-25 RX ADMIN — OSELTAMIVIR PHOSPHATE 30 MG: 75 CAPSULE ORAL at 20:36

## 2020-01-25 ASSESSMENT — ENCOUNTER SYMPTOMS
DOUBLE VISION: 0
NERVOUS/ANXIOUS: 1
DIZZINESS: 0
STRIDOR: 0
COUGH: 1
MYALGIAS: 0
WHEEZING: 0
NAUSEA: 0
EYE REDNESS: 0
EYE DISCHARGE: 0
HEMOPTYSIS: 0
HEADACHES: 0
CHILLS: 1
FOCAL WEAKNESS: 0
FEVER: 1
HEARTBURN: 0
SINUS PAIN: 0
SHORTNESS OF BREATH: 0
VOMITING: 0
CONSTIPATION: 0
SENSORY CHANGE: 0
FALLS: 1
MEMORY LOSS: 1
DIARRHEA: 0
SORE THROAT: 0
BLURRED VISION: 0
LOSS OF CONSCIOUSNESS: 0
PALPITATIONS: 0
BACK PAIN: 1
SPUTUM PRODUCTION: 1
ABDOMINAL PAIN: 0

## 2020-01-25 ASSESSMENT — COGNITIVE AND FUNCTIONAL STATUS - GENERAL
MOVING FROM LYING ON BACK TO SITTING ON SIDE OF FLAT BED: UNABLE
STANDING UP FROM CHAIR USING ARMS: TOTAL
HELP NEEDED FOR BATHING: TOTAL
TURNING FROM BACK TO SIDE WHILE IN FLAT BAD: UNABLE
DRESSING REGULAR UPPER BODY CLOTHING: A LITTLE
SUGGESTED CMS G CODE MODIFIER MOBILITY: CN
WALKING IN HOSPITAL ROOM: TOTAL
MOBILITY SCORE: 6
PERSONAL GROOMING: A LITTLE
SUGGESTED CMS G CODE MODIFIER DAILY ACTIVITY: CL
DAILY ACTIVITIY SCORE: 13
DRESSING REGULAR LOWER BODY CLOTHING: TOTAL
TOILETING: TOTAL
CLIMB 3 TO 5 STEPS WITH RAILING: TOTAL
MOVING TO AND FROM BED TO CHAIR: UNABLE

## 2020-01-25 ASSESSMENT — COPD QUESTIONNAIRES
DURING THE PAST 4 WEEKS HOW MUCH DID YOU FEEL SHORT OF BREATH: NONE/LITTLE OF THE TIME
HAVE YOU SMOKED AT LEAST 100 CIGARETTES IN YOUR ENTIRE LIFE: NO/DON'T KNOW
HAVE YOU SMOKED AT LEAST 100 CIGARETTES IN YOUR ENTIRE LIFE: NO/DON'T KNOW
DO YOU EVER COUGH UP ANY MUCUS OR PHLEGM?: NO/ONLY WITH OCCASIONAL COLDS OR INFECTIONS
DURING THE PAST 4 WEEKS HOW MUCH DID YOU FEEL SHORT OF BREATH: NONE/LITTLE OF THE TIME
COPD SCREENING SCORE: 0
DO YOU EVER COUGH UP ANY MUCUS OR PHLEGM?: NO/ONLY WITH OCCASIONAL COLDS OR INFECTIONS
COPD SCREENING SCORE: 0

## 2020-01-25 ASSESSMENT — PATIENT HEALTH QUESTIONNAIRE - PHQ9
1. LITTLE INTEREST OR PLEASURE IN DOING THINGS: NOT AT ALL
SUM OF ALL RESPONSES TO PHQ9 QUESTIONS 1 AND 2: 0
2. FEELING DOWN, DEPRESSED, IRRITABLE, OR HOPELESS: NOT AT ALL

## 2020-01-25 ASSESSMENT — LIFESTYLE VARIABLES
TOTAL SCORE: 0
EVER FELT BAD OR GUILTY ABOUT YOUR DRINKING: NO
CONSUMPTION TOTAL: NEGATIVE
ALCOHOL_USE: NO
EVER HAD A DRINK FIRST THING IN THE MORNING TO STEADY YOUR NERVES TO GET RID OF A HANGOVER: NO
HOW MANY TIMES IN THE PAST YEAR HAVE YOU HAD 5 OR MORE DRINKS IN A DAY: 0
DOES PATIENT WANT TO STOP DRINKING: NO
TOTAL SCORE: 0
HAVE PEOPLE ANNOYED YOU BY CRITICIZING YOUR DRINKING: NO
AVERAGE NUMBER OF DAYS PER WEEK YOU HAVE A DRINK CONTAINING ALCOHOL: 0
TOTAL SCORE: 0
HAVE YOU EVER FELT YOU SHOULD CUT DOWN ON YOUR DRINKING: NO
SUBSTANCE_ABUSE: 0
ON A TYPICAL DAY WHEN YOU DRINK ALCOHOL HOW MANY DRINKS DO YOU HAVE: 0
EVER_SMOKED: YES
EVER_SMOKED: YES

## 2020-01-25 NOTE — H&P
"History & Physical Note    Date of Admission: 1/25/2020  Admission Status: Emergency  [facility] Team: [facility] Yellow [facility]  Attending: Hussein Bertrand   Senior Resident: Dr. Tomlinson  Intern: Dr. Hermosillo  Contact Number: [phone]    Chief Complaint: \"I have been in bed all week\"     History of Present Illness (HPI):   Roxy is a 44 y.o. female with PMHx of Stage IV Breast Cancer with Metastasis to Pelvis, L Spine and C-spine, COPD on 3L of O2 and GERD who was brought to hospital for worsening confusion, fatigue and new symptom of urinary incontinence. Patient states she has been lying in bed all week due to weakness and malaise. Reports that for the past 3 days, she has wet herself due to inability to make it to her bedside commode. She still has voiding/bowel sensation. Son and Daughter-in-law at bedside stating patient has been more confused over the past week, report a 101F and chills. Patient denies Chest Pain, SOB, N/V/Diarrhea, Saddle anesthesia, LOC or Sensory Deficits.     Sepsis protocol was initiated in ED given patient was hypotensive and tachycardic, was given 1L NS bolus which improved her mentation and blood pressure. Also given fentanyl patch for pain control. Head CT was ordered by EDP due to increasing confusion, resulted as osseous metastatic disease to skull.     Prior records reviewed, patient admitted in Dec 2019 for Comminuted Fracture of Right Femur. MRI of Brain/C-Spine/L-Spine/T-spine consistent with metastatic bone disease. Palliative and Oncology was also consulted at the time. Patient decided to follow with oncology in [facility] and to remain full code despite palliative consult. Upon discussion of code status on admission, patient remains as full code.     Review of Systems:   Review of Systems   Constitutional: Positive for chills, fever and malaise/fatigue.   HENT: Negative for sinus pain and sore throat.    Eyes: Negative for blurred vision and double vision.   Respiratory: Positive for " cough. Negative for hemoptysis and shortness of breath.    Cardiovascular: Negative for chest pain, palpitations and leg swelling.   Gastrointestinal: Negative for abdominal pain, constipation, diarrhea, nausea and vomiting.   Genitourinary: Positive for hematuria. Negative for dysuria.   Musculoskeletal: Positive for back pain and falls.   Skin: Negative for itching and rash.   Neurological: Negative for dizziness, loss of consciousness and headaches.   Psychiatric/Behavioral: Positive for memory loss. Negative for substance abuse. The patient is nervous/anxious.    All other systems reviewed and are negative.    Past Medical History:   Past Medical History was reviewed with patient.   has a past medical history of Cancer (HCC).    Past Surgical History: Past Surgical History was reviewed with patient.   has a past surgical history that includes mastectomy (Right).    Medications: Medications have been reviewed with patient.  Prior to Admission Medications   Prescriptions Last Dose Informant Patient Reported? Taking?   acetaminophen (TYLENOL) 500 MG Tab   No No   Sig: Take 2 Tabs by mouth every 8 hours for 30 days.   albuterol (VENTOLIN HFA) 108 (90 Base) MCG/ACT Aero Soln inhalation aerosol   Yes No   Sig: Inhale 2 Puffs by mouth every 6 hours.   carvedilol (COREG) 6.25 MG Tab   Yes No   Sig: Take 6.25 mg by mouth 2 times a day.   celecoxib (CELEBREX) 100 MG Cap   No No   Sig: Take 1 Cap by mouth every day for 30 days.   cetirizine (ZYRTEC) 10 MG Tab   Yes No   Sig: Take 10 mg by mouth every day.   citalopram (CELEXA) 40 MG Tab   Yes No   Sig: Take 40 mg by mouth every day.   clonazePAM (KLONOPIN) 1 MG Tab   Yes No   Sig: Take 1 mg by mouth 3 times a day.   cyanocobalamin (VITAMIN B-12) 500 MCG Tab   Yes No   Sig: Take 1,000 mcg by mouth every day.   dicyclomine (BENTYL) 10 MG Cap   Yes No   Sig: Take 10 mg by mouth 3 times a day.   lidocaine (LIDODERM) 5 % Patch   No No   Sig: Apply 1 Patch to skin as directed  every 24 hours.   omeprazole (PRILOSEC) 20 MG delayed-release capsule   No No   Sig: Take 1 Cap by mouth every day.   polyethylene glycol/lytes (MIRALAX) Pack   No No   Sig: Take 1 Packet by mouth every day for 30 days.   senna-docusate (PERICOLACE OR SENOKOT S) 8.6-50 MG Tab   No No   Sig: Take 2 Tabs by mouth 2 Times a Day for 30 days.   tamoxifen (NOLVADEX) 10 MG Tab   Yes No   Sig: Take 20 mg by mouth.   tizanidine (ZANAFLEX) 4 MG Tab   Yes No   Sig: Take 4 mg by mouth every 8 hours.      Facility-Administered Medications: None        Allergies: Allergies have been reviewed with patient.  Allergies   Allergen Reactions   • Fish    • Levaquin    • Lyrica Hives   • Morphine Unspecified     Was told she is very allergic after surgery.    • Motrin [Ibuprofen] Hives   • Neurontin [Gabapentin] Swelling     Messes with kidneys   • Pcn [Penicillins] Hives     Burning sensation throughout body       Family History: Not Contributory    Social History:   Tobacco: Denies   Alcohol: Denies   Recreational drugs (illegal and prescription):  Denies   Employment: Unemployed  Activity Level: Limited secondary to pain and fracture to right femur, ambulates with walker with assistance   Living situation:  Lives with family  Recent travel:  None  Primary Care Provider: reviewed DAVID Reyes  Physical Exam:  Vitals:  Temp:  [37 °C (98.6 °F)] 37 °C (98.6 °F)  Pulse:  [] 109  Resp:  [18-22] 22  BP: ()/(36-73) 92/65  SpO2:  [96 %-100 %] 100 %    Physical Exam  Vitals signs and nursing note reviewed.   Constitutional:       Appearance: She is not toxic-appearing or diaphoretic.      Comments: Thin female, ill-appearing   HENT:      Head: Normocephalic and atraumatic.      Right Ear: External ear normal.      Left Ear: External ear normal.      Nose: Nose normal. No congestion or rhinorrhea.      Mouth/Throat:      Mouth: Mucous membranes are dry.      Pharynx: Oropharynx is clear.   Eyes:      Extraocular Movements:  Extraocular movements intact.      Conjunctiva/sclera: Conjunctivae normal.      Pupils: Pupils are equal, round, and reactive to light.   Neck:      Musculoskeletal: Normal range of motion and neck supple. No muscular tenderness.   Cardiovascular:      Rate and Rhythm: Regular rhythm. Tachycardia present.      Pulses: Normal pulses.      Heart sounds: Normal heart sounds.   Pulmonary:      Effort: Pulmonary effort is normal. No respiratory distress.      Breath sounds: Wheezing present.      Comments: Expiratory wheezes diffusely  Abdominal:      General: Abdomen is flat. Bowel sounds are normal.      Palpations: Abdomen is soft.      Tenderness: There is no tenderness.   Genitourinary:     Comments: Deferred ROMINA  Musculoskeletal:      Comments: 4/5 strength to RLE, with painful Knee Flexion/Hip Flexion.   5/5 Strength to all other extremities   Skin:     General: Skin is warm and dry.      Capillary Refill: Capillary refill takes less than 2 seconds.   Neurological:      General: No focal deficit present.      Mental Status: She is oriented to person, place, and time.      Cranial Nerves: No cranial nerve deficit.      Sensory: No sensory deficit.      Deep Tendon Reflexes: Reflexes normal.   Psychiatric:         Mood and Affect: Mood normal.         Behavior: Behavior normal.       Labs:    1/25/2020 03:23   WBC 9.0   RBC 2.91 (L)   Hemoglobin 9.3 (L)   Hematocrit 30.2 (L)   .8 (H)   MCH 32.0   MCHC 30.8 (L)   RDW 68.2 (H)   Platelet Count 230   MPV 9.1      1/25/2020 03:23   Sodium 139   Potassium 4.1   Chloride 113 (H)   Co2 18 (L)   Anion Gap 8.0   Glucose 147 (H)   Bun 13   Creatinine 1.14   GFR If  >60   GFR If Non African American 51 (A)   Calcium 7.8 (L)   AST(SGOT) 37   ALT(SGPT) 9   Alkaline Phosphatase 112 (H)   Total Bilirubin 0.2   Albumin 3.3   Total Protein 6.3   Globulin 3.0   A-G Ratio 1.1   Magnesium 2.0     EKG: Per my read, Sinus Tachycardia 106, QTc of 484    Imaging:    CT-HEAD W/O   Final Result      1.  No acute intracranial abnormality.   2.  Osseous metastatic disease.      DX-CHEST-PORTABLE (1 VIEW)   Final Result      No acute cardiopulmonary disease evident.      MR-LUMBAR SPINE-WITH & W/O    (Results Pending)   MR-CERVICAL SPINE-WITH & W/O    (Results Pending)   MR-THORACIC SPINE-WITH & W/O    (Results Pending)     Previous Data Review: reviewed    * Sepsis (HCC)  Assessment & Plan  This is Sepsis Present on admission  SIRS criteria identified on my evaluation include: Tachycardia, with heart rate greater than 90 BPM and Tachypnea, with respirations greater than 20 per minute  Source is unknown  Sepsis protocol initiated  Fluid resuscitation ordered per protocol  IV antibiotics as appropriate for source of sepsis  While organ dysfunction may be noted elsewhere in this problem list or in the chart, degree of organ dysfunction does not meet CMS criteria for severe sepsis    - Blood Cultures Pending, No Leukocytosis, CXR negative.   - Tachycardia/Tachypnea could be secondary to pain given no pain medication for 1 week (States ran out of Percocet 1 week ago, has pain management appointment on 01/31/20)       Breast cancer (HCC)- (present on admission)  Assessment & Plan  History of Metastatic Breast Cancer, evaluated by Dr. Wynne during last admission. Mets to L and Spine, Pelvis. CT head on admission showing Mets to skull, no prior records of it. Brought by family for concern given weakness and increased confusion, patient reporting new incontinence.     Repeat MR of Spine   Chart review shows Dr. Wynne recommended palliative care, palliative was consulted during last admission, Advance Directive stating patient's POA is her , Jones Cota.  Discussed code status, full code at this time.       Displaced fracture of greater trochanter of right femur (HCC)- (present on admission)  Assessment & Plan  Admitted for during last admission (Dec 2019), Ortho consulted and  patient deemed non-surgical. PT had recommended home health, per family, patient was denied home health. Did not go to physical therapy as prescribed on last admission    - CTM    Macrocytic anemia- (present on admission)  Assessment & Plan  Patient with chronic anemia, chart review showing normal B12/Folate, likely secondary to bone metastasis     COPD (chronic obstructive pulmonary disease) (HCC)- (present on admission)  Assessment & Plan  Chronic respiratory failure with 3L O2 at home, currently requiring 3L, at baseline. Diffuse expiratory wheezes on exam    - RT protocol  - Albuterol Q6  - Incentive Spirometer

## 2020-01-25 NOTE — PROGRESS NOTES
[facility] notified of RR of 22, on 3 Liters O2 with sats 97%, labored breathing with crackles in bases. MD to order respiratory treatments. MD to also enter orders for pain medication

## 2020-01-25 NOTE — ASSESSMENT & PLAN NOTE
Sepsis Present on admission, soft bp's and sinus tachycardia low threshold for ICU transfer   SIRS criteria: Tachycardia, with heart rate greater than 90 BPM and Tachypnea, with respirations greater than 20 per minute  Source is presumed to be lungs (Influenza/CAP) at this time, sepsis protocol initiated on admission, fluid resuscitation ordered per protocol. Avoid excess fluids (sepsis 30ml/kg) use early pressors if necessary to prevent ARDS. Monitor for post influenza superimposed pna (MRSA, strep PNA, GAS) serial procal, MRSA nasal Swab and monitor for Viral induced-HLH.  Treated for Tamiflu 5 day course and completed abx tx. BAL- NGTD.      Plan  Resolved  - Continue to monitor vitals   - Hgb 9 improved, WBC 7.4  - Dysphagia 2 diet; will try to get a hold of speech therapy to reassess as pt really wants a regular diet   - CXR 1/31- stable chest x-ray findings  - Incentive spirometer

## 2020-01-25 NOTE — PROGRESS NOTES
2 bags of home meds sent to pharmacy. Bagged with KELVIN Block    Total of 18 meds sent  Bag 1 0f 2 with 10 meds  Bag 2 of 2 with 8 meds    2 Stickers placed in chart

## 2020-01-25 NOTE — ED TRIAGE NOTES
BIBEMS c/o increased lethargy, pt has hx stage 4 bone CA and takes oral medications at home percocet per report.  Upon arrival for EMS, hypotensive 60's systolic. GCS 13 IV started 1L LR started, now BP systolic 80-90.  Responsive to sternal rub, purposeful movements, able to follow commands. Speaking full sentences.

## 2020-01-25 NOTE — CARE PLAN
Problem: Communication  Goal: The ability to communicate needs accurately and effectively will improve  Outcome: PROGRESSING AS EXPECTED     Problem: Safety  Goal: Will remain free from falls  Outcome: PROGRESSING AS EXPECTED     Problem: Fluid Volume:  Goal: Will maintain balanced intake and output  Outcome: PROGRESSING AS EXPECTED     Problem: Knowledge Deficit  Goal: Knowledge of the prescribed therapeutic regimen will improve  Outcome: PROGRESSING AS EXPECTED

## 2020-01-25 NOTE — DIETARY
"Nutrition Services: Consult received for TF. Pt currently tolerating a regular, mechanical soft diet. RD spoke with pt about upcoming meal choices; no special requests made. Per RN: \"Patient is fatigued, crying occasionally, but cooperative.\" ?Overall POC as related to terminal Dx. Please re-consult RD as needed.  "

## 2020-01-25 NOTE — ASSESSMENT & PLAN NOTE
Patient with chronic anemia, chart review showing normal B12/Folate, likely secondary to bone metastasis.   HgB 9.2 today,stable since yesterday, improved from 8.7.

## 2020-01-25 NOTE — PROGRESS NOTES
Bedside report received.  Assessment complete.  A&O x 4. Slow speech but clear. Patient calls appropriately.  Patient weak and turns with 2 assist. Bed alarm on.   Patient has 10/10 pain but do to low blood pressures, team does not want to give opioids at this time. Pt on pain regimen at home  Denies N&V. Tolerating regular diet.  No wounds present- old, healed wound on right butt cheek  Voiding on bedpan. 800 mL of urine since admit to unti  Last BM 1-23.  Current systolic 101, but coreg reschedule for 8am if BP improved  -130- Team aware and came to bedside- HR increases when awake  Tachypnea with RR 22, labored breathing, on 3 liters Oxygen- baseline, oxygen at home R'T COPD. Diminished throughout with crackles at bases, weak productive cough- team aware and came to bedside to assess patient- albuterol ordered.  Review plan with of care with patient. Call light and personal belongings with in reach. Hourly rounding in place. All needs met at this time.

## 2020-01-25 NOTE — ASSESSMENT & PLAN NOTE
Chronic respiratory failure with 3L O2 at home, currently requiring 3L, at baseline. Diffuse expiratory rhonci on exam    - Incentive Spirometer    - Spiriva and Symbicort  - Follow up with PCP

## 2020-01-25 NOTE — SENIOR ADMIT NOTE
Senior Admit Note    Patient: Roxy Cota.  MRN: [id number].                               Chief complaint: Confusion    Assessment and Plan:    #Urinary Incontinence:  #Metastatic Breast Cancer: Patient with known metastatic breast cancer. Family with concerns of confusion (CT brain parenchyma w/out acute pathology; however, likely mets to skull base/cervical vertebra). Drowsy on exam, but A&Ox4 and answering questions appropriately. Reporting urinary incontinence. At first she stated she feels like she cannot make it to bathroom in time, but also reporting lack of warning/feeling with urination. Strength symmetrical (3+), patellar DTRs 2+, sensation gross and light intact. Unable to assess rectal tone due to pain with repositioning.      -No overt suspicion of spinal involvement; nevertheless, her hx of metastatic disease puts her at risk.   -MRI spine.      Admit: Inpatient. To Medical.  Continuous Cardiac Monitoring: Not indicated.  DVT prophylaxis: Enoxaparin.  Code status: Full.    For complete details, please refer to H&P by Dr. Barrientos.    Jacky Garner M.D.

## 2020-01-25 NOTE — PROGRESS NOTES
Daily Progress Note:     Date of Service: 1/25/2020  Primary Team: [facility] IM Yellow Team   Attending: Ryder Bertrand M.D.   Senior Resident: Dr. Tomlinson  Intern: Dr. Hermosillo  Contact:  [phone]    Chief Complaint:   Fever, confusion, fatigue, urinary incontinence and pain after running out of pain medication a week ago     Subjective:  Low threshold for ICU transfer if hemodynamically unstable or becomes more somnolent     Pt resting in bed this morning sleeping but easy to awaken. She is alert to person, place, and time and able to provide a clear history. She is hard of hearing, particularly on the right side. Pt started crying during the interview complaining of back pain from her metastatic disease. Said she is on OxyContin 10mg BID at home for her cancer pain and percocet, but ran out a week ago and has not had a chance to refill her medications. It seems like she used them more then prescribed and ran out before the appropriate date.  In addition, she stated that her  has been sick with 103F fevers and cough and that she started developing fevers shortly after he got sick. She saw an oncologist for her stage 4 lung cancer and was placed on Verzenio BID for her metastatic breast cancer and has been on it for 4 days prior to admission. Pt presented to the ED last night septic and her pro calcitonin this morning is 7.92. We started her on C3 and doxy and continuing LR at 100ml per hr following her boluses per sepsis protocol.   Pt agreed to a palliative consult at this time and we spoke to them and they will be seeing pt   Spoke to Dr. Khan (Oncology) and she recommends holding patient's Verzenio until the sepsis resolves, managing her pain, and then can restart Verzenio on discharge; 4 days of treatment with Verzenio prior to this admission is too soon to predict response   Will resume her home pain regiment of OxyContin 10mg BID and percocet q4hr prn   Pt is retaining urine so we will place a herndon  and get strict I&Os w presence of sepsis   Added scheduled duonebs for her COPD   PT/OT consult to assess her functional status, especially given hx of recent displaced fracture of right femur that was deemed non-surgical     Consultants/Specialty:  N/A  Review of Systems:    Review of Systems   Constitutional: Positive for chills, fever and malaise/fatigue.   HENT: Negative for congestion and sore throat.    Eyes: Negative for discharge and redness.   Respiratory: Positive for cough and sputum production. Negative for hemoptysis, shortness of breath, wheezing and stridor.    Cardiovascular: Negative for chest pain, palpitations and leg swelling.   Gastrointestinal: Negative for abdominal pain, constipation, diarrhea, heartburn, nausea and vomiting.   Genitourinary: Positive for urgency. Negative for dysuria and frequency.        Incontinence for 3 days and MRI showed retention    Musculoskeletal: Positive for back pain (sagnificant metastatic pain ). Negative for myalgias.   Skin: Negative for itching and rash.   Neurological: Negative for sensory change and focal weakness.       Objective Data:   Physical Exam:   Vitals:   Temp:  [37 °C (98.6 °F)-38 °C (100.4 °F)] 37.5 °C (99.5 °F)  Pulse:  [] 120  Resp:  [18-24] 18  BP: ()/(36-73) 97/62  SpO2:  [93 %-100 %] 93 %     Physical Exam  Constitutional:       General: She is in acute distress.      Appearance: She is ill-appearing and toxic-appearing. She is not diaphoretic.      Comments: Appears somewhat somnolent with soft blood pressure and tachycardia, in distress due to cancer pain in her back    HENT:      Head: Normocephalic and atraumatic.      Nose: Nose normal.   Eyes:      General: No scleral icterus.     Extraocular Movements: Extraocular movements intact.      Conjunctiva/sclera: Conjunctivae normal.      Pupils: Pupils are equal, round, and reactive to light.   Neck:      Musculoskeletal: Normal range of motion and neck supple. No neck  rigidity.   Cardiovascular:      Rate and Rhythm: Tachycardia present.      Pulses: Normal pulses.      Heart sounds: Normal heart sounds. No murmur. No gallop.    Pulmonary:      Effort: Pulmonary effort is normal. No respiratory distress.      Breath sounds: No stridor. Rhonchi (diffuse throughout all posterior lung fields on expiration ) present. No wheezing or rales.   Chest:      Chest wall: No tenderness.   Abdominal:      General: Abdomen is flat. Bowel sounds are normal. There is no distension.      Palpations: Abdomen is soft.      Tenderness: There is no guarding or rebound.   Musculoskeletal: Normal range of motion.         General: No swelling or tenderness.      Right lower leg: No edema.      Left lower leg: No edema.   Skin:     General: Skin is warm.      Findings: No bruising, erythema, lesion or rash.   Neurological:      General: No focal deficit present.      Mental Status: She is oriented to person, place, and time.      Cranial Nerves: No cranial nerve deficit.      Sensory: No sensory deficit.      Motor: No weakness.   Psychiatric:      Comments: Pt is crying and upset because of the severity of her pain from the metastatic cancer            Labs:   Recent Labs     01/24/20 2242 01/25/20  0323   WBC 8.7 9.0   RBC 2.92* 2.91*   HEMOGLOBIN 9.4* 9.3*   HEMATOCRIT 30.6* 30.2*   .8* 103.8*   MCH 32.2 32.0   RDW 69.2* 68.2*   PLATELETCT 238 230   MPV 9.6 9.1   NEUTSPOLYS 69.20 73.10*   LYMPHOCYTES 21.60* 22.10   MONOCYTES 7.00 3.30   EOSINOPHILS 0.50 0.20   BASOPHILS 0.20 0.20   RBCMORPHOLO Present  --      Recent Labs     01/24/20 2242 01/25/20  0323   SODIUM 138 139   POTASSIUM 3.5* 4.1   CHLORIDE 114* 113*   CO2 15* 18*   GLUCOSE 109* 147*   BUN 14 13     Imaging:   CT-HEAD W/O   Final Result      1.  No acute intracranial abnormality.   2.  Osseous metastatic disease.      DX-CHEST-PORTABLE (1 VIEW)   Final Result      No acute cardiopulmonary disease evident.      MR-LUMBAR SPINE-WITH  & W/O    (Results Pending)   MR-CERVICAL SPINE-WITH & W/O    (Results Pending)   MR-THORACIC SPINE-WITH & W/O    (Results Pending)         * Sepsis (HCC)  Assessment & Plan  Sepsis Present on admission, soft bp's and sinus tachycardia low threshold for ICU transfer   SIRS criteria: Tachycardia, with heart rate greater than 90 BPM and Tachypnea, with respirations greater than 20 per minute  Source is unknown  Sepsis protocol initiated on admission   Fluid resuscitation ordered per protocol  IV antibiotics C3 and doxy ordered despite negative CXR, pt has been having a cough with fevers and chills and got sick after her  was having fevers of 103F and cough    Plan:  - Blood Cultures Pending, No Leukocytosis, CXR negative  -IV fluids at 100ml hr after boluses are complete per protocol   -C3 and doxy for 5 days treating for likely CAP at this time started morning 1/25/20  -Hold her breast cancer medication Verzenio until sepsis resolves   -Pt is retaining urine so we will place a herndon and get strict I&Os w presence of sepsis   -Low threshold for ICU transfer if hemodynamically unstable or becomes more somnolent       Breast cancer (HCC)- (present on admission)  Assessment & Plan  History of Metastatic Breast Cancer, evaluated by Dr. Wynne during last admission. Mets to L and Spine, Pelvis. CT head on admission showing Mets to skull, no prior records of it. Brought by family for concern given weakness and increased confusion, patient reporting new incontinence. Repeat MR of Spine Chart review shows Dr. Wynne recommended palliative care, palliative was consulted during last admission, Advance Directive stating patient's POA is her , Jones Cota.  Discussed code status, full code at this time.     Plan:  Pt agreed to a palliative consult at this time and we spoke to them and they will be seeing pt   Spoke to Dr. Khan (Oncology) and she recommends holding patient's Verzenio until the sepsis resolves,  managing her pain, and then can restart Verzenio on discharge; 4 days of treatment with Verzenio prior to this admission is too soon to predict response   Will resume her home pain regiment of OxyContin 10mg BID and percocet q4hr prn       COPD (chronic obstructive pulmonary disease) (HCC)- (present on admission)  Assessment & Plan  Chronic respiratory failure with 3L O2 at home, currently requiring 3L, at baseline. Diffuse expiratory rhonci on exam    - RT protocol  - Albuterol Q6  - Incentive Spirometer    -scheduled duonebs Q4     Macrocytic anemia- (present on admission)  Assessment & Plan  Patient with chronic anemia, chart review showing normal B12/Folate, likely secondary to bone metastasis     Displaced fracture of greater trochanter of right femur (HCC)- (present on admission)  Assessment & Plan  Admitted for during last admission (Dec 2019), Ortho consulted and patient deemed non-surgical. PT had recommended home health, per family, patient was denied home health. Did not go to physical therapy as prescribed on last admission    - CTM  -PT/OT consult

## 2020-01-25 NOTE — ASSESSMENT & PLAN NOTE
History of Metastatic Breast Cancer, evaluated by Dr. Wynne during last admission. Mets to L and Spine, Pelvis. CT head on admission showing Mets to skull, no prior records of it. Brought by family for concern given weakness and increased confusion, patient reporting new incontinence. Repeat MR of Spine Chart review shows Dr. Wynne recommended palliative care, palliative was consulted during last admission, Advance Directive stating patient's POA is her , Jones Cota.  Discussed code status, full code at this time.      Plan:  - Spoke to Dr. Khan (Oncology) and she recommends holding patient's Verzenio until the sepsis resolves, managing her pain, and then can restart Verzenio on discharge; 4 days of treatment with Verzenio prior to this admission is too soon to predict response   - Palliative  has had discussion with Patient and family  - Pain control- cindy Oxycontin 10 mg q12, oxy 1-2 tabs q4 prn, lidocaine patches to areas of pain  - Percocet for 15 days  - palliative radiation therapy Monday 2/3/20   - Patient and  declined rehabilitation, were explained the recommendations by PT and OT  - Follow up appointment with PCP and Dr Wynne scheduled

## 2020-01-25 NOTE — PROGRESS NOTES
"Bedside report received.  Assessment complete.  A&O x 4. Patient calls appropriately.  Patient ambulates with max assist. Bed alarm off.   Patient has 7/10 pain. Pain managed with prescribed medications.  Denies N&V. Tolerating dysphagia 3 diet.  + void in herndon for retention, + flatus, last BM PTA.  Has weak productive cough, RT is following for neb treatments.  SCD's on.  Patient is fatigued, crying occasionally, but cooperative. Her  called with concerns about her medications for cancer, and I updated him on current medications.  Review plan with of care with patient. Call light and personal belongings with in reach. Hourly rounding in place. All needs met at this time.  BP (!) 97/62 Comment: night RN and MD in room  Pulse (!) 120   Temp 37.5 °C (99.5 °F) (Oral)   Resp 18   Ht 1.651 m (5' 5\")   Wt 56.6 kg (124 lb 12.5 oz)   SpO2 93%   BMI 20.76 kg/m²       "

## 2020-01-25 NOTE — PROGRESS NOTES
Skin check:    Spot that appears to be healed wound on right butt check, pink and blanching    All bony prominences checked, no breakdown or wounds  Skin under all devices checked, no wounds  Scds to BLE

## 2020-01-25 NOTE — ASSESSMENT & PLAN NOTE
Admitted for during last admission (Dec 2019), Ortho consulted and patient deemed non-surgical. PT had recommended home health, per family, patient was denied home health. Did not go to physical therapy as prescribed on last admission      - CTM  -PT/OT   -PT recommends therapy 3x per day or [facility] if pt agrees  -Patient declined Rehab, would like to go home

## 2020-01-25 NOTE — ED NOTES
ERP at bedside for eval. Labs previously sent, paged lab draw for 2 sets of blood cultures, to come to bedside. ED tech to get EKG.  Assisted to bedpan in room.

## 2020-01-25 NOTE — ED NOTES
Assisted to bedpan 500 cc output clear yellow urine, urine sent to lab.  EKG completed. Family arrived to BS.  Pt only able to draw out of right arm, lab draw states will come after 30 minutes to draw 2nd set of cultures.  No further orders. Will CTM.

## 2020-01-25 NOTE — ED PROVIDER NOTES
ED Provider Note    ER PROVIDER NOTE      CHIEF COMPLAINT  Chief Complaint   Patient presents with   • Altered Mental Status       HPI  Roxy Cota is a 44 y.o. female who presents to the emergency department complaining of generalized weakness and confusion.  Per family she has had some increased confusion and memory issues over the last few days, and she appears more weak.  Patient reports that she continues to have pain in her hip and in her back, but has not been taking any pain medications.  No fevers or chills, no nausea vomiting.  She has had some intermittent diarrhea which is chronic for her.  Has not been eating well.  She states that she will occasionally have episodes of urinary incontinence no bowel incontinence     Per family, patient has been increasingly forgetful    History of metastatic breast cancer, as well as recent hip fracture    REVIEW OF SYSTEMS  Pertinent positives include metastatic cancer, confusion. Pertinent negatives include no fever. See HPI for details. All other systems reviewed and are negative.    PAST MEDICAL HISTORY   has a past medical history of Cancer (HCC).    SURGICAL HISTORY   has a past surgical history that includes mastectomy (Right).    FAMILY HISTORY  No family history on file.    SOCIAL HISTORY  Social History     Socioeconomic History   • Marital status:      Spouse name: Not on file   • Number of children: Not on file   • Years of education: Not on file   • Highest education level: Not on file   Occupational History   • Not on file   Social Needs   • Financial resource strain: Not hard at all   • Food insecurity:     Worry: Never true     Inability: Never true   • Transportation needs:     Medical: Yes     Non-medical: No   Tobacco Use   • Smoking status: Former Smoker     Packs/day: 0.00   • Smokeless tobacco: Never Used   Substance and Sexual Activity   • Alcohol use: Not on file   • Drug use: Not on file   • Sexual activity: Not on file   Lifestyle  "  • Physical activity:     Days per week: Not on file     Minutes per session: Not on file   • Stress: Not on file   Relationships   • Social connections:     Talks on phone: Not on file     Gets together: Not on file     Attends [employer] service: Not on file     Active member of club or organization: Not on file     Attends meetings of clubs or organizations: Not on file     Relationship status: Not on file   • Intimate partner violence:     Fear of current or ex partner: Not on file     Emotionally abused: Not on file     Physically abused: Not on file     Forced sexual activity: Not on file   Other Topics Concern   • Not on file   Social History Narrative   • Not on file      Social History     Substance and Sexual Activity   Drug Use Not on file       CURRENT MEDICATIONS  Home Medications    **Home medications have not yet been reviewed for this encounter**         ALLERGIES  Allergies   Allergen Reactions   • Fish    • Levaquin    • Lyrica Hives   • Morphine Unspecified     Was told she is very allergic after surgery.    • Motrin [Ibuprofen] Hives   • Neurontin [Gabapentin] Swelling     Messes with kidneys   • Pcn [Penicillins] Hives     Burning sensation throughout body       PHYSICAL EXAM  VITAL SIGNS: BP (!) 86/56   Pulse (!) 111   Temp 37 °C (98.6 °F) (Temporal)   Resp (!) 22   Ht 1.651 m (5' 5\")   Wt 65.8 kg (145 lb)   SpO2 99%   BMI 24.13 kg/m²   Pulse ox interpretation: I interpret this pulse ox as normal.    Constitutional: Alert chronically ill-appearing  HENT: No signs of trauma, Bilateral external ears normal, Nose normal.   Eyes: Pupils are equal and reactive, Conjunctiva normal, Non-icteric.   Neck: Normal range of motion, No tenderness, Supple, No stridor.   Lymphatic: No lymphadenopathy noted.   Cardiovascular: Regular rate and rhythm, no murmurs.   Thorax & Lungs: Normal breath sounds, No respiratory distress, No wheezing, No chest tenderness.   Abdomen: Bowel sounds normal, Soft, No " tenderness, No masses, No pulsatile masses. No peritoneal signs.  Skin: Warm, Dry, No erythema, No rash.   Back: No bony tenderness, No CVA tenderness.   Extremities: Intact distal pulses, No edema, No tenderness, No cyanosis, Negative Nolvia's sign.  Musculoskeletal: Right hip is externally rotated and shortened, she has some associated tenderness, otherwise no joint or bony tenderness  Neurologic: Alert , Normal motor function, Normal sensory function, No focal deficits noted.   Psychiatric: Affect normal, Judgment normal, Mood normal.     DIAGNOSTIC STUDIES / PROCEDURES    EKG  Results for orders placed or performed during the hospital encounter of 01/24/20   Lactic acid (lactate)   Result Value Ref Range    Lactic Acid 1.1 0.5 - 2.0 mmol/L   CBC WITH DIFFERENTIAL   Result Value Ref Range    WBC 8.7 4.8 - 10.8 K/uL    RBC 2.92 (L) 4.20 - 5.40 M/uL    Hemoglobin 9.4 (L) 12.0 - 16.0 g/dL    Hematocrit 30.6 (L) 37.0 - 47.0 %    .8 (H) 81.4 - 97.8 fL    MCH 32.2 27.0 - 33.0 pg    MCHC 30.7 (L) 33.6 - 35.0 g/dL    RDW 69.2 (H) 35.9 - 50.0 fL    Platelet Count 238 164 - 446 K/uL    MPV 9.6 9.0 - 12.9 fL    Neutrophils-Polys 69.20 44.00 - 72.00 %    Lymphocytes 21.60 (L) 22.00 - 41.00 %    Monocytes 7.00 0.00 - 13.40 %    Eosinophils 0.50 0.00 - 6.90 %    Basophils 0.20 0.00 - 1.80 %    Immature Granulocytes 1.50 (H) 0.00 - 0.90 %    Nucleated RBC 0.90 /100 WBC    Neutrophils (Absolute) 6.02 2.00 - 7.15 K/uL    Lymphs (Absolute) 1.88 1.00 - 4.80 K/uL    Monos (Absolute) 0.61 0.00 - 0.85 K/uL    Eos (Absolute) 0.04 0.00 - 0.51 K/uL    Baso (Absolute) 0.02 0.00 - 0.12 K/uL    Immature Granulocytes (abs) 0.13 (H) 0.00 - 0.11 K/uL    NRBC (Absolute) 0.08 K/uL    Anisocytosis 1+     Macrocytosis 1+    COMP METABOLIC PANEL   Result Value Ref Range    Sodium 138 135 - 145 mmol/L    Potassium 3.5 (L) 3.6 - 5.5 mmol/L    Chloride 114 (H) 96 - 112 mmol/L    Co2 15 (L) 20 - 33 mmol/L    Anion Gap 9.0 0.0 - 11.9    Glucose  109 (H) 65 - 99 mg/dL    Bun 14 8 - 22 mg/dL    Creatinine 1.24 0.50 - 1.40 mg/dL    Calcium 7.7 (L) 8.5 - 10.5 mg/dL    AST(SGOT) 35 12 - 45 U/L    ALT(SGPT) 8 2 - 50 U/L    Alkaline Phosphatase 112 (H) 30 - 99 U/L    Total Bilirubin 0.2 0.1 - 1.5 mg/dL    Albumin 3.3 3.2 - 4.9 g/dL    Total Protein 6.2 6.0 - 8.2 g/dL    Globulin 2.9 1.9 - 3.5 g/dL    A-G Ratio 1.1 g/dL   URINALYSIS   Result Value Ref Range    Color Yellow     Character Clear     Specific Gravity 1.006 <1.035    Ph 6.0 5.0 - 8.0    Glucose Negative Negative mg/dL    Ketones Negative Negative mg/dL    Protein 100 (A) Negative mg/dL    Bilirubin Negative Negative    Urobilinogen, Urine 0.2 Negative    Nitrite Negative Negative    Leukocyte Esterase Negative Negative    Occult Blood Small (A) Negative    Micro Urine Req Microscopic    ESTIMATED GFR   Result Value Ref Range    GFR If  56 (A) >60 mL/min/1.73 m 2    GFR If Non  47 (A) >60 mL/min/1.73 m 2   MORPHOLOGY   Result Value Ref Range    RBC Morphology Present    PERIPHERAL SMEAR REVIEW   Result Value Ref Range    Peripheral Smear Review see below    PLATELET ESTIMATE   Result Value Ref Range    Plt Estimation Normal    DIFFERENTIAL COMMENT   Result Value Ref Range    Comments-Diff see below    URINE MICROSCOPIC (W/UA)   Result Value Ref Range    WBC 0-2 /hpf    RBC 0-2 /hpf    Bacteria Negative None /hpf    Epithelial Cells Negative /hpf    Hyaline Cast 0-2 /lpf   EKG   Result Value Ref Range    Report       [facility] Emergency Dept.    Test Date:  2020  Pt Name:    GEOVANNI CABRAL                Department: ER  MRN:        [id number]                      Room:        18  Gender:     Female                       Technician: [id number]  :        [id number]                   Requested By:CHARMAINE HINOJOSA  Order #:    [id number]                    Nitin MD: CHARMAINE HINOJOSA MD    Measurements  Intervals                                Axis  Rate:        106                          P:          74  AK:         144                          QRS:        80  QRSD:       94                           T:          0  QT:         364  QTc:        484    Interpretive Statements  SINUS TACHYCARDIA  VENTRICULAR PREMATURE COMPLEX  ABERRANT COMPLEX, POSSIBLY SUPRAVENTRICULAR  MYLA, CONSIDER BIATRIAL ABNORMALITIES  CONSIDER RIGHT VENTRICULAR HYPERTROPHY  NONSPECIFIC T ABNORMALITIES, INFERIOR LEADS  Electronically Signed On 1- 0:05:04 PST by CHARMAINE HINOJOSA MD           RADIOLOGY  CT-HEAD W/O   Final Result      1.  No acute intracranial abnormality.   2.  Osseous metastatic disease.      DX-CHEST-PORTABLE (1 VIEW)   Final Result      No acute cardiopulmonary disease evident.        The radiologist's interpretation of all radiological studies have been reviewed by me.    COURSE & MEDICAL DECISION MAKING  Nursing notes, VS, PMSFHx reviewed in chart.    10:52 PM Patient seen and examined at bedside. Patient will be treated with IV fluid and fentanyl. Ordered for sepsis bundle, CT head, chest x-ray to evaluate her symptoms.       Patient reports improved pain control after medication, blood pressure improved after fluids as well    Discussed case with [facility] for admission    HYDRATION: Based on the patient's presentation of Dehydration the patient was given IV fluids. IV Hydration was used because oral hydration was not as rapid as required. Upon recheck following hydration, the patient was imrpoved.    Decision Making:  This is a 44 y.o. female presenting with altered mental status and some generalized weakness.  Patient has history of metastatic breast cancer and will be admitted for further care.  She has recently had MRIs although some new symptoms including urinary incontinence and some confusion she may benefit from MRIs of her spine and brain given her metastatic history.  Patient has no fevers or leukocytosis or other infectious symptoms and no findings on her  diagnostics in the emergency department suggestive of infectious etiology.     Patient is admitted in stable condition    FINAL IMPRESSION  1. Confusion    2. Memory loss    3. Metastatic breast cancer (HCC)       The note accurately reflects work and decisions made by me.  Jose Angel Drummond M.D.  1/25/2020  1:21 AM

## 2020-01-26 ENCOUNTER — APPOINTMENT (OUTPATIENT)
Dept: RADIOLOGY | Facility: MEDICAL CENTER | Age: 45
DRG: 871 | End: 2020-01-26
Attending: INTERNAL MEDICINE
Payer: COMMERCIAL

## 2020-01-26 ENCOUNTER — APPOINTMENT (OUTPATIENT)
Dept: RADIOLOGY | Facility: MEDICAL CENTER | Age: 45
DRG: 871 | End: 2020-01-26
Attending: STUDENT IN AN ORGANIZED HEALTH CARE EDUCATION/TRAINING PROGRAM
Payer: COMMERCIAL

## 2020-01-26 PROBLEM — J10.1 INFLUENZA A: Status: ACTIVE | Noted: 2020-01-26

## 2020-01-26 PROBLEM — R33.9 URINARY RETENTION: Status: ACTIVE | Noted: 2020-01-26

## 2020-01-26 LAB
ALBUMIN SERPL BCP-MCNC: 2.8 G/DL (ref 3.2–4.9)
ALBUMIN/GLOB SERPL: 1.1 G/DL
ALP SERPL-CCNC: 79 U/L (ref 30–99)
ALT SERPL-CCNC: 11 U/L (ref 2–50)
ANION GAP SERPL CALC-SCNC: 8 MMOL/L (ref 0–11.9)
AST SERPL-CCNC: 46 U/L (ref 12–45)
BASOPHILS # BLD AUTO: 0 % (ref 0–1.8)
BASOPHILS # BLD: 0 K/UL (ref 0–0.12)
BILIRUB SERPL-MCNC: 0.2 MG/DL (ref 0.1–1.5)
BUN SERPL-MCNC: 10 MG/DL (ref 8–22)
CALCIUM SERPL-MCNC: 7.1 MG/DL (ref 8.5–10.5)
CHLORIDE SERPL-SCNC: 113 MMOL/L (ref 96–112)
CO2 SERPL-SCNC: 20 MMOL/L (ref 20–33)
CREAT SERPL-MCNC: 1.1 MG/DL (ref 0.5–1.4)
EOSINOPHIL # BLD AUTO: 0 K/UL (ref 0–0.51)
EOSINOPHIL NFR BLD: 0 % (ref 0–6.9)
ERYTHROCYTE [DISTWIDTH] IN BLOOD BY AUTOMATED COUNT: 68.4 FL (ref 35.9–50)
FLUAV RNA SPEC QL NAA+PROBE: POSITIVE
FLUBV RNA SPEC QL NAA+PROBE: NEGATIVE
GLOBULIN SER CALC-MCNC: 2.5 G/DL (ref 1.9–3.5)
GLUCOSE SERPL-MCNC: 113 MG/DL (ref 65–99)
GRAM STN SPEC: NORMAL
HCT VFR BLD AUTO: 25.1 % (ref 37–47)
HGB BLD-MCNC: 7.9 G/DL (ref 12–16)
LACTATE BLD-SCNC: 0.8 MMOL/L (ref 0.5–2)
LYMPHOCYTES # BLD AUTO: 1.37 K/UL (ref 1–4.8)
LYMPHOCYTES NFR BLD: 13.8 % (ref 22–41)
MAGNESIUM SERPL-MCNC: 1.8 MG/DL (ref 1.5–2.5)
MANUAL DIFF BLD: NORMAL
MCH RBC QN AUTO: 32.4 PG (ref 27–33)
MCHC RBC AUTO-ENTMCNC: 31.5 G/DL (ref 33.6–35)
MCV RBC AUTO: 102.9 FL (ref 81.4–97.8)
MONOCYTES # BLD AUTO: 0 K/UL (ref 0–0.85)
MONOCYTES NFR BLD AUTO: 0 % (ref 0–13.4)
MORPHOLOGY BLD-IMP: NORMAL
NEUTROPHILS # BLD AUTO: 8.53 K/UL (ref 2–7.15)
NEUTROPHILS NFR BLD: 81.9 % (ref 44–72)
NEUTS BAND NFR BLD MANUAL: 4.3 % (ref 0–10)
NRBC # BLD AUTO: 0.02 K/UL
NRBC BLD-RTO: 0.2 /100 WBC
PLATELET # BLD AUTO: 205 K/UL (ref 164–446)
PLATELET BLD QL SMEAR: NORMAL
PMV BLD AUTO: 9.2 FL (ref 9–12.9)
POTASSIUM SERPL-SCNC: 3.6 MMOL/L (ref 3.6–5.5)
PROT SERPL-MCNC: 5.3 G/DL (ref 6–8.2)
RBC # BLD AUTO: 2.44 M/UL (ref 4.2–5.4)
SIGNIFICANT IND 70042: NORMAL
SITE SITE: NORMAL
SODIUM SERPL-SCNC: 141 MMOL/L (ref 135–145)
SOURCE SOURCE: NORMAL
WBC # BLD AUTO: 9.9 K/UL (ref 4.8–10.8)

## 2020-01-26 PROCEDURE — 700102 HCHG RX REV CODE 250 W/ 637 OVERRIDE(OP): Performed by: STUDENT IN AN ORGANIZED HEALTH CARE EDUCATION/TRAINING PROGRAM

## 2020-01-26 PROCEDURE — 700111 HCHG RX REV CODE 636 W/ 250 OVERRIDE (IP): Performed by: INTERNAL MEDICINE

## 2020-01-26 PROCEDURE — 700105 HCHG RX REV CODE 258: Performed by: INTERNAL MEDICINE

## 2020-01-26 PROCEDURE — 92950 HEART/LUNG RESUSCITATION CPR: CPT

## 2020-01-26 PROCEDURE — 700111 HCHG RX REV CODE 636 W/ 250 OVERRIDE (IP): Performed by: STUDENT IN AN ORGANIZED HEALTH CARE EDUCATION/TRAINING PROGRAM

## 2020-01-26 PROCEDURE — 0B9D8ZX DRAINAGE OF RIGHT MIDDLE LUNG LOBE, VIA NATURAL OR ARTIFICIAL OPENING ENDOSCOPIC, DIAGNOSTIC: ICD-10-PCS | Performed by: INTERNAL MEDICINE

## 2020-01-26 PROCEDURE — A9270 NON-COVERED ITEM OR SERVICE: HCPCS | Performed by: STUDENT IN AN ORGANIZED HEALTH CARE EDUCATION/TRAINING PROGRAM

## 2020-01-26 PROCEDURE — 85007 BL SMEAR W/DIFF WBC COUNT: CPT

## 2020-01-26 PROCEDURE — 02HV33Z INSERTION OF INFUSION DEVICE INTO SUPERIOR VENA CAVA, PERCUTANEOUS APPROACH: ICD-10-PCS | Performed by: INTERNAL MEDICINE

## 2020-01-26 PROCEDURE — 94668 MNPJ CHEST WALL SBSQ: CPT

## 2020-01-26 PROCEDURE — 770022 HCHG ROOM/CARE - ICU (200)

## 2020-01-26 PROCEDURE — 700111 HCHG RX REV CODE 636 W/ 250 OVERRIDE (IP)

## 2020-01-26 PROCEDURE — 700102 HCHG RX REV CODE 250 W/ 637 OVERRIDE(OP): Performed by: INTERNAL MEDICINE

## 2020-01-26 PROCEDURE — C1751 CATH, INF, PER/CENT/MIDLINE: HCPCS | Performed by: INTERNAL MEDICINE

## 2020-01-26 PROCEDURE — 85027 COMPLETE CBC AUTOMATED: CPT

## 2020-01-26 PROCEDURE — 71046 X-RAY EXAM CHEST 2 VIEWS: CPT

## 2020-01-26 PROCEDURE — 302977 HCHG BRONCHOSCOPY PROC-THERAPEUTIC

## 2020-01-26 PROCEDURE — 83605 ASSAY OF LACTIC ACID: CPT

## 2020-01-26 PROCEDURE — 99291 CRITICAL CARE FIRST HOUR: CPT | Performed by: INTERNAL MEDICINE

## 2020-01-26 PROCEDURE — 0BH17EZ INSERTION OF ENDOTRACHEAL AIRWAY INTO TRACHEA, VIA NATURAL OR ARTIFICIAL OPENING: ICD-10-PCS | Performed by: INTERNAL MEDICINE

## 2020-01-26 PROCEDURE — 87205 SMEAR GRAM STAIN: CPT

## 2020-01-26 PROCEDURE — 31624 DX BRONCHOSCOPE/LAVAGE: CPT | Performed by: INTERNAL MEDICINE

## 2020-01-26 PROCEDURE — 99233 SBSQ HOSP IP/OBS HIGH 50: CPT | Mod: GC | Performed by: HOSPITALIST

## 2020-01-26 PROCEDURE — 302132 K THERMIA MOTOR: Performed by: INTERNAL MEDICINE

## 2020-01-26 PROCEDURE — 80053 COMPREHEN METABOLIC PANEL: CPT

## 2020-01-26 PROCEDURE — C1751 CATH, INF, PER/CENT/MIDLINE: HCPCS

## 2020-01-26 PROCEDURE — 31645 BRNCHSC W/THER ASPIR 1ST: CPT | Performed by: INTERNAL MEDICINE

## 2020-01-26 PROCEDURE — 5A1945Z RESPIRATORY VENTILATION, 24-96 CONSECUTIVE HOURS: ICD-10-PCS | Performed by: INTERNAL MEDICINE

## 2020-01-26 PROCEDURE — 99152 MOD SED SAME PHYS/QHP 5/>YRS: CPT | Mod: 59 | Performed by: INTERNAL MEDICINE

## 2020-01-26 PROCEDURE — 99292 CRITICAL CARE ADDL 30 MIN: CPT | Mod: 25 | Performed by: INTERNAL MEDICINE

## 2020-01-26 PROCEDURE — 87502 INFLUENZA DNA AMP PROBE: CPT

## 2020-01-26 PROCEDURE — 83735 ASSAY OF MAGNESIUM: CPT

## 2020-01-26 PROCEDURE — 31500 INSERT EMERGENCY AIRWAY: CPT | Performed by: INTERNAL MEDICINE

## 2020-01-26 PROCEDURE — 302214 INTUBATION BOX: Performed by: INTERNAL MEDICINE

## 2020-01-26 PROCEDURE — 0B9M8ZZ DRAINAGE OF BILATERAL LUNGS, VIA NATURAL OR ARTIFICIAL OPENING ENDOSCOPIC: ICD-10-PCS | Performed by: INTERNAL MEDICINE

## 2020-01-26 PROCEDURE — 87070 CULTURE OTHR SPECIMN AEROBIC: CPT

## 2020-01-26 PROCEDURE — 36556 INSERT NON-TUNNEL CV CATH: CPT

## 2020-01-26 PROCEDURE — 700105 HCHG RX REV CODE 258: Performed by: STUDENT IN AN ORGANIZED HEALTH CARE EDUCATION/TRAINING PROGRAM

## 2020-01-26 PROCEDURE — 700101 HCHG RX REV CODE 250: Performed by: INTERNAL MEDICINE

## 2020-01-26 PROCEDURE — 36556 INSERT NON-TUNNEL CV CATH: CPT | Performed by: INTERNAL MEDICINE

## 2020-01-26 PROCEDURE — 99152 MOD SED SAME PHYS/QHP 5/>YRS: CPT

## 2020-01-26 PROCEDURE — 700101 HCHG RX REV CODE 250: Performed by: STUDENT IN AN ORGANIZED HEALTH CARE EDUCATION/TRAINING PROGRAM

## 2020-01-26 PROCEDURE — 94640 AIRWAY INHALATION TREATMENT: CPT

## 2020-01-26 PROCEDURE — 302154 KIT COOLING VEST BARIATRIC BLANKET: Performed by: INTERNAL MEDICINE

## 2020-01-26 PROCEDURE — 94002 VENT MGMT INPAT INIT DAY: CPT

## 2020-01-26 PROCEDURE — A9270 NON-COVERED ITEM OR SERVICE: HCPCS | Performed by: INTERNAL MEDICINE

## 2020-01-26 PROCEDURE — 31500 INSERT EMERGENCY AIRWAY: CPT

## 2020-01-26 RX ORDER — ACETAMINOPHEN 500 MG
1000 TABLET ORAL ONCE
Status: COMPLETED | OUTPATIENT
Start: 2020-01-26 | End: 2020-01-26

## 2020-01-26 RX ORDER — LINEZOLID 600 MG/1
600 TABLET, FILM COATED ORAL EVERY 12 HOURS
Status: DISCONTINUED | OUTPATIENT
Start: 2020-01-26 | End: 2020-01-27

## 2020-01-26 RX ORDER — MIDODRINE HYDROCHLORIDE 5 MG/1
5 TABLET ORAL
Status: DISCONTINUED | OUTPATIENT
Start: 2020-01-26 | End: 2020-01-27

## 2020-01-26 RX ORDER — PHENYLEPHRINE HYDROCHLORIDE 10 MG/ML
INJECTION, SOLUTION INTRAMUSCULAR; INTRAVENOUS; SUBCUTANEOUS
Status: ACTIVE
Start: 2020-01-26 | End: 2020-01-27

## 2020-01-26 RX ORDER — PHENYLEPHRINE HCL IN 0.9% NACL 0.5 MG/5ML
SYRINGE (ML) INTRAVENOUS
Status: COMPLETED
Start: 2020-01-26 | End: 2020-01-26

## 2020-01-26 RX ORDER — SODIUM CHLORIDE, SODIUM LACTATE, POTASSIUM CHLORIDE, AND CALCIUM CHLORIDE .6; .31; .03; .02 G/100ML; G/100ML; G/100ML; G/100ML
1000 INJECTION, SOLUTION INTRAVENOUS ONCE
Status: ACTIVE | OUTPATIENT
Start: 2020-01-26 | End: 2020-01-27

## 2020-01-26 RX ORDER — DEXMEDETOMIDINE HYDROCHLORIDE 4 UG/ML
0-.7 INJECTION, SOLUTION INTRAVENOUS CONTINUOUS
Status: DISCONTINUED | OUTPATIENT
Start: 2020-01-26 | End: 2020-01-28

## 2020-01-26 RX ORDER — ROCURONIUM BROMIDE 10 MG/ML
70 INJECTION, SOLUTION INTRAVENOUS ONCE
Status: COMPLETED | OUTPATIENT
Start: 2020-01-26 | End: 2020-01-26

## 2020-01-26 RX ORDER — POTASSIUM CHLORIDE 20 MEQ/1
40 TABLET, EXTENDED RELEASE ORAL ONCE
Status: COMPLETED | OUTPATIENT
Start: 2020-01-26 | End: 2020-01-26

## 2020-01-26 RX ORDER — AZITHROMYCIN 250 MG/1
500 TABLET, FILM COATED ORAL DAILY
Status: DISCONTINUED | OUTPATIENT
Start: 2020-01-27 | End: 2020-01-27

## 2020-01-26 RX ORDER — DEXMEDETOMIDINE HYDROCHLORIDE 4 UG/ML
.1-1.5 INJECTION, SOLUTION INTRAVENOUS CONTINUOUS
Status: DISCONTINUED | OUTPATIENT
Start: 2020-01-26 | End: 2020-01-26

## 2020-01-26 RX ORDER — FAMOTIDINE 20 MG/1
20 TABLET, FILM COATED ORAL EVERY 12 HOURS
Status: DISCONTINUED | OUTPATIENT
Start: 2020-01-26 | End: 2020-01-29

## 2020-01-26 RX ORDER — ETOMIDATE 2 MG/ML
14 INJECTION INTRAVENOUS ONCE
Status: COMPLETED | OUTPATIENT
Start: 2020-01-26 | End: 2020-01-26

## 2020-01-26 RX ADMIN — OXYCODONE HYDROCHLORIDE 10 MG: 10 TABLET, FILM COATED, EXTENDED RELEASE ORAL at 05:09

## 2020-01-26 RX ADMIN — FENTANYL CITRATE 50 MCG: 0.05 INJECTION, SOLUTION INTRAMUSCULAR; INTRAVENOUS at 12:52

## 2020-01-26 RX ADMIN — IPRATROPIUM BROMIDE AND ALBUTEROL SULFATE 3 ML: .5; 3 SOLUTION RESPIRATORY (INHALATION) at 21:53

## 2020-01-26 RX ADMIN — FENTANYL CITRATE 100 MCG: 50 INJECTION, SOLUTION INTRAMUSCULAR; INTRAVENOUS at 23:36

## 2020-01-26 RX ADMIN — FENTANYL CITRATE 100 MCG: 0.05 INJECTION, SOLUTION INTRAMUSCULAR; INTRAVENOUS at 20:28

## 2020-01-26 RX ADMIN — FENTANYL CITRATE 50 MCG: 0.05 INJECTION, SOLUTION INTRAMUSCULAR; INTRAVENOUS at 19:12

## 2020-01-26 RX ADMIN — CEFTRIAXONE SODIUM 1 G: 1 INJECTION, POWDER, FOR SOLUTION INTRAMUSCULAR; INTRAVENOUS at 05:09

## 2020-01-26 RX ADMIN — FAMOTIDINE 20 MG: 10 INJECTION INTRAVENOUS at 23:57

## 2020-01-26 RX ADMIN — ANTACID TABLETS 500 MG: 500 TABLET, CHEWABLE ORAL at 17:30

## 2020-01-26 RX ADMIN — OSELTAMIVIR PHOSPHATE 30 MG: 75 CAPSULE ORAL at 05:09

## 2020-01-26 RX ADMIN — FENTANYL CITRATE 25 MCG: 0.05 INJECTION, SOLUTION INTRAMUSCULAR; INTRAVENOUS at 15:41

## 2020-01-26 RX ADMIN — FENTANYL CITRATE 100 MCG: 0.05 INJECTION, SOLUTION INTRAMUSCULAR; INTRAVENOUS at 22:48

## 2020-01-26 RX ADMIN — ACETAMINOPHEN 1000 MG: 500 TABLET ORAL at 14:07

## 2020-01-26 RX ADMIN — OXYCODONE HYDROCHLORIDE 10 MG: 10 TABLET, FILM COATED, EXTENDED RELEASE ORAL at 17:30

## 2020-01-26 RX ADMIN — OXYCODONE HYDROCHLORIDE AND ACETAMINOPHEN 2 TABLET: 5; 325 TABLET ORAL at 01:00

## 2020-01-26 RX ADMIN — OXYCODONE HYDROCHLORIDE AND ACETAMINOPHEN 2 TABLET: 5; 325 TABLET ORAL at 06:41

## 2020-01-26 RX ADMIN — SENNOSIDES AND DOCUSATE SODIUM 2 TABLET: 8.6; 5 TABLET ORAL at 17:30

## 2020-01-26 RX ADMIN — OSELTAMIVIR PHOSPHATE 30 MG: 75 CAPSULE ORAL at 17:31

## 2020-01-26 RX ADMIN — SODIUM CHLORIDE, POTASSIUM CHLORIDE, SODIUM LACTATE AND CALCIUM CHLORIDE: 600; 310; 30; 20 INJECTION, SOLUTION INTRAVENOUS at 01:00

## 2020-01-26 RX ADMIN — POTASSIUM CHLORIDE 40 MEQ: 1500 TABLET, EXTENDED RELEASE ORAL at 06:34

## 2020-01-26 RX ADMIN — DEXMEDETOMIDINE HYDROCHLORIDE 0.2 MCG/KG/HR: 100 INJECTION, SOLUTION INTRAVENOUS at 23:36

## 2020-01-26 RX ADMIN — DOXYCYCLINE 100 MG: 100 TABLET, FILM COATED ORAL at 05:09

## 2020-01-26 RX ADMIN — ANTACID TABLETS 500 MG: 500 TABLET, CHEWABLE ORAL at 05:09

## 2020-01-26 RX ADMIN — OMEPRAZOLE 20 MG: 20 CAPSULE, DELAYED RELEASE ORAL at 05:09

## 2020-01-26 RX ADMIN — Medication 300 MCG: at 22:35

## 2020-01-26 RX ADMIN — DOXYCYCLINE 100 MG: 100 TABLET, FILM COATED ORAL at 17:31

## 2020-01-26 RX ADMIN — ROCURONIUM BROMIDE 70 MG: 10 INJECTION, SOLUTION INTRAVENOUS at 22:37

## 2020-01-26 RX ADMIN — MIDODRINE HYDROCHLORIDE 5 MG: 5 TABLET ORAL at 17:30

## 2020-01-26 RX ADMIN — FENTANYL CITRATE 100 MCG: 0.05 INJECTION, SOLUTION INTRAMUSCULAR; INTRAVENOUS at 22:41

## 2020-01-26 RX ADMIN — ETOMIDATE 14 MG: 2 INJECTION INTRAVENOUS at 22:37

## 2020-01-26 ASSESSMENT — ENCOUNTER SYMPTOMS
FEVER: 1
STRIDOR: 0
MYALGIAS: 0
HEARTBURN: 0
HEMOPTYSIS: 0
VOMITING: 0
SENSORY CHANGE: 0
EYE REDNESS: 0
SHORTNESS OF BREATH: 0
PALPITATIONS: 0
SHORTNESS OF BREATH: 1
CHILLS: 1
ABDOMINAL PAIN: 0
BACK PAIN: 1
WEAKNESS: 0
COUGH: 1
NERVOUS/ANXIOUS: 0
HEADACHES: 0
FOCAL WEAKNESS: 0
WHEEZING: 0
CONSTIPATION: 0
NAUSEA: 0
SORE THROAT: 0
EYE DISCHARGE: 0
BACK PAIN: 0
CHILLS: 0
DIARRHEA: 0
SPUTUM PRODUCTION: 1

## 2020-01-26 NOTE — PROGRESS NOTES
At shift change, systolic BP was 86- Day RN notified [facility]; 1000 mL bolus initiated by Day RN  Fever of 103.1 F., tylenol administered by Day RN, Team aware  Tachycardia remains 110-130, Team aware  Will continue to monitor patient  Current MEWs 5- team aware

## 2020-01-26 NOTE — PROGRESS NOTES
Bedside report received.    Assessment complete.    A&O x 4. Patient calls appropriately.    Patient turns self in bed but too weak to ambulate. Bed alarm on.     Patient has 10/10 pain. Scheduled and prn pain med    Denies N&V. Tolerating regular diet.  Morales in place for accurate I/O's  BM 1/23    1000 mL bolus administered at the beginning of night shift. LR @ 100 mL/hr    3 liters oxygen, baseline  Expiratory wheeze, weak non productive cough. Poor use of IS    Systolic ,  Tachycardia remains 110-135    Temp high but trending down, recent is 101.3F, unable to give more tylenol, patient has reached the maximum amount in a 24 hour period-ice packs applied to body, but patient shivers which makes her more tachycardic!!    Positive Flu A- tamiflu ordered- Droplet precautions initiated during shift  Sputum collection still not collected D'T inability to clear secretions    Review plan with of care with patient. Call light and personal belongings with in reach. Hourly rounding in place. All needs met at this time.

## 2020-01-26 NOTE — PROGRESS NOTES
Spoke with [facility] on call re: pt vital signs and MEWS of 5. She ordered to give 500 mL bolus of LR, tylenol and recheck vital signs after bolus.

## 2020-01-26 NOTE — PROGRESS NOTES
Report received, pt has a mews of 5, tachycardia, borderline hypotension, febrile and has exceeded the acetaminophen dosing.  Refuses ice pack because they are cold.  Updated Dr. Yu who is rounding on her, waiting possible transfer to the unit.  PT was scheduled for MRI this am but refused.  Waiting on decision from attending.

## 2020-01-26 NOTE — PROGRESS NOTES
Pt with pmx of COPD 3L O2 baseline at home and metastatic breast cancer( on Verzenio), came in with altered mental status, hypotension, and tachycardia positive for SIRS.Pt started on C3 and doxy for possible CAP despite normal CXR given symptoms and elevated procalcitonin. Pt found to be influenza A positive and started on oseltamivir last evening. She received several boluses for SBP in the 80s and was continued on maintnance fluid all through her stay. Pt continued to have soft blood pressures and tachycardia and spiking high fevers not controlled on max tylenol. She was sent to the ICU for close monitoring.

## 2020-01-26 NOTE — PROGRESS NOTES
Receive page from the nurse that patient was having fever with Temperature of 103.1 F , and BP of 96/55 after receiving 1 Liter and later 500 ml bolus this evening. I went to check on the patient . As per the patient she had sick contacts at home (  with temp in 104 F and also daughter in law with fever) . Flu test still pending. But given her immune suppressed state , sick contacts around , I felt starting her on Tamiflu  is reasonable. Will start on 30 mg BID dose as her GFR is < 60.   Also ordered another 1 Liter bolus NS , repeat blood cultures and a repeat CXR for tomorrow morning.

## 2020-01-26 NOTE — CONSULTS
"Critical Care Consultation    Date of consult: 1/26/2020    Referring Physician  Ryder Bertrand M.D.    Reason for Consultation  Flu, sepsis    History of Presenting Illness  44 y.o. female with hx of breast CA, mets to bone (base of skull and cervical and lumbar spine), s/p right mastectomy, on immunotherapy (started 4 days ago), COPD on 3L home O2,  who's admitted 1/25 for SOB and found to have influenza A pneumonia. Initially admitted to the floor. Pt has been having high grade fever in 39.5-40.6C. HR in 100-130s. SBP in 80-100s. Given the change in vitals, there has been increase demand from bedside on monitoring the patient. Pt also has had \"max dose of acetaminophen\" given in the day but it appears on MAR pt only received acetaminophen 650mg PO x 3 doses (total about 2gr).     On my exam, pt was alert, somewhat drowsy but following commands appropriately.     Code Status  Full Code    Review of Systems  Review of Systems   Constitutional: Positive for fever and malaise/fatigue. Negative for chills.   HENT: Negative for congestion.    Respiratory: Positive for cough and shortness of breath.    Cardiovascular: Negative for chest pain, palpitations and leg swelling.   Gastrointestinal: Negative for abdominal pain, diarrhea, nausea and vomiting.   Genitourinary: Negative for dysuria.   Musculoskeletal: Negative for back pain and joint pain.   Skin: Negative for rash.   Neurological: Negative for weakness and headaches.   Psychiatric/Behavioral: The patient is not nervous/anxious.    All other systems reviewed and are negative.      Past Medical History   has a past medical history of Cancer (HCC).    Surgical History   has a past surgical history that includes mastectomy (Right).    Family History  family history is not on file.    Social History   reports that she has quit smoking. She smoked 0.00 packs per day. She has never used smokeless tobacco.    Medications  Home Medications     Reviewed by Umair ALVARADO" ZACH Santos (Registered Nurse) on 01/25/20 at 1233  Med List Status: Not Addressed   Medication Last Dose Status   albuterol (VENTOLIN HFA) 108 (90 Base) MCG/ACT Aero Soln inhalation aerosol 1/24/2020 Active   carvedilol (COREG) 6.25 MG Tab 1/24/2020 Active   cetirizine (ZYRTEC) 10 MG Tab 1/24/2020 Active   citalopram (CELEXA) 40 MG Tab 1/24/2020 Active   clonazePAM (KLONOPIN) 1 MG Tab 1/24/2020 Active   cyanocobalamin (VITAMIN B-12) 500 MCG Tab 1/24/2020 Active   dicyclomine (BENTYL) 10 MG Cap 1/24/2020 Active   lidocaine (LIDODERM) 5 % Patch 1/24/2020 Active   omeprazole (PRILOSEC) 20 MG delayed-release capsule 1/24/2020 Active   polyethylene glycol/lytes (MIRALAX) Pack 1/24/2020 Active   tamoxifen (NOLVADEX) 10 MG Tab 1/24/2020 Active   tizanidine (ZANAFLEX) 4 MG Tab 1/24/2020 Active              Current Facility-Administered Medications   Medication Dose Route Frequency Provider Last Rate Last Dose   • senna-docusate (PERICOLACE or SENOKOT S) 8.6-50 MG per tablet 2 Tab  2 Tab Oral BID Kasia Barrientos D.O.        And   • polyethylene glycol/lytes (MIRALAX) PACKET 1 Packet  1 Packet Oral QDAY PRN Kasia Barrientos D.O.        And   • bisacodyl (DULCOLAX) suppository 10 mg  10 mg Rectal QDAY PRN Kasia Barrientos D.O.       • Respiratory Care per Protocol   Nebulization Continuous RT DONNA Mathias.O.       • enoxaparin (LOVENOX) inj 40 mg  40 mg Subcutaneous DAILY DONNA Mathias.O.       • acetaminophen (TYLENOL) tablet 650 mg  650 mg Oral Q6HRS PRN Kasia Barrientos D.O.   650 mg at 01/25/20 2331   • omeprazole (PRILOSEC) capsule 20 mg  20 mg Oral DAILY Kasia Barrientos D.O.   20 mg at 01/26/20 0509   • lidocaine (LIDODERM) 5 % 1 Patch  1 Patch Transdermal Q24HRS Kasia Barrientos D.O.   Stopped at 01/26/20 0600   • lactated ringers infusion   Intravenous Continuous Alejandra Tomlinson M.D. 100 mL/hr at 01/26/20 0100     • calcium carbonate (TUMS) chewable tab 500 mg  500 mg Oral BID  Alejandra Tomlinson M.D.   500 mg at 01/26/20 0509   • oxyCODONE CR (OXYCONTIN) tablet 10 mg  10 mg Oral Q12HRS Juvenal Hermosillo M.D.   10 mg at 01/26/20 0509   • cefTRIAXone (ROCEPHIN) 1 g in  mL IVPB  1 g Intravenous Q24HRS Juvenal Hermosillo M.D.   Stopped at 01/26/20 0539   • doxycycline monohydrate (ADOXA) tablet 100 mg  100 mg Oral Q12HRS Juvenal Hermosillo M.D.   100 mg at 01/26/20 0509   • albuterol inhaler 2 Puff  2 Puff Inhalation Q4H PRN (RT) Alejandra Tomlinson M.D.       • oxyCODONE-acetaminophen (PERCOCET) 5-325 MG per tablet 1-2 Tab  1-2 Tab Oral Q4HRS PRN Juvenal Hermosillo M.D.   2 Tab at 01/26/20 0641   • oseltamivir (TAMIFLU) capsule 30 mg  30 mg Oral BID Funmi Rose M.D.   30 mg at 01/26/20 0509   • ipratropium-albuterol (DUONEB) nebulizer solution  3 mL Nebulization 4X/DAY (RT) DONNA Mathias.ORito   Stopped at 01/26/20 0736   • ipratropium-albuterol (DUONEB) nebulizer solution  3 mL Nebulization Q4H PRN (RT) Kasia Barrientos D.ORito           Allergies  Allergies   Allergen Reactions   • Fish    • Levaquin    • Lyrica Hives   • Morphine Unspecified     Was told she is very allergic after surgery.    • Motrin [Ibuprofen] Hives   • Neurontin [Gabapentin] Swelling     Messes with kidneys   • Pcn [Penicillins] Hives     Burning sensation throughout body  Tolerated Ceftriaxone 12/2019       Vital Signs last 24 hours  Temp:  [37.9 °C (100.2 °F)-39.6 °C (103.3 °F)] 38.3 °C (101 °F)  Pulse:  [104-132] 128  Resp:  [18-20] 20  BP: ()/(46-60) 96/56  SpO2:  [96 %-99 %] 96 %    Physical Exam  Physical Exam  Vitals signs and nursing note reviewed.   Constitutional:       General: She is not in acute distress.     Appearance: She is obese. She is ill-appearing and toxic-appearing. She is not diaphoretic.   HENT:      Head: Normocephalic and atraumatic.      Mouth/Throat:      Mouth: Mucous membranes are dry.   Eyes:      Pupils: Pupils are equal, round, and reactive to light.   Neck:       Musculoskeletal: Neck supple.   Cardiovascular:      Rate and Rhythm: Normal rate and regular rhythm.      Pulses: Normal pulses.      Heart sounds: Normal heart sounds. No murmur.   Pulmonary:      Effort: No respiratory distress.      Breath sounds: Normal breath sounds. No wheezing, rhonchi or rales.   Abdominal:      General: Bowel sounds are normal. There is no distension.      Palpations: Abdomen is soft.      Tenderness: There is no tenderness. There is no guarding.   Musculoskeletal:      Right lower leg: No edema.      Left lower leg: No edema.   Skin:     Coloration: Skin is not jaundiced.      Findings: No bruising or rash.   Neurological:      General: No focal deficit present.      Mental Status: She is alert.      Comments: Drowsy but able to answer questions appropriately          Fluids    Intake/Output Summary (Last 24 hours) at 1/26/2020 1117  Last data filed at 1/26/2020 0725  Gross per 24 hour   Intake 3898.33 ml   Output 4475 ml   Net -576.67 ml       Laboratory  Recent Results (from the past 48 hour(s))   Lactic acid (lactate)    Collection Time: 01/24/20 10:42 PM   Result Value Ref Range    Lactic Acid 1.1 0.5 - 2.0 mmol/L   CBC WITH DIFFERENTIAL    Collection Time: 01/24/20 10:42 PM   Result Value Ref Range    WBC 8.7 4.8 - 10.8 K/uL    RBC 2.92 (L) 4.20 - 5.40 M/uL    Hemoglobin 9.4 (L) 12.0 - 16.0 g/dL    Hematocrit 30.6 (L) 37.0 - 47.0 %    .8 (H) 81.4 - 97.8 fL    MCH 32.2 27.0 - 33.0 pg    MCHC 30.7 (L) 33.6 - 35.0 g/dL    RDW 69.2 (H) 35.9 - 50.0 fL    Platelet Count 238 164 - 446 K/uL    MPV 9.6 9.0 - 12.9 fL    Neutrophils-Polys 69.20 44.00 - 72.00 %    Lymphocytes 21.60 (L) 22.00 - 41.00 %    Monocytes 7.00 0.00 - 13.40 %    Eosinophils 0.50 0.00 - 6.90 %    Basophils 0.20 0.00 - 1.80 %    Immature Granulocytes 1.50 (H) 0.00 - 0.90 %    Nucleated RBC 0.90 /100 WBC    Neutrophils (Absolute) 6.02 2.00 - 7.15 K/uL    Lymphs (Absolute) 1.88 1.00 - 4.80 K/uL    Monos (Absolute)  0.61 0.00 - 0.85 K/uL    Eos (Absolute) 0.04 0.00 - 0.51 K/uL    Baso (Absolute) 0.02 0.00 - 0.12 K/uL    Immature Granulocytes (abs) 0.13 (H) 0.00 - 0.11 K/uL    NRBC (Absolute) 0.08 K/uL    Anisocytosis 1+     Macrocytosis 1+    COMP METABOLIC PANEL    Collection Time: 01/24/20 10:42 PM   Result Value Ref Range    Sodium 138 135 - 145 mmol/L    Potassium 3.5 (L) 3.6 - 5.5 mmol/L    Chloride 114 (H) 96 - 112 mmol/L    Co2 15 (L) 20 - 33 mmol/L    Anion Gap 9.0 0.0 - 11.9    Glucose 109 (H) 65 - 99 mg/dL    Bun 14 8 - 22 mg/dL    Creatinine 1.24 0.50 - 1.40 mg/dL    Calcium 7.7 (L) 8.5 - 10.5 mg/dL    AST(SGOT) 35 12 - 45 U/L    ALT(SGPT) 8 2 - 50 U/L    Alkaline Phosphatase 112 (H) 30 - 99 U/L    Total Bilirubin 0.2 0.1 - 1.5 mg/dL    Albumin 3.3 3.2 - 4.9 g/dL    Total Protein 6.2 6.0 - 8.2 g/dL    Globulin 2.9 1.9 - 3.5 g/dL    A-G Ratio 1.1 g/dL   BLOOD CULTURE    Collection Time: 01/24/20 10:42 PM   Result Value Ref Range    Significant Indicator NEG     Source BLD     Site PERIPHERAL     Culture Result       No Growth  Note: Blood cultures are incubated for 5 days and  are monitored continuously.Positive blood cultures  are called to the RN and reported as soon as  they are identified.     ESTIMATED GFR    Collection Time: 01/24/20 10:42 PM   Result Value Ref Range    GFR If  56 (A) >60 mL/min/1.73 m 2    GFR If Non  47 (A) >60 mL/min/1.73 m 2   MORPHOLOGY    Collection Time: 01/24/20 10:42 PM   Result Value Ref Range    RBC Morphology Present    PERIPHERAL SMEAR REVIEW    Collection Time: 01/24/20 10:42 PM   Result Value Ref Range    Peripheral Smear Review see below    PLATELET ESTIMATE    Collection Time: 01/24/20 10:42 PM   Result Value Ref Range    Plt Estimation Normal    DIFFERENTIAL COMMENT    Collection Time: 01/24/20 10:42 PM   Result Value Ref Range    Comments-Diff see below    BLOOD CULTURE    Collection Time: 01/24/20 11:12 PM   Result Value Ref Range     Significant Indicator NEG     Source BLD     Site PERIPHERAL     Culture Result       No Growth  Note: Blood cultures are incubated for 5 days and  are monitored continuously.Positive blood cultures  are called to the RN and reported as soon as  they are identified.     EKG    Collection Time: 20 11:13 PM   Result Value Ref Range    Report       [facility] Emergency Dept.    Test Date:  2020  Pt Name:    GEOVANNI CABRAL                Department: ER  MRN:        [id number]                      Room:       [facility]  Gender:     Female                       Technician: [id number]  :        [id number]                   Requested By:CHARMAINE HINOJOSA  Order #:    [id number]                    Reading MD: CHARMAINE HINOJOSA MD    Measurements  Intervals                                Axis  Rate:       106                          P:          74  OH:         144                          QRS:        80  QRSD:       94                           T:          0  QT:         364  QTc:        484    Interpretive Statements  SINUS TACHYCARDIA  VENTRICULAR PREMATURE COMPLEX  ABERRANT COMPLEX, POSSIBLY SUPRAVENTRICULAR  MYLA, CONSIDER BIATRIAL ABNORMALITIES  CONSIDER RIGHT VENTRICULAR HYPERTROPHY  NONSPECIFIC T ABNORMALITIES, INFERIOR LEADS  Electronically Signed On 2020 0:05:04 PST by CHARMAINE HINOJOSA MD     URINALYSIS    Collection Time: 20 11:22 PM   Result Value Ref Range    Color Yellow     Character Clear     Specific Gravity 1.006 <1.035    Ph 6.0 5.0 - 8.0    Glucose Negative Negative mg/dL    Ketones Negative Negative mg/dL    Protein 100 (A) Negative mg/dL    Bilirubin Negative Negative    Urobilinogen, Urine 0.2 Negative    Nitrite Negative Negative    Leukocyte Esterase Negative Negative    Occult Blood Small (A) Negative    Micro Urine Req Microscopic    URINE MICROSCOPIC (W/UA)    Collection Time: 20 11:22 PM   Result Value Ref Range    WBC 0-2 /hpf    RBC 0-2 /hpf    Bacteria Negative  None /hpf    Epithelial Cells Negative /hpf    Hyaline Cast 0-2 /lpf   CBC WITH DIFFERENTIAL    Collection Time: 01/25/20  3:23 AM   Result Value Ref Range    WBC 9.0 4.8 - 10.8 K/uL    RBC 2.91 (L) 4.20 - 5.40 M/uL    Hemoglobin 9.3 (L) 12.0 - 16.0 g/dL    Hematocrit 30.2 (L) 37.0 - 47.0 %    .8 (H) 81.4 - 97.8 fL    MCH 32.0 27.0 - 33.0 pg    MCHC 30.8 (L) 33.6 - 35.0 g/dL    RDW 68.2 (H) 35.9 - 50.0 fL    Platelet Count 230 164 - 446 K/uL    MPV 9.1 9.0 - 12.9 fL    Neutrophils-Polys 73.10 (H) 44.00 - 72.00 %    Lymphocytes 22.10 22.00 - 41.00 %    Monocytes 3.30 0.00 - 13.40 %    Eosinophils 0.20 0.00 - 6.90 %    Basophils 0.20 0.00 - 1.80 %    Immature Granulocytes 1.10 (H) 0.00 - 0.90 %    Nucleated RBC 0.40 /100 WBC    Neutrophils (Absolute) 6.58 2.00 - 7.15 K/uL    Lymphs (Absolute) 1.99 1.00 - 4.80 K/uL    Monos (Absolute) 0.30 0.00 - 0.85 K/uL    Eos (Absolute) 0.02 0.00 - 0.51 K/uL    Baso (Absolute) 0.02 0.00 - 0.12 K/uL    Immature Granulocytes (abs) 0.10 0.00 - 0.11 K/uL    NRBC (Absolute) 0.04 K/uL   Comp Metabolic Panel    Collection Time: 01/25/20  3:23 AM   Result Value Ref Range    Sodium 139 135 - 145 mmol/L    Potassium 4.1 3.6 - 5.5 mmol/L    Chloride 113 (H) 96 - 112 mmol/L    Co2 18 (L) 20 - 33 mmol/L    Anion Gap 8.0 0.0 - 11.9    Glucose 147 (H) 65 - 99 mg/dL    Bun 13 8 - 22 mg/dL    Creatinine 1.14 0.50 - 1.40 mg/dL    Calcium 7.8 (L) 8.5 - 10.5 mg/dL    AST(SGOT) 37 12 - 45 U/L    ALT(SGPT) 9 2 - 50 U/L    Alkaline Phosphatase 112 (H) 30 - 99 U/L    Total Bilirubin 0.2 0.1 - 1.5 mg/dL    Albumin 3.3 3.2 - 4.9 g/dL    Total Protein 6.3 6.0 - 8.2 g/dL    Globulin 3.0 1.9 - 3.5 g/dL    A-G Ratio 1.1 g/dL   MAGNESIUM    Collection Time: 01/25/20  3:23 AM   Result Value Ref Range    Magnesium 2.0 1.5 - 2.5 mg/dL   ESTIMATED GFR    Collection Time: 01/25/20  3:23 AM   Result Value Ref Range    GFR If African American >60 >60 mL/min/1.73 m 2    GFR If Non  51 (A) >60  mL/min/1.73 m 2   PROCALCITONIN    Collection Time: 20  3:23 AM   Result Value Ref Range    Procalcitonin 7.92 (H) <0.25 ng/mL   EKG    Collection Time: 20  4:57 AM   Result Value Ref Range    Report       [facility]    Test Date:  2020  Pt Name:    GEOVANNI CABRAL                Department: ER  MRN:        [id number]                      Room:       T423  Gender:     Female                       Technician: PEP  :        1975                   Requested By:SHANTI DORADO  Order #:    [id number]                    Reading MD: Randal Bundy MD    Measurements  Intervals                                Axis  Rate:       120                          P:          66  NV:         145                          QRS:        73  QRSD:       198                          T:          -16  QT:         295  QTc:        417    Interpretive Statements  POOR QUALITY DATA, INTERPRETATION MAY BE AFFECTED  SINUS TACHYCARDIA  CONSIDER RIGHT ATRIAL ENLARGEMENT  Electronically Signed On 2020 6:09:24 PST by Randal Bundy MD     BLOOD CULTURE    Collection Time: 20  7:35 PM   Result Value Ref Range    Significant Indicator NEG     Source BLD     Site PERIPHERAL     Culture Result       No Growth  Note: Blood cultures are incubated for 5 days and  are monitored continuously.Positive blood cultures  are called to the RN and reported as soon as  they are identified.     Influenza A/B By PCR (Adult - Flu Only)    Collection Time: 20  1:02 AM   Result Value Ref Range    Influenza virus A RNA POSITIVE (A) Negative    Influenza virus B, PCR Negative Negative   CBC WITH DIFFERENTIAL    Collection Time: 20  4:41 AM   Result Value Ref Range    WBC 9.9 4.8 - 10.8 K/uL    RBC 2.44 (L) 4.20 - 5.40 M/uL    Hemoglobin 7.9 (L) 12.0 - 16.0 g/dL    Hematocrit 25.1 (L) 37.0 - 47.0 %    .9 (H) 81.4 - 97.8 fL    MCH 32.4 27.0 - 33.0 pg    MCHC 31.5 (L) 33.6 - 35.0 g/dL    RDW 68.4 (H)  35.9 - 50.0 fL    Platelet Count 205 164 - 446 K/uL    MPV 9.2 9.0 - 12.9 fL    Neutrophils-Polys 81.90 (H) 44.00 - 72.00 %    Lymphocytes 13.80 (L) 22.00 - 41.00 %    Monocytes 0.00 0.00 - 13.40 %    Eosinophils 0.00 0.00 - 6.90 %    Basophils 0.00 0.00 - 1.80 %    Nucleated RBC 0.20 /100 WBC    Neutrophils (Absolute) 8.53 (H) 2.00 - 7.15 K/uL    Lymphs (Absolute) 1.37 1.00 - 4.80 K/uL    Monos (Absolute) 0.00 0.00 - 0.85 K/uL    Eos (Absolute) 0.00 0.00 - 0.51 K/uL    Baso (Absolute) 0.00 0.00 - 0.12 K/uL    NRBC (Absolute) 0.02 K/uL   Comp Metabolic Panel    Collection Time: 01/26/20  4:41 AM   Result Value Ref Range    Sodium 141 135 - 145 mmol/L    Potassium 3.6 3.6 - 5.5 mmol/L    Chloride 113 (H) 96 - 112 mmol/L    Co2 20 20 - 33 mmol/L    Anion Gap 8.0 0.0 - 11.9    Glucose 113 (H) 65 - 99 mg/dL    Bun 10 8 - 22 mg/dL    Creatinine 1.10 0.50 - 1.40 mg/dL    Calcium 7.1 (L) 8.5 - 10.5 mg/dL    AST(SGOT) 46 (H) 12 - 45 U/L    ALT(SGPT) 11 2 - 50 U/L    Alkaline Phosphatase 79 30 - 99 U/L    Total Bilirubin 0.2 0.1 - 1.5 mg/dL    Albumin 2.8 (L) 3.2 - 4.9 g/dL    Total Protein 5.3 (L) 6.0 - 8.2 g/dL    Globulin 2.5 1.9 - 3.5 g/dL    A-G Ratio 1.1 g/dL   MAGNESIUM    Collection Time: 01/26/20  4:41 AM   Result Value Ref Range    Magnesium 1.8 1.5 - 2.5 mg/dL   ESTIMATED GFR    Collection Time: 01/26/20  4:41 AM   Result Value Ref Range    GFR If African American >60 >60 mL/min/1.73 m 2    GFR If Non African American 54 (A) >60 mL/min/1.73 m 2   DIFFERENTIAL MANUAL    Collection Time: 01/26/20  4:41 AM   Result Value Ref Range    Bands-Stabs 4.30 0.00 - 10.00 %    Manual Diff Status PERFORMED    PERIPHERAL SMEAR REVIEW    Collection Time: 01/26/20  4:41 AM   Result Value Ref Range    Peripheral Smear Review see below    PLATELET ESTIMATE    Collection Time: 01/26/20  4:41 AM   Result Value Ref Range    Plt Estimation Normal    LACTIC ACID    Collection Time: 01/26/20  8:26 AM   Result Value Ref Range    Lactic  Acid 0.8 0.5 - 2.0 mmol/L       Imaging  DX-CHEST-2 VIEWS   Final Result      1.  Worsening mild pulmonary edema and/or multifocal pneumonitis.   2.  Minimal bilateral pleural effusions, slightly greater on the LEFT.   3.  Probable underlying COPD.      CT-HEAD W/O   Final Result      1.  No acute intracranial abnormality.   2.  Osseous metastatic disease.      DX-CHEST-PORTABLE (1 VIEW)   Final Result      No acute cardiopulmonary disease evident.          Assessment/Plan  * Influenza A  Assessment & Plan  On tamiflu x 5 days    Sepsis (HCC)  Assessment & Plan  This is Severe Sepsis Present on admission  SIRS criteria identified on my evaluation include: Fever, with temperature greater than 101 deg F, Tachycardia, with heart rate greater than 90 BPM and Leukocyosis, with WBC greater than 12,000  Source of infection is lungs  Clinical indicators of end organ dysfunction include Systolic blood pressure (SBP) <90 mmHg or mean arterial pressure <65 mmHg and Lactate >2 mmol/L (18.0 mg/dL)  Sepsis protocol initiated  Fluid resuscitation ordered per protocol  IV antibiotics as appropriate for source of sepsis  Reassessment: I have reassessed the patient's hemodynamic status  End organ dysfunction include(s):  Acute respiratory failure and Encephalopathy (metabolic)    Pt is somewhat drowsy during my exam.   Tamiflu x5 days  Ceftriaxone and doxycyline x 5 days.   Send urine strep pneumo antigen,and urine legionella antigen  Lactate normal at 1  Follow up blood cultures.   BP has been in 90s with MAP 60-65.   Start midodrine 5 TID  Treat fever. Given that pt actually only had 2gr of acetaminophen, we will give 1000gr of acetaminophen today. She has plenty of room for more tylenol  Cooling blankets. Pt refuses ice packs      Breast cancer ([facility])- (present on admission)  Assessment & Plan  S/p mastectomy, chemotherapy. On immunotherapy     COPD (chronic obstructive pulmonary disease) ([facility])- (present on admission)  Assessment &  Plan  Not in COPD exacerbation   Duonerory prn.       I have assessed her respiratory status, cardiovascular and neurological status.  She has severe sepsis from influenza A given her mental status change. Assessd patient multiple times. SHe is at increased risk for worsening respiratory, cardiovascular and neurological system dysfunction.  SHe is at risk for requiring intubation mechanical ventilatory support.  High risk of deterioration and worsening vital organ dysfunction and death without the above critical care interventions.       Discussed patient condition and risk of morbidity and/or mortality with RN, RT, Pharmacy, [facility] resident and Patient.    The patient remains critically ill.  Critical care time = 45 minutes in directly providing and coordinating critical care and extensive data review.  No time overlap and excludes procedures.

## 2020-01-26 NOTE — PROGRESS NOTES
Patients  called and did not answer phone. Voicemail left with [facility] contact number and patients new room of [facility].

## 2020-01-26 NOTE — PROGRESS NOTES
[facility] team notified by primary team around 8am. Patient is a 44 year old female who has a past medical history COPD 3L O2 baseline at home and metastatic breast cancer( on Verzenio), who initially presented on 1/25/20 with altered mental status, hypotension, and tachycardia positive for SIRS. Pt was started on C3 and doxy for possible CAP despite normal CXR given symptoms and elevated procalcitonin. Pt was also found to be influenza A positive and started on oseltamivir last evening. She received several boluses for SBP in the 80s and was continued on maintnance fluid all through her stay. Pt has continued to have soft blood pressures and tachycardia and spiking high fevers not controlled on max tylenol. At this time, there is a concern for hyotension (into SBP 90s), tachycardia into 110s-120s, and fever (temp 101 this morning and into 103 overngiht). Concern was also regarding patient refusing cooling blankets and above mentioned vitals and possible instability, and that patient had maxed out on tylenol for her temperature.     ICU team came to bedside to examine patient, patient resting comfortably in no acute distress. States she is feeling better this morning overall, no pain and that currently well controlled, and states her tiredness is at her baseline, she is A&Ox4. Physical exam notable for bilateral rhonchi and wheezing in all lung fields on exam, no JVD, tachycardic but regular rhythm, no signs of fluid overload, saturating well on room air. Informed patient and RN that we will obtain some STAT lactic acid and a STAT Chest X-ray for further evaluation. She was agreeable to cooling pads/blanket to help with temperature. Will also increase her IVF rate to 125 cc/h to help bring pressures up and help with tachycardia. Notified intensivist on call Dr Rojas and RN (Blanche) regarding plan of care at the moment.     Update 11:30am:  Evaluated patient at bedside with Dr Rojas, patient's clinical status seems to  be worsening. Febrile to 102, tachycardic into 130s-140s, hypotensive into SBPs 90s. On exam, patient has significant rhonchi on lung exam bilaterally with crackles and audible crackling heard as well. No other findings on physical examination. States she is feeling fine, but drowsy/tired.     Lactic acid- 0.8  CXR 2 views 1/26/20- Worsening mild pulmonary edema and/or multifocal pneumonitis, minimal bilateral pleural effusions, slightly greater on the LEFT, probable underlying COPD.    RN staff feel patient requires higher level of care as is requiring very close monitoring at this time and resources on floor are limited and inadequate. ICU team discussed with primary team and agree to admit patient to ICU for closer monitoring.    Plan/Things to Follow  - Urine strep pneumo, legionella ordered  - Continue Tamiflu for influenza  - Continue C3+doxy for CAP  - IVFs 125 cc/h  - Cooling blanket  - Q4 neurochecks

## 2020-01-26 NOTE — ASSESSMENT & PLAN NOTE
Influenza A was ordered by day team 1/25/20 as pt discussed sick contacts at home with fevers. Test initially wasn't read, but after [facility] night float got called about temp 103F, then requested stat flu test, which came back positive for influenza A. They started Tamiflu renally dosed.     Plan:  Completed Tamiflu  tessalon caps scheduled for cough

## 2020-01-26 NOTE — CARE PLAN
Problem: Communication  Goal: The ability to communicate needs accurately and effectively will improve  Outcome: PROGRESSING AS EXPECTED     Problem: Safety  Goal: Will remain free from injury  Outcome: PROGRESSING AS EXPECTED     Problem: Safety  Goal: Will remain free from falls  Outcome: PROGRESSING AS EXPECTED     Problem: Knowledge Deficit  Goal: Knowledge of disease process/condition, treatment plan, diagnostic tests, and medications will improve  Outcome: PROGRESSING AS EXPECTED     Problem: Knowledge Deficit  Goal: Knowledge of the prescribed therapeutic regimen will improve  Outcome: PROGRESSING AS EXPECTED     Problem: Pain Management  Goal: Pain level will decrease to patient's comfort goal  Outcome: PROGRESSING AS EXPECTED     Problem: Fluid Volume:  Goal: Will maintain balanced intake and output  Outcome: PROGRESSING AS EXPECTED

## 2020-01-26 NOTE — PROGRESS NOTES
[facility] notified of positive result for Influenza A. Patient was started on tamiflu earlier. All health personnel notified to take droplet precautions. Proper signs placed outside door. Pt in blocked room.

## 2020-01-26 NOTE — PROGRESS NOTES
Daily Progress Note:     Date of Service: 1/26/2020  Primary Team: [facility]   Attending: Ryder Bertrand M.D.   Senior Resident: Dr. Tomlinson  Intern: Dr. Hermosillo  Contact:  [phone]    Chief Complaint:   Fever, confusion, fatigue, urinary incontinence and pain after running out of pain medication a week ago     Subjective:  Pt spiked Tmax of 103.2 overnight and continued to have a fever into the morning hrs despite max dose of tylenol. She also continued to require fluid boluses due to her SBP dropping <90. Her flu test came back positive for influenza A and she was started on tamiflu per night float. Pt continued to be in sinus tach with hr's 110-130s. Her morning CXR showed mild pulmonary edema with mild b/l effusions possibly due to her requiring a lot of fluids to maintain appropriate pressures. We contacted the ICU team, who evaluated pt and decided to transfer pt to the ICU for close monitoring, including q1hr temperature checks and telemetry monitoring. Pt is at risk of decompensating.     Consultants/Specialty:  N/A  Review of Systems:    Review of Systems   Constitutional: Positive for chills, fever (>103) and malaise/fatigue.   HENT: Negative for congestion and sore throat.    Eyes: Negative for discharge and redness.   Respiratory: Positive for cough and sputum production. Negative for hemoptysis, shortness of breath, wheezing and stridor.         Expiratory rhonchi diffuse, appear slightly worse from yesterday's exam    Cardiovascular: Negative for chest pain, palpitations and leg swelling.   Gastrointestinal: Negative for abdominal pain, constipation, diarrhea, heartburn, nausea and vomiting.   Genitourinary: Positive for urgency. Negative for dysuria and frequency.        Incontinence for 3 days and MRI showed retention    Musculoskeletal: Positive for back pain (sagnificant metastatic pain ). Negative for myalgias.   Skin: Negative for itching and rash.   Neurological: Negative for  sensory change and focal weakness.       Objective Data:   Physical Exam:   Vitals:   Temp:  [37.9 °C (100.2 °F)-39.6 °C (103.3 °F)] 38.3 °C (101 °F)  Pulse:  [104-132] 128  Resp:  [18-20] 20  BP: ()/(46-60) 96/56  SpO2:  [96 %-99 %] 96 %     Physical Exam  Constitutional:       General: She is in acute distress.      Appearance: She is normal weight. She is ill-appearing and toxic-appearing. She is not diaphoretic.      Comments: Appears somewhat somnolent with soft blood pressure and tachycardia, in distress due to cancer pain in her back    HENT:      Head: Normocephalic and atraumatic.      Nose: Nose normal.      Mouth/Throat:      Mouth: Mucous membranes are moist.   Eyes:      General: No scleral icterus.     Extraocular Movements: Extraocular movements intact.      Conjunctiva/sclera: Conjunctivae normal.      Pupils: Pupils are equal, round, and reactive to light.   Neck:      Musculoskeletal: Normal range of motion and neck supple. No neck rigidity.   Cardiovascular:      Rate and Rhythm: Tachycardia present.      Pulses: Normal pulses.      Heart sounds: Normal heart sounds. No murmur. No friction rub. No gallop.    Pulmonary:      Effort: Pulmonary effort is normal. No respiratory distress.      Breath sounds: No stridor. Rhonchi (diffuse throughout all posterior lung fields on expiration ) present. No wheezing or rales.   Chest:      Chest wall: No tenderness.   Abdominal:      General: Abdomen is flat. Bowel sounds are normal. There is no distension.      Palpations: Abdomen is soft.      Tenderness: There is no guarding or rebound.   Musculoskeletal: Normal range of motion.         General: No swelling or tenderness.      Right lower leg: No edema.      Left lower leg: No edema.   Skin:     General: Skin is warm.      Findings: No bruising, erythema, lesion or rash.   Neurological:      General: No focal deficit present.      Mental Status: She is alert and oriented to person, place, and time.       Cranial Nerves: No cranial nerve deficit.      Sensory: No sensory deficit.      Motor: No weakness.   Psychiatric:      Comments: Pt emotionally distressed from her acute illness with underlying chronic pain that is difficult to control            Labs:   Recent Labs     01/24/20 2242 01/25/20 0323 01/26/20  0441   WBC 8.7 9.0 9.9   RBC 2.92* 2.91* 2.44*   HEMOGLOBIN 9.4* 9.3* 7.9*   HEMATOCRIT 30.6* 30.2* 25.1*   .8* 103.8* 102.9*   MCH 32.2 32.0 32.4   RDW 69.2* 68.2* 68.4*   PLATELETCT 238 230 205   MPV 9.6 9.1 9.2   NEUTSPOLYS 69.20 73.10* 81.90*   LYMPHOCYTES 21.60* 22.10 13.80*   MONOCYTES 7.00 3.30 0.00   EOSINOPHILS 0.50 0.20 0.00   BASOPHILS 0.20 0.20 0.00   RBCMORPHOLO Present  --   --      Recent Labs     01/24/20 2242 01/25/20 0323 01/26/20  0441   SODIUM 138 139 141   POTASSIUM 3.5* 4.1 3.6   CHLORIDE 114* 113* 113*   CO2 15* 18* 20   GLUCOSE 109* 147* 113*   BUN 14 13 10     Imaging:   DX-CHEST-2 VIEWS   Final Result      1.  Worsening mild pulmonary edema and/or multifocal pneumonitis.   2.  Minimal bilateral pleural effusions, slightly greater on the LEFT.   3.  Probable underlying COPD.      CT-HEAD W/O   Final Result      1.  No acute intracranial abnormality.   2.  Osseous metastatic disease.      DX-CHEST-PORTABLE (1 VIEW)   Final Result      No acute cardiopulmonary disease evident.            * Influenza A  Assessment & Plan  Influenza A was ordered by day team 1/25/20 as pt discussed sick contacts at home with fevers. Test initially wasn't read, but after [facility] night float got called about temp 103F, then requested stat flu test, which came back positive for influenza A. They started Tamiflu renally dosed.     Plan:  Continue Tamiflu and transfer to ICU for further management     Sepsis (HCC)  Assessment & Plan  Sepsis Present on admission, soft bp's and sinus tachycardia low threshold for ICU transfer   SIRS criteria: Tachycardia, with heart rate greater than 90 BPM and  Tachypnea, with respirations greater than 20 per minute  Source is unknown  Sepsis protocol initiated on admission   Fluid resuscitation ordered per protocol  IV antibiotics C3 and doxy ordered despite negative CXR, pt has been having a cough with fevers and chills and got sick after her  was having fevers of 103F and cough    Pt tested positive for influenza A overnight and was started on tamiflu by the night float, she is still hypotensive, tachycardic 110-130s and continues having high fevers despite max tylenol. She is a candidate for ICU monitoring as she needs q 1hr temp checks and telemetry, along with close monitoring of her volume status and hemodynamics     Plan:  - Blood Cultures Pending and new culture obtained last night, No Leukocytosis, CXR negative on admission, repeat today shows mild pulmonary edema with b/l trace effusions   -C3 and doxy for 5 days treating for possible overlapping CAP started morning 1/25/20  -Hold her breast cancer medication Verzenio until sepsis resolves   -Pt is retaining urine so has a herndon and get strict I&Os w presence of hemodynamic instability   -Spoke with ICU team, who evaluated pt bedside and agreed to transfer pt to the ICU  -Transfer orders to ICU and transfer note from internal medicine placed        Breast cancer (HCC)- (present on admission)  Assessment & Plan  History of Metastatic Breast Cancer, evaluated by Dr. Wynne during last admission. Mets to L and Spine, Pelvis. CT head on admission showing Mets to skull, no prior records of it. Brought by family for concern given weakness and increased confusion, patient reporting new incontinence. Repeat MR of Spine Chart review shows Dr. Wynne recommended palliative care, palliative was consulted during last admission, Advance Directive stating patient's POA is her , Jones Cota.  Discussed code status, full code at this time.     Plan:  Pt agreed to a palliative consult at this time and we spoke to them  and they will be seeing pt   Spoke to Dr. Khan (Oncology) and she recommends holding patient's Verzenio until the sepsis resolves, managing her pain, and then can restart Verzenio on discharge; 4 days of treatment with Verzenio prior to this admission is too soon to predict response   Will resume her home pain regiment of OxyContin 10mg BID and percocet q4hr prn       Urinary retention  Assessment & Plan  Pt presented with 3 days of incontinence. She was found to be retaining >900cc on bladder scan morning of 1/25/20, then had an episode of urination in bed that was not able to be measured; repeat scan showed 510cc in the bladder and we ordered a herndon catheter     Plan:  [place]don cath with strict I&Os and evaluate with physician when it is appropriate to d/c herndon     COPD (chronic obstructive pulmonary disease) (HCC)- (present on admission)  Assessment & Plan  Chronic respiratory failure with 3L O2 at home, currently requiring 3L, at baseline. Diffuse expiratory rhonci on exam    - RT protocol  - Albuterol Q6  - Incentive Spirometer    -scheduled duonebs Q4     Macrocytic anemia- (present on admission)  Assessment & Plan  Patient with chronic anemia, chart review showing normal B12/Folate, likely secondary to bone metastasis     Displaced fracture of greater trochanter of right femur (HCC)- (present on admission)  Assessment & Plan  Admitted for during last admission (Dec 2019), Ortho consulted and patient deemed non-surgical. PT had recommended home health, per family, patient was denied home health. Did not go to physical therapy as prescribed on last admission    - CTM  -PT/OT consult

## 2020-01-26 NOTE — ASSESSMENT & PLAN NOTE
Pt presented with 3 days of incontinence. She was found to be retaining >900cc on bladder scan morning of 1/25/20, then had an episode of urination in bed that was not able to be measured; repeat scan showed 510cc in the bladder and we ordered a herndon catheter     Plan:  Ok to remove herndon and d/c patient

## 2020-01-26 NOTE — CARE PLAN
Problem: Bronchoconstriction:  Goal: Improve in air movement and diminished wheezing  Intervention: Implement inhaled treatments  Note:   Duo Q4  Flutter

## 2020-01-26 NOTE — CARE PLAN
Problem: Communication  Goal: The ability to communicate needs accurately and effectively will improve  Outcome: PROGRESSING AS EXPECTED     Problem: Safety  Goal: Will remain free from injury  Outcome: PROGRESSING AS EXPECTED  Goal: Will remain free from falls  Outcome: PROGRESSING AS EXPECTED     Problem: Infection  Goal: Will remain free from infection  Outcome: PROGRESSING AS EXPECTED     Problem: Venous Thromboembolism (VTW)/Deep Vein Thrombosis (DVT) Prevention:  Goal: Patient will participate in Venous Thrombosis (VTE)/Deep Vein Thrombosis (DVT)Prevention Measures  Outcome: PROGRESSING AS EXPECTED     Problem: Bowel/Gastric:  Goal: Normal bowel function is maintained or improved  Outcome: PROGRESSING SLOWER THAN EXPECTED  Goal: Will not experience complications related to bowel motility  Outcome: PROGRESSING SLOWER THAN EXPECTED

## 2020-01-27 ENCOUNTER — APPOINTMENT (OUTPATIENT)
Dept: RADIOLOGY | Facility: MEDICAL CENTER | Age: 45
DRG: 871 | End: 2020-01-27
Attending: PSYCHIATRY & NEUROLOGY
Payer: COMMERCIAL

## 2020-01-27 ENCOUNTER — APPOINTMENT (OUTPATIENT)
Dept: RADIOLOGY | Facility: MEDICAL CENTER | Age: 45
DRG: 871 | End: 2020-01-27
Attending: INTERNAL MEDICINE
Payer: COMMERCIAL

## 2020-01-27 LAB
ACTION RANGE TRIGGERED IACRT: YES
ACTION RANGE TRIGGERED IACRT: YES
ALBUMIN SERPL BCP-MCNC: 3.2 G/DL (ref 3.2–4.9)
ALBUMIN/GLOB SERPL: 1.1 G/DL
ALP SERPL-CCNC: 76 U/L (ref 30–99)
ALT SERPL-CCNC: 16 U/L (ref 2–50)
ANION GAP SERPL CALC-SCNC: 8 MMOL/L (ref 0–11.9)
ANISOCYTOSIS BLD QL SMEAR: ABNORMAL
APPEARANCE UR: ABNORMAL
AST SERPL-CCNC: 60 U/L (ref 12–45)
BACTERIA #/AREA URNS HPF: NEGATIVE /HPF
BACTERIA UR CULT: NORMAL
BASE EXCESS BLDA CALC-SCNC: -7 MMOL/L (ref -4–3)
BASE EXCESS BLDA CALC-SCNC: -8 MMOL/L (ref -4–3)
BASOPHILS # BLD AUTO: 0 % (ref 0–1.8)
BASOPHILS # BLD: 0 K/UL (ref 0–0.12)
BILIRUB SERPL-MCNC: 0.3 MG/DL (ref 0.1–1.5)
BILIRUB UR QL STRIP.AUTO: NEGATIVE
BODY TEMPERATURE: ABNORMAL DEGREES
BODY TEMPERATURE: ABNORMAL DEGREES
BUN SERPL-MCNC: 9 MG/DL (ref 8–22)
CALCIUM SERPL-MCNC: 8.1 MG/DL (ref 8.5–10.5)
CHLORIDE SERPL-SCNC: 110 MMOL/L (ref 96–112)
CO2 BLDA-SCNC: 20 MMOL/L (ref 20–33)
CO2 BLDA-SCNC: 21 MMOL/L (ref 20–33)
CO2 SERPL-SCNC: 20 MMOL/L (ref 20–33)
COLOR UR: YELLOW
CREAT SERPL-MCNC: 0.85 MG/DL (ref 0.5–1.4)
CRP SERPL HS-MCNC: 11.65 MG/DL (ref 0–0.75)
EOSINOPHIL # BLD AUTO: 0 K/UL (ref 0–0.51)
EOSINOPHIL NFR BLD: 0 % (ref 0–6.9)
EPI CELLS #/AREA URNS HPF: ABNORMAL /HPF
ERYTHROCYTE [DISTWIDTH] IN BLOOD BY AUTOMATED COUNT: 69 FL (ref 35.9–50)
GLOBULIN SER CALC-MCNC: 2.9 G/DL (ref 1.9–3.5)
GLUCOSE SERPL-MCNC: 146 MG/DL (ref 65–99)
GLUCOSE UR STRIP.AUTO-MCNC: 500 MG/DL
GRAM STN SPEC: NORMAL
GRAN CASTS #/AREA URNS LPF: ABNORMAL /LPF
HCO3 BLDA-SCNC: 19.1 MMOL/L (ref 17–25)
HCO3 BLDA-SCNC: 19.3 MMOL/L (ref 17–25)
HCT VFR BLD AUTO: 28 % (ref 37–47)
HGB BLD-MCNC: 8.6 G/DL (ref 12–16)
HOROWITZ INDEX BLDA+IHG-RTO: 270 MM[HG]
HOROWITZ INDEX BLDA+IHG-RTO: 290 MM[HG]
HYALINE CASTS #/AREA URNS LPF: ABNORMAL /LPF
INST. QUALIFIED PATIENT IIQPT: YES
INST. QUALIFIED PATIENT IIQPT: YES
KETONES UR STRIP.AUTO-MCNC: ABNORMAL MG/DL
LEUKOCYTE ESTERASE UR QL STRIP.AUTO: ABNORMAL
LYMPHOCYTES # BLD AUTO: 0.83 K/UL (ref 1–4.8)
LYMPHOCYTES NFR BLD: 7.8 % (ref 22–41)
MACROCYTES BLD QL SMEAR: ABNORMAL
MAGNESIUM SERPL-MCNC: 2 MG/DL (ref 1.5–2.5)
MANUAL DIFF BLD: NORMAL
MCH RBC QN AUTO: 31.6 PG (ref 27–33)
MCHC RBC AUTO-ENTMCNC: 30.7 G/DL (ref 33.6–35)
MCV RBC AUTO: 102.9 FL (ref 81.4–97.8)
MICRO URNS: ABNORMAL
MONOCYTES # BLD AUTO: 0.1 K/UL (ref 0–0.85)
MONOCYTES NFR BLD AUTO: 0.9 % (ref 0–13.4)
MORPHOLOGY BLD-IMP: NORMAL
MRSA DNA SPEC QL NAA+PROBE: NORMAL
NEUTROPHILS # BLD AUTO: 9.68 K/UL (ref 2–7.15)
NEUTROPHILS NFR BLD: 87 % (ref 44–72)
NEUTS BAND NFR BLD MANUAL: 4.3 % (ref 0–10)
NITRITE UR QL STRIP.AUTO: NEGATIVE
NRBC # BLD AUTO: 0.02 K/UL
NRBC BLD-RTO: 0.2 /100 WBC
O2/TOTAL GAS SETTING VFR VENT: 40 %
O2/TOTAL GAS SETTING VFR VENT: 50 %
PCO2 BLDA: 40.5 MMHG (ref 26–37)
PCO2 BLDA: 44.5 MMHG (ref 26–37)
PCO2 TEMP ADJ BLDA: 43.7 MMHG (ref 26–37)
PCO2 TEMP ADJ BLDA: 44.5 MMHG (ref 26–37)
PH BLDA: 7.25 [PH] (ref 7.4–7.5)
PH BLDA: 7.28 [PH] (ref 7.4–7.5)
PH TEMP ADJ BLDA: 7.25 [PH] (ref 7.4–7.5)
PH TEMP ADJ BLDA: 7.26 [PH] (ref 7.4–7.5)
PH UR STRIP.AUTO: 5.5 [PH] (ref 5–8)
PLATELET # BLD AUTO: 219 K/UL (ref 164–446)
PLATELET BLD QL SMEAR: NORMAL
PMV BLD AUTO: 9.7 FL (ref 9–12.9)
PO2 BLDA: 116 MMHG (ref 64–87)
PO2 BLDA: 135 MMHG (ref 64–87)
PO2 TEMP ADJ BLDA: 116 MMHG (ref 64–87)
PO2 TEMP ADJ BLDA: 146 MMHG (ref 64–87)
POTASSIUM SERPL-SCNC: 3.9 MMOL/L (ref 3.6–5.5)
PREALB SERPL-MCNC: 7 MG/DL (ref 18–38)
PROCALCITONIN SERPL-MCNC: 6.7 NG/ML
PROT SERPL-MCNC: 6.1 G/DL (ref 6–8.2)
PROT UR QL STRIP: 100 MG/DL
RBC # BLD AUTO: 2.72 M/UL (ref 4.2–5.4)
RBC # URNS HPF: ABNORMAL /HPF
RBC BLD AUTO: PRESENT
RBC UR QL AUTO: ABNORMAL
SAO2 % BLDA: 98 % (ref 93–99)
SAO2 % BLDA: 99 % (ref 93–99)
SIGNIFICANT IND 70042: NORMAL
SITE SITE: NORMAL
SODIUM SERPL-SCNC: 138 MMOL/L (ref 135–145)
SOURCE SOURCE: NORMAL
SP GR UR STRIP.AUTO: 1.02
SPECIMEN DRAWN FROM PATIENT: ABNORMAL
SPECIMEN DRAWN FROM PATIENT: ABNORMAL
UROBILINOGEN UR STRIP.AUTO-MCNC: 0.2 MG/DL
WBC # BLD AUTO: 10.6 K/UL (ref 4.8–10.8)
WBC #/AREA URNS HPF: ABNORMAL /HPF
YEAST BUDDING URNS QL: PRESENT /HPF

## 2020-01-27 PROCEDURE — 94640 AIRWAY INHALATION TREATMENT: CPT

## 2020-01-27 PROCEDURE — 94003 VENT MGMT INPAT SUBQ DAY: CPT

## 2020-01-27 PROCEDURE — 700102 HCHG RX REV CODE 250 W/ 637 OVERRIDE(OP): Performed by: PSYCHIATRY & NEUROLOGY

## 2020-01-27 PROCEDURE — A9270 NON-COVERED ITEM OR SERVICE: HCPCS | Performed by: INTERNAL MEDICINE

## 2020-01-27 PROCEDURE — 700111 HCHG RX REV CODE 636 W/ 250 OVERRIDE (IP): Performed by: STUDENT IN AN ORGANIZED HEALTH CARE EDUCATION/TRAINING PROGRAM

## 2020-01-27 PROCEDURE — 87641 MR-STAPH DNA AMP PROBE: CPT

## 2020-01-27 PROCEDURE — 80053 COMPREHEN METABOLIC PANEL: CPT

## 2020-01-27 PROCEDURE — 87899 AGENT NOS ASSAY W/OPTIC: CPT

## 2020-01-27 PROCEDURE — 97166 OT EVAL MOD COMPLEX 45 MIN: CPT

## 2020-01-27 PROCEDURE — 302136 NUTRITION PUMP: Performed by: PSYCHIATRY & NEUROLOGY

## 2020-01-27 PROCEDURE — 700102 HCHG RX REV CODE 250 W/ 637 OVERRIDE(OP): Performed by: INTERNAL MEDICINE

## 2020-01-27 PROCEDURE — 700105 HCHG RX REV CODE 258: Performed by: STUDENT IN AN ORGANIZED HEALTH CARE EDUCATION/TRAINING PROGRAM

## 2020-01-27 PROCEDURE — 99291 CRITICAL CARE FIRST HOUR: CPT | Performed by: PSYCHIATRY & NEUROLOGY

## 2020-01-27 PROCEDURE — 36600 WITHDRAWAL OF ARTERIAL BLOOD: CPT

## 2020-01-27 PROCEDURE — 84145 PROCALCITONIN (PCT): CPT

## 2020-01-27 PROCEDURE — 86140 C-REACTIVE PROTEIN: CPT

## 2020-01-27 PROCEDURE — 700101 HCHG RX REV CODE 250: Performed by: STUDENT IN AN ORGANIZED HEALTH CARE EDUCATION/TRAINING PROGRAM

## 2020-01-27 PROCEDURE — 84134 ASSAY OF PREALBUMIN: CPT

## 2020-01-27 PROCEDURE — 700101 HCHG RX REV CODE 250: Performed by: INTERNAL MEDICINE

## 2020-01-27 PROCEDURE — 82803 BLOOD GASES ANY COMBINATION: CPT | Mod: 91

## 2020-01-27 PROCEDURE — 81001 URINALYSIS AUTO W/SCOPE: CPT

## 2020-01-27 PROCEDURE — A9270 NON-COVERED ITEM OR SERVICE: HCPCS | Performed by: PSYCHIATRY & NEUROLOGY

## 2020-01-27 PROCEDURE — 700111 HCHG RX REV CODE 636 W/ 250 OVERRIDE (IP): Performed by: INTERNAL MEDICINE

## 2020-01-27 PROCEDURE — 83735 ASSAY OF MAGNESIUM: CPT

## 2020-01-27 PROCEDURE — 85007 BL SMEAR W/DIFF WBC COUNT: CPT

## 2020-01-27 PROCEDURE — 87449 NOS EACH ORGANISM AG IA: CPT

## 2020-01-27 PROCEDURE — 770022 HCHG ROOM/CARE - ICU (200)

## 2020-01-27 PROCEDURE — 700105 HCHG RX REV CODE 258: Performed by: INTERNAL MEDICINE

## 2020-01-27 PROCEDURE — 51798 US URINE CAPACITY MEASURE: CPT

## 2020-01-27 PROCEDURE — 97162 PT EVAL MOD COMPLEX 30 MIN: CPT

## 2020-01-27 PROCEDURE — 85027 COMPLETE CBC AUTOMATED: CPT

## 2020-01-27 PROCEDURE — 87040 BLOOD CULTURE FOR BACTERIA: CPT | Mod: 91

## 2020-01-27 RX ORDER — LINEZOLID 2 MG/ML
600 INJECTION, SOLUTION INTRAVENOUS EVERY 12 HOURS
Status: DISCONTINUED | OUTPATIENT
Start: 2020-01-27 | End: 2020-01-27

## 2020-01-27 RX ORDER — IPRATROPIUM BROMIDE AND ALBUTEROL SULFATE 2.5; .5 MG/3ML; MG/3ML
SOLUTION RESPIRATORY (INHALATION)
Status: ACTIVE
Start: 2020-01-27 | End: 2020-01-27

## 2020-01-27 RX ORDER — POLYETHYLENE GLYCOL 3350 17 G/17G
1 POWDER, FOR SOLUTION ORAL
Status: DISCONTINUED | OUTPATIENT
Start: 2020-01-27 | End: 2020-01-31

## 2020-01-27 RX ORDER — SODIUM CHLORIDE, SODIUM LACTATE, POTASSIUM CHLORIDE, CALCIUM CHLORIDE 600; 310; 30; 20 MG/100ML; MG/100ML; MG/100ML; MG/100ML
INJECTION, SOLUTION INTRAVENOUS CONTINUOUS
Status: DISCONTINUED | OUTPATIENT
Start: 2020-01-27 | End: 2020-01-30

## 2020-01-27 RX ORDER — AMOXICILLIN 250 MG
2 CAPSULE ORAL 2 TIMES DAILY
Status: DISCONTINUED | OUTPATIENT
Start: 2020-01-27 | End: 2020-01-31

## 2020-01-27 RX ORDER — MIDODRINE HYDROCHLORIDE 5 MG/1
5 TABLET ORAL
Status: DISCONTINUED | OUTPATIENT
Start: 2020-01-27 | End: 2020-01-28

## 2020-01-27 RX ORDER — ACETAMINOPHEN 325 MG/1
650 TABLET ORAL EVERY 6 HOURS PRN
Status: DISCONTINUED | OUTPATIENT
Start: 2020-01-27 | End: 2020-01-31

## 2020-01-27 RX ORDER — OSELTAMIVIR PHOSPHATE 75 MG/1
75 CAPSULE ORAL 2 TIMES DAILY
Status: DISPENSED | OUTPATIENT
Start: 2020-01-27 | End: 2020-01-30

## 2020-01-27 RX ORDER — OXYCODONE HYDROCHLORIDE AND ACETAMINOPHEN 5; 325 MG/1; MG/1
1-2 TABLET ORAL EVERY 4 HOURS PRN
Status: DISCONTINUED | OUTPATIENT
Start: 2020-01-27 | End: 2020-01-31

## 2020-01-27 RX ORDER — CALCIUM CARBONATE 500 MG/1
500 TABLET, CHEWABLE ORAL 2 TIMES DAILY
Status: DISCONTINUED | OUTPATIENT
Start: 2020-01-27 | End: 2020-01-31

## 2020-01-27 RX ORDER — IPRATROPIUM BROMIDE AND ALBUTEROL SULFATE 2.5; .5 MG/3ML; MG/3ML
3 SOLUTION RESPIRATORY (INHALATION)
Status: DISCONTINUED | OUTPATIENT
Start: 2020-01-27 | End: 2020-01-29

## 2020-01-27 RX ORDER — BISACODYL 10 MG
10 SUPPOSITORY, RECTAL RECTAL
Status: DISCONTINUED | OUTPATIENT
Start: 2020-01-27 | End: 2020-01-31

## 2020-01-27 RX ADMIN — FAMOTIDINE 20 MG: 10 INJECTION INTRAVENOUS at 05:45

## 2020-01-27 RX ADMIN — ANTACID TABLETS 500 MG: 500 TABLET, CHEWABLE ORAL at 18:11

## 2020-01-27 RX ADMIN — NOREPINEPHRINE BITARTRATE 10 MCG/MIN: 1 INJECTION INTRAVENOUS at 08:47

## 2020-01-27 RX ADMIN — FENTANYL CITRATE 200 MCG/HR: 50 INJECTION, SOLUTION INTRAMUSCULAR; INTRAVENOUS at 13:11

## 2020-01-27 RX ADMIN — MIDODRINE HYDROCHLORIDE 5 MG: 5 TABLET ORAL at 13:09

## 2020-01-27 RX ADMIN — CEFTRIAXONE SODIUM 1 G: 1 INJECTION, POWDER, FOR SOLUTION INTRAMUSCULAR; INTRAVENOUS at 05:44

## 2020-01-27 RX ADMIN — SENNOSIDES AND DOCUSATE SODIUM 2 TABLET: 8.6; 5 TABLET ORAL at 18:11

## 2020-01-27 RX ADMIN — ACETAMINOPHEN 650 MG: 325 TABLET, FILM COATED ORAL at 22:49

## 2020-01-27 RX ADMIN — FAMOTIDINE 20 MG: 10 INJECTION INTRAVENOUS at 18:11

## 2020-01-27 RX ADMIN — NOREPINEPHRINE BITARTRATE 10 MCG/MIN: 1 INJECTION INTRAVENOUS at 00:20

## 2020-01-27 RX ADMIN — LIDOCAINE 1 PATCH: 50 PATCH CUTANEOUS at 05:44

## 2020-01-27 RX ADMIN — IPRATROPIUM BROMIDE AND ALBUTEROL SULFATE 3 ML: .5; 3 SOLUTION RESPIRATORY (INHALATION) at 10:42

## 2020-01-27 RX ADMIN — ENOXAPARIN SODIUM 40 MG: 100 INJECTION SUBCUTANEOUS at 05:45

## 2020-01-27 RX ADMIN — SODIUM CHLORIDE, POTASSIUM CHLORIDE, SODIUM LACTATE AND CALCIUM CHLORIDE: 600; 310; 30; 20 INJECTION, SOLUTION INTRAVENOUS at 10:24

## 2020-01-27 RX ADMIN — POLYETHYLENE GLYCOL 3350 1 PACKET: 17 POWDER, FOR SOLUTION ORAL at 13:09

## 2020-01-27 RX ADMIN — OSELTAMIVIR PHOSPHATE 75 MG: 75 CAPSULE ORAL at 18:11

## 2020-01-27 RX ADMIN — IPRATROPIUM BROMIDE AND ALBUTEROL SULFATE 3 ML: .5; 3 SOLUTION RESPIRATORY (INHALATION) at 19:16

## 2020-01-27 RX ADMIN — IPRATROPIUM BROMIDE AND ALBUTEROL SULFATE 3 ML: .5; 3 SOLUTION RESPIRATORY (INHALATION) at 07:29

## 2020-01-27 RX ADMIN — IPRATROPIUM BROMIDE AND ALBUTEROL SULFATE 3 ML: .5; 3 SOLUTION RESPIRATORY (INHALATION) at 15:09

## 2020-01-27 RX ADMIN — LINEZOLID 600 MG: 600 INJECTION, SOLUTION INTRAVENOUS at 05:44

## 2020-01-27 RX ADMIN — MIDODRINE HYDROCHLORIDE 5 MG: 5 TABLET ORAL at 18:11

## 2020-01-27 RX ADMIN — LINEZOLID 600 MG: 600 INJECTION, SOLUTION INTRAVENOUS at 18:11

## 2020-01-27 RX ADMIN — AZITHROMYCIN MONOHYDRATE 500 MG: 500 INJECTION, POWDER, LYOPHILIZED, FOR SOLUTION INTRAVENOUS at 07:12

## 2020-01-27 RX ADMIN — IPRATROPIUM BROMIDE AND ALBUTEROL SULFATE 3 ML: .5; 3 SOLUTION RESPIRATORY (INHALATION) at 23:14

## 2020-01-27 RX ADMIN — POTASSIUM BICARBONATE 25 MEQ: 978 TABLET, EFFERVESCENT ORAL at 13:09

## 2020-01-27 ASSESSMENT — COGNITIVE AND FUNCTIONAL STATUS - GENERAL
TURNING FROM BACK TO SIDE WHILE IN FLAT BAD: UNABLE
DAILY ACTIVITIY SCORE: 6
MOVING TO AND FROM BED TO CHAIR: UNABLE
SUGGESTED CMS G CODE MODIFIER MOBILITY: CM
PERSONAL GROOMING: TOTAL
DRESSING REGULAR UPPER BODY CLOTHING: TOTAL
TOILETING: TOTAL
EATING MEALS: TOTAL
STANDING UP FROM CHAIR USING ARMS: A LOT
SUGGESTED CMS G CODE MODIFIER DAILY ACTIVITY: CN
MOVING FROM LYING ON BACK TO SITTING ON SIDE OF FLAT BED: UNABLE
MOBILITY SCORE: 7
CLIMB 3 TO 5 STEPS WITH RAILING: TOTAL
HELP NEEDED FOR BATHING: TOTAL
DRESSING REGULAR LOWER BODY CLOTHING: TOTAL
WALKING IN HOSPITAL ROOM: TOTAL

## 2020-01-27 ASSESSMENT — GAIT ASSESSMENTS: GAIT LEVEL OF ASSIST: UNABLE TO PARTICIPATE

## 2020-01-27 NOTE — PROGRESS NOTES
"[facility] ICU Progress Note      Admit Date: 1/24/2020  ICU Day: 2    Resident(s): Lencho Sage M.D.  Attending: LUCY FRENCH/ Dr. Cruz    Date & Time:   1/27/2020   11:52 AM       Patient ID:    Name:             Roxy Cota   YOB: 1975  Age:                 44 y.o.  female   MRN:               [id number]    HPI:  44 y.o. female with hx of breast CA, mets to bone (base of skull and cervical and lumbar spine), s/p right mastectomy, on immunotherapy (started 4 days prior), COPD on 3L home O2,  who was initially admitted 1/25 for SOB and found to have influenza A pneumonia. Initially admitted to the floor. Pt had been having high grade fever in 39.5-40.6C. HR in 100-130s. SBP in 80-100s. Given the change in vitals, there was an increase demand from bedside on monitoring the patient. Pt also had had \"max dose of acetaminophen\" given in the day but on MAR appeared pt only received acetaminophen 650mg PO x 3 doses (total about 2gr). ICU team was consulted, and  bedside examination was notable for bilateral rhonchi and wheezing in all lung fields , no JVD, tachycardic but regular rhythm, no signs of fluid overload. STAT Chest Xray 1/26 revealed worsening mild pulmonary edema and/or multifocal pneumonitis, minimal bilateral pleural effusions, slightly greater on the LEFT, probable underlying COPD. Lactic acid was 0.8. Urine strep pneumo and legionella Ag were ordered.Tamiflu for influenza A x 5 days and C3+doxy for CAP. Midodrine 5 TID was started. Patient was transferred to ICU for closer monitoring and treatment of Influenza A and septic shock.      Consultants:  ICU    Interval Events:  - Overnight- intubated, bronchoscopy, central line placed with pressor tx initiation  - Sedation- fentayl only, precedex d/c'ed  - Abx were switched to Zyvox (for potential MRSA pna) and doxycycline was changed to azithromycin for flu replication inhibition and lung antiinflammatory effects, continue " "ceftriaxone  - F/u sputum (BAL), urine strep pneumo, legionella Ag, blood cxs  - Procalcitonin 1/27- 6.70, serial procalcitonins  - Avoid excess fluids (sepsis 30ml/kg) use early pressors if necessary to prevent ARDS   - Monitor for post influenza superimposed pna (MRSA, strep PNA, GAS) serial procal, MRSA nasal Swab and   - Monitor for Viral induced-HLH  - Adjuncts: Steroids, Vit C, naproxen 200mg BID  - IVFs- LR 75 cc/h, monitor UOP, bladder scan  - Cortrack to be placed  - Palliative consult this AM-per update, family would like to pursue full treatment at this time        Review of Systems   Unable to perform ROS: Intubated       PHYSICAL EXAM    Vitals:    01/27/20 1030 01/27/20 1044 01/27/20 1045 01/27/20 1100   BP: 110/57  101/54 102/54   Pulse: (!) 110 (!) 109 (!) 109 (!) 106   Resp: 20  20 20   Temp:       TempSrc:       SpO2: 99% 99% 99% 99%   Weight:       Height:         Body mass index is 21.3 kg/m².  /54   Pulse (!) 106   Temp (!) 40.1 °C (104.1 °F) (Temporal)   Resp 20   Ht 1.626 m (5' 4\")   Wt 56.3 kg (124 lb 1.9 oz)   SpO2 99%   BMI 21.30 kg/m²   O2 therapy: Pulse Oximetry: 99 %, O2 (LPM): 3, O2 Delivery: Ventilator    Physical Exam   Constitutional: No distress.   HENT:   Head: Normocephalic and atraumatic.   Right Ear: External ear normal.   Left Ear: External ear normal.   Nose: Nose normal.   Eyes: Pupils are equal, round, and reactive to light. Conjunctivae and EOM are normal. Right eye exhibits no discharge. Left eye exhibits no discharge. No scleral icterus.   Neck: Normal range of motion. Neck supple. No JVD present. No thyromegaly present.   Cardiovascular: Normal rate, regular rhythm, normal heart sounds and intact distal pulses. Exam reveals no gallop and no friction rub.   No murmur heard.  Pulmonary/Chest: No respiratory distress. She has no wheezes. She has rales.   Mild bilateral rhonchi and rales/crackling in bilateral lung fields   Abdominal: Soft. Bowel sounds are " normal. She exhibits no distension. There is no tenderness. There is no rebound.   Genitourinary:    Genitourinary Comments: Morales catether in place     Musculoskeletal:         General: No deformity or edema.   Neurological: She is alert. She has normal reflexes.   Intubated   Skin: No rash noted. She is not diaphoretic. No erythema.   Nursing note and vitals reviewed.      Respiratory:  Owen Vent Mode: APVCMV  Respiration: 20, Pulse Oximetry: 99 %, O2 Daily Delivery Respiratory : Silicone Nasal Cannula    Chest Tube Drains:    Recent Labs     01/27/20  0042 01/27/20  0433   ISTATAPH 7.281* 7.245*   ISTATAPCO2 40.5* 44.5*   ISTATAPO2 135* 116*   ISTATATCO2 20 21   APQVEPM7DUM 99 98   ISTATARTHCO3 19.1 19.3   ISTATARTBE -7* -8*   ISTATTEMP 101.7 F 98.6 F   ISTATFIO2 50 40   ISTATSPEC Arterial Arterial   ISTATAPHTC 7.257* 7.245*   YXINENNX3TR 146* 116*       HemoDynamics:  Pulse: (!) 106 Blood Pressure: 102/54      Neuro:  Intubated    Fluids:  Date 01/27/20 0700 - 01/28/20 0659   Shift 7192-6367 3252-1384 4848-8715 24 Hour Total   INTAKE   I.V. 119.8   119.8     Fentanyl Volume 4   4     Precedex Volume 11.2   11.2     Norepinephrine Volume 104.6   104.6   IV Piggyback 516.7   516.7     Volume (mL) (Linezolid (ZYVOX) premix 600 mg) 220   220     Volume (mL) (azithromycin (ZITHROMAX) 500 mg in D5W 250 mL IVPB) 250   250     Volume (mL) (cefTRIAXone (ROCEPHIN) 1 g in  mL IVPB) 46.7   46.7   Shift Total 636.5   636.5   OUTPUT   Urine 160   160     Output (mL) (Urethral Catheter Non-latex 16 Fr.) 160   160   Stool         Number of Times Stooled 0 x   0 x   Shift Total 160   160   .5   476.5        Intake/Output Summary (Last 24 hours) at 1/27/2020 0743  Last data filed at 1/27/2020 0600  Gross per 24 hour   Intake 1026.18 ml   Output 2455 ml   Net -1428.82 ml          Body mass index is 21.3 kg/m².    Recent Labs     01/25/20  0323 01/26/20  0441 01/27/20  0045   SODIUM 139 141 138   POTASSIUM 4.1 3.6  3.9   CHLORIDE 113* 113* 110   CO2 18* 20 20   BUN 13 10 9   CREATININE 1.14 1.10 0.85   MAGNESIUM 2.0 1.8 2.0   CALCIUM 7.8* 7.1* 8.1*       GI/Nutrition:  Recent Labs     20   ALTSGPT 9 11 16   ASTSGOT 37 46* 60*   ALKPHOSPHAT 112* 79 76   TBILIRUBIN 0.2 0.2 0.3   GLUCOSE 147* 113* 146*       Heme:  Recent Labs     20   RBC 2.91* 2.44* 2.72*   HEMOGLOBIN 9.3* 7.9* 8.6*   HEMATOCRIT 30.2* 25.1* 28.0*   PLATELETCT 230 205 219       Infectious Disease:  Monitored Temp 2  Av.6 °C (99.6 °F)  Min: 36.2 °C (97.2 °F)  Max: 40.6 °C (105.1 °F)  Temp  Av.1 °C (104.1 °F)  Min: 40.1 °C (104.1 °F)  Max: 40.1 °C (104.1 °F)  Recent Labs     20   WBC 9.0 9.9 10.6   NEUTSPOLYS 73.10* 81.90* 87.00*   LYMPHOCYTES 22.10 13.80* 7.80*   MONOCYTES 3.30 0.00 0.90   EOSINOPHILS 0.20 0.00 0.00   BASOPHILS 0.20 0.00 0.00   ASTSGOT 37 46* 60*   ALTSGPT 9 11 16   ALKPHOSPHAT 112* 79 76   TBILIRUBIN 0.2 0.2 0.3       Meds:  • acetaminophen  650 mg     • calcium carbonate  500 mg     • midodrine  5 mg     • oseltamivir  75 mg     • oxyCODONE-acetaminophen  1-2 Tab     • polyethylene glycol/lytes  1 Packet      And   • senna-docusate  2 Tab      And   • bisacodyl  10 mg     • linezolid (ZYVOX) IV  600 mg Stopped (20 0644)   • azithromycin (ZITHROMAX) IV  500 mg Stopped (20 0812)   • ipratropium-albuterol  3 mL     • ipratropium-albuterol       • potassium bicarbonate  25 mEq     • LR   75 mL/hr at 20 1024   • fentaNYL   mcg     • NORepinephrine (LEVOPHED) infusion  0-30 mcg/min 10 mcg/min (20 0847)   • LR  1,000 mL     • fentaNYL  50 mcg      And   • fentaNYL  100 mcg      And   • fentaNYL   200 mcg/hr (20 1030)    And   • dexmedetomidine (PRECEDEX) infusion  0-0.7 mcg/kg/hr Stopped (20 1024)   • famotidine  20 mg      Or   • famotidine  20 mg     • MD Alert...Adult ICU  Electrolyte Replacement per Pharmacy       • lidocaine  1-2 mL     • Respiratory Care per Protocol       • enoxaparin  40 mg     • lidocaine  1 Patch     • cefTRIAXone (ROCEPHIN) IV  1 g Stopped (01/27/20 0614)   • albuterol  2 Puff     • ipratropium-albuterol  3 mL          Procedures:  Intubation- 1/26/20  Bronchoscopy- 1/26/20  Central Line- 1/26/20    Imaging:  DX-CHEST-FOR LINE PLACEMENT Perform procedure in: Patient's Room   Final Result            1. A right peripherally inserted catheter with tip projects appropriately over the expected area of the superior vena cava.      2. Endotracheal tube with tip projecting over the mid thoracic trachea.      DX-CHEST-2 VIEWS   Final Result      1.  Worsening mild pulmonary edema and/or multifocal pneumonitis.   2.  Minimal bilateral pleural effusions, slightly greater on the LEFT.   3.  Probable underlying COPD.      CT-HEAD W/O   Final Result      1.  No acute intracranial abnormality.   2.  Osseous metastatic disease.      DX-CHEST-PORTABLE (1 VIEW)   Final Result      No acute cardiopulmonary disease evident.          Problem and Plan:    * Influenza A  Assessment & Plan  Influenza A was ordered by day team 1/25/20 as pt discussed sick contacts at home with fevers. Test initially wasn't read, but after [facility] night float got called about temp 103F, then requested stat flu test, which came back positive for influenza A. They started Tamiflu renally dosed.     Plan:  Continue Tamiflu and transfer to ICU for further management     Sepsis (HCC)  Assessment & Plan  Sepsis Present on admission, soft bp's and sinus tachycardia low threshold for ICU transfer   SIRS criteria: Tachycardia, with heart rate greater than 90 BPM and Tachypnea, with respirations greater than 20 per minute  Source is presumed to be lungs (Influenza/CAP) at this time  Sepsis protocol initiated on admission   Fluid resuscitation ordered per protocol  IV antibiotics C3 and doxy ordered despite negative  CXR, pt has been having a cough with fevers and chills and got sick after her  was having fevers of 103F and cough.     Plan  - Intubated, bronchoscopy, central line placed with pressor tx initiation  - Sedation- fentayl and precedex  - Abx were switched to Zyvox (for potential MRSA pna) and doxycycline was changed to azithromycin for flu replication inhibition and lung antiinflammatory effects, continue ceftriaxone  - F/u sputum (BAL), urine strep pneumo, legionella Ag, blood cxs  - Procalcitonin 1/27- 6.70, serial procalcitonins  - Avoid excess fluids (sepsis 30ml/kg) use early pressors if necessary to prevent ARDS   - IVFs- LR 75 cc/h, monitor UOP, bladder scan  - Monitor for post influenza superimposed pna (MRSA, strep PNA, GAS) serial procal, MRSA nasal Swab and   - Monitor for Viral induced-HLH  - Hold breast cancer medication Verzenio until sepsis resolves   - Adjuncts: Steroids, Vit C, naproxen 200mg BID              Breast cancer (HCC)- (present on admission)  Assessment & Plan  History of Metastatic Breast Cancer, evaluated by Dr. Wynne during last admission. Mets to L and Spine, Pelvis. CT head on admission showing Mets to skull, no prior records of it. Brought by family for concern given weakness and increased confusion, patient reporting new incontinence. Repeat MR of Spine Chart review shows Dr. Wynne recommended palliative care, palliative was consulted during last admission, Advance Directive stating patient's POA is her , Jones Cota.  Discussed code status, full code at this time.     Plan:  Pt agreed to a palliative consult at this time and we spoke to them and they will be seeing pt   Spoke to Dr. Khan (Oncology) and she recommends holding patient's Verzenio until the sepsis resolves, managing her pain, and then can restart Verzenio on discharge; 4 days of treatment with Verzenio prior to this admission is too soon to predict response   Will resume her home pain regiment of  OxyContin 10mg BID and percocet q4hr prn       Urinary retention  Assessment & Plan  Pt presented with 3 days of incontinence. She was found to be retaining >900cc on bladder scan morning of 1/25/20, then had an episode of urination in bed that was not able to be measured; repeat scan showed 510cc in the bladder and we ordered a herndon catheter     Plan:  Herndon cath with strict I&Os and evaluate with physician when it is appropriate to d/c herndon     COPD (chronic obstructive pulmonary disease) (HCC)- (present on admission)  Assessment & Plan  Chronic respiratory failure with 3L O2 at home, currently requiring 3L, at baseline. Diffuse expiratory rhonci on exam    - RT protocol  - Albuterol Q6  - Incentive Spirometer    -scheduled duonebs Q4     Macrocytic anemia- (present on admission)  Assessment & Plan  Patient with chronic anemia, chart review showing normal B12/Folate, likely secondary to bone metastasis     Displaced fracture of greater trochanter of right femur (HCC)- (present on admission)  Assessment & Plan  Admitted for during last admission (Dec 2019), Ortho consulted and patient deemed non-surgical. PT had recommended home health, per family, patient was denied home health. Did not go to physical therapy as prescribed on last admission    - CTM  -PT/OT consult       DISPO:  ICU    CODE STATUS: FULL    Quality Measures:  Herndon Catheter:  Yes  DVT Prophylaxis:   Enoxaparin  Ulcer Prophylaxis:   Famotidine  Antibiotics: Ceftriaxone, Azithromycin, Zyvox  Lines: Central, PIV

## 2020-01-27 NOTE — PROGRESS NOTES
2 RN skin check complete with KELVIN Benítez.   Devices in place 5 ekg leads, spo2 probe, bp cuff, SCDs, PIV.  Skin assessed under devices intact.  No confirmed pressure ulcers found.  Wound consult not necessary.  The following interventions in place Q2 turns, ensuring all lines/tubing out from underneath patient.

## 2020-01-27 NOTE — DIETARY
Nutrition Services: Nutrition Support Assessment  Day 2 of admit.  Roxy Cota is a 44 y.o. female with admitting DX of Metastatic breast cancer     Current problem list:  1. Influenza A  2. Breast cancer  3. Sepsis  4. COPD  5. Urinary retention  6. Macrocytic anemia  7. Displaced fracture of greater trochanter of right femur     Assessment:  Estimated Nutritional Needs: based on: Ht: 162.6 cm, Wt : 56.3 kg via bed scale, IBW: 54.4 kg, BMI: 21.3 - Normal     Calculation/Equation: REE per MSJ x 1.2 = 1433 kcal/day; RMR per PSU (vent L/min 7.4, T max 24 hours 36.5) = 1258 kcal/day  Total Calories / day: 1250 - 1500 (Calories / k - 27)  Total Grams Protein / day: 68 - 84 (Grams Protein / k.2 - 1.5)  Total Fluids ml / day: 1400 - 1700 (mL / k - 30)    Evaluation:   1. Consult received for TF assessment. Pt was intubated yesterday and in need of nutrition support.   2. Enteral access: Gastric Cortrak   3. Labs: Glucose 146  4. Meds: zithromax, tums, rocephin, lovenox, pepcid, zyvox, electrolyte replacement, proamatine, tamiflu, pericolace, miralax (prn), fentanyl, precedex (stopped), levophed @ 10 mcg/min  5. Fluids: lactated ringers infusion @ 75 mL/hr   6. Last BM:   7. Fiber-free Impact Peptide 1.5 appropriate to meet pt's estimated nutritional needs while on pressors.      Malnutrition Risk: Unable to determine at this time.      Recommendations/Plan:  Start Impact Peptide 1.5 @ 25 ml/hr and advance per protocol to 40 ml/hr (goal rate) to provide 1440 kcal (26 kcal/kg), 90 grams protein (1.6 gm/kg), and 739 ml free water per day.   Fluids per MD.        RD following

## 2020-01-27 NOTE — PROCEDURES
Intubation  Date/Time: 1/26/2020 11:07 PM  Performed by: Jose Barreto M.D.  Authorized by: Jose Barreto M.D.     Consent:     Consent obtained:  Written    Consent given by:  Patient    Risks discussed:  Aspiration, death and hypoxia    Alternatives discussed:  Delayed treatment  Pre-procedure details:     Patient status:  Awake    Mallampati score:  I    Pretreatment meds: etomidate.    Paralytics:  Rocuronium  Procedure details:     Preoxygenation:  Nonrebreather mask    CPR in progress: no      Intubation method:  Oral    Laryngoscope type:  GlideScope    Laryngoscope blade:  Mac 3    Cormack-Lehane Classification:  Grade 1    Tube size (mm):  7.5    Tube type:  Cuffed    Number of attempts:  1    Cricoid pressure: no      Tube visualized through cords: yes    Placement assessment:     ETT to teeth:  22    Tube secured with:  ETT silvestre    Breath sounds:  Equal    Placement verification: condensation, direct visualization and ETCO2 detector      CXR findings:  ETT in proper place

## 2020-01-27 NOTE — ASSESSMENT & PLAN NOTE
This is Severe Sepsis Present on admission  SIRS criteria identified on my evaluation include: Fever, with temperature greater than 101 deg F, Tachycardia, with heart rate greater than 90 BPM and Leukocyosis, with WBC greater than 12,000  Source of infection is lungs  Clinical indicators of end organ dysfunction include Systolic blood pressure (SBP) <90 mmHg or mean arterial pressure <65 mmHg and Lactate >2 mmol/L (18.0 mg/dL)  Sepsis protocol initiated  Fluid resuscitation ordered per protocol  IV antibiotics as appropriate for source of sepsis  Reassessment: I have reassessed the patient's hemodynamic status  End organ dysfunction include(s):  Acute respiratory failure and Encephalopathy (metabolic)    Tamiflu completed  Off pressors  MAP goal >65.   Wean midodrine  Fever control

## 2020-01-27 NOTE — PROCEDURES
Central Line Insertion  Date/Time: 1/26/2020 11:11 PM  Performed by: Jose Barreto M.D.  Authorized by: Jose Barreto M.D.     Consent:     Consent obtained:  Written    Consent given by:  Patient    Risks discussed:  Arterial puncture, bleeding and infection    Alternatives discussed:  Delayed treatment  Pre-procedure details:     Hand hygiene: Hand hygiene performed prior to insertion      Sterile barrier technique: All elements of maximal sterile technique followed      Skin preparation:  ChloraPrep    Skin preparation agent: Skin preparation agent completely dried prior to procedure    Sedation:     Sedation type:  Moderate (conscious) sedation  Anesthesia:     Anesthesia method:  Local infiltration    Local anesthetic:  Lidocaine 1% w/o epi  Procedure details:     Location:  R internal jugular    Patient position:  Trendelenburg    Procedural supplies:  Triple lumen    Catheter size:  7.5 Fr    Landmarks identified: yes      Ultrasound guidance: yes      Sterile ultrasound techniques: Sterile gel and sterile probe covers were used      Number of attempts:  1    Successful placement: yes    Post-procedure details:     Post-procedure:  Dressing applied    Assessment:  Blood return through all ports and free fluid flow    Patient tolerance of procedure:  Tolerated well, no immediate complications

## 2020-01-27 NOTE — CARE PLAN
Problem: Ventilation Defect:  Goal: Ability to achieve and maintain unassisted ventilation or tolerate decreased levels of ventilator support  Outcome: PROGRESSING AS EXPECTED      Ventilator Daily Summary    Vent Day #2  7.5 @ 24  CMV 20, 370, +8, 40%    Ventilator settings changed this shift: FIO2 Titrated    Weaning trials: YES    Respiratory Procedures: Pt intubated and bronchoscopy performed.    Plan: Continue current ventilator settings and wean mechanical ventilation as tolerated per physician orders.         BRONCHOCONSTRICTION    Duoneb QID

## 2020-01-27 NOTE — PROGRESS NOTES
Called about low blood pressure SBP in the 70's  Patient admitted from floor today with high fever.   PMH: metastatic breast cancer, ummunotherapy started 4 days ago, COPD 3l n/c, influenza A + s/p C3 doxy, famiflu  Told to place on norepinephrine gtt  Give 1l LR wide open  Temp 40.2 -140 sbp 73/52  Blood cx x2, procal, MRSA nares  Respiratory is tachypnea with coarse wet gurgling in the back of her throat when talking.   Intubated   Bronch  Central line  neosynephrine 3ml 300mcg  Will start zyvox but doubt mrsa pna while mrsa pcr returns  Will change doxy to azithro for flu replication inhibition and lung antiinflammatory  CXR benign   Findings consistent with ongoing aspiration  Sedation will do fentanyl and dexmeditomidine    FLU recommendations:  Avoid excess fluids (sepsis 30ml/kg) use early pressors if necessary to prevent ARDS   Monitor for post influenza superimposed pna (MRSA, strep PNA, GAS) serial procal, MRSA nasal Swab and consider MRSA rx for 48-72 hours until more data returns  Consider: Macrolide/clarithromycin anti-inflammatory and anti-viral replication  Monitor for Viral induced-HLH  Adjuncts: Steroids, Vit C, naproxen 200mg BID      Patient remains in critical condition from respiratory distress hyperpyrexia and shock needing neosynephrine and intubation and central line placment. Critical care time provided was 45 minutes. This excludes all separate billable procedures.

## 2020-01-27 NOTE — PROCEDURES
Date: 1/26/2020    Procedure: Bronchoscopy with Therapeutic suctioning and BAL    Indication: Infiltrate/atlectasis    Physician:  oJse Barreto M.D.    Consent:  Patient    Procedure:  A time out occurred with the patient name, medical record number, allergies, and consent with right procedure and indication with bedside nurse and if able the patient. The patient was connected to a monitor and had continuous pulse oximeter. The patient was sedated with etomidate 14 mg, rocuronium 70mg, fentanyl 200mcg. The bronchoscope was insert down the left and right bronchi to the terminal bronchioles. Therapeutic suction of secretion was provided mid trachea with watery secretions bilateral right and left bronchi with watery secretions. A BAL was preformed in the RML after injecting with small aliquots to a total of 30ml. The patient tolerated the procedure without any immediate complications with minimal <5ml of blood loss.     Findings watery bilateral proximal airways and trachea likely ongoing aspiration benign appearing mucosa.     Jose Barreto MD  Critical Care Medicine

## 2020-01-27 NOTE — PROCEDURES
Date: 1/26/2020    Procedure: Conscious sedation    Indication: bronchoscopy and intubation    Physician:  Jose Barreto M.D.    Consent:  patient    Procedure:  A time out occurred with the patient name, medical record number, allergies, and consent with right procedure and indication with bedside nurse and if able the patient. The patient was connected to a monitor and had continuous pulse oximeter and serial blood pressure measurement were done during the procedure. I performed the conscious sedation and was directly involved with administration of medication, monitoring airway, vital signs, preventing complications.  Administered of  Etomidate 14mg, fentanyl 200mcg IV total mg in titration fashion until achieving a level of moderate procedural sedation.  Patient tolerated procedure well without complications from sedation and was left in the care of his bedside nurse and respiratory therapy. Procedural sedation time equals 17 minutes which was separate from procedure time as described above.  Start time: 2234  Stop time: 2251    Jose Barreto MD  Critical Care Medicine

## 2020-01-27 NOTE — CARE PLAN
Problem: Communication  Goal: The ability to communicate needs accurately and effectively will improve  Outcome: PROGRESSING AS EXPECTED  Intervention: Educate patient and significant other/support system about the plan of care, procedures, treatments, medications and allow for questions  Note:   Pt educated regarding POC. Pt verbalized understanding of POC     Problem: Venous Thromboembolism (VTW)/Deep Vein Thrombosis (DVT) Prevention:  Goal: Patient will participate in Venous Thrombosis (VTE)/Deep Vein Thrombosis (DVT)Prevention Measures  Outcome: PROGRESSING AS EXPECTED  Intervention: Ensure patient wears graduated elastic stockings (JAYASHREE hose) and/or SCDs, if ordered, when in bed or chair (Remove at least once per shift for skin check)  Flowsheets (Taken 1/26/2020 2110)  SCDs, Sequential Compression Device: On  Note:   Pt compliant with wearing SCDs to prevent DVTs

## 2020-01-27 NOTE — PROGRESS NOTES
Critical Care Progress Note    Date of admission  1/24/2020    Chief Complaint  44 y.o. female admitted 1/24/2020 with sepsis, Flu    Hospital Course    1/26 - admitted to ICU for severe sepsis, flu, started on Tamiflu, intubated, pressors started for MAP goals      Interval Problem Update  Reviewed last 24 hour events:  Tm 40.6  HR 80-100s  -130s  Titration of 15mcg NorEpi  ABG 7.245/45/116/19/40%  GCS 15  Fentanyl gtt RASS -2  Vent day 2  Cortrak placed this am    Review of Systems  Review of Systems   Unable to perform ROS: Intubated        Vital Signs for last 24 hours   Temp:  [38.3 °C (101 °F)-40.1 °C (104.1 °F)] 40.1 °C (104.1 °F)  Pulse:  [] 95  Resp:  [13-50] 14  BP: ()/(46-75) 137/65  SpO2:  [91 %-100 %] 100 %    Hemodynamic parameters for last 24 hours       Respiratory Information for the last 24 hours  Owen Vent Mode: APVCMV  Rate (breaths/min): 20  Vt Target (mL): 370  PEEP/CPAP: 8  FiO2: 40  P Support: 5  P MEAN: 14  Control VTE (exp VT): 306    Physical Exam   Physical Exam  Constitutional:       Appearance: She is ill-appearing.   HENT:      Head: Normocephalic.      Right Ear: External ear normal.      Left Ear: External ear normal.      Nose: Nose normal.      Mouth/Throat:      Mouth: Mucous membranes are dry.   Eyes:      Extraocular Movements: Extraocular movements intact.      Pupils: Pupils are equal, round, and reactive to light.   Neck:      Musculoskeletal: Neck supple.   Cardiovascular:      Rate and Rhythm: Normal rate and regular rhythm.      Pulses: Normal pulses.   Pulmonary:      Breath sounds: No wheezing or rales.   Abdominal:      General: Bowel sounds are normal.   Musculoskeletal:         General: No swelling.   Skin:     General: Skin is dry.      Capillary Refill: Capillary refill takes less than 2 seconds.   Neurological:      General: No focal deficit present.      Mental Status: She is alert.      Cranial Nerves: No cranial nerve deficit.          Medications  Current Facility-Administered Medications   Medication Dose Route Frequency Provider Last Rate Last Dose   • acetaminophen (TYLENOL) tablet 650 mg  650 mg Enteral Tube Q6HRS PRN RISSA PandeyO.       • calcium carbonate (TUMS) chewable tab 500 mg  500 mg Enteral Tube BID RISSA PandeyORito   Stopped at 01/27/20 0600   • midodrine (PROAMATINE) tablet 5 mg  5 mg Enteral Tube TID WITH MEALS RISSA PandeyORito   Stopped at 01/27/20 0730   • oseltamivir (TAMIFLU) capsule 75 mg  75 mg Enteral Tube BID DONNA Pandey.O.   Stopped at 01/27/20 0600   • oxyCODONE-acetaminophen (PERCOCET) 5-325 MG per tablet 1-2 Tab  1-2 Tab Enteral Tube Q4HRS PRN DONNA Pandey.O.       • polyethylene glycol/lytes (MIRALAX) PACKET 1 Packet  1 Packet Enteral Tube QDAY PRN Gustavo Rojas D.O.        And   • senna-docusate (PERICOLACE or SENOKOT S) 8.6-50 MG per tablet 2 Tab  2 Tab Enteral Tube BID RISSA PandeyORito   Stopped at 01/27/20 0600    And   • bisacodyl (DULCOLAX) suppository 10 mg  10 mg Rectal QDAY PRN DONNA Pandey.O.       • omeprazole (FIRST-OMEPRAZOLE) 2 mg/mL oral susp 40 mg  40 mg Enteral Tube DAILY DONNA Pandey.ORito   Stopped at 01/27/20 0600   • Linezolid (ZYVOX) premix 600 mg  600 mg Intravenous Q12HRS Jose Barreto M.D.   Stopped at 01/27/20 0644   • azithromycin (ZITHROMAX) 500 mg in D5W 250 mL IVPB  500 mg Intravenous Q24HRS Jose Barreto M.D.   Stopped at 01/27/20 0812   • ipratropium-albuterol (DUONEB) nebulizer solution  3 mL Nebulization Q4HRS (RT) Ian Carver M.D.       • IPRATROPIUM-ALBUTEROL 0.5-2.5 (3) MG/3ML INH SOLN            • fentaNYL (SUBLIMAZE) injection  mcg   mcg Intravenous Q HOUR PRN Gustavo Rojas D.O.   100 mcg at 01/26/20 9108   • norepinephrine (LEVOPHED) 8 mg in  mL Infusion  0-30 mcg/min Intravenous Continuous Jose Barreto M.D. 28.1 mL/hr at 01/27/20 0711 15 mcg/min at 01/27/20 0711   • lactated ringer BOLUS infusion  1,000 mL Intravenous Once  Jose Barreto M.D.       • PHENYLEPHRINE HCL 10 MG/ML INJ SOLN (WRAPPED)            • fentaNYL (SUBLIMAZE) injection 50 mcg  50 mcg Intravenous Q15 MIN PRN Jose Barreto M.D.        And   • fentaNYL (SUBLIMAZE) injection 100 mcg  100 mcg Intravenous Q15 MIN PRN Jose Barreto M.D.   100 mcg at 01/26/20 2241    And   • fentaNYL (SUBLIMAZE) 50 mcg/mL in 50mL (Continuous Infusion)   Intravenous Continuous Jose Barreto M.D. 3 mL/hr at 01/27/20 0735 150 mcg/hr at 01/27/20 0735    And   • dexmedetomidine (PRECEDEX) 400 mcg/100mL NS premix infusion  0-0.7 mcg/kg/hr Intravenous Continuous Jose Barreto M.D. 2.8 mL/hr at 01/26/20 2336 0.2 mcg/kg/hr at 01/26/20 2336   • famotidine (PEPCID) tablet 20 mg  20 mg Enteral Tube Q12HRS Jose Barreto M.D.        Or   • famotidine (PEPCID) injection 20 mg  20 mg Intravenous Q12HRS Jose Barreto M.D.   20 mg at 01/27/20 0545   • MD Alert...ICU Electrolyte Replacement per Pharmacy   Other PHARMACY TO DOSE Jose Barreto M.D.       • lidocaine (XYLOCAINE) 1 % injection 1-2 mL  1-2 mL Tracheal Tube Q30 MIN PRN Jose Barreto M.D.       • Respiratory Care per Protocol   Nebulization Continuous RT Kasia Barrientos, D.O.       • enoxaparin (LOVENOX) inj 40 mg  40 mg Subcutaneous DAILY Kasia Barrientos D.O.   40 mg at 01/27/20 0545   • lidocaine (LIDODERM) 5 % 1 Patch  1 Patch Transdermal Q24HRS Kasia Barrientos D.O.   1 Patch at 01/27/20 0544   • cefTRIAXone (ROCEPHIN) 1 g in  mL IVPB  1 g Intravenous Q24HRS Juvenal Hermosillo M.D.   Stopped at 01/27/20 0614   • albuterol inhaler 2 Puff  2 Puff Inhalation Q4H PRN (RT) Alejandra Tomlinson M.D.       • ipratropium-albuterol (DUONEB) nebulizer solution  3 mL Nebulization Q4H PRN (RT) Kasia Barrientos D.O.           Fluids    Intake/Output Summary (Last 24 hours) at 1/27/2020 0840  Last data filed at 1/27/2020 0800  Gross per 24 hour   Intake 1562.18 ml   Output 2455 ml   Net -892.82 ml        Laboratory  Recent Labs     01/27/20 0042 01/27/20  0433   ISTATAPH 7.281* 7.245*   ISTATAPCO2 40.5* 44.5*   ISTATAPO2 135* 116*   ISTATATCO2 20 21   NMHERFK5TKE 99 98   ISTATARTHCO3 19.1 19.3   ISTATARTBE -7* -8*   ISTATTEMP 101.7 F 98.6 F   ISTATFIO2 50 40   ISTATSPEC Arterial Arterial   ISTATAPHTC 7.257* 7.245*   WSUSXVDE2YV 146* 116*         Recent Labs     01/25/20 0323 01/26/20 0441 01/27/20  0045   SODIUM 139 141 138   POTASSIUM 4.1 3.6 3.9   CHLORIDE 113* 113* 110   CO2 18* 20 20   BUN 13 10 9   CREATININE 1.14 1.10 0.85   MAGNESIUM 2.0 1.8 2.0   CALCIUM 7.8* 7.1* 8.1*     Recent Labs     01/25/20 0323 01/26/20 0441 01/27/20  0045   ALTSGPT 9 11 16   ASTSGOT 37 46* 60*   ALKPHOSPHAT 112* 79 76   TBILIRUBIN 0.2 0.2 0.3   GLUCOSE 147* 113* 146*     Recent Labs     01/25/20 0323 01/26/20 0441 01/27/20  0045   WBC 9.0 9.9 10.6   NEUTSPOLYS 73.10* 81.90* 87.00*   LYMPHOCYTES 22.10 13.80* 7.80*   MONOCYTES 3.30 0.00 0.90   EOSINOPHILS 0.20 0.00 0.00   BASOPHILS 0.20 0.00 0.00   ASTSGOT 37 46* 60*   ALTSGPT 9 11 16   ALKPHOSPHAT 112* 79 76   TBILIRUBIN 0.2 0.2 0.3     Recent Labs     01/25/20 0323 01/26/20 0441 01/27/20  0045   RBC 2.91* 2.44* 2.72*   HEMOGLOBIN 9.3* 7.9* 8.6*   HEMATOCRIT 30.2* 25.1* 28.0*   PLATELETCT 230 205 219       Imaging  X-Ray:  I have personally reviewed the images and compared with prior images.    Assessment/Plan  * Influenza A  Assessment & Plan  On tamiflu x 5 days    Sepsis (HCC)  Assessment & Plan  This is Severe Sepsis Present on admission  SIRS criteria identified on my evaluation include: Fever, with temperature greater than 101 deg F, Tachycardia, with heart rate greater than 90 BPM and Leukocyosis, with WBC greater than 12,000  Source of infection is lungs  Clinical indicators of end organ dysfunction include Systolic blood pressure (SBP) <90 mmHg or mean arterial pressure <65 mmHg and Lactate >2 mmol/L (18.0 mg/dL)  Sepsis protocol initiated  Fluid resuscitation  ordered per protocol  IV antibiotics as appropriate for source of sepsis  Reassessment: I have reassessed the patient's hemodynamic status  End organ dysfunction include(s):  Acute respiratory failure and Encephalopathy (metabolic)    Tamiflu x5 days  Ceftriaxone, Zyvox, azithro, deescalate when cx available  Follow up blood cultures.   BP has been in 90s with MAP 60-65.   Current titration of NorEpi  Fever control  Cooling blankets.       Breast cancer (HCC)- (present on admission)  Assessment & Plan  S/p mastectomy, chemotherapy. On immunotherapy     COPD (chronic obstructive pulmonary disease) (HCC)- (present on admission)  Assessment & Plan  Not in COPD exacerbation   Duonebs prn.             VTE:  Lovenox  Ulcer: H2 Antagonist  Lines: Central Line  Ongoing indication addressed and Morales Catheter  Ongoing indication addressed    I have performed a physical exam and reviewed and updated ROS and Plan today (1/27/2020). In review of yesterday's note (1/26/2020), there are no changes except as documented above.     Discussed patient condition and risk of morbidity and/or mortality with RN, RT, Pharmacy, Code status disscussed, Charge nurse / hot rounds and Patient  The patient remains critically ill.  Critical care time = 35 minutes in directly providing and coordinating critical care and extensive data review.  No time overlap and excludes procedures.

## 2020-01-27 NOTE — CONSULTS
Reason for PC Consult: Advance Care Planning    Consulted by: Dr Hermosillo    Assessment:  General: 43 yo female admitted on 1/24/2020 for Metastatic breast cancer.   PMH: COPD, Cancer-breast 2016 with right-sided mastectomy.     Pt with GLF and admit for 12/21 through 12/25 for right hip fx. At that time notes states that pt was recently found have a recurrence of her cancer which has become metastatic and was ob Tamoxifen-Ct then showed osseous metastases in axial and appendicular skeleton, MRI of brain was negative. MRI of cervical, thoracic and lumber spines showed diffuse osseous metastatic disease at [facility] and plain films of right hip showed lytic and sclerotic lesions to right pelvis and proximal femur.   Pt was discharged with non-surgical management and with a follow up appointment with Oncology in [facility], appt 12/26, due to insurance which is currently listed at [facility].     Pt presented to the Ed due to increasing generalized weakness, ALOC, fever, hip and back pain. Pt improved slightly in ED with rehydration, admitted.     Dyspnea: Yes- intubated  Last BM: 01/23/20-    Pain: Unable to determine-    Depression: Unable to determine-    Dementia: Unable to Determine;       Spiritual:  Is Pentecostalism or spirituality important for coping with this illness? Unable to determine-    Has a  or spiritual provider visit been requested? Unable to determine    Palliative Performance Scale: 30%    Advance Directive: None on file.   DPOA: No, CAYETANO Cota [phone], spouse   POLST: None on file.       Code Status: Full    Outcome:  Pt with Influenza A+  Stopped at pts room, pt intubated, currently sleeping, no family present.   Called and spoke with pts , Boone. Boone and rest of family all sick with the Flu, Boone has MD appt later today. So he will need to be update by phone for anything at this time.     Spoke about pts hx of disease process and current admit. Boone stated that they have been  "very unhappy with the care from the Oncology group in [facility] and were hoping to connect with the Oncology Team at [facility].   Boone stated that they are wanting to treat if there are any options and feel that this \"is just the flu\". Explained pts debilitated stated and infectious process.   Boone continues to feel that there may be options. Discussed his wife's discussions, per notes, of wanting Full code at that time and Boone again confirmed that this is their wish at this time. Boone wishes to explore any avenues available for treatment if pt is able to recover from current intubation.   Ensured that Boone has Palliative Team contact number for any needs. Let Boone know that we will continue to keep him updated and that we are here for support for him and his family.     Updated: Dr Sage, BS RN, Unit CM RNs    Plan: TBD as pts admission progresses, family hopeful that pt will be able to wean off vent.     Thank you for allowing Palliative Care to participate in this patient's care. Please feel free to call [phone] with any questions or concerns.  "

## 2020-01-27 NOTE — ASSESSMENT & PLAN NOTE
S/p mastectomy, chemotherapy. On immunotherapy   Rad Onc following and plan for CT simulation while she is hospitalized to expedite her treatment

## 2020-01-28 ENCOUNTER — APPOINTMENT (OUTPATIENT)
Dept: RADIOLOGY | Facility: MEDICAL CENTER | Age: 45
DRG: 871 | End: 2020-01-28
Attending: INTERNAL MEDICINE
Payer: COMMERCIAL

## 2020-01-28 PROBLEM — J96.01 ACUTE RESPIRATORY FAILURE WITH HYPOXEMIA (HCC): Status: ACTIVE | Noted: 2020-01-28

## 2020-01-28 LAB
ABO + RH BLD: NORMAL
ABO GROUP BLD: NORMAL
ACTION RANGE TRIGGERED IACRT: NO
ALBUMIN SERPL BCP-MCNC: 2.8 G/DL (ref 3.2–4.9)
ALBUMIN/GLOB SERPL: 1.1 G/DL
ALP SERPL-CCNC: 60 U/L (ref 30–99)
ALT SERPL-CCNC: 12 U/L (ref 2–50)
ANION GAP SERPL CALC-SCNC: 9 MMOL/L (ref 0–11.9)
ANISOCYTOSIS BLD QL SMEAR: ABNORMAL
AST SERPL-CCNC: 42 U/L (ref 12–45)
BACTERIA SPEC RESP CULT: NORMAL
BARCODED ABORH UBTYP: 6200
BARCODED PRD CODE UBPRD: NORMAL
BARCODED UNIT NUM UBUNT: NORMAL
BASE EXCESS BLDA CALC-SCNC: -1 MMOL/L (ref -4–3)
BASO STIPL BLD QL SMEAR: NORMAL
BASOPHILS # BLD AUTO: 0.2 % (ref 0–1.8)
BASOPHILS # BLD AUTO: 0.2 % (ref 0–1.8)
BASOPHILS # BLD: 0.01 K/UL (ref 0–0.12)
BASOPHILS # BLD: 0.01 K/UL (ref 0–0.12)
BILIRUB SERPL-MCNC: 0.2 MG/DL (ref 0.1–1.5)
BLD GP AB SCN SERPL QL: NORMAL
BODY TEMPERATURE: ABNORMAL DEGREES
BUN SERPL-MCNC: 13 MG/DL (ref 8–22)
CALCIUM SERPL-MCNC: 7.8 MG/DL (ref 8.5–10.5)
CHLORIDE SERPL-SCNC: 111 MMOL/L (ref 96–112)
CO2 BLDA-SCNC: 26 MMOL/L (ref 20–33)
CO2 SERPL-SCNC: 20 MMOL/L (ref 20–33)
COMMENT 1642: NORMAL
COMPONENT R 8504R: NORMAL
CREAT SERPL-MCNC: 0.9 MG/DL (ref 0.5–1.4)
CRP SERPL HS-MCNC: 9.44 MG/DL (ref 0–0.75)
EOSINOPHIL # BLD AUTO: 0.04 K/UL (ref 0–0.51)
EOSINOPHIL # BLD AUTO: 0.06 K/UL (ref 0–0.51)
EOSINOPHIL NFR BLD: 0.7 % (ref 0–6.9)
EOSINOPHIL NFR BLD: 1.1 % (ref 0–6.9)
ERYTHROCYTE [DISTWIDTH] IN BLOOD BY AUTOMATED COUNT: 67.5 FL (ref 35.9–50)
ERYTHROCYTE [DISTWIDTH] IN BLOOD BY AUTOMATED COUNT: 80.4 FL (ref 35.9–50)
GLOBULIN SER CALC-MCNC: 2.5 G/DL (ref 1.9–3.5)
GLUCOSE SERPL-MCNC: 132 MG/DL (ref 65–99)
GRAM STN SPEC: NORMAL
HCO3 BLDA-SCNC: 24.2 MMOL/L (ref 17–25)
HCT VFR BLD AUTO: 21 % (ref 37–47)
HCT VFR BLD AUTO: 24.7 % (ref 37–47)
HGB BLD-MCNC: 6.6 G/DL (ref 12–16)
HGB BLD-MCNC: 7.5 G/DL (ref 12–16)
HOROWITZ INDEX BLDA+IHG-RTO: 310 MM[HG]
HYPOCHROMIA BLD QL SMEAR: ABNORMAL
IMM GRANULOCYTES # BLD AUTO: 0.02 K/UL (ref 0–0.11)
IMM GRANULOCYTES # BLD AUTO: 0.03 K/UL (ref 0–0.11)
IMM GRANULOCYTES NFR BLD AUTO: 0.3 % (ref 0–0.9)
IMM GRANULOCYTES NFR BLD AUTO: 0.5 % (ref 0–0.9)
INST. QUALIFIED PATIENT IIQPT: YES
L PNEUMO AG UR QL IA: NEGATIVE
LYMPHOCYTES # BLD AUTO: 1.07 K/UL (ref 1–4.8)
LYMPHOCYTES # BLD AUTO: 1.34 K/UL (ref 1–4.8)
LYMPHOCYTES NFR BLD: 18 % (ref 22–41)
LYMPHOCYTES NFR BLD: 23.6 % (ref 22–41)
MCH RBC QN AUTO: 29.5 PG (ref 27–33)
MCH RBC QN AUTO: 32.4 PG (ref 27–33)
MCHC RBC AUTO-ENTMCNC: 30.4 G/DL (ref 33.6–35)
MCHC RBC AUTO-ENTMCNC: 31.4 G/DL (ref 33.6–35)
MCV RBC AUTO: 102.9 FL (ref 81.4–97.8)
MCV RBC AUTO: 97.2 FL (ref 81.4–97.8)
MICROCYTES BLD QL SMEAR: ABNORMAL
MONOCYTES # BLD AUTO: 0.17 K/UL (ref 0–0.85)
MONOCYTES # BLD AUTO: 0.18 K/UL (ref 0–0.85)
MONOCYTES NFR BLD AUTO: 3 % (ref 0–13.4)
MONOCYTES NFR BLD AUTO: 3 % (ref 0–13.4)
MORPHOLOGY BLD-IMP: NORMAL
NEUTROPHILS # BLD AUTO: 4.07 K/UL (ref 2–7.15)
NEUTROPHILS # BLD AUTO: 4.62 K/UL (ref 2–7.15)
NEUTROPHILS NFR BLD: 71.6 % (ref 44–72)
NEUTROPHILS NFR BLD: 77.8 % (ref 44–72)
NRBC # BLD AUTO: 0 K/UL
NRBC # BLD AUTO: 0.02 K/UL
NRBC BLD-RTO: 0 /100 WBC
NRBC BLD-RTO: 0.4 /100 WBC
O2/TOTAL GAS SETTING VFR VENT: 30 %
PCO2 BLDA: 44.5 MMHG (ref 26–37)
PCO2 TEMP ADJ BLDA: 43.1 MMHG (ref 26–37)
PH BLDA: 7.34 [PH] (ref 7.4–7.5)
PH TEMP ADJ BLDA: 7.35 [PH] (ref 7.4–7.5)
PLATELET # BLD AUTO: 154 K/UL (ref 164–446)
PLATELET # BLD AUTO: 185 K/UL (ref 164–446)
PLATELET BLD QL SMEAR: NORMAL
PMV BLD AUTO: 10.3 FL (ref 9–12.9)
PMV BLD AUTO: 9.3 FL (ref 9–12.9)
PO2 BLDA: 93 MMHG (ref 64–87)
PO2 TEMP ADJ BLDA: 89 MMHG (ref 64–87)
POIKILOCYTOSIS BLD QL SMEAR: NORMAL
POLYCHROMASIA BLD QL SMEAR: NORMAL
POTASSIUM SERPL-SCNC: 3.5 MMOL/L (ref 3.6–5.5)
PREALB SERPL-MCNC: 7 MG/DL (ref 18–38)
PRODUCT TYPE UPROD: NORMAL
PROT SERPL-MCNC: 5.3 G/DL (ref 6–8.2)
RBC # BLD AUTO: 2.04 M/UL (ref 4.2–5.4)
RBC # BLD AUTO: 2.54 M/UL (ref 4.2–5.4)
RBC BLD AUTO: PRESENT
RH BLD: NORMAL
S PNEUM AG UR QL: NEGATIVE
SAO2 % BLDA: 97 % (ref 93–99)
SCHISTOCYTES BLD QL SMEAR: NORMAL
SIGNIFICANT IND 70042: NORMAL
SITE SITE: NORMAL
SODIUM SERPL-SCNC: 140 MMOL/L (ref 135–145)
SOURCE SOURCE: NORMAL
SPECIMEN DRAWN FROM PATIENT: ABNORMAL
UNIT STATUS USTAT: NORMAL
WBC # BLD AUTO: 5.7 K/UL (ref 4.8–10.8)
WBC # BLD AUTO: 5.9 K/UL (ref 4.8–10.8)

## 2020-01-28 PROCEDURE — 86900 BLOOD TYPING SEROLOGIC ABO: CPT

## 2020-01-28 PROCEDURE — 85025 COMPLETE CBC W/AUTO DIFF WBC: CPT | Mod: 91

## 2020-01-28 PROCEDURE — 86923 COMPATIBILITY TEST ELECTRIC: CPT

## 2020-01-28 PROCEDURE — 700101 HCHG RX REV CODE 250: Performed by: INTERNAL MEDICINE

## 2020-01-28 PROCEDURE — 700105 HCHG RX REV CODE 258: Performed by: STUDENT IN AN ORGANIZED HEALTH CARE EDUCATION/TRAINING PROGRAM

## 2020-01-28 PROCEDURE — 700111 HCHG RX REV CODE 636 W/ 250 OVERRIDE (IP): Performed by: STUDENT IN AN ORGANIZED HEALTH CARE EDUCATION/TRAINING PROGRAM

## 2020-01-28 PROCEDURE — 700102 HCHG RX REV CODE 250 W/ 637 OVERRIDE(OP): Performed by: INTERNAL MEDICINE

## 2020-01-28 PROCEDURE — 86901 BLOOD TYPING SEROLOGIC RH(D): CPT

## 2020-01-28 PROCEDURE — 94640 AIRWAY INHALATION TREATMENT: CPT

## 2020-01-28 PROCEDURE — 700111 HCHG RX REV CODE 636 W/ 250 OVERRIDE (IP): Performed by: INTERNAL MEDICINE

## 2020-01-28 PROCEDURE — A9270 NON-COVERED ITEM OR SERVICE: HCPCS | Performed by: INTERNAL MEDICINE

## 2020-01-28 PROCEDURE — 700105 HCHG RX REV CODE 258: Performed by: INTERNAL MEDICINE

## 2020-01-28 PROCEDURE — 94003 VENT MGMT INPAT SUBQ DAY: CPT

## 2020-01-28 PROCEDURE — 94690 O2 UPTK REST INDIRECT: CPT

## 2020-01-28 PROCEDURE — 71045 X-RAY EXAM CHEST 1 VIEW: CPT

## 2020-01-28 PROCEDURE — 700101 HCHG RX REV CODE 250: Performed by: STUDENT IN AN ORGANIZED HEALTH CARE EDUCATION/TRAINING PROGRAM

## 2020-01-28 PROCEDURE — 86850 RBC ANTIBODY SCREEN: CPT

## 2020-01-28 PROCEDURE — P9016 RBC LEUKOCYTES REDUCED: HCPCS

## 2020-01-28 PROCEDURE — 84134 ASSAY OF PREALBUMIN: CPT

## 2020-01-28 PROCEDURE — 94150 VITAL CAPACITY TEST: CPT

## 2020-01-28 PROCEDURE — 30233N1 TRANSFUSION OF NONAUTOLOGOUS RED BLOOD CELLS INTO PERIPHERAL VEIN, PERCUTANEOUS APPROACH: ICD-10-PCS | Performed by: HOSPITALIST

## 2020-01-28 PROCEDURE — 36430 TRANSFUSION BLD/BLD COMPNT: CPT

## 2020-01-28 PROCEDURE — 86140 C-REACTIVE PROTEIN: CPT

## 2020-01-28 PROCEDURE — 770022 HCHG ROOM/CARE - ICU (200)

## 2020-01-28 PROCEDURE — 80053 COMPREHEN METABOLIC PANEL: CPT

## 2020-01-28 PROCEDURE — 99291 CRITICAL CARE FIRST HOUR: CPT | Performed by: PSYCHIATRY & NEUROLOGY

## 2020-01-28 PROCEDURE — A9270 NON-COVERED ITEM OR SERVICE: HCPCS | Performed by: PSYCHIATRY & NEUROLOGY

## 2020-01-28 PROCEDURE — 82803 BLOOD GASES ANY COMBINATION: CPT

## 2020-01-28 PROCEDURE — 700102 HCHG RX REV CODE 250 W/ 637 OVERRIDE(OP): Performed by: PSYCHIATRY & NEUROLOGY

## 2020-01-28 PROCEDURE — 36600 WITHDRAWAL OF ARTERIAL BLOOD: CPT

## 2020-01-28 RX ORDER — DEXMEDETOMIDINE HYDROCHLORIDE 4 UG/ML
0-.7 INJECTION, SOLUTION INTRAVENOUS CONTINUOUS
Status: DISCONTINUED | OUTPATIENT
Start: 2020-01-28 | End: 2020-01-29

## 2020-01-28 RX ORDER — MIDODRINE HYDROCHLORIDE 5 MG/1
10 TABLET ORAL
Status: DISCONTINUED | OUTPATIENT
Start: 2020-01-28 | End: 2020-01-31

## 2020-01-28 RX ORDER — SODIUM CHLORIDE 9 MG/ML
INJECTION, SOLUTION INTRAVENOUS CONTINUOUS
Status: ACTIVE | OUTPATIENT
Start: 2020-01-28 | End: 2020-01-28

## 2020-01-28 RX ORDER — AZITHROMYCIN 250 MG/1
500 TABLET, FILM COATED ORAL DAILY
Status: COMPLETED | OUTPATIENT
Start: 2020-01-29 | End: 2020-01-29

## 2020-01-28 RX ADMIN — CEFTRIAXONE SODIUM 1 G: 1 INJECTION, POWDER, FOR SOLUTION INTRAMUSCULAR; INTRAVENOUS at 05:57

## 2020-01-28 RX ADMIN — POTASSIUM BICARBONATE 50 MEQ: 978 TABLET, EFFERVESCENT ORAL at 14:58

## 2020-01-28 RX ADMIN — MIDODRINE HYDROCHLORIDE 5 MG: 5 TABLET ORAL at 08:27

## 2020-01-28 RX ADMIN — AZITHROMYCIN MONOHYDRATE 500 MG: 500 INJECTION, POWDER, LYOPHILIZED, FOR SOLUTION INTRAVENOUS at 05:57

## 2020-01-28 RX ADMIN — IPRATROPIUM BROMIDE AND ALBUTEROL SULFATE 3 ML: .5; 3 SOLUTION RESPIRATORY (INHALATION) at 10:01

## 2020-01-28 RX ADMIN — ANTACID TABLETS 500 MG: 500 TABLET, CHEWABLE ORAL at 05:58

## 2020-01-28 RX ADMIN — IPRATROPIUM BROMIDE AND ALBUTEROL SULFATE 3 ML: .5; 3 SOLUTION RESPIRATORY (INHALATION) at 14:43

## 2020-01-28 RX ADMIN — OXYCODONE HYDROCHLORIDE AND ACETAMINOPHEN 2 TABLET: 5; 325 TABLET ORAL at 12:18

## 2020-01-28 RX ADMIN — OSELTAMIVIR PHOSPHATE 75 MG: 75 CAPSULE ORAL at 05:58

## 2020-01-28 RX ADMIN — NOREPINEPHRINE BITARTRATE 5 MCG/MIN: 1 INJECTION INTRAVENOUS at 03:00

## 2020-01-28 RX ADMIN — OSELTAMIVIR PHOSPHATE 75 MG: 75 CAPSULE ORAL at 18:18

## 2020-01-28 RX ADMIN — MIDODRINE HYDROCHLORIDE 10 MG: 5 TABLET ORAL at 18:18

## 2020-01-28 RX ADMIN — SENNOSIDES AND DOCUSATE SODIUM 2 TABLET: 8.6; 5 TABLET ORAL at 18:18

## 2020-01-28 RX ADMIN — FAMOTIDINE 20 MG: 20 TABLET ORAL at 05:58

## 2020-01-28 RX ADMIN — ENOXAPARIN SODIUM 40 MG: 100 INJECTION SUBCUTANEOUS at 05:58

## 2020-01-28 RX ADMIN — FAMOTIDINE 20 MG: 10 INJECTION INTRAVENOUS at 18:18

## 2020-01-28 RX ADMIN — BISACODYL 10 MG: 10 SUPPOSITORY RECTAL at 18:18

## 2020-01-28 RX ADMIN — ANTACID TABLETS 500 MG: 500 TABLET, CHEWABLE ORAL at 18:18

## 2020-01-28 RX ADMIN — OXYCODONE HYDROCHLORIDE AND ACETAMINOPHEN 2 TABLET: 5; 325 TABLET ORAL at 19:33

## 2020-01-28 RX ADMIN — OXYCODONE HYDROCHLORIDE AND ACETAMINOPHEN 2 TABLET: 5; 325 TABLET ORAL at 04:19

## 2020-01-28 RX ADMIN — MIDODRINE HYDROCHLORIDE 10 MG: 5 TABLET ORAL at 12:18

## 2020-01-28 RX ADMIN — SODIUM CHLORIDE, POTASSIUM CHLORIDE, SODIUM LACTATE AND CALCIUM CHLORIDE: 600; 310; 30; 20 INJECTION, SOLUTION INTRAVENOUS at 00:04

## 2020-01-28 RX ADMIN — FENTANYL CITRATE 200 MCG/HR: 50 INJECTION, SOLUTION INTRAMUSCULAR; INTRAVENOUS at 14:57

## 2020-01-28 RX ADMIN — IPRATROPIUM BROMIDE AND ALBUTEROL SULFATE 3 ML: .5; 3 SOLUTION RESPIRATORY (INHALATION) at 02:52

## 2020-01-28 RX ADMIN — OXYCODONE HYDROCHLORIDE AND ACETAMINOPHEN 1 TABLET: 5; 325 TABLET ORAL at 00:12

## 2020-01-28 RX ADMIN — IPRATROPIUM BROMIDE AND ALBUTEROL SULFATE 3 ML: .5; 3 SOLUTION RESPIRATORY (INHALATION) at 06:46

## 2020-01-28 RX ADMIN — IPRATROPIUM BROMIDE AND ALBUTEROL SULFATE 3 ML: .5; 3 SOLUTION RESPIRATORY (INHALATION) at 18:50

## 2020-01-28 RX ADMIN — IPRATROPIUM BROMIDE AND ALBUTEROL SULFATE 3 ML: .5; 3 SOLUTION RESPIRATORY (INHALATION) at 22:44

## 2020-01-28 RX ADMIN — FENTANYL CITRATE 200 MCG/HR: 50 INJECTION, SOLUTION INTRAMUSCULAR; INTRAVENOUS at 04:52

## 2020-01-28 RX ADMIN — SENNOSIDES AND DOCUSATE SODIUM 2 TABLET: 8.6; 5 TABLET ORAL at 05:58

## 2020-01-28 RX ADMIN — POLYETHYLENE GLYCOL 3350 1 PACKET: 17 POWDER, FOR SOLUTION ORAL at 12:18

## 2020-01-28 RX ADMIN — LIDOCAINE 1 PATCH: 50 PATCH CUTANEOUS at 05:59

## 2020-01-28 ASSESSMENT — PULMONARY FUNCTION TESTS: FVC: .74

## 2020-01-28 NOTE — PROGRESS NOTES
"[facility] ICU Progress Note      Admit Date: 1/24/2020  ICU Day: 2    Resident(s): Lencho Sage M.D.  Attending: LUCY FRENCH/ Dr. Cruz    Date & Time:   1/28/2020   11:08 AM       Patient ID:    Name:             Roxy Cota   YOB: 1975  Age:                 44 y.o.  female   MRN:               [id number]    HPI:  44 y.o. female with hx of breast CA, mets to bone (base of skull and cervical and lumbar spine), s/p right mastectomy, on immunotherapy (started 4 days prior), COPD on 3L home O2,  who was initially admitted 1/25 for SOB and found to have influenza A pneumonia. Initially admitted to the floor. Pt had been having high grade fever in 39.5-40.6C. HR in 100-130s. SBP in 80-100s. Given the change in vitals, there was an increase demand from bedside on monitoring the patient. Pt also had had \"max dose of acetaminophen\" given in the day but on MAR appeared pt only received acetaminophen 650mg PO x 3 doses (total about 2gr). ICU team was consulted, and  bedside examination was notable for bilateral rhonchi and wheezing in all lung fields , no JVD, tachycardic but regular rhythm, no signs of fluid overload. STAT Chest Xray 1/26 revealed worsening mild pulmonary edema and/or multifocal pneumonitis, minimal bilateral pleural effusions, slightly greater on the LEFT, probable underlying COPD. Lactic acid was 0.8. Urine strep pneumo and legionella Ag were ordered.Tamiflu for influenza A x 5 days and C3+doxy for CAP. Midodrine 5 TID was started. Patient was transferred to ICU for closer monitoring and treatment of Influenza A and septic shock.      Consultants:  ICU    Interval Events:  - Overnight- no events, UOP 400cc  - Sedation- fentayl   - Azithromycin for flu replication inhibition and lung antiinflammatory effects, continue ceftriaxone, through 1/30  - MRSA nares negative, Blood cx NGTD  - Zyvox discontinued   - F/u sputum (BAL), urine strep pneumo, legionella Ag   - CRP 11.65  - Serial " "procalcitonins  - Avoid excess fluids (sepsis 30ml/kg) use early pressors if necessary to prevent ARDS   - Monitor for post influenza superimposed pna (MRSA, strep PNA, GAS) serial procal, MRSA nasal Swab and   - Monitor for Viral induced-HLH  - Adjuncts: Steroids, Vit C, naproxen 200mg BID  - IVFs- LR 75 cc/h, monitor UOP  - Cortrack feeding  - Hgb 8.6 --> 6.6 this AM, this is likely d/t hemodilution, occult negative   - Transfuse 1U pRBC, follow-up PM CBC  - PT/OT, palliative follow-up      Review of Systems   Unable to perform ROS: Intubated       PHYSICAL EXAM    Vitals:    01/28/20 0845 01/28/20 0900 01/28/20 0915 01/28/20 1002   BP:  108/60     Pulse: (!) 107 (!) 102 (!) 104 (!) 106   Resp: 20 20 20    Temp:       TempSrc:       SpO2: 99% 98% 98% 99%   Weight:       Height:         Body mass index is 21.19 kg/m².  /60   Pulse (!) 106   Temp (!) 40.1 °C (104.1 °F) (Temporal)   Resp 20   Ht 1.626 m (5' 4\")   Wt 56 kg (123 lb 7.3 oz)   SpO2 99%   BMI 21.19 kg/m²   O2 therapy: Pulse Oximetry: 99 %, O2 Delivery: Ventilator    Physical Exam   Constitutional: No distress.   HENT:   Head: Normocephalic and atraumatic.   Right Ear: External ear normal.   Left Ear: External ear normal.   Nose: Nose normal.   Eyes: Pupils are equal, round, and reactive to light. Conjunctivae and EOM are normal. Right eye exhibits no discharge. Left eye exhibits no discharge. No scleral icterus.   Neck: Normal range of motion. Neck supple. No JVD present. No thyromegaly present.   Cardiovascular: Normal rate, regular rhythm, normal heart sounds and intact distal pulses. Exam reveals no gallop and no friction rub.   No murmur heard.  Pulmonary/Chest: No respiratory distress. She has no wheezes. She has rales.   Mild bilateral rhonchi and rales/crackling in bilateral lung fields   Abdominal: Soft. Bowel sounds are normal. She exhibits no distension. There is no tenderness. There is no rebound.   Genitourinary:    Genitourinary " Comments: Morales catether in place     Musculoskeletal:         General: No deformity or edema.   Neurological: She is alert. She has normal reflexes.   Intubated   Skin: No rash noted. She is not diaphoretic. No erythema.   Nursing note and vitals reviewed.      Respiratory:  Owen Vent Mode: APVCMV  Respiration: 20, Pulse Oximetry: 99 %    Chest Tube Drains:    Recent Labs     01/27/20  0042 01/27/20  0433 01/28/20  0523   ISTATAPH 7.281* 7.245* 7.344*   ISTATAPCO2 40.5* 44.5* 44.5*   ISTATAPO2 135* 116* 93*   ISTATATCO2 20 21 26   BKYTTUD1NSK 99 98 97   ISTATARTHCO3 19.1 19.3 24.2   ISTATARTBE -7* -8* -1   ISTATTEMP 101.7 F 98.6 F 97.3 F   ISTATFIO2 50 40 30   ISTATSPEC Arterial Arterial Arterial   ISTATAPHTC 7.257* 7.245* 7.354*   RXAYSHDL4FF 146* 116* 89*       HemoDynamics:  Pulse: (!) 106 Blood Pressure: 108/60      Neuro:  Intubated    Fluids:  Date 01/28/20 0700 - 01/29/20 0659   Shift 1665-8053 2312-5759 4172-5730 24 Hour Total   INTAKE   I.V. 337.6   337.6     Norepinephrine Volume 37.6   37.6     Volume (mL) (lactated ringers infusion) 300   300   Other 60   60     Flush / Irrigation Volume (Free Water Flush) 60   60   NG/GT 80   80     Intake (mL) (Enteral Tube 01/27/20 Cortrak - Gastric 10 Fr. Left nare) 80   80   Shift Total 477.6   477.6   OUTPUT   Urine 75   75     Output (mL) (Urethral Catheter Non-latex 16 Fr.) 75   75   Stool         Number of Times Stooled 0 x   0 x   Shift Total 75   75   .6   402.6        Intake/Output Summary (Last 24 hours) at 1/27/2020 0743  Last data filed at 1/27/2020 0600  Gross per 24 hour   Intake 1026.18 ml   Output 2455 ml   Net -1428.82 ml       Weight: 56 kg (123 lb 7.3 oz)  Body mass index is 21.19 kg/m².    Recent Labs     01/26/20 0441 01/27/20  0045   SODIUM 141 138   POTASSIUM 3.6 3.9   CHLORIDE 113* 110   CO2 20 20   BUN 10 9   CREATININE 1.10 0.85   MAGNESIUM 1.8 2.0   CALCIUM 7.1* 8.1*       GI/Nutrition:  Recent Labs     01/26/20 0441  20  0045 20  1620 20  0630   ALTSGPT 11 16  --   --    ASTSGOT 46* 60*  --   --    ALKPHOSPHAT 79 76  --   --    TBILIRUBIN 0.2 0.3  --   --    PREALBUMIN  --   --  7.0* 7.0*   GLUCOSE 113* 146*  --   --        Heme:  Recent Labs     205 20  0630   RBC 2.44* 2.72* 2.04*   HEMOGLOBIN 7.9* 8.6* 6.6*   HEMATOCRIT 25.1* 28.0* 21.0*   PLATELETCT 205 219 185       Infectious Disease:  Monitored Temp 2  Av.3 °C (97.3 °F)  Min: 35.8 °C (96.4 °F)  Max: 36.6 °C (97.9 °F)  Recent Labs     20  0630   WBC 9.9 10.6 5.9   NEUTSPOLYS 81.90* 87.00* 77.80*   LYMPHOCYTES 13.80* 7.80* 18.00*   MONOCYTES 0.00 0.90 3.00   EOSINOPHILS 0.00 0.00 0.70   BASOPHILS 0.00 0.00 0.20   ASTSGOT 46* 60*  --    ALTSGPT 11 16  --    ALKPHOSPHAT 79 76  --    TBILIRUBIN 0.2 0.3  --        Meds:  • [START ON 2020] azithromycin  500 mg     • midodrine  10 mg     • NS       • acetaminophen  650 mg     • calcium carbonate  500 mg     • oseltamivir  75 mg     • oxyCODONE-acetaminophen  1-2 Tab     • polyethylene glycol/lytes  1 Packet      And   • senna-docusate  2 Tab      And   • bisacodyl  10 mg     • ipratropium-albuterol  3 mL     • LR   75 mL/hr at 20 0004   • Pharmacy  1 Each     • fentaNYL   mcg     • NORepinephrine (LEVOPHED) infusion  0-30 mcg/min 5 mcg/min (20 0300)   • fentaNYL  50 mcg      And   • fentaNYL  100 mcg      And   • fentaNYL   200 mcg/hr (20 0452)    And   • dexmedetomidine (PRECEDEX) infusion  0-0.7 mcg/kg/hr Stopped (20 1024)   • famotidine  20 mg      Or   • famotidine  20 mg     • MD Alert...Adult ICU Electrolyte Replacement per Pharmacy       • lidocaine  1-2 mL     • Respiratory Care per Protocol       • enoxaparin  40 mg     • lidocaine  1 Patch     • cefTRIAXone (ROCEPHIN) IV  1 g Stopped (20 0627)   • albuterol  2 Puff     • ipratropium-albuterol  3 mL          Procedures:  Intubation-  1/26/20  Bronchoscopy- 1/26/20  Central Line- 1/26/20    Imaging:  DX-CHEST-PORTABLE (1 VIEW)   Final Result      Stable cardiomegaly and interstitial infiltrates.      DX-ABDOMEN FOR TUBE PLACEMENT   Final Result      Enteric tube tip projects over the stomach.      DX-CHEST-FOR LINE PLACEMENT Perform procedure in: Patient's Room   Final Result            1. A right peripherally inserted catheter with tip projects appropriately over the expected area of the superior vena cava.      2. Endotracheal tube with tip projecting over the mid thoracic trachea.      DX-CHEST-2 VIEWS   Final Result      1.  Worsening mild pulmonary edema and/or multifocal pneumonitis.   2.  Minimal bilateral pleural effusions, slightly greater on the LEFT.   3.  Probable underlying COPD.      CT-HEAD W/O   Final Result      1.  No acute intracranial abnormality.   2.  Osseous metastatic disease.      DX-CHEST-PORTABLE (1 VIEW)   Final Result      No acute cardiopulmonary disease evident.          Problem and Plan:    * Influenza A  Assessment & Plan  Influenza A was ordered by day team 1/25/20 as pt discussed sick contacts at home with fevers. Test initially wasn't read, but after [facility] night float got called about temp 103F, then requested stat flu test, which came back positive for influenza A. They started Tamiflu renally dosed.     Plan:  Continue Tamiflu      Sepsis (HCC)  Assessment & Plan  Sepsis Present on admission, soft bp's and sinus tachycardia low threshold for ICU transfer   SIRS criteria: Tachycardia, with heart rate greater than 90 BPM and Tachypnea, with respirations greater than 20 per minute  Source is presumed to be lungs (Influenza/CAP) at this time  Sepsis protocol initiated on admission   Fluid resuscitation ordered per protocol  IV antibiotics C3 and doxy ordered despite negative CXR, pt has been having a cough with fevers and chills and got sick after her  was having fevers of 103F and cough.     Plan  -  Intubated, sedation- fentayl   - Azithromycin for flu replication inhibition and lung antiinflammatory effects, continue ceftriaxone through 1/30  - MRSA nares negative, Blood cx NGTD  - Zyvox discontinued   - F/u sputum (BAL), urine strep pneumo, legionella Ag   - CRP 11.65  - Serial procalcitonins  - Avoid excess fluids (sepsis 30ml/kg) use early pressors if necessary to prevent ARDS   - Increase midodrine and wean off pressors (Levophed 5)  - Monitor for post influenza superimposed pna (MRSA, strep PNA, GAS) serial procal, Viral induced-HLH  - Adjuncts: Steroids, Vit C, naproxen 200mg BID  - IVFs-  cc/h, monitor UOP  - Cortrack feeding  - Consider extubation today if SBT successful        Macrocytic anemia- (present on admission)  Assessment & Plan  Patient with chronic anemia, chart review showing normal B12/Folate, likely secondary to bone metastasis. 1/28/20- There has been a drop in hemoglobin to 6.6 (8.6 previous day).    Plan  - Suspect drop in hemoglobin to be secondary to hemodilution  - Transfuse 1U pRBC, follow-up PM CBC    COPD (chronic obstructive pulmonary disease) (HCC)- (present on admission)  Assessment & Plan  Chronic respiratory failure with 3L O2 at home, currently requiring 3L, at baseline. Diffuse expiratory rhonci on exam    - RT protocol  - Albuterol Q6  - Incentive Spirometer    - scheduled duonebs Q4      Urinary retention  Assessment & Plan  Pt presented with 3 days of incontinence. She was found to be retaining >900cc on bladder scan morning of 1/25/20, then had an episode of urination in bed that was not able to be measured; repeat scan showed 510cc in the bladder and we ordered a herndon catheter     Plan:  Herndon cath with strict I&Os and evaluate with physician when it is appropriate to d/c herndon     Displaced fracture of greater trochanter of right femur (HCC)- (present on admission)  Assessment & Plan  Admitted for during last admission (Dec 2019), Ortho consulted and patient  deemed non-surgical. PT had recommended home health, per family, patient was denied home health. Did not go to physical therapy as prescribed on last admission     - CTM  -PT/OT consult     Breast cancer (HCC)- (present on admission)  Assessment & Plan  History of Metastatic Breast Cancer, evaluated by Dr. Wynne during last admission. Mets to L and Spine, Pelvis. CT head on admission showing Mets to skull, no prior records of it. Brought by family for concern given weakness and increased confusion, patient reporting new incontinence. Repeat MR of Spine Chart review shows Dr. Wynne recommended palliative care, palliative was consulted during last admission, Advance Directive stating patient's POA is her , Jones Cota.  Discussed code status, full code at this time.     Plan:  - Spoke to Dr. Khan (Oncology) and she recommends holding patient's Verzenio until the sepsis resolves, managing her pain, and then can restart Verzenio on discharge; 4 days of treatment with Verzenio prior to this admission is too soon to predict response   - Will resume her home pain regiment of OxyContin 10mg BID and percocet q4hr prn   - Follow-up palliative care discussions with family        DISPO:  ICU    CODE STATUS: FULL    Quality Measures:  Morales Catheter:  Yes  DVT Prophylaxis:   Enoxaparin  Ulcer Prophylaxis:   Famotidine  Antibiotics: Ceftriaxone, Azithromycin  Lines: Central, PIV

## 2020-01-28 NOTE — THERAPY
"Physical Therapy Evaluation completed.   Bed Mobility:  Supine to Sit: Maximal Assist  Transfers: Sit to Stand: Minimal Assist  Gait: Level Of Assist: Unable to Participate  Plan of Care: Will benefit from Physical Therapy 3 times per week  Discharge Recommendations: Equipment: Will Continue to Assess for Equipment Needs. Recommend post-acute placement for continued physical therapy services prior to discharge home. Patient can tolerate post-acute therapies at a 5x/week frequency.       See \"Rehab Therapy-Acute\" Patient Summary Report for complete documentation.     Pt is a 45yo female admitted with influenza A. Pt with hx of breast CA with mets to pelvis and spine. Pt on ventilator. Information regarding PLOF and living situation obtained from pervious admission; however, will need to confirm and obtain most recent mobility. Pt presents with generalized weakness and lethargy. Pt able to follow commands, with delayed responses. Nods appropriately throughout. Pt appears to have R gaze preference and possible L inattention but is able to turn head L when cued. Required max A for supine > sit. Initially grimacing at EOB, then giving thumbs up. Did require assist to maintain upright balance. Pt with R lateral lean and continuous cues to find center. Performed STS with min A, less significant lateral lean. Pt was unable to achieve B foot clearance for marching or side stepping. Had difficulty with weight shifting. Decreased tolerance d/t pain and fatigue. Pt will benefit from continued acute PT to progress mobility. At this time recommend postacute placement for continued therapy prior to return home.   "

## 2020-01-28 NOTE — CARE PLAN
Problem: Ventilation Defect:  Goal: Ability to achieve and maintain unassisted ventilation or tolerate decreased levels of ventilator support  Outcome: PROGRESSING AS EXPECTED     Vent Day #3  7.5 @ 24  CMV 20, 370, +8, 30%     Ventilator settings changed this shift: FIO2 Titrated     Weaning trials: YES     Respiratory Procedures:      Plan: Continue current ventilator settings and wean mechanical ventilation as tolerated per physician orders.       Problem: Bronchoconstriction:  Goal: Improve in air movement and diminished wheezing  Outcome: PROGRESSING AS EXPECTED      Duoneb Q4

## 2020-01-28 NOTE — PROGRESS NOTES
Critical Care Progress Note    Date of admission  1/24/2020    Chief Complaint  44 y.o. female admitted 1/24/2020 with sepsis, Flu    Hospital Course    1/26 - admitted to ICU for severe sepsis, flu, started on Tamiflu, intubated, pressors started for MAP goals      Interval Problem Update  Reviewed last 24 hour events:  Tm 36.6  HR 90-100s  SBP 90-130s  Titration of 5mcg NorEpi  ABG 7.35/43/89/24/40% P/F 310  GCS 15  Fentanyl gtt RASS -2  Vent day 3  Cortrak TF   CXR stable infiltrates  Azithromycin  Ceftraixone  Urine output 1L (+904cc)    Review of Systems  Review of Systems   Unable to perform ROS: Intubated        Vital Signs for last 24 hours   Pulse:  [] 111  Resp:  [13-36] 20  BP: ()/(48-69) 97/53  SpO2:  [97 %-100 %] 97 %    Hemodynamic parameters for last 24 hours       Respiratory Information for the last 24 hours  [place]wen Vent Mode: Spont  Rate (breaths/min): 20  Vt Target (mL): 370  PEEP/CPAP: 8  FiO2: 40  P Support: 5  P MEAN: 11  Control VTE (exp VT): 358    Physical Exam   Physical Exam  Constitutional:       Appearance: She is ill-appearing.   HENT:      Head: Normocephalic.      Right Ear: External ear normal.      Left Ear: External ear normal.      Nose: Nose normal.      Mouth/Throat:      Mouth: Mucous membranes are dry.   Eyes:      Extraocular Movements: Extraocular movements intact.      Pupils: Pupils are equal, round, and reactive to light.   Neck:      Musculoskeletal: Neck supple.   Cardiovascular:      Rate and Rhythm: Normal rate and regular rhythm.      Pulses: Normal pulses.   Pulmonary:      Breath sounds: No wheezing or rales.   Abdominal:      General: Bowel sounds are normal.   Musculoskeletal:         General: No swelling.   Skin:     General: Skin is dry.      Capillary Refill: Capillary refill takes less than 2 seconds.   Neurological:      General: No focal deficit present.      Mental Status: She is alert.      Cranial Nerves: No cranial nerve deficit.          Medications  Current Facility-Administered Medications   Medication Dose Route Frequency Provider Last Rate Last Dose   • [START ON 1/29/2020] azithromycin (ZITHROMAX) tablet 500 mg  500 mg Enteral Tube DAILY RISSA PandeyORito       • acetaminophen (TYLENOL) tablet 650 mg  650 mg Enteral Tube Q6HRS PRN DONNA Pandey.O.   650 mg at 01/27/20 2249   • calcium carbonate (TUMS) chewable tab 500 mg  500 mg Enteral Tube BID DONNA Pandey.O.   500 mg at 01/28/20 0558   • midodrine (PROAMATINE) tablet 5 mg  5 mg Enteral Tube TID WITH MEALS DONNA Pandey.O.   5 mg at 01/27/20 1811   • oseltamivir (TAMIFLU) capsule 75 mg  75 mg Enteral Tube BID DONNA Pandey.O.   75 mg at 01/28/20 0558   • oxyCODONE-acetaminophen (PERCOCET) 5-325 MG per tablet 1-2 Tab  1-2 Tab Enteral Tube Q4HRS PRN DONNA Pandey.O.   2 Tab at 01/28/20 0419   • polyethylene glycol/lytes (MIRALAX) PACKET 1 Packet  1 Packet Enteral Tube QDAY PRN DONNA Pandey.O.   1 Packet at 01/27/20 1309    And   • senna-docusate (PERICOLACE or SENOKOT S) 8.6-50 MG per tablet 2 Tab  2 Tab Enteral Tube BID DONNA Pandey.O.   2 Tab at 01/28/20 0558    And   • bisacodyl (DULCOLAX) suppository 10 mg  10 mg Rectal QDAY PRN DONNA Pandey.O.       • ipratropium-albuterol (DUONEB) nebulizer solution  3 mL Nebulization Q4HRS (RT) Ian Carver M.D.   3 mL at 01/28/20 0646   • lactated ringers infusion   Intravenous Continuous Lencho Sage M.D. 75 mL/hr at 01/28/20 0004     • Pharmacy Consult: Enteral tube insertion - review meds/change route/product selection  1 Each Other PHARMACY TO DOSE Kwame Cruz M.D.       • fentaNYL (SUBLIMAZE) injection  mcg   mcg Intravenous Q HOUR PRN Gustavo Rojas D.O.   100 mcg at 01/26/20 2248   • norepinephrine (LEVOPHED) 8 mg in  mL Infusion  0-30 mcg/min Intravenous Continuous Jose Barreto M.D. 9.4 mL/hr at 01/28/20 0300 5 mcg/min at 01/28/20 0300   • fentaNYL (SUBLIMAZE) injection 50 mcg  50 mcg  Intravenous Q15 MIN PRN Jose Barreto M.D.        And   • fentaNYL (SUBLIMAZE) injection 100 mcg  100 mcg Intravenous Q15 MIN PRN Jose Barreto M.D.   100 mcg at 01/26/20 2241    And   • fentaNYL (SUBLIMAZE) 50 mcg/mL in 50mL (Continuous Infusion)   Intravenous Continuous Jose Barreto M.D. 4 mL/hr at 01/28/20 0452 200 mcg/hr at 01/28/20 0452    And   • dexmedetomidine (PRECEDEX) 400 mcg/100mL NS premix infusion  0-0.7 mcg/kg/hr Intravenous Continuous Jose Barreto M.D.   Stopped at 01/27/20 1024   • famotidine (PEPCID) tablet 20 mg  20 mg Enteral Tube Q12HRS Jose Barreto M.D.   20 mg at 01/28/20 0558    Or   • famotidine (PEPCID) injection 20 mg  20 mg Intravenous Q12HRS Jose Barreto M.D.   20 mg at 01/27/20 1811   • MD Alert...ICU Electrolyte Replacement per Pharmacy   Other PHARMACY TO DOSE Jose Barreto M.D.       • lidocaine (XYLOCAINE) 1 % injection 1-2 mL  1-2 mL Tracheal Tube Q30 MIN PRN Jose Barreto M.D.       • Respiratory Care per Protocol   Nebulization Continuous RT Kasia Barrientos D.ORito       • enoxaparin (LOVENOX) inj 40 mg  40 mg Subcutaneous DAILY Kasia Barrientos D.O.   40 mg at 01/28/20 0558   • lidocaine (LIDODERM) 5 % 1 Patch  1 Patch Transdermal Q24HRS Kasia Barrientos D.O.   1 Patch at 01/28/20 0559   • cefTRIAXone (ROCEPHIN) 1 g in  mL IVPB  1 g Intravenous Q24HRS Juvenal Hermosillo M.D.   Stopped at 01/28/20 0627   • albuterol inhaler 2 Puff  2 Puff Inhalation Q4H PRN (RT) Alejandra Tomlinson M.D.       • ipratropium-albuterol (DUONEB) nebulizer solution  3 mL Nebulization Q4H PRN (RT) Kasia Barrientos D.O.           Fluids    Intake/Output Summary (Last 24 hours) at 1/28/2020 0758  Last data filed at 1/28/2020 0657  Gross per 24 hour   Intake 3810.41 ml   Output 1080 ml   Net 2730.41 ml       Laboratory  Recent Labs     01/27/20  0042 01/27/20  0433 01/28/20  0523   ISTATAPH 7.281* 7.245* 7.344*   ISTATAPCO2 40.5* 44.5* 44.5*   ISTATAPO2 135*  116* 93*   ISTATATCO2 20 21 26   MFOIROP9LQO 99 98 97   ISTATARTHCO3 19.1 19.3 24.2   ISTATARTBE -7* -8* -1   ISTATTEMP 101.7 F 98.6 F 97.3 F   ISTATFIO2 50 40 30   ISTATSPEC Arterial Arterial Arterial   ISTATAPHTC 7.257* 7.245* 7.354*   VUNCHHDH5QZ 146* 116* 89*         Recent Labs     01/26/20 0441 01/27/20  0045   SODIUM 141 138   POTASSIUM 3.6 3.9   CHLORIDE 113* 110   CO2 20 20   BUN 10 9   CREATININE 1.10 0.85   MAGNESIUM 1.8 2.0   CALCIUM 7.1* 8.1*     Recent Labs     01/26/20 0441 01/27/20  0045 01/27/20  1620   ALTSGPT 11 16  --    ASTSGOT 46* 60*  --    ALKPHOSPHAT 79 76  --    TBILIRUBIN 0.2 0.3  --    PREALBUMIN  --   --  7.0*   GLUCOSE 113* 146*  --      Recent Labs     01/26/20 0441 01/27/20  0045   WBC 9.9 10.6   NEUTSPOLYS 81.90* 87.00*   LYMPHOCYTES 13.80* 7.80*   MONOCYTES 0.00 0.90   EOSINOPHILS 0.00 0.00   BASOPHILS 0.00 0.00   ASTSGOT 46* 60*   ALTSGPT 11 16   ALKPHOSPHAT 79 76   TBILIRUBIN 0.2 0.3     Recent Labs     01/26/20 0441 01/27/20  0045   RBC 2.44* 2.72*   HEMOGLOBIN 7.9* 8.6*   HEMATOCRIT 25.1* 28.0*   PLATELETCT 205 219       Imaging  X-Ray:  I have personally reviewed the images and compared with prior images.    Assessment/Plan  * Influenza A  Assessment & Plan  On tamiflu x 5 days    Sepsis (HCC)  Assessment & Plan  This is Severe Sepsis Present on admission  SIRS criteria identified on my evaluation include: Fever, with temperature greater than 101 deg F, Tachycardia, with heart rate greater than 90 BPM and Leukocyosis, with WBC greater than 12,000  Source of infection is lungs  Clinical indicators of end organ dysfunction include Systolic blood pressure (SBP) <90 mmHg or mean arterial pressure <65 mmHg and Lactate >2 mmol/L (18.0 mg/dL)  Sepsis protocol initiated  Fluid resuscitation ordered per protocol  IV antibiotics as appropriate for source of sepsis  Reassessment: I have reassessed the patient's hemodynamic status  End organ dysfunction include(s):  Acute respiratory  failure and Encephalopathy (metabolic)    Tamiflu x5 days  Ceftriaxone, azithro, deescalate when cx available  Follow up blood cultures.   MAP goal >65.   Current titration of NorEpi  Increased midodrine to help wean pressors  Fever control  Cooling blankets.       COPD (chronic obstructive pulmonary disease) (HCC)- (present on admission)  Assessment & Plan  Not in COPD exacerbation   Duonebs prn.       Acute respiratory failure with hypoxemia (HCC)  Assessment & Plan  2/2 to flu  Increased risk for ARDS  Goal normovolemia  RT/O2 protocols  Daily and PRN ABGs  Titration of ventilator therapy based on ABGs and patient's status  Sedation as tolerated/indicated  Daily CXR  HOB >30 degrees and peridex for VAP prevention  Pepcid for GI prophylaxis  SAT/SBT when able (ABCDEF Bundle)  Early mobilization    Did well SBT this afternoon but did not meet parameters for extubation      Breast cancer (HCC)- (present on admission)  Assessment & Plan  S/p mastectomy, chemotherapy. On immunotherapy           VTE:  Lovenox  Ulcer: H2 Antagonist  Lines: Central Line  Ongoing indication addressed and Morales Catheter  Ongoing indication addressed    I have performed a physical exam and reviewed and updated ROS and Plan today (1/28/2020). In review of yesterday's note (1/27/2020), there are no changes except as documented above.     Discussed patient condition and risk of morbidity and/or mortality with RN, RT, Pharmacy, Code status disscussed, Charge nurse / hot rounds and Patient  The patient remains critically ill.  Critical care time = 37 minutes in directly providing and coordinating critical care and extensive data review.  No time overlap and excludes procedures.

## 2020-01-28 NOTE — CARE PLAN
Adult Ventilation Update    Total Vent Days: 2  Vent:  x 20, 40% +8    Patient Lines/Drains/Airways Status      Active Airway       Name: Placement date: Placement time: Site: Days:    Airway ETT 7.5  01/26/20 2238   --  2                  In the last 24 hours, the patient tolerated SBT for 2.5 hours on settings of Spont 10/8.    Mobility  Activity Performed: Edge of bed     Events/Summary/Plan: Patient stable on vent. No changes.

## 2020-01-28 NOTE — CARE PLAN
Problem: Pain Management  Goal: Pain level will decrease to patient's comfort goal  Outcome: PROGRESSING AS EXPECTED    Pain medication as well as adjunctive treatments including repositioning, therapeutic presence, and placing a folded blanket under pt's lower back to alleviate back pain have been successful in maintaining pt's comfort.    Problem: Communication  Goal: The ability to communicate needs accurately and effectively will improve  Outcome: PROGRESSING SLOWER THAN EXPECTED     It is difficult for pt to mouth words that the healthcare team can understand around her ETT. When she writes her writing is illegible makes communication difficult.

## 2020-01-28 NOTE — THERAPY
"Occupational Therapy Evaluation completed.   Functional Status:  Supine >< sit total  A, sit > stand mod A (2 person assist), LB dressing (don socks) total A   Plan of Care: Will benefit from Occupational Therapy 3 times per week  Discharge Recommendations:  Equipment: Will Continue to Assess for Equipment Needs. Post-acute therapy Recommend post-acute placement for additional occupational therapy services prior to discharge home.     See \"Rehab Therapy-Acute\" Patient Summary Report for complete documentation.    Pt is a 44 y.o female with h/o stage IV breast cancer with metastasis to pelvis, L spine and C spine,COPD, GERD, and R femur fx (December 2019). Pt presented with confusion, fatigue, urinary incontinence. Pt dx with sepsis, influenza A, respiratory failure requiring oral intubation. OT eval completed. Per RN pt is [place]; unclear etiology. Demonstrated inconsistent command following throughout session. Followed commands for R UE assessment more consistently than L UE. Demonstrates significant weakness B UE (L> R). Moderate R lean in sitting, mod/max A to maintain balance. Pt did stand with 2 person assist for ~3 min. R gaze preference throughout session. Pt is limited by: decreased B UE ROM/strength/coordination, decreased activity tolerance, impaired balance, impaired command following, possible L inattention. Pt would benefit from acute OT to maximize functional independence while in house and post acute recommended.   "

## 2020-01-29 ENCOUNTER — APPOINTMENT (OUTPATIENT)
Dept: RADIOLOGY | Facility: MEDICAL CENTER | Age: 45
DRG: 871 | End: 2020-01-29
Attending: INTERNAL MEDICINE
Payer: COMMERCIAL

## 2020-01-29 ENCOUNTER — HOSPITAL ENCOUNTER (OUTPATIENT)
Dept: RADIOLOGY | Facility: MEDICAL CENTER | Age: 45
End: 2020-01-29

## 2020-01-29 LAB
ALBUMIN SERPL BCP-MCNC: 2.6 G/DL (ref 3.2–4.9)
ALBUMIN/GLOB SERPL: 1.1 G/DL
ALP SERPL-CCNC: 64 U/L (ref 30–99)
ALT SERPL-CCNC: 15 U/L (ref 2–50)
ANION GAP SERPL CALC-SCNC: 4 MMOL/L (ref 0–11.9)
AST SERPL-CCNC: 42 U/L (ref 12–45)
BACTERIA SPEC RESP CULT: NORMAL
BASOPHILS # BLD AUTO: 0.3 % (ref 0–1.8)
BASOPHILS # BLD: 0.02 K/UL (ref 0–0.12)
BILIRUB SERPL-MCNC: 0.4 MG/DL (ref 0.1–1.5)
BUN SERPL-MCNC: 17 MG/DL (ref 8–22)
CALCIUM SERPL-MCNC: 8.2 MG/DL (ref 8.5–10.5)
CHLORIDE SERPL-SCNC: 113 MMOL/L (ref 96–112)
CO2 SERPL-SCNC: 27 MMOL/L (ref 20–33)
CREAT SERPL-MCNC: 0.84 MG/DL (ref 0.5–1.4)
EOSINOPHIL # BLD AUTO: 0.03 K/UL (ref 0–0.51)
EOSINOPHIL NFR BLD: 0.4 % (ref 0–6.9)
ERYTHROCYTE [DISTWIDTH] IN BLOOD BY AUTOMATED COUNT: 80.5 FL (ref 35.9–50)
GLOBULIN SER CALC-MCNC: 2.4 G/DL (ref 1.9–3.5)
GLUCOSE SERPL-MCNC: 118 MG/DL (ref 65–99)
GRAM STN SPEC: NORMAL
HCT VFR BLD AUTO: 24.3 % (ref 37–47)
HGB BLD-MCNC: 7.8 G/DL (ref 12–16)
IMM GRANULOCYTES # BLD AUTO: 0.05 K/UL (ref 0–0.11)
IMM GRANULOCYTES NFR BLD AUTO: 0.7 % (ref 0–0.9)
LYMPHOCYTES # BLD AUTO: 0.5 K/UL (ref 1–4.8)
LYMPHOCYTES NFR BLD: 7.2 % (ref 22–41)
MCH RBC QN AUTO: 30.2 PG (ref 27–33)
MCHC RBC AUTO-ENTMCNC: 32.1 G/DL (ref 33.6–35)
MCV RBC AUTO: 94.2 FL (ref 81.4–97.8)
MONOCYTES # BLD AUTO: 0.18 K/UL (ref 0–0.85)
MONOCYTES NFR BLD AUTO: 2.6 % (ref 0–13.4)
NEUTROPHILS # BLD AUTO: 6.17 K/UL (ref 2–7.15)
NEUTROPHILS NFR BLD: 88.8 % (ref 44–72)
NRBC # BLD AUTO: 0 K/UL
NRBC BLD-RTO: 0 /100 WBC
PLATELET # BLD AUTO: 154 K/UL (ref 164–446)
PMV BLD AUTO: 9.7 FL (ref 9–12.9)
POTASSIUM SERPL-SCNC: 4.5 MMOL/L (ref 3.6–5.5)
PROT SERPL-MCNC: 5 G/DL (ref 6–8.2)
RBC # BLD AUTO: 2.58 M/UL (ref 4.2–5.4)
SIGNIFICANT IND 70042: NORMAL
SITE SITE: NORMAL
SODIUM SERPL-SCNC: 144 MMOL/L (ref 135–145)
SOURCE SOURCE: NORMAL
WBC # BLD AUTO: 7 K/UL (ref 4.8–10.8)

## 2020-01-29 PROCEDURE — 71045 X-RAY EXAM CHEST 1 VIEW: CPT

## 2020-01-29 PROCEDURE — 700101 HCHG RX REV CODE 250: Performed by: STUDENT IN AN ORGANIZED HEALTH CARE EDUCATION/TRAINING PROGRAM

## 2020-01-29 PROCEDURE — 94640 AIRWAY INHALATION TREATMENT: CPT

## 2020-01-29 PROCEDURE — 770022 HCHG ROOM/CARE - ICU (200)

## 2020-01-29 PROCEDURE — 700111 HCHG RX REV CODE 636 W/ 250 OVERRIDE (IP): Performed by: INTERNAL MEDICINE

## 2020-01-29 PROCEDURE — 700102 HCHG RX REV CODE 250 W/ 637 OVERRIDE(OP): Performed by: PSYCHIATRY & NEUROLOGY

## 2020-01-29 PROCEDURE — 80053 COMPREHEN METABOLIC PANEL: CPT

## 2020-01-29 PROCEDURE — 700105 HCHG RX REV CODE 258: Performed by: STUDENT IN AN ORGANIZED HEALTH CARE EDUCATION/TRAINING PROGRAM

## 2020-01-29 PROCEDURE — 700111 HCHG RX REV CODE 636 W/ 250 OVERRIDE (IP): Performed by: STUDENT IN AN ORGANIZED HEALTH CARE EDUCATION/TRAINING PROGRAM

## 2020-01-29 PROCEDURE — 99253 IP/OBS CNSLTJ NEW/EST LOW 45: CPT | Performed by: PSYCHIATRY & NEUROLOGY

## 2020-01-29 PROCEDURE — A9270 NON-COVERED ITEM OR SERVICE: HCPCS | Performed by: INTERNAL MEDICINE

## 2020-01-29 PROCEDURE — 700102 HCHG RX REV CODE 250 W/ 637 OVERRIDE(OP): Performed by: INTERNAL MEDICINE

## 2020-01-29 PROCEDURE — 700101 HCHG RX REV CODE 250: Performed by: INTERNAL MEDICINE

## 2020-01-29 PROCEDURE — 99233 SBSQ HOSP IP/OBS HIGH 50: CPT | Performed by: PSYCHIATRY & NEUROLOGY

## 2020-01-29 PROCEDURE — A9270 NON-COVERED ITEM OR SERVICE: HCPCS | Performed by: PSYCHIATRY & NEUROLOGY

## 2020-01-29 PROCEDURE — 85025 COMPLETE CBC W/AUTO DIFF WBC: CPT

## 2020-01-29 RX ORDER — CITALOPRAM 20 MG/1
20 TABLET ORAL DAILY
Status: DISCONTINUED | OUTPATIENT
Start: 2020-01-29 | End: 2020-01-31

## 2020-01-29 RX ORDER — IPRATROPIUM BROMIDE AND ALBUTEROL SULFATE 2.5; .5 MG/3ML; MG/3ML
3 SOLUTION RESPIRATORY (INHALATION)
Status: DISCONTINUED | OUTPATIENT
Start: 2020-01-29 | End: 2020-02-03 | Stop reason: HOSPADM

## 2020-01-29 RX ORDER — CITALOPRAM 20 MG/1
20 TABLET ORAL DAILY
Status: DISCONTINUED | OUTPATIENT
Start: 2020-01-29 | End: 2020-01-29

## 2020-01-29 RX ORDER — OXYCODONE HYDROCHLORIDE 5 MG/1
2.5 TABLET ORAL EVERY 6 HOURS
Status: DISCONTINUED | OUTPATIENT
Start: 2020-01-29 | End: 2020-01-30

## 2020-01-29 RX ORDER — OXYCODONE HYDROCHLORIDE 5 MG/1
2.5 TABLET ORAL EVERY 6 HOURS
Status: DISCONTINUED | OUTPATIENT
Start: 2020-01-29 | End: 2020-01-29

## 2020-01-29 RX ORDER — FUROSEMIDE 10 MG/ML
20 INJECTION INTRAMUSCULAR; INTRAVENOUS ONCE
Status: COMPLETED | OUTPATIENT
Start: 2020-01-29 | End: 2020-01-29

## 2020-01-29 RX ADMIN — OXYCODONE HYDROCHLORIDE AND ACETAMINOPHEN 1 TABLET: 5; 325 TABLET ORAL at 18:18

## 2020-01-29 RX ADMIN — CEFTRIAXONE SODIUM 1 G: 1 INJECTION, POWDER, FOR SOLUTION INTRAMUSCULAR; INTRAVENOUS at 06:28

## 2020-01-29 RX ADMIN — MIDODRINE HYDROCHLORIDE 10 MG: 5 TABLET ORAL at 17:57

## 2020-01-29 RX ADMIN — MIDODRINE HYDROCHLORIDE 10 MG: 5 TABLET ORAL at 12:35

## 2020-01-29 RX ADMIN — ANTACID TABLETS 500 MG: 500 TABLET, CHEWABLE ORAL at 05:14

## 2020-01-29 RX ADMIN — MIDODRINE HYDROCHLORIDE 10 MG: 5 TABLET ORAL at 08:51

## 2020-01-29 RX ADMIN — FUROSEMIDE 20 MG: 10 INJECTION, SOLUTION INTRAMUSCULAR; INTRAVENOUS at 15:24

## 2020-01-29 RX ADMIN — CITALOPRAM HYDROBROMIDE 20 MG: 20 TABLET ORAL at 10:45

## 2020-01-29 RX ADMIN — LIDOCAINE 1 PATCH: 50 PATCH CUTANEOUS at 05:50

## 2020-01-29 RX ADMIN — IPRATROPIUM BROMIDE AND ALBUTEROL SULFATE 3 ML: .5; 3 SOLUTION RESPIRATORY (INHALATION) at 02:56

## 2020-01-29 RX ADMIN — OXYCODONE HYDROCHLORIDE 2.5 MG: 5 TABLET ORAL at 17:58

## 2020-01-29 RX ADMIN — OXYCODONE HYDROCHLORIDE 2.5 MG: 5 TABLET ORAL at 10:45

## 2020-01-29 RX ADMIN — OSELTAMIVIR PHOSPHATE 75 MG: 75 CAPSULE ORAL at 05:14

## 2020-01-29 RX ADMIN — OXYCODONE HYDROCHLORIDE AND ACETAMINOPHEN 2 TABLET: 5; 325 TABLET ORAL at 01:50

## 2020-01-29 RX ADMIN — FAMOTIDINE 20 MG: 20 TABLET ORAL at 05:14

## 2020-01-29 RX ADMIN — OXYCODONE HYDROCHLORIDE AND ACETAMINOPHEN 2 TABLET: 5; 325 TABLET ORAL at 08:51

## 2020-01-29 RX ADMIN — IPRATROPIUM BROMIDE AND ALBUTEROL SULFATE 3 ML: .5; 3 SOLUTION RESPIRATORY (INHALATION) at 07:04

## 2020-01-29 RX ADMIN — AZITHROMYCIN 500 MG: 250 TABLET, FILM COATED ORAL at 05:14

## 2020-01-29 RX ADMIN — ENOXAPARIN SODIUM 40 MG: 100 INJECTION SUBCUTANEOUS at 05:13

## 2020-01-29 RX ADMIN — ANTACID TABLETS 500 MG: 500 TABLET, CHEWABLE ORAL at 17:57

## 2020-01-29 RX ADMIN — FENTANYL CITRATE 100 MCG/HR: 50 INJECTION, SOLUTION INTRAMUSCULAR; INTRAVENOUS at 03:40

## 2020-01-29 RX ADMIN — OSELTAMIVIR PHOSPHATE 75 MG: 75 CAPSULE ORAL at 18:17

## 2020-01-29 ASSESSMENT — ENCOUNTER SYMPTOMS
PALPITATIONS: 0
NECK PAIN: 0
SPUTUM PRODUCTION: 0
FEVER: 0
VOMITING: 0
INSOMNIA: 0
HEADACHES: 0
DEPRESSION: 1
HEMOPTYSIS: 0
CONSTIPATION: 0
WEAKNESS: 0
MYALGIAS: 0
CHILLS: 0
BRUISES/BLEEDS EASILY: 0
BLURRED VISION: 0
NERVOUS/ANXIOUS: 1
SEIZURES: 0
ABDOMINAL PAIN: 0
FOCAL WEAKNESS: 0
SHORTNESS OF BREATH: 0
LOSS OF CONSCIOUSNESS: 0
BACK PAIN: 1
DIZZINESS: 0
DIARRHEA: 0
COUGH: 1
NAUSEA: 0
MEMORY LOSS: 0
PHOTOPHOBIA: 0
DOUBLE VISION: 0
WHEEZING: 0
HEARTBURN: 0
POLYDIPSIA: 0

## 2020-01-29 ASSESSMENT — LIFESTYLE VARIABLES: SUBSTANCE_ABUSE: 0

## 2020-01-29 NOTE — CARE PLAN
Vent day 3. Pt on APV/CMV: 20/370/8/30%. Pt has 7.5 ETT @ 24. Pt w/ small white/clear thick/thin secretions. Pt SBT x 1 hr x 2 per protocol. Pt too tearful/upset secondary to condition to provide parameters. MD aware.

## 2020-01-29 NOTE — CARE PLAN
Problem: Communication  Goal: The ability to communicate needs accurately and effectively will improve  Intervention: [place] patient and significant other/support system to call light to alert staff of needs  Note:   Patient educated on use of call light. Patient demonstrates understanding and alerts staff of needs appropriately.     Problem: Venous Thromboembolism (VTW)/Deep Vein Thrombosis (DVT) Prevention:  Goal: Patient will participate in Venous Thrombosis (VTE)/Deep Vein Thrombosis (DVT)Prevention Measures  Intervention: Ensure patient wears graduated elastic stockings (JAYASHREE hose) and/or SCDs, if ordered, when in bed or chair (Remove at least once per shift for skin check)  Note:   Patient educated on importance of SCD's, nods to understanding. Bilateral SCD's in use.

## 2020-01-29 NOTE — PROGRESS NOTES
"[facility] ICU Progress Note      Admit Date: 1/24/2020  ICU Day: 2    Resident(s): Lencho Sage M.D.  Attending: LUCY FRENCH/ Dr. Cruz    Date & Time:   1/29/2020   10:05 AM       Patient ID:    Name:             Roxy Cota   YOB: 1975  Age:                 44 y.o.  female   MRN:               [id number]    HPI:  44 y.o. female with hx of breast CA, mets to bone (base of skull and cervical and lumbar spine), s/p right mastectomy, on immunotherapy (started 4 days prior), COPD on 3L home O2,  who was initially admitted 1/25 for SOB and found to have influenza A pneumonia. Initially admitted to the floor. Pt had been having high grade fever in 39.5-40.6C. HR in 100-130s. SBP in 80-100s. Given the change in vitals, there was an increase demand from bedside on monitoring the patient. Pt also had had \"max dose of acetaminophen\" given in the day but on MAR appeared pt only received acetaminophen 650mg PO x 3 doses (total about 2gr). ICU team was consulted, and  bedside examination was notable for bilateral rhonchi and wheezing in all lung fields , no JVD, tachycardic but regular rhythm, no signs of fluid overload. STAT Chest Xray 1/26 revealed worsening mild pulmonary edema and/or multifocal pneumonitis, minimal bilateral pleural effusions, slightly greater on the LEFT, probable underlying COPD. Lactic acid was 0.8. Urine strep pneumo and legionella Ag were ordered.Tamiflu for influenza A x 5 days and C3+doxy for CAP. Midodrine 5 TID was started. Patient was transferred to ICU for closer monitoring and treatment of Influenza A and septic shock. She was treated with plasmapheresis for her hypertriglyceridemia, with insulin drip and dextrose titrated closely.       Consultants:  ICU    Interval Events:  - Overnight- self extubated, UOP 1250cc, 3 large bowel movements  - Abx completed  - Continue Tamiflu (day  4/5)  - BAL- NGTD  - Avoid excess fluids (sepsis 30ml/kg) use early pressors if necessary " to prevent ARDS   - Monitor for post influenza superimposed pna (MRSA, strep PNA, GAS) serial procal, MRSA nasal Swab and monitor for Viral induced-HLH  - Adjuncts: Steroids, Vit C, naproxen 200mg BID  - Monitor UOP  - Hgb 7.8, s/p 1 unit pRBC transfusion yesterday 1/28/20  - Cor track feedings  - PT/OT, palliative follow-up recs  - Psych consult for worsening MDD/depressed mood/anxiety sx, appreciate recs  - Start citalopram 20 mg qd   - Pain control- cindy oxy 2.5 mg q6, oxy 1-2 tabs q4 prn       Review of Systems   Constitutional: Positive for malaise/fatigue. Negative for chills and fever.   HENT: Negative for ear pain, hearing loss and tinnitus.    Eyes: Negative for blurred vision, double vision and photophobia.   Respiratory: Positive for cough. Negative for hemoptysis, sputum production, shortness of breath and wheezing.    Cardiovascular: Negative for palpitations and leg swelling.   Gastrointestinal: Negative for abdominal pain, constipation, diarrhea, heartburn, nausea and vomiting.   Genitourinary: Negative for dysuria, frequency and urgency.   Musculoskeletal: Positive for back pain and joint pain. Negative for myalgias and neck pain.   Skin: Negative for itching and rash.   Neurological: Negative for dizziness, focal weakness, seizures, loss of consciousness, weakness and headaches.   Endo/Heme/Allergies: Negative for polydipsia. Does not bruise/bleed easily.   Psychiatric/Behavioral: Positive for depression. Negative for memory loss, substance abuse and suicidal ideas. The patient is nervous/anxious. The patient does not have insomnia.    All other systems reviewed and are negative.      PHYSICAL EXAM    Vitals:    01/29/20 0700 01/29/20 0704 01/29/20 0800 01/29/20 0900   BP: (P) 104/59  (P) 103/58 (P) 114/69   Pulse: (!) (P) 110 (!) 113 (!) (P) 111 (!) (P) 115   Resp: (P) 20 20 (!) (P) 21 (!) (P) 46   Temp:   (P) 37.3 °C (99.2 °F)    TempSrc:   (P) Temporal    SpO2: (P) 92% 93% (P) 96% (P) 94%  "  Weight:       Height:         Body mass index is 21.19 kg/m².  BP (P) 114/69   Pulse (!) (P) 115   Temp (P) 37.3 °C (99.2 °F) (Temporal)   Resp (!) (P) 46   Ht 1.626 m (5' 4\")   Wt 56 kg (123 lb 7.3 oz)   SpO2 (P) 94%   BMI 21.19 kg/m²   O2 therapy: Pulse Oximetry: (P) 94 %, O2 (LPM): (P) 4, O2 Delivery: (P) Oxymask    Physical Exam   Constitutional: She appears distressed.   HENT:   Head: Normocephalic and atraumatic.   Right Ear: External ear normal.   Left Ear: External ear normal.   Nose: Nose normal.   Eyes: Pupils are equal, round, and reactive to light. Conjunctivae and EOM are normal. Right eye exhibits no discharge. Left eye exhibits no discharge. No scleral icterus.   Neck: Normal range of motion. Neck supple. No JVD present. No tracheal deviation present. No thyromegaly present.   Cardiovascular: Normal rate, regular rhythm, normal heart sounds and intact distal pulses. Exam reveals no gallop and no friction rub.   No murmur heard.  Pulmonary/Chest: No stridor. No respiratory distress. She has no wheezes. She has rales. She exhibits tenderness.   Mild bilateral rhonchi and rales/crackling in bilateral lung fields, improved from previous day   Abdominal: Soft. Bowel sounds are normal. She exhibits no distension. There is no tenderness. There is no rebound and no guarding.   Genitourinary:    Genitourinary Comments: Morales catether in place     Musculoskeletal:         General: No deformity or edema.   Neurological: She is alert. She has normal reflexes. No cranial nerve deficit.   A&O 2-3   Skin: No rash noted. She is not diaphoretic. No erythema.   Nursing note and vitals reviewed.      Respiratory:  Owen Vent Mode: APVCMV  Respiration: (!) (P) 46, Pulse Oximetry: (P) 94 %, O2 Daily Delivery Respiratory : Silicone Nasal Cannula    Chest Tube Drains:    Recent Labs     01/27/20  0042 01/27/20  0433 01/28/20  0523   ISTATAPH 7.281* 7.245* 7.344*   ISTATAPCO2 40.5* 44.5* 44.5*   ISTATAPO2 135* " 116* 93*   ISTATATCO2 20 21 26   [id number] 99 98 97   ISTATARTHCO3 19.1 19.3 24.2   ISTATARTBE -7* -8* -1   ISTATTEMP 101.7 F 98.6 F 97.3 F   ISTATFIO2 50 40 30   ISTATSPEC Arterial Arterial Arterial   ISTATAPHTC 7.257* 7.245* 7.354*   [id number] 146* 116* 89*       HemoDynamics:  Pulse: (!) (P) 115 Blood Pressure: (P) 114/69      Neuro:  Intubated    Fluids:  Date 20 0700 - 20 0659   Shift 0767-5575 0389-6920 0613-9423 24 Hour Total   INTAKE   Other 120   120     Medications (PO/Enteral Liquids) 120   120   NG/GT 80   80     Intake (mL) (Enteral Tube 20 Cortrak - Gastric 10 Fr. Left nare) 80   80   IV Piggyback 100   100     Volume (mL) (cefTRIAXone (ROCEPHIN) 1 g in  mL IVPB) 100   100   Shift Total 300   300   OUTPUT   Urine 350   350     Output (mL) (Urethral Catheter Non-latex 16 Fr.) 350   350   Stool 0   0     Measurable Stool (mL) 0   0   Shift Total 350   350   NET -50   -50        Intake/Output Summary (Last 24 hours) at 2020 0743  Last data filed at 2020 0600  Gross per 24 hour   Intake 1026.18 ml   Output 2455 ml   Net -1428.82 ml          Body mass index is 21.19 kg/m².    Recent Labs     20  0045 20  0336   SODIUM 138 140 144   POTASSIUM 3.9 3.5* 4.5   CHLORIDE 110 111 113*   CO2 20 20 27   BUN 9 13 17   CREATININE 0.85 0.90 0.84   MAGNESIUM 2.0  --   --    CALCIUM 8.1* 7.8* 8.2*       GI/Nutrition:  Recent Labs     20  0045 20  1620 20  0630 20  0336   ALTSGPT 16  --  12 15   ASTSGOT 60*  --  42 42   ALKPHOSPHAT 76  --  60 64   TBILIRUBIN 0.3  --  0.2 0.4   PREALBUMIN  --  7.0* 7.0*  --    GLUCOSE 146*  --  132* 118*       Heme:  Recent Labs     20  0630 20  1502 20  0336   RBC 2.04* 2.54* 2.58*   HEMOGLOBIN 6.6* 7.5* 7.8*   HEMATOCRIT 21.0* 24.7* 24.3*   PLATELETCT 185 154* 154*       Infectious Disease:  Monitored Temp 2  Av.3 °C (97.4 °F)  Min: 36 °C (96.8 °F)  Max: 36.6 °C (97.9 °F)  Temp   Av.7 °C (98 °F)  Min: 36.3 °C (97.4 °F)  Max: 37.3 °C (99.2 °F)  Recent Labs     20  0045 20  0630 20  1502 20  0336   WBC 10.6 5.9 5.7 7.0   NEUTSPOLYS 87.00* 77.80* 71.60 88.80*   LYMPHOCYTES 7.80* 18.00* 23.60 7.20*   MONOCYTES 0.90 3.00 3.00 2.60   EOSINOPHILS 0.00 0.70 1.10 0.40   BASOPHILS 0.00 0.20 0.20 0.30   ASTSGOT 60* 42  --  42   ALTSGPT 16 12  --  15   ALKPHOSPHAT 76 60  --  64   TBILIRUBIN 0.3 0.2  --  0.4       Meds:  • oxyCODONE immediate-release  2.5 mg     • citalopram  20 mg     • midodrine  10 mg     • acetaminophen  650 mg     • calcium carbonate  500 mg     • oseltamivir  75 mg     • oxyCODONE-acetaminophen  1-2 Tab     • polyethylene glycol/lytes  1 Packet      And   • senna-docusate  2 Tab      And   • bisacodyl  10 mg     • ipratropium-albuterol  3 mL     • LR   Stopped (20 1217)   • Pharmacy  1 Each     • famotidine  20 mg      Or   • famotidine  20 mg     • MD Alert...Adult ICU Electrolyte Replacement per Pharmacy       • Respiratory Care per Protocol       • enoxaparin  40 mg     • lidocaine  1 Patch     • albuterol  2 Puff     • ipratropium-albuterol  3 mL          Procedures:  Intubation- 20  Bronchoscopy- 20  Central Line- 20    Imaging:  OUTSIDE IMAGES-NUCLEAR MEDICINE, PET   Final Result      DX-CHEST-PORTABLE (1 VIEW)   Final Result      Interval extubation. Otherwise stable exam.      DX-CHEST-PORTABLE (1 VIEW)   Final Result      Stable cardiomegaly and interstitial infiltrates.      DX-ABDOMEN FOR TUBE PLACEMENT   Final Result      Enteric tube tip projects over the stomach.      DX-CHEST-FOR LINE PLACEMENT Perform procedure in: Patient's Room   Final Result            1. A right peripherally inserted catheter with tip projects appropriately over the expected area of the superior vena cava.      2. Endotracheal tube with tip projecting over the mid thoracic trachea.      DX-CHEST-2 VIEWS   Final Result      1.  Worsening mild  pulmonary edema and/or multifocal pneumonitis.   2.  Minimal bilateral pleural effusions, slightly greater on the LEFT.   3.  Probable underlying COPD.      CT-HEAD W/O   Final Result      1.  No acute intracranial abnormality.   2.  Osseous metastatic disease.      DX-CHEST-PORTABLE (1 VIEW)   Final Result      No acute cardiopulmonary disease evident.          Problem and Plan:    * Sepsis (HCC)  Assessment & Plan  Sepsis Present on admission, soft bp's and sinus tachycardia low threshold for ICU transfer   SIRS criteria: Tachycardia, with heart rate greater than 90 BPM and Tachypnea, with respirations greater than 20 per minute  Source is presumed to be lungs (Influenza/CAP) at this time  Sepsis protocol initiated on admission   Fluid resuscitation ordered per protocol  IV antibiotics C3 and doxy ordered despite negative CXR, pt has been having a cough with fevers and chills and got sick after her  was having fevers of 103F and cough.     Plan  - Abx completed  - Continue Tamiflu (day 4/5)  - BAL- NGTD  - Avoid excess fluids (sepsis 30ml/kg) use early pressors if necessary to prevent ARDS   - Monitor for post influenza superimposed pna (MRSA, strep PNA, GAS) serial procal, MRSA nasal Swab and monitor for Viral induced-HLH  - Adjuncts: Steroids, Vit C, naproxen 200mg BID  - IVFs- LR 75 cc/h, monitor UOP  - Hgb 7.8, s/p 1 unit pRBC transfusion yesterday 1/28/20  - Cortrack feedings      Influenza A  Assessment & Plan  Influenza A was ordered by day team 1/25/20 as pt discussed sick contacts at home with fevers. Test initially wasn't read, but after [facility] night float got called about temp 103F, then requested stat flu test, which came back positive for influenza A. They started Tamiflu renally dosed.     Plan:  Continue Tamiflu (day 4/5)    Macrocytic anemia- (present on admission)  Assessment & Plan  Patient with chronic anemia, chart review showing normal B12/Folate, likely secondary to bone metastasis.  1/28/20- There has been a drop in hemoglobin to 6.6 (8.6 previous day).    Plan  - Suspect drop in hemoglobin to be secondary to hemodilution  - Transfuse 1U pRBC, follow-up PM CBC    COPD (chronic obstructive pulmonary disease) (HCC)- (present on admission)  Assessment & Plan  Chronic respiratory failure with 3L O2 at home, currently requiring 3L, at baseline. Diffuse expiratory rhonci on exam    - RT protocol  - Albuterol Q6  - Incentive Spirometer    - scheduled duonebs Q4       Acute respiratory failure with hypoxemia (HCC)  Assessment & Plan  Resolved, self-extubated    Urinary retention  Assessment & Plan  Pt presented with 3 days of incontinence. She was found to be retaining >900cc on bladder scan morning of 1/25/20, then had an episode of urination in bed that was not able to be measured; repeat scan showed 510cc in the bladder and we ordered a herndon catheter     Plan:  Herndon cath with strict I&Os and evaluate with physician when it is appropriate to d/c herndon      Displaced fracture of greater trochanter of right femur (HCC)- (present on admission)  Assessment & Plan  Admitted for during last admission (Dec 2019), Ortho consulted and patient deemed non-surgical. PT had recommended home health, per family, patient was denied home health. Did not go to physical therapy as prescribed on last admission     - CTM  -PT/OT consult      Breast cancer (HCC)- (present on admission)  Assessment & Plan  History of Metastatic Breast Cancer, evaluated by Dr. Wynne during last admission. Mets to L and Spine, Pelvis. CT head on admission showing Mets to skull, no prior records of it. Brought by family for concern given weakness and increased confusion, patient reporting new incontinence. Repeat MR of Spine Chart review shows Dr. Wynne recommended palliative care, palliative was consulted during last admission, Advance Directive stating patient's POA is her , Jones Cota.  Discussed code status, full code at this  time.     Plan:  - Spoke to Dr. Khan (Oncology) and she recommends holding patient's Verzenio until the sepsis resolves, managing her pain, and then can restart Verzenio on discharge; 4 days of treatment with Verzenio prior to this admission is too soon to predict response   - Follow-up palliative care discussions with family   - Pain control- cindy oxy 2.5 mg q6, oxy 1-2 tabs q4 prn       Mood disorder (HCC)- (present on admission)  Assessment & Plan  - Psych consult for worsening MDD/depressed mood/anxiety sx  - Start citalopram 20 mg qd   - Appreciate psych recs      DISPO:  ICU    CODE STATUS: FULL    Quality Measures:  Morales Catheter:  Yes  DVT Prophylaxis:   Enoxaparin  Ulcer Prophylaxis:   Famotidine  Antibiotics: Ceftriaxone, Azithromycin  Lines: Central, PIV

## 2020-01-29 NOTE — ASSESSMENT & PLAN NOTE
- Psych consult for worsening MDD/depressed mood/anxiety sx  - Psych 1/29 - continue celexa 200 qd for depression

## 2020-01-29 NOTE — CARE PLAN
Problem: Nutritional:  Goal: Nutrition support tolerated and meeting greater than 85% of estimated needs  Outcome: MET  TF running @ goal

## 2020-01-29 NOTE — PROGRESS NOTES
Critical Care Progress Note    Date of admission  1/24/2020    Chief Complaint  44 y.o. female admitted 1/24/2020 with sepsis, Flu    Hospital Course    1/26 - admitted to ICU for severe sepsis, flu, started on Tamiflu, intubated, pressors started for MAP goals      Interval Problem Update  Reviewed last 24 hour events:  Tm 36.7  -120s  SBP 90-100s  GCS 15  Cortrak TF   CXR stable infiltrates  Azithromycin  Ceftraixone  Urine output 1.8L   Self extubated overnight  Intermittently moans in pain    Review of Systems  Review of Systems   Unable to perform ROS: Acuity of condition        Vital Signs for last 24 hours   Temp:  [36.3 °C (97.4 °F)-36.7 °C (98 °F)] 36.7 °C (98 °F)  Pulse:  [] 113  Resp:  [13-61] 20  BP: ()/(46-64) 97/60  SpO2:  [93 %-100 %] 93 %    Hemodynamic parameters for last 24 hours       Respiratory Information for the last 24 hours  Owen Vent Mode: APVCMV  Rate (breaths/min): 20  Vt Target (mL): 370  PEEP/CPAP: 8  FiO2: 30  P MEAN: 10  Length of Weaning Trial (Hours): 1  Control VTE (exp VT): 535    Physical Exam   Physical Exam  Constitutional:       Appearance: She is ill-appearing.   HENT:      Head: Normocephalic.      Right Ear: External ear normal.      Left Ear: External ear normal.      Nose: Nose normal.      Mouth/Throat:      Pharynx: Oropharynx is clear. No oropharyngeal exudate.   Eyes:      Extraocular Movements: Extraocular movements intact.      Pupils: Pupils are equal, round, and reactive to light.   Neck:      Musculoskeletal: Neck supple.   Cardiovascular:      Rate and Rhythm: Normal rate and regular rhythm.      Pulses: Normal pulses.   Pulmonary:      Breath sounds: No wheezing or rales.      Comments: BL crackles  Abdominal:      General: Bowel sounds are normal.   Musculoskeletal:         General: No swelling.   Skin:     General: Skin is dry.      Capillary Refill: Capillary refill takes less than 2 seconds.   Neurological:      General: No focal  deficit present.      Mental Status: She is alert.      Cranial Nerves: No cranial nerve deficit.         Medications  Current Facility-Administered Medications   Medication Dose Route Frequency Provider Last Rate Last Dose   • midodrine (PROAMATINE) tablet 10 mg  10 mg Enteral Tube TID WITH MEALS Kwame Cruz M.D.   10 mg at 01/28/20 1818   • fentaNYL (SUBLIMAZE) 50 mcg/mL in 50mL (Continuous Infusion)   Intravenous Continuous Navarro Buckner M.D. 2 mL/hr at 01/29/20 0340 100 mcg/hr at 01/29/20 0340    And   • fentaNYL (SUBLIMAZE) injection 50 mcg  50 mcg Intravenous Q15 MIN PRN Navarro Buckner M.D.        And   • fentaNYL (SUBLIMAZE) injection 100 mcg  100 mcg Intravenous Q15 MIN PRN Navarro Buckner M.D.        And   • dexmedetomidine (PRECEDEX) 400 mcg/100mL NS premix infusion  0-0.7 mcg/kg/hr Intravenous Continuous Navarro Buckner M.D.       • acetaminophen (TYLENOL) tablet 650 mg  650 mg Enteral Tube Q6HRS PRN Gustavo Rojas D.O.   650 mg at 01/27/20 2249   • calcium carbonate (TUMS) chewable tab 500 mg  500 mg Enteral Tube BID RISSA PandeyO.   500 mg at 01/29/20 0514   • oseltamivir (TAMIFLU) capsule 75 mg  75 mg Enteral Tube BID Gustavo Rojas D.O.   75 mg at 01/29/20 0514   • oxyCODONE-acetaminophen (PERCOCET) 5-325 MG per tablet 1-2 Tab  1-2 Tab Enteral Tube Q4HRS PRN DONNA Pandey.O.   2 Tab at 01/29/20 0150   • polyethylene glycol/lytes (MIRALAX) PACKET 1 Packet  1 Packet Enteral Tube QDAY PRN RISSA PandeyO.   1 Packet at 01/28/20 1218    And   • senna-docusate (PERICOLACE or SENOKOT S) 8.6-50 MG per tablet 2 Tab  2 Tab Enteral Tube BID DONNA Pandey.O.   Stopped at 01/29/20 0600    And   • bisacodyl (DULCOLAX) suppository 10 mg  10 mg Rectal QDAY PRN DONNA Pandey.O.   10 mg at 01/28/20 1818   • ipratropium-albuterol (DUONEB) nebulizer solution  3 mL Nebulization Q4HRS (RT) Ian Carver M.D.   3 mL at 01/29/20 0704   • lactated ringers infusion   Intravenous Continuous Lencho BRUNNER  PAOLA Sage   Stopped at 01/28/20 1217   • Pharmacy Consult: Enteral tube insertion - review meds/change route/product selection  1 Each Other PHARMACY TO DOSE Kwame Cruz M.D.       • fentaNYL (SUBLIMAZE) injection  mcg   mcg Intravenous Q HOUR PRN Gustavo Rojas D.O.   100 mcg at 01/26/20 2248   • norepinephrine (LEVOPHED) 8 mg in  mL Infusion  0-30 mcg/min Intravenous Continuous Jose Barreto M.D.   Stopped at 01/28/20 1121   • famotidine (PEPCID) tablet 20 mg  20 mg Enteral Tube Q12HRS Jose Barreto M.D.   20 mg at 01/29/20 0514    Or   • famotidine (PEPCID) injection 20 mg  20 mg Intravenous Q12HRS Jose Barreto M.D.   20 mg at 01/28/20 1818   • MD Alert...ICU Electrolyte Replacement per Pharmacy   Other PHARMACY TO DOSE Jose Barreto M.D.       • lidocaine (XYLOCAINE) 1 % injection 1-2 mL  1-2 mL Tracheal Tube Q30 MIN PRN Jose Barreto M.D.       • Respiratory Care per Protocol   Nebulization Continuous RT DONNA Mathias.ESTEFANI       • enoxaparin (LOVENOX) inj 40 mg  40 mg Subcutaneous DAILY Kasia Barrientos D.O.   40 mg at 01/29/20 0513   • lidocaine (LIDODERM) 5 % 1 Patch  1 Patch Transdermal Q24HRS DONNA Mathias.O.   1 Patch at 01/29/20 0550   • albuterol inhaler 2 Puff  2 Puff Inhalation Q4H PRN (RT) Alejandra Tomlinson M.D.       • ipratropium-albuterol (DUONEB) nebulizer solution  3 mL Nebulization Q4H PRN (RT) DONNA Mathias.ESTEFANI           Fluids    Intake/Output Summary (Last 24 hours) at 1/29/2020 0759  Last data filed at 1/29/2020 0600  Gross per 24 hour   Intake 2301.54 ml   Output 1800 ml   Net 501.54 ml       Laboratory  Recent Labs     01/27/20  0042 01/27/20  0433 01/28/20  0523   ISTATAPH 7.281* 7.245* 7.344*   ISTATAPCO2 40.5* 44.5* 44.5*   ISTATAPO2 135* 116* 93*   ISTATATCO2 20 21 26   BAFYMTT5CQM 99 98 97   ISTATARTHCO3 19.1 19.3 24.2   ISTATARTBE -7* -8* -1   ISTATTEMP 101.7 F 98.6 F 97.3 F   ISTATFIO2 50 40 30   ISTATSPEC Arterial  Arterial Arterial   ISTATAPHTC 7.257* 7.245* 7.354*   [id number] 146* 116* 89*         Recent Labs     01/27/20  0045 01/28/20  0630 01/29/20  0336   SODIUM 138 140 144   POTASSIUM 3.9 3.5* 4.5   CHLORIDE 110 111 113*   CO2 20 20 27   BUN 9 13 17   CREATININE 0.85 0.90 0.84   MAGNESIUM 2.0  --   --    CALCIUM 8.1* 7.8* 8.2*     Recent Labs     01/27/20  0045 01/27/20  1620 01/28/20  0630 01/29/20  0336   ALTSGPT 16  --  12 15   ASTSGOT 60*  --  42 42   ALKPHOSPHAT 76  --  60 64   TBILIRUBIN 0.3  --  0.2 0.4   PREALBUMIN  --  7.0* 7.0*  --    GLUCOSE 146*  --  132* 118*     Recent Labs     01/27/20  0045 01/28/20  0630 01/28/20  1502 01/29/20  0336   WBC 10.6 5.9 5.7 7.0   NEUTSPOLYS 87.00* 77.80* 71.60 88.80*   LYMPHOCYTES 7.80* 18.00* 23.60 7.20*   MONOCYTES 0.90 3.00 3.00 2.60   EOSINOPHILS 0.00 0.70 1.10 0.40   BASOPHILS 0.00 0.20 0.20 0.30   ASTSGOT 60* 42  --  42   ALTSGPT 16 12  --  15   ALKPHOSPHAT 76 60  --  64   TBILIRUBIN 0.3 0.2  --  0.4     Recent Labs     01/28/20 0630 01/28/20  1502 01/29/20  0336   RBC 2.04* 2.54* 2.58*   HEMOGLOBIN 6.6* 7.5* 7.8*   HEMATOCRIT 21.0* 24.7* 24.3*   PLATELETCT 185 154* 154*       Imaging  X-Ray:  I have personally reviewed the images and compared with prior images. and My impression is: Removal of et tube and  stable infiltrates    Assessment/Plan  * Sepsis (HCC)  Assessment & Plan  This is Severe Sepsis Present on admission  SIRS criteria identified on my evaluation include: Fever, with temperature greater than 101 deg F, Tachycardia, with heart rate greater than 90 BPM and Leukocyosis, with WBC greater than 12,000  Source of infection is lungs  Clinical indicators of end organ dysfunction include Systolic blood pressure (SBP) <90 mmHg or mean arterial pressure <65 mmHg and Lactate >2 mmol/L (18.0 mg/dL)  Sepsis protocol initiated  Fluid resuscitation ordered per protocol  IV antibiotics as appropriate for source of sepsis  Reassessment: I have reassessed the  patient's hemodynamic status  End organ dysfunction include(s):  Acute respiratory failure and Encephalopathy (metabolic)    Tamiflu x5 days  Off pressors  Ceftriaxone, azithro, deescalate when cx available  MAP goal >65.   Cont midodrine  Fever control      Influenza A  Assessment & Plan  On tamiflu x 5 days    COPD (chronic obstructive pulmonary disease) (HCC)- (present on admission)  Assessment & Plan  Not in COPD exacerbation   Duonebs prn.       Acute respiratory failure with hypoxemia (HCC)  Assessment & Plan  2/2 to flu  Increased risk for ARDS  Goal normovolemia  RT/O2 protocols    Self extubated overnight and did well over the day    Breast cancer (HCC)- (present on admission)  Assessment & Plan  S/p mastectomy, chemotherapy. On immunotherapy     Mood disorder (HCC)- (present on admission)  Assessment & Plan  Restart home Celexa          VTE:  Lovenox  Ulcer: H2 Antagonist  Lines: Central Line  Ongoing indication addressed and Morales Catheter  Ongoing indication addressed    I have performed a physical exam and reviewed and updated ROS and Plan today (1/29/2020). In review of yesterday's note (1/28/2020), there are no changes except as documented above.     Discussed patient condition and risk of morbidity and/or mortality with RN, RT, Pharmacy, Code status disscussed, Charge nurse / hot rounds and Patient

## 2020-01-29 NOTE — RESPIRATORY CARE
Extubation    Cuff leak noted no   Stridor present no   Patient toleration well  Events/Summary/Plan: pt self extubated placed on oxymask on 6 LPM SPo2 is 96%  no stridor noted (01/28/20 2220)

## 2020-01-29 NOTE — PSYCHIATRY
"PSYCHIATRIC INTAKE EVALUATION    *Reason for admission:  SOB/influenza A pneumonia                   *Reason for consult: \"increased MDD sx, anxiety d/t underlying critical illness\"       *Requesting Physician/APN:  Lencho Sage M.D.              Legal Hold status:    n/a        *Chief Complaint: \"I have the flu\"      *HPI (includes Psychiatric ROS):   Pt is a very difficult pt to evaluate. She was distracted and unable to maintain attention, mostly preoccupied with her hands on retrains. Pt is also difficult hearing and had difficult reading questions. She initally was disoriented with sitaution, but later was able to tell me she is here because she has the flu. She did not provide an answer to most of my question. She did stated having a hx of depression, but was not able to grieve me clear answer on response to celexa. She really could not give me any details about her hx of depression and her current stated. I was not able to get an answer for associated symptoms. She did however tell he that she feels sad because she can't go outside or do anything she wants to do; not able to elaborate further. When asked about her anxiety, she reported being awful. Denies any SI/Hi/AVH.Pt lives with her  and son.       *Medical Review Of Symptoms (not dx conditions):   ROS reprted leg pain, unable to obtain any further answers      *Psychiatric Examination:   Vitals:   Vitals:    01/29/20 1000   BP: (P) 120/58   Pulse: (P) 95   Resp: (P) 16   Temp:    SpO2: (P) 93%       General Appearance: lying in bed on oxygen [place], cortrack, facila hair noted, calm but appears to be in pain during evaluation, moans at times  Abnormal Movements:distracted with her arms at times on restrains  Gait and Posture:normal  Speech:soft and slurred  Thought Process: impoverished   Associations:no loose associations   Abnormal or Psychotic Thoughts:denies AVH, no paranoia or delusions elicited  Judgement and Insight:unable to fully " "assess, has very limited insight   Orientation:oriented to person, place and situation, not to time  Recent and Remote Memory:unable to assess  Attention Span and Concentration:distracted  Fund of Knowledge; not tested  Mood and Affect:\"depressed\", constricted, confused  SI/HI: denies SI/HI  MMSE score and/or clock drawing:unable to do it        *PAST MEDICAL/PSYCH/FAMILY/SOCIAL(as reported by patient):       *medical hx:        TBI:unable to obtain  SZ:unable to obtain  Stroke: unable to obtain  Past Medical History:   Diagnosis Date   • Cancer (HCC)     breast ca with mets     Past Surgical History:   Procedure Laterality Date   • MASTECTOMY Right         *psychiatric hx: unknown to our services   SAs:unable to obtain  Guns:unable to obtain  Hx of Violence:unable to obtain  Hospitalizations:unable to obtain  Med Hx:per chart on Celexa 40mg and klonopin 1mg Po TID prior to admission  Dx Hx:hx of MDD and anxiety per chart    *family Psych hx:  unable to obtain     *social hx: per chart , lives with  in [place], [place]; unemployed  Alcohol: unable to obtain   Drugs:unable to obtain     *MEDICAL HX: labs, MARS, medications, etc were reviewed. Only those findings of potential interest to psychiatry are noted below:    *Current Medical issues:   Sepsis, influenza A     *Allergies:  Allergies   Allergen Reactions   • Fish    • Levaquin    • Lyrica Hives   • Morphine Unspecified     Was told she is very allergic after surgery.    • Motrin [Ibuprofen] Hives   • Neurontin [Gabapentin] Swelling     Messes with kidneys   • Pcn [Penicillins] Hives     Burning sensation throughout body  Tolerated Ceftriaxone 12/2019     *Current Medications:    Current Facility-Administered Medications:   •  oxyCODONE immediate-release (ROXICODONE) tablet 2.5 mg, 2.5 mg, Enteral Tube, Q6HRS, Kwame Cruz M.D.  •  citalopram (CELEXA) tablet 20 mg, 20 mg, Enteral Tube, DAILY, Kwame Cruz M.D.  •  ipratropium-albuterol (DUONEB) " nebulizer solution, 3 mL, Nebulization, Q4H PRN (RT), Kwame Cruz M.D.  •  midodrine (PROAMATINE) tablet 10 mg, 10 mg, Enteral Tube, TID WITH MEALS, Kwame Cruz M.D., 10 mg at 01/29/20 0851  •  acetaminophen (TYLENOL) tablet 650 mg, 650 mg, Enteral Tube, Q6HRS PRN, Gustavo Rojas D.O., 650 mg at 01/27/20 2249  •  calcium carbonate (TUMS) chewable tab 500 mg, 500 mg, Enteral Tube, BID, Gustavo Rojas D.O., 500 mg at 01/29/20 0514  •  oseltamivir (TAMIFLU) capsule 75 mg, 75 mg, Enteral Tube, BID, Gustavo Rojas D.O., 75 mg at 01/29/20 0514  •  oxyCODONE-acetaminophen (PERCOCET) 5-325 MG per tablet 1-2 Tab, 1-2 Tab, Enteral Tube, Q4HRS PRN, Gustavo Rojas, D.O., 2 Tab at 01/29/20 0851  •  senna-docusate (PERICOLACE or SENOKOT S) 8.6-50 MG per tablet 2 Tab, 2 Tab, Enteral Tube, BID, Stopped at 01/29/20 0600 **AND** polyethylene glycol/lytes (MIRALAX) PACKET 1 Packet, 1 Packet, Enteral Tube, QDAY PRN, 1 Packet at 01/28/20 1218 **AND** bisacodyl (DULCOLAX) suppository 10 mg, 10 mg, Rectal, QDAY PRN, Gustavo Rojas, D.O., 10 mg at 01/28/20 1818  •  lactated ringers infusion, , Intravenous, Continuous, Lencho Sage M.D., Stopped at 01/28/20 1217  •  Pharmacy Consult: Enteral tube insertion - review meds/change route/product selection, 1 Each, Other, PHARMACY TO DOSE, Kwame Cruz M.D.  •  famotidine (PEPCID) tablet 20 mg, 20 mg, Enteral Tube, Q12HRS, 20 mg at 01/29/20 0514 **OR** famotidine (PEPCID) injection 20 mg, 20 mg, Intravenous, Q12HRS, Jose Barreto M.D., 20 mg at 01/28/20 1818  •  MD Alert...ICU Electrolyte Replacement per Pharmacy, , Other, PHARMACY TO DOSE, Jose Barreto M.D.  •  Respiratory Care per Protocol, , Nebulization, Continuous RT, DONNA Mathias.JHON.  •  enoxaparin (LOVENOX) inj 40 mg, 40 mg, Subcutaneous, DAILY, Kasia Barrientos D.O., 40 mg at 01/29/20 0513  •  lidocaine (LIDODERM) 5 % 1 Patch, 1 Patch, Transdermal, Q24HRS, Kasia Barrientos D.O., 1 Patch at 01/29/20 0550  •   albuterol inhaler 2 Puff, 2 Puff, Inhalation, Q4H PRN (RT), Alejandra Tomlinson M.D.  *EKG:   *Imaging:   OUTSIDE IMAGES-NUCLEAR MEDICINE, PET   Final Result      DX-CHEST-PORTABLE (1 VIEW)   Final Result      Interval extubation. Otherwise stable exam.      DX-CHEST-PORTABLE (1 VIEW)   Final Result      Stable cardiomegaly and interstitial infiltrates.      DX-ABDOMEN FOR TUBE PLACEMENT   Final Result      Enteric tube tip projects over the stomach.      DX-CHEST-FOR LINE PLACEMENT Perform procedure in: Patient's Room   Final Result            1. A right peripherally inserted catheter with tip projects appropriately over the expected area of the superior vena cava.      2. Endotracheal tube with tip projecting over the mid thoracic trachea.      DX-CHEST-2 VIEWS   Final Result      1.  Worsening mild pulmonary edema and/or multifocal pneumonitis.   2.  Minimal bilateral pleural effusions, slightly greater on the LEFT.   3.  Probable underlying COPD.      CT-HEAD W/O   Final Result      1.  No acute intracranial abnormality.   2.  Osseous metastatic disease.      DX-CHEST-PORTABLE (1 VIEW)   Final Result      No acute cardiopulmonary disease evident.           EEG:  n/a     *Labs:  Recent Labs     01/27/20  0045 01/28/20  0630 01/28/20  1502 01/29/20  0336   WBC 10.6 5.9 5.7 7.0   RBC 2.72* 2.04* 2.54* 2.58*   HEMOGLOBIN 8.6* 6.6* 7.5* 7.8*   HEMATOCRIT 28.0* 21.0* 24.7* 24.3*   .9* 102.9* 97.2 94.2   MCH 31.6 32.4 29.5 30.2   RDW 69.0* 67.5* 80.4* 80.5*   PLATELETCT 219 185 154* 154*   MPV 9.7 10.3 9.3 9.7   NEUTSPOLYS 87.00* 77.80* 71.60 88.80*   LYMPHOCYTES 7.80* 18.00* 23.60 7.20*   MONOCYTES 0.90 3.00 3.00 2.60   EOSINOPHILS 0.00 0.70 1.10 0.40   BASOPHILS 0.00 0.20 0.20 0.30   RBCMORPHOLO Present  --  Present  --      Lab Results   Component Value Date/Time    SODIUM 144 01/29/2020 03:36 AM    POTASSIUM 4.5 01/29/2020 03:36 AM    CHLORIDE 113 (H) 01/29/2020 03:36 AM    CO2 27 01/29/2020 03:36 AM     GLUCOSE 118 (H) 01/29/2020 03:36 AM    BUN 17 01/29/2020 03:36 AM    CREATININE 0.84 01/29/2020 03:36 AM         No results found for: BREATHALIZER  No components found for: BLOODALCOHOL   No results found for: AMPHUR, BARBSURINE, BENZODIAZU, COCAINEMET, METHADONE, ECSTASY, OPIATES, OXYCODN, PCPURINE, PROPOXY, CANNABINOID  No results found for: TSH, FREET4     Assessment: Pt was extubated yesterday. Pt is currently delirious. I do not recommend starting/or making any further adjustments to antidepressant  until delirium resolves.     Dx:Delirium   Hx of MDD   Hx of unspecified anxiety     Medical:  Sepsis  Influenza A  Macrocytic Anemia, chronic   COPD on O2 at home  Urinary retention  Breast Cancer  Displaced fracture of greater trochanter of right femur        Plan:  1- Legal hold: n/a  2-Medical team re-started Celexa 20mg PO daily for depression. Will not make any changes to current regimen until delirium resolves  3-Will continue to follow

## 2020-01-30 ENCOUNTER — APPOINTMENT (OUTPATIENT)
Dept: RADIOLOGY | Facility: MEDICAL CENTER | Age: 45
DRG: 871 | End: 2020-01-30
Attending: STUDENT IN AN ORGANIZED HEALTH CARE EDUCATION/TRAINING PROGRAM
Payer: COMMERCIAL

## 2020-01-30 PROBLEM — R52 PAIN: Status: ACTIVE | Noted: 2020-01-30

## 2020-01-30 PROBLEM — R41.0 DELIRIUM: Status: ACTIVE | Noted: 2020-01-30

## 2020-01-30 LAB
ALBUMIN SERPL BCP-MCNC: 3 G/DL (ref 3.2–4.9)
ALBUMIN/GLOB SERPL: 1 G/DL
ALP SERPL-CCNC: 70 U/L (ref 30–99)
ALT SERPL-CCNC: 15 U/L (ref 2–50)
ANION GAP SERPL CALC-SCNC: 10 MMOL/L (ref 0–11.9)
ANISOCYTOSIS BLD QL SMEAR: ABNORMAL
AST SERPL-CCNC: 37 U/L (ref 12–45)
BACTERIA BLD CULT: NORMAL
BASOPHILS # BLD AUTO: 0 % (ref 0–1.8)
BASOPHILS # BLD: 0 K/UL (ref 0–0.12)
BILIRUB SERPL-MCNC: 0.4 MG/DL (ref 0.1–1.5)
BUN SERPL-MCNC: 18 MG/DL (ref 8–22)
CALCIUM SERPL-MCNC: 9.1 MG/DL (ref 8.5–10.5)
CHLORIDE SERPL-SCNC: 105 MMOL/L (ref 96–112)
CO2 SERPL-SCNC: 29 MMOL/L (ref 20–33)
CREAT SERPL-MCNC: 0.73 MG/DL (ref 0.5–1.4)
EOSINOPHIL # BLD AUTO: 0.05 K/UL (ref 0–0.51)
EOSINOPHIL NFR BLD: 0.9 % (ref 0–6.9)
ERYTHROCYTE [DISTWIDTH] IN BLOOD BY AUTOMATED COUNT: 77 FL (ref 35.9–50)
GLOBULIN SER CALC-MCNC: 3 G/DL (ref 1.9–3.5)
GLUCOSE SERPL-MCNC: 115 MG/DL (ref 65–99)
HCT VFR BLD AUTO: 28.1 % (ref 37–47)
HGB BLD-MCNC: 8.9 G/DL (ref 12–16)
HYPOCHROMIA BLD QL SMEAR: ABNORMAL
LYMPHOCYTES # BLD AUTO: 1.38 K/UL (ref 1–4.8)
LYMPHOCYTES NFR BLD: 22.6 % (ref 22–41)
MACROCYTES BLD QL SMEAR: ABNORMAL
MANUAL DIFF BLD: NORMAL
MCH RBC QN AUTO: 29.5 PG (ref 27–33)
MCHC RBC AUTO-ENTMCNC: 31.7 G/DL (ref 33.6–35)
MCV RBC AUTO: 93 FL (ref 81.4–97.8)
MICROCYTES BLD QL SMEAR: ABNORMAL
MONOCYTES # BLD AUTO: 0.16 K/UL (ref 0–0.85)
MONOCYTES NFR BLD AUTO: 2.6 % (ref 0–13.4)
MORPHOLOGY BLD-IMP: NORMAL
NEUTROPHILS # BLD AUTO: 4.51 K/UL (ref 2–7.15)
NEUTROPHILS NFR BLD: 73.9 % (ref 44–72)
NRBC # BLD AUTO: 0.02 K/UL
NRBC BLD-RTO: 0.3 /100 WBC
PLATELET # BLD AUTO: 174 K/UL (ref 164–446)
PLATELET BLD QL SMEAR: NORMAL
PMV BLD AUTO: 9.7 FL (ref 9–12.9)
POIKILOCYTOSIS BLD QL SMEAR: NORMAL
POLYCHROMASIA BLD QL SMEAR: NORMAL
POTASSIUM SERPL-SCNC: 3.8 MMOL/L (ref 3.6–5.5)
PROT SERPL-MCNC: 6 G/DL (ref 6–8.2)
RBC # BLD AUTO: 3.02 M/UL (ref 4.2–5.4)
RBC BLD AUTO: PRESENT
SIGNIFICANT IND 70042: NORMAL
SITE SITE: NORMAL
SODIUM SERPL-SCNC: 144 MMOL/L (ref 135–145)
SOURCE SOURCE: NORMAL
TARGETS BLD QL SMEAR: NORMAL
WBC # BLD AUTO: 6.1 K/UL (ref 4.8–10.8)

## 2020-01-30 PROCEDURE — 99233 SBSQ HOSP IP/OBS HIGH 50: CPT | Performed by: PSYCHIATRY & NEUROLOGY

## 2020-01-30 PROCEDURE — 85027 COMPLETE CBC AUTOMATED: CPT

## 2020-01-30 PROCEDURE — 92610 EVALUATE SWALLOWING FUNCTION: CPT

## 2020-01-30 PROCEDURE — 80053 COMPREHEN METABOLIC PANEL: CPT

## 2020-01-30 PROCEDURE — A9270 NON-COVERED ITEM OR SERVICE: HCPCS | Performed by: STUDENT IN AN ORGANIZED HEALTH CARE EDUCATION/TRAINING PROGRAM

## 2020-01-30 PROCEDURE — 700102 HCHG RX REV CODE 250 W/ 637 OVERRIDE(OP): Performed by: STUDENT IN AN ORGANIZED HEALTH CARE EDUCATION/TRAINING PROGRAM

## 2020-01-30 PROCEDURE — 700111 HCHG RX REV CODE 636 W/ 250 OVERRIDE (IP): Performed by: STUDENT IN AN ORGANIZED HEALTH CARE EDUCATION/TRAINING PROGRAM

## 2020-01-30 PROCEDURE — 85007 BL SMEAR W/DIFF WBC COUNT: CPT

## 2020-01-30 PROCEDURE — A9270 NON-COVERED ITEM OR SERVICE: HCPCS | Performed by: INTERNAL MEDICINE

## 2020-01-30 PROCEDURE — 700101 HCHG RX REV CODE 250: Performed by: STUDENT IN AN ORGANIZED HEALTH CARE EDUCATION/TRAINING PROGRAM

## 2020-01-30 PROCEDURE — 97530 THERAPEUTIC ACTIVITIES: CPT

## 2020-01-30 PROCEDURE — A9270 NON-COVERED ITEM OR SERVICE: HCPCS | Performed by: PSYCHIATRY & NEUROLOGY

## 2020-01-30 PROCEDURE — 700101 HCHG RX REV CODE 250: Performed by: PSYCHIATRY & NEUROLOGY

## 2020-01-30 PROCEDURE — 97535 SELF CARE MNGMENT TRAINING: CPT

## 2020-01-30 PROCEDURE — 71045 X-RAY EXAM CHEST 1 VIEW: CPT

## 2020-01-30 PROCEDURE — 700102 HCHG RX REV CODE 250 W/ 637 OVERRIDE(OP): Performed by: PSYCHIATRY & NEUROLOGY

## 2020-01-30 PROCEDURE — 770022 HCHG ROOM/CARE - ICU (200)

## 2020-01-30 PROCEDURE — 700102 HCHG RX REV CODE 250 W/ 637 OVERRIDE(OP): Performed by: INTERNAL MEDICINE

## 2020-01-30 RX ORDER — LIDOCAINE 50 MG/G
1 PATCH TOPICAL EVERY 24 HOURS
Status: DISCONTINUED | OUTPATIENT
Start: 2020-01-30 | End: 2020-02-03 | Stop reason: HOSPADM

## 2020-01-30 RX ORDER — OXYCODONE HYDROCHLORIDE 5 MG/1
5 TABLET ORAL EVERY 6 HOURS
Status: DISCONTINUED | OUTPATIENT
Start: 2020-01-30 | End: 2020-01-30

## 2020-01-30 RX ORDER — OXYCODONE HCL 10 MG/1
10 TABLET, FILM COATED, EXTENDED RELEASE ORAL EVERY 12 HOURS
Status: DISCONTINUED | OUTPATIENT
Start: 2020-01-30 | End: 2020-02-02

## 2020-01-30 RX ORDER — GABAPENTIN 100 MG/1
100 CAPSULE ORAL 3 TIMES DAILY
Status: DISCONTINUED | OUTPATIENT
Start: 2020-01-30 | End: 2020-01-31

## 2020-01-30 RX ADMIN — LIDOCAINE 1 PATCH: 50 PATCH CUTANEOUS at 12:31

## 2020-01-30 RX ADMIN — MIDODRINE HYDROCHLORIDE 10 MG: 5 TABLET ORAL at 07:19

## 2020-01-30 RX ADMIN — SENNOSIDES AND DOCUSATE SODIUM 2 TABLET: 8.6; 5 TABLET ORAL at 18:23

## 2020-01-30 RX ADMIN — OXYCODONE HYDROCHLORIDE 5 MG: 5 TABLET ORAL at 12:17

## 2020-01-30 RX ADMIN — SENNOSIDES AND DOCUSATE SODIUM 2 TABLET: 8.6; 5 TABLET ORAL at 05:05

## 2020-01-30 RX ADMIN — MIDODRINE HYDROCHLORIDE 10 MG: 5 TABLET ORAL at 12:17

## 2020-01-30 RX ADMIN — POTASSIUM BICARBONATE 25 MEQ: 978 TABLET, EFFERVESCENT ORAL at 14:12

## 2020-01-30 RX ADMIN — OXYCODONE HYDROCHLORIDE 2.5 MG: 5 TABLET ORAL at 00:12

## 2020-01-30 RX ADMIN — CITALOPRAM HYDROBROMIDE 20 MG: 20 TABLET ORAL at 05:04

## 2020-01-30 RX ADMIN — ACETAMINOPHEN 650 MG: 325 TABLET, FILM COATED ORAL at 00:18

## 2020-01-30 RX ADMIN — LIDOCAINE 1 PATCH: 50 PATCH CUTANEOUS at 12:35

## 2020-01-30 RX ADMIN — OSELTAMIVIR PHOSPHATE 75 MG: 75 CAPSULE ORAL at 05:04

## 2020-01-30 RX ADMIN — OXYCODONE HYDROCHLORIDE AND ACETAMINOPHEN 1 TABLET: 5; 325 TABLET ORAL at 19:39

## 2020-01-30 RX ADMIN — OXYCODONE HYDROCHLORIDE AND ACETAMINOPHEN 2 TABLET: 5; 325 TABLET ORAL at 10:03

## 2020-01-30 RX ADMIN — ENOXAPARIN SODIUM 40 MG: 100 INJECTION SUBCUTANEOUS at 05:05

## 2020-01-30 RX ADMIN — ANTACID TABLETS 500 MG: 500 TABLET, CHEWABLE ORAL at 05:04

## 2020-01-30 RX ADMIN — GABAPENTIN 100 MG: 100 CAPSULE ORAL at 18:23

## 2020-01-30 RX ADMIN — OXYCODONE HYDROCHLORIDE 10 MG: 10 TABLET, FILM COATED, EXTENDED RELEASE ORAL at 18:23

## 2020-01-30 RX ADMIN — OXYCODONE HYDROCHLORIDE AND ACETAMINOPHEN 1 TABLET: 5; 325 TABLET ORAL at 14:12

## 2020-01-30 RX ADMIN — LIDOCAINE 1 PATCH: 50 PATCH CUTANEOUS at 05:05

## 2020-01-30 RX ADMIN — GABAPENTIN 100 MG: 100 CAPSULE ORAL at 12:17

## 2020-01-30 RX ADMIN — MIDODRINE HYDROCHLORIDE 10 MG: 5 TABLET ORAL at 18:22

## 2020-01-30 RX ADMIN — OXYCODONE HYDROCHLORIDE 2.5 MG: 5 TABLET ORAL at 05:04

## 2020-01-30 ASSESSMENT — PAIN SCALES - WONG BAKER
WONGBAKER_NUMERICALRESPONSE: HURTS A LITTLE MORE
WONGBAKER_NUMERICALRESPONSE: HURTS A LITTLE MORE
WONGBAKER_NUMERICALRESPONSE: HURTS EVEN MORE
WONGBAKER_NUMERICALRESPONSE: HURTS A WHOLE LOT
WONGBAKER_NUMERICALRESPONSE: HURTS A LITTLE MORE

## 2020-01-30 ASSESSMENT — ENCOUNTER SYMPTOMS
NECK PAIN: 1
HEARTBURN: 0
DIZZINESS: 0
SEIZURES: 0
DEPRESSION: 1
ABDOMINAL PAIN: 0
STRIDOR: 0
WHEEZING: 0
BLURRED VISION: 0
NECK PAIN: 0
FEVER: 0
SPUTUM PRODUCTION: 0
BRUISES/BLEEDS EASILY: 0
CHILLS: 0
DIARRHEA: 0
DOUBLE VISION: 0
HEMOPTYSIS: 0
SHORTNESS OF BREATH: 0
COUGH: 1
PHOTOPHOBIA: 0
WEAKNESS: 1
HEADACHES: 0
NAUSEA: 0
FOCAL WEAKNESS: 0
POLYDIPSIA: 0
MEMORY LOSS: 0
NERVOUS/ANXIOUS: 1
MYALGIAS: 0
FEVER: 1
CONSTIPATION: 0
VOMITING: 0
LOSS OF CONSCIOUSNESS: 0
PALPITATIONS: 0
INSOMNIA: 0
BACK PAIN: 1
WEAKNESS: 0

## 2020-01-30 ASSESSMENT — COGNITIVE AND FUNCTIONAL STATUS - GENERAL
DAILY ACTIVITIY SCORE: 6
TOILETING: TOTAL
PERSONAL GROOMING: TOTAL
SUGGESTED CMS G CODE MODIFIER DAILY ACTIVITY: CN
HELP NEEDED FOR BATHING: TOTAL
EATING MEALS: TOTAL
DRESSING REGULAR UPPER BODY CLOTHING: TOTAL
DRESSING REGULAR LOWER BODY CLOTHING: TOTAL

## 2020-01-30 ASSESSMENT — LIFESTYLE VARIABLES: SUBSTANCE_ABUSE: 0

## 2020-01-30 NOTE — PROGRESS NOTES
Critical Care Progress Note    Date of admission  1/24/2020    Chief Complaint  44 y.o. female admitted 1/24/2020 with sepsis, Flu    Hospital Course    1/26 - admitted to ICU for severe sepsis, flu, started on Tamiflu, intubated, pressors started for MAP goals  1/29- Self extubated did well over the day        Interval Problem Update  Reviewed last 24 hour events:  Pulled cortrak overnight and replaced. ?aspiration however CXR thus far unremarkable  Tm 37.8  HR 60-100s  SBP 90-130s  GCS 15. Intermittently follows commands. Generalized weakness  Cortrak TF @goal  - BM  U/O 2.2  CXR stable infiltrates but with better aeration  WBC 6.1      Review of Systems  Review of Systems   Unable to perform ROS: Acuity of condition   Constitutional: Positive for fever and malaise/fatigue. Negative for chills.   Respiratory: Negative for shortness of breath, wheezing and stridor.    Cardiovascular: Negative for chest pain.   Gastrointestinal: Negative for abdominal pain, nausea and vomiting.   Genitourinary: Negative for urgency.   Musculoskeletal: Positive for back pain and neck pain.   Skin: Negative for rash.   Neurological: Positive for weakness. Negative for loss of consciousness.   Psychiatric/Behavioral: Positive for depression.        Vital Signs for last 24 hours   Temp:  [36.6 °C (97.8 °F)-37.8 °C (100 °F)] 36.6 °C (97.8 °F)  Pulse:  [] 65  Resp:  [17-33] 29  BP: ()/(52-75) 114/56  SpO2:  [91 %-97 %] 94 %    Hemodynamic parameters for last 24 hours       Respiratory Information for the last 24 hours       Physical Exam   Physical Exam  Constitutional:       Appearance: She is ill-appearing.   HENT:      Head: Normocephalic.      Right Ear: External ear normal.      Left Ear: External ear normal.      Nose: Nose normal.      Mouth/Throat:      Pharynx: Oropharynx is clear. No oropharyngeal exudate.   Eyes:      Extraocular Movements: Extraocular movements intact.      Pupils: Pupils are equal, round, and  reactive to light.   Neck:      Musculoskeletal: Neck supple.   Cardiovascular:      Rate and Rhythm: Normal rate and regular rhythm.      Pulses: Normal pulses.   Pulmonary:      Breath sounds: No wheezing or rales.      Comments: BL crackles  Abdominal:      General: Bowel sounds are normal.   Musculoskeletal:         General: No swelling.   Skin:     General: Skin is dry.      Capillary Refill: Capillary refill takes less than 2 seconds.   Neurological:      General: No focal deficit present.      Mental Status: She is alert.      Cranial Nerves: No cranial nerve deficit.      Comments: Generalized weakness throughout   Psychiatric:      Comments: Bizarre affect at times         Medications  Current Facility-Administered Medications   Medication Dose Route Frequency Provider Last Rate Last Dose   • oxyCODONE immediate-release (ROXICODONE) tablet 5 mg  5 mg Enteral Tube Q6HRS Kwame Cruz M.D.   5 mg at 01/30/20 1217   • lidocaine (LIDODERM) 5 % 1 Patch  1 Patch Transdermal Q24HR Kwame Cruz M.D.       • gabapentin (NEURONTIN) capsule 100 mg  100 mg Enteral Tube TID Kwame Cruz M.D.   100 mg at 01/30/20 1217   • potassium bicarbonate (KLYTE) effervescent tablet 25 mEq  25 mEq Enteral Tube Once Kwame Cruz M.D.       • citalopram (CELEXA) tablet 20 mg  20 mg Enteral Tube DAILY Kwame Cruz M.D.   20 mg at 01/30/20 0504   • ipratropium-albuterol (DUONEB) nebulizer solution  3 mL Nebulization Q4H PRN (RT) Kwame Cruz M.D.       • midodrine (PROAMATINE) tablet 10 mg  10 mg Enteral Tube TID WITH MEALS Kwame Cruz M.D.   10 mg at 01/30/20 1217   • acetaminophen (TYLENOL) tablet 650 mg  650 mg Enteral Tube Q6HRS PRN Gustavo Rojas D.O.   650 mg at 01/30/20 0018   • calcium carbonate (TUMS) chewable tab 500 mg  500 mg Enteral Tube BID Gustavo Rojas, D.O.   500 mg at 01/30/20 0504   • oseltamivir (TAMIFLU) capsule 75 mg  75 mg Enteral Tube BID Gustavo Rojas D.O.   75 mg at 01/30/20 0504   •  oxyCODONE-acetaminophen (PERCOCET) 5-325 MG per tablet 1-2 Tab  1-2 Tab Enteral Tube Q4HRS PRN DONNA Pandey.O.   2 Tab at 01/30/20 1003   • polyethylene glycol/lytes (MIRALAX) PACKET 1 Packet  1 Packet Enteral Tube QDAY PRN RISSA PandeyO.   1 Packet at 01/28/20 1218    And   • senna-docusate (PERICOLACE or SENOKOT S) 8.6-50 MG per tablet 2 Tab  2 Tab Enteral Tube BID DONNA Pandey.O.   2 Tab at 01/30/20 0505    And   • bisacodyl (DULCOLAX) suppository 10 mg  10 mg Rectal QDAY PRN DONNA Pandey.O.   10 mg at 01/28/20 1818   • Pharmacy Consult: Enteral tube insertion - review meds/change route/product selection  1 Each Other PHARMACY TO DOSE Kwame Cruz M.D.       • MD Alert...ICU Electrolyte Replacement per Pharmacy   Other PHARMACY TO DOSE Jose Barreto M.D.       • Respiratory Care per Protocol   Nebulization Continuous RT Kasia Barrientos D.ORito       • enoxaparin (LOVENOX) inj 40 mg  40 mg Subcutaneous DAILY Kasia Barrientos D.O.   40 mg at 01/30/20 0505   • lidocaine (LIDODERM) 5 % 1 Patch  1 Patch Transdermal Q24HRS Kasia Barrientos D.O.   1 Patch at 01/30/20 0505   • albuterol inhaler 2 Puff  2 Puff Inhalation Q4H PRN (RT) Alejandra Tomlinson M.D.           Fluids    Intake/Output Summary (Last 24 hours) at 1/30/2020 1221  Last data filed at 1/30/2020 1000  Gross per 24 hour   Intake 1226 ml   Output 1830 ml   Net -604 ml       Laboratory  Recent Labs     01/28/20  0523   ISTATAPH 7.344*   ISTATAPCO2 44.5*   ISTATAPO2 93*   ISTATATCO2 26   [id number] 97   ISTATARTHCO3 24.2   ISTATARTBE -1   ISTATTEMP 97.3 F   ISTATFIO2 30   ISTATSPEC Arterial   ISTATAPHTC 7.354*   [id number] 89*         Recent Labs     01/28/20  0630 01/29/20  0336 01/30/20  0820   SODIUM 140 144 144   POTASSIUM 3.5* 4.5 3.8   CHLORIDE 111 113* 105   CO2 20 27 29   BUN 13 17 18   CREATININE 0.90 0.84 0.73   CALCIUM 7.8* 8.2* 9.1     Recent Labs     01/27/20  1620 01/28/20  0630 01/29/20  0336 01/30/20  0820   ALTSGPT  --  12 15  15   ASTSGOT  --  42 42 37   ALKPHOSPHAT  --  60 64 70   TBILIRUBIN  --  0.2 0.4 0.4   PREALBUMIN 7.0* 7.0*  --   --    GLUCOSE  --  132* 118* 115*     Recent Labs     01/28/20  0630 01/28/20  1502 01/29/20  0336 01/30/20  0340 01/30/20  0820   WBC 5.9 5.7 7.0 6.1  --    NEUTSPOLYS 77.80* 71.60 88.80* 73.90*  --    LYMPHOCYTES 18.00* 23.60 7.20* 22.60  --    MONOCYTES 3.00 3.00 2.60 2.60  --    EOSINOPHILS 0.70 1.10 0.40 0.90  --    BASOPHILS 0.20 0.20 0.30 0.00  --    ASTSGOT 42  --  42  --  37   ALTSGPT 12  --  15  --  15   ALKPHOSPHAT 60  --  64  --  70   TBILIRUBIN 0.2  --  0.4  --  0.4     Recent Labs     01/28/20  1502 01/29/20  0336 01/30/20  0340   RBC 2.54* 2.58* 3.02*   HEMOGLOBIN 7.5* 7.8* 8.9*   HEMATOCRIT 24.7* 24.3* 28.1*   PLATELETCT 154* 154* 174       Imaging  X-Ray:  I have personally reviewed the images and compared with prior images. and My impression is: Removal of et tube and  stable infiltrates    Assessment/Plan  * Sepsis (HCC)  Assessment & Plan  This is Severe Sepsis Present on admission  SIRS criteria identified on my evaluation include: Fever, with temperature greater than 101 deg F, Tachycardia, with heart rate greater than 90 BPM and Leukocyosis, with WBC greater than 12,000  Source of infection is lungs  Clinical indicators of end organ dysfunction include Systolic blood pressure (SBP) <90 mmHg or mean arterial pressure <65 mmHg and Lactate >2 mmol/L (18.0 mg/dL)  Sepsis protocol initiated  Fluid resuscitation ordered per protocol  IV antibiotics as appropriate for source of sepsis  Reassessment: I have reassessed the patient's hemodynamic status  End organ dysfunction include(s):  Acute respiratory failure and Encephalopathy (metabolic)    Tamiflu completed  Off pressors  MAP goal >65.   Cont midodrine  Fever control      Influenza A  Assessment & Plan  Completed course of tamiflu     COPD (chronic obstructive pulmonary disease) (HCC)- (present on admission)  Assessment & Plan  Not in  COPD exacerbation   Duonebs prn.       Pain  Assessment & Plan  Likely an element of bone pain 2/2 mets  Multimodal pain strategy  Continue to optimize narcotic regimen  Acetaminophen  Started Gabapentin  Lidoderm patch  Cont home SSRI    Acute respiratory failure with hypoxemia (HCC)  Assessment & Plan  2/2 to flu  Self extubated 1/29   Pulled Cortrak 1/30 concern for aspiration monitor - follow closely   IS/Pulm toilet  Mild volume overload gave 20 lasix on 1/29  RT/O2 protocols        Breast cancer (HCC)- (present on admission)  Assessment & Plan  S/p mastectomy, chemotherapy. On immunotherapy     Mood disorder (HCC)- (present on admission)  Assessment & Plan  Cont home Celexa          VTE:  Lovenox  Ulcer: H2 Antagonist  Lines: Central Line  Ongoing indication addressed and Morales Catheter  Ongoing indication addressed    I have performed a physical exam and reviewed and updated ROS and Plan today (1/30/2020). In review of yesterday's note (1/29/2020), there are no changes except as documented above.     Discussed patient condition and risk of morbidity and/or mortality with RN, RT, Pharmacy, Code status disscussed, Charge nurse / hot rounds and Patient

## 2020-01-30 NOTE — PROGRESS NOTES
"[facility] ICU Progress Note      Admit Date: 1/24/2020  ICU Day: 2    Resident(s): Lencho Sage M.D.  Attending: LUCY FRENCH/ Dr. Cruz    Date & Time:   1/30/2020   1:50 PM       Patient ID:    Name:             Roxy Cota   YOB: 1975  Age:                 44 y.o.  female   MRN:               [id number]    HPI:  44 y.o. female with hx of breast CA, mets to bone (base of skull and cervical and lumbar spine), s/p right mastectomy, on immunotherapy (started 4 days prior), COPD on 3L home O2,  who was initially admitted 1/25 for SOB and found to have influenza A pneumonia. Initially admitted to the floor. Pt had been having high grade fever in 39.5-40.6C. HR in 100-130s. SBP in 80-100s. Given the change in vitals, there was an increase demand from bedside on monitoring the patient. Pt also had had \"max dose of acetaminophen\" given in the day but on MAR appeared pt only received acetaminophen 650mg PO x 3 doses (total about 2gr). ICU team was consulted, and  bedside examination was notable for bilateral rhonchi and wheezing in all lung fields , no JVD, tachycardic but regular rhythm, no signs of fluid overload. STAT Chest Xray 1/26 revealed worsening mild pulmonary edema and/or multifocal pneumonitis, minimal bilateral pleural effusions, slightly greater on the LEFT, probable underlying COPD. Lactic acid was 0.8. Urine strep pneumo and legionella Ag were ordered.Tamiflu for influenza A x 5 days and C3+doxy for CAP. Midodrine 5 TID was started. Patient was transferred to ICU for closer monitoring and treatment of Influenza A and septic shock. She was treated with azithromycin (doxycycline for 1 day before switch to azithromycin) and ceftriaxone and Zyvox. After MRSA nares resulted negative, Zyvox was discontinued. Tamiflu was given for 5 days. Patient self-extubated on 1/28/20. On 1/30, there was concern for possible aspiration vs pneumonitis, CXR revealed no change from " "prior.        Consultants:  ICU    Interval Events:  - Overnight- patient pulled Cortrak out which was replaced, there was concern for aspiration as throughout night her oxygenation requirements increased to 10L, UOP 600cc, 1-2 bowel movements  - Tamiflu (day  5/5)  - BAL- NGTD  - Avoid excess fluids (sepsis 30ml/kg) use early pressors if necessary to prevent ARDS   - Monitor for post influenza superimposed pna (MRSA, strep PNA, GAS) serial procal, MRSA nasal Swab and monitor for Viral induced-HLH  - Adjuncts: Steroids, Vit C, naproxen 200mg BID  - Continue to monitor UOP  - Hgb 8.9  - Cor track feedings  - PT/OT, palliative follow-up recs  - Psych 1/29- continue celexa 200 qd for depression, no new meds until delirium resolves  - Pain control- cindy oxy 2.5 mg q6, oxy 1-2 tabs q4 prn, lidocaine patches to areas of pain  - Precedex  - Increased oxygenation requirements to 10L- obtain CXR 1/30- no change from prior  - Incentive spirometer, pulmonary toilet  - SLP evaluation     - Phone Update: 1/30/2020- 1:30pm- spoke with patient's  [place] over the phone,  was disappointed and angry regarding patient's ongoing confusion/tiredness and \"hearing loss\" and care in ICU by RNs, also mad about patient not receiving around the clock water sponges for dry mouth/throat which he stated she requires constantly. He stated for fevers, patient should not be receiving Tylenol or cooling blankets, but instead \"1/2 1/2 of rubbing alcohol and water mixed onto towel and then rubbed on patient's torso\" is method to bring her fever down because she has fibromyalgia. Provided complete update regarding patient's condition, care, and course in the ICU, and verbalized that entire team is trying to provide the best care that is appropriately indicated. Described that patient's confusion/tiredness is likely secondary to her long hospital course and ICU delirium related and that the goal is to get her off sedating medications as much " "as possible, and attempt to treat her cancer related pain appropriately.He expressed verbal understanding of entire conversation and agreed with plan of care at this time.       Review of Systems   Constitutional: Positive for malaise/fatigue. Negative for chills and fever.   HENT: Negative for ear pain, hearing loss and tinnitus.    Eyes: Negative for blurred vision, double vision and photophobia.   Respiratory: Positive for cough. Negative for hemoptysis, sputum production, shortness of breath and wheezing.    Cardiovascular: Negative for palpitations and leg swelling.   Gastrointestinal: Negative for abdominal pain, constipation, diarrhea, heartburn, nausea and vomiting.   Genitourinary: Negative for dysuria, frequency and urgency.   Musculoskeletal: Positive for back pain and joint pain. Negative for myalgias and neck pain.   Skin: Negative for itching and rash.   Neurological: Negative for dizziness, focal weakness, seizures, loss of consciousness, weakness and headaches.   Endo/Heme/Allergies: Negative for polydipsia. Does not bruise/bleed easily.   Psychiatric/Behavioral: Positive for depression. Negative for memory loss, substance abuse and suicidal ideas. The patient is nervous/anxious. The patient does not have insomnia.    All other systems reviewed and are negative.      PHYSICAL EXAM    Vitals:    01/30/20 0800 01/30/20 0900 01/30/20 1000 01/30/20 1100   BP: 120/63 124/75 112/71 114/56   Pulse: 86 91 95 65   Resp: (!) 29 (!) 25 (!) 29 (!) 29   Temp: 37 °C (98.6 °F)  36.6 °C (97.8 °F)    TempSrc: Temporal  Temporal    SpO2: 96% 97% 94% 94%   Weight:       Height:         Body mass index is 19.53 kg/m².  /56   Pulse 65   Temp 36.6 °C (97.8 °F) (Temporal)   Resp (!) 29   Ht 1.626 m (5' 4\")   Wt 51.6 kg (113 lb 12.1 oz)   SpO2 94%   BMI 19.53 kg/m²   O2 therapy: Pulse Oximetry: 94 %, O2 (LPM): 10, O2 Delivery: Oxymask    Physical Exam   Constitutional: She appears distressed.   HENT:   Head: " Normocephalic and atraumatic.   Right Ear: External ear normal.   Left Ear: External ear normal.   Nose: Nose normal.   Eyes: Pupils are equal, round, and reactive to light. Conjunctivae and EOM are normal. Right eye exhibits no discharge. Left eye exhibits no discharge. No scleral icterus.   Neck: Normal range of motion. Neck supple. No JVD present. No tracheal deviation present. No thyromegaly present.   Cardiovascular: Normal rate, regular rhythm, normal heart sounds and intact distal pulses. Exam reveals no gallop and no friction rub.   No murmur heard.  Pulmonary/Chest: No stridor. No respiratory distress. She has no wheezes. She has rales. She exhibits tenderness.   Mild bilateral rhonchi and rales/crackling in bilateral lung fields, improved from previous day   Abdominal: Soft. Bowel sounds are normal. She exhibits no distension. There is no tenderness. There is no rebound and no guarding.   Genitourinary:    Genitourinary Comments: Morales catether in place     Musculoskeletal:         General: No deformity or edema.   Neurological: She is alert. She has normal reflexes. No cranial nerve deficit.   A&O 2-3   Skin: No rash noted. She is not diaphoretic. No erythema.   Nursing note and vitals reviewed.      Respiratory:     Respiration: (!) 29, Pulse Oximetry: 94 %    Chest Tube Drains:    Recent Labs     01/28/20  0523   ISTATAPH 7.344*   ISTATAPCO2 44.5*   ISTATAPO2 93*   ISTATATCO2 26   [id number] 97   ISTATARTHCO3 24.2   ISTATARTBE -1   ISTATTEMP 97.3 F   ISTATFIO2 30   ISTATSPEC Arterial   ISTATAPHTC 7.354*   [id number] 89*       HemoDynamics:  Pulse: 65 Blood Pressure: 114/56      Neuro:  Intubated    Fluids:  Date 01/30/20 0700 - 01/31/20 0659   Shift 0146-3738 2881-0857 0074-4296 24 Hour Total   INTAKE   Other 210   210     Medications (PO/Enteral Liquids) 180   180     Flush / Irrigation Volume (Free Water Flush) 30   30   NG/   160     Intake (mL) (Enteral Tube 01/29/20 Cortrak - Gastric Right  olaf) 160   160   Shift Total 370   370   OUTPUT   Urine 250   250     Output (mL) (Urethral Catheter Non-latex 16 Fr.) 250   250   Stool         Number of Times Stooled 1 x   1 x   Shift Total 250   250      120        Intake/Output Summary (Last 24 hours) at 2020 0743  Last data filed at 2020 0600  Gross per 24 hour   Intake 1026.18 ml   Output 2455 ml   Net -1428.82 ml       Weight: 51.6 kg (113 lb 12.1 oz)  Body mass index is 19.53 kg/m².    Recent Labs     20  0620  03320  08   SODIUM 140 144 144   POTASSIUM 3.5* 4.5 3.8   CHLORIDE 111 113* 105   CO2    BUN 13 17 18   CREATININE 0.90 0.84 0.73   CALCIUM 7.8* 8.2* 9.1       GI/Nutrition:  Recent Labs     20  1620 20  0620  08   ALTSGPT  --  12 15 15   ASTSGOT  --  42 42 37   ALKPHOSPHAT  --  60 64 70   TBILIRUBIN  --  0.2 0.4 0.4   PREALBUMIN 7.0* 7.0*  --   --    GLUCOSE  --  132* 118* 115*       Heme:  Recent Labs     20  15020  034   RBC 2.54* 2.58* 3.02*   HEMOGLOBIN 7.5* 7.8* 8.9*   HEMATOCRIT 24.7* 24.3* 28.1*   PLATELETCT 154* 154* 174       Infectious Disease:  Temp  Av.2 °C (98.9 °F)  Min: 36.6 °C (97.8 °F)  Max: 37.8 °C (100 °F)  Recent Labs     20  0630 20  1502 20  03320  03420  08   WBC 5.9 5.7 7.0 6.1  --    NEUTSPOLYS 77.80* 71.60 88.80* 73.90*  --    LYMPHOCYTES 18.00* 23.60 7.20* 22.60  --    MONOCYTES 3.00 3.00 2.60 2.60  --    EOSINOPHILS 0.70 1.10 0.40 0.90  --    BASOPHILS 0.20 0.20 0.30 0.00  --    ASTSGOT 42  --  42  --  37   ALTSGPT 12  --  15  --  15   ALKPHOSPHAT 60  --  64  --  70   TBILIRUBIN 0.2  --  0.4  --  0.4       Meds:  • oxyCODONE immediate-release  5 mg     • lidocaine  1 Patch     • gabapentin  100 mg     • potassium bicarbonate  25 mEq     • citalopram  20 mg     • ipratropium-albuterol  3 mL     • midodrine  10 mg     • acetaminophen  650 mg     • calcium carbonate   500 mg     • oseltamivir  75 mg     • oxyCODONE-acetaminophen  1-2 Tab     • polyethylene glycol/lytes  1 Packet      And   • senna-docusate  2 Tab      And   • bisacodyl  10 mg     • Pharmacy  1 Each     • MD Alert...Adult ICU Electrolyte Replacement per Pharmacy       • Respiratory Care per Protocol       • enoxaparin  40 mg     • lidocaine  1 Patch     • albuterol  2 Puff          Procedures:  Intubation- 1/26/20  Bronchoscopy- 1/26/20  Central Line- 1/26/20    Imaging:  DX-CHEST-PORTABLE (1 VIEW)   Final Result      No significant change from prior exam.      DX-ABDOMEN FOR TUBE PLACEMENT   Final Result      Enteric tube terminates over the gastric body      OUTSIDE IMAGES-NUCLEAR MEDICINE, PET   Final Result      DX-CHEST-PORTABLE (1 VIEW)   Final Result      Interval extubation. Otherwise stable exam.      DX-CHEST-PORTABLE (1 VIEW)   Final Result      Stable cardiomegaly and interstitial infiltrates.      DX-ABDOMEN FOR TUBE PLACEMENT   Final Result      Enteric tube tip projects over the stomach.      DX-CHEST-FOR LINE PLACEMENT Perform procedure in: Patient's Room   Final Result            1. A right peripherally inserted catheter with tip projects appropriately over the expected area of the superior vena cava.      2. Endotracheal tube with tip projecting over the mid thoracic trachea.      DX-CHEST-2 VIEWS   Final Result      1.  Worsening mild pulmonary edema and/or multifocal pneumonitis.   2.  Minimal bilateral pleural effusions, slightly greater on the LEFT.   3.  Probable underlying COPD.      CT-HEAD W/O   Final Result      1.  No acute intracranial abnormality.   2.  Osseous metastatic disease.      DX-CHEST-PORTABLE (1 VIEW)   Final Result      No acute cardiopulmonary disease evident.          Problem and Plan:    * Sepsis (HCC)  Assessment & Plan  Sepsis Present on admission, soft bp's and sinus tachycardia low threshold for ICU transfer   SIRS criteria: Tachycardia, with heart rate greater than  90 BPM and Tachypnea, with respirations greater than 20 per minute  Source is presumed to be lungs (Influenza/CAP) at this time  Sepsis protocol initiated on admission   Fluid resuscitation ordered per protocol  IV antibiotics C3 and doxy ordered despite negative CXR, pt has been having a cough with fevers and chills and got sick after her  was having fevers of 103F and cough.     Plan  - Abx completed  - Continue Tamiflu (day 5/5)  - BAL- NGTD  - Avoid excess fluids (sepsis 30ml/kg) use early pressors if necessary to prevent ARDS   - Monitor for post influenza superimposed pna (MRSA, strep PNA, GAS) serial procal, MRSA nasal Swab and monitor for Viral induced-HLH  - Adjuncts: Steroids, Vit C, naproxen 200mg BID  - Continue to monitor UOP  - Hgb 8.9  - Cortrack feedings  - Increased oxygenation requirements to 10L- obtain CXR 1/30- no change from prior  - Incentive spirometer, pulmonary toilet  - SLP evaluation     Influenza A  Assessment & Plan  Influenza A was ordered by day team 1/25/20 as pt discussed sick contacts at home with fevers. Test initially wasn't read, but after [facility] night float got called about temp 103F, then requested stat flu test, which came back positive for influenza A. They started Tamiflu renally dosed.     Plan:  Continue Tamiflu (day 5/5)    Macrocytic anemia- (present on admission)  Assessment & Plan  Patient with chronic anemia, chart review showing normal B12/Folate, likely secondary to bone metastasis. 1/28/20- There has been a drop in hemoglobin to 6.6 (8.6 previous day).    Plan  - Resolved, Hgb 8.9 1/30    COPD (chronic obstructive pulmonary disease) (HCC)- (present on admission)  Assessment & Plan  Chronic respiratory failure with 3L O2 at home, currently requiring 3L, at baseline. Diffuse expiratory rhonci on exam    - RT protocol  - Albuterol Q6   - Incentive Spirometer    - scheduled duonebs Q4       Delirium  Assessment & Plan  Pyshiatry following- 1/29- continue celexa  "200 qd for depression, no new meds until delirium resolves.    - Phone Update: 1/30/2020- 1:30pm- spoke with patient's  [place] over the phone,  was disappointed and angry regarding patient's ongoing confusion/tiredness and \"hearing loss\" and care in ICU by RNs, also mad about patient not receiving around the clock water sponges for dry mouth/throat which he stated she requires constantly. He stated for fevers, patient should not be receiving Tylenol or cooling blankets, but instead \"1/2 1/2 of rubbing alcohol and water mixed onto towel and then rubbed on patient's torso\" is method to bring her fever down because she has fibromyalgia. Provided complete update regarding patient's condition, care, and course in the ICU, and verbalized that entire team is trying to provide the best care that is appropriately indicated. Described that patient's confusion/tiredness is likely secondary to her long hospital course and ICU delirium related and that the goal is to get her off sedating medications as much as possible, and attempt to treat her cancer related pain appropriately.He expressed verbal understanding of entire conversation and agreed with plan of care at this time.     Acute respiratory failure with hypoxemia (HCC)  Assessment & Plan  Self-extubated     - Increased oxygenation requirements to 10L- obtain CXR 1/30-    Urinary retention  Assessment & Plan  Pt presented with 3 days of incontinence. She was found to be retaining >900cc on bladder scan morning of 1/25/20, then had an episode of urination in bed that was not able to be measured; repeat scan showed 510cc in the bladder and we ordered a herndon catheter     Plan:  Herndon cath with strict I&Os and evaluate with physician when it is appropriate to d/c herndon       Displaced fracture of greater trochanter of right femur (HCC)- (present on admission)  Assessment & Plan  Admitted for during last admission (Dec 2019), Ortho consulted and patient deemed " non-surgical. PT had recommended home health, per family, patient was denied home health. Did not go to physical therapy as prescribed on last admission      - CTM  -PT/OT consult      Breast cancer (HCC)- (present on admission)  Assessment & Plan  History of Metastatic Breast Cancer, evaluated by Dr. Wynne during last admission. Mets to L and Spine, Pelvis. CT head on admission showing Mets to skull, no prior records of it. Brought by family for concern given weakness and increased confusion, patient reporting new incontinence. Repeat MR of Spine Chart review shows Dr. Wynne recommended palliative care, palliative was consulted during last admission, Advance Directive stating patient's POA is her , Jones Cota.  Discussed code status, full code at this time.      Plan:  - Spoke to Dr. Khan (Oncology) and she recommends holding patient's Verzenio until the sepsis resolves, managing her pain, and then can restart Verzenio on discharge; 4 days of treatment with Verzenio prior to this admission is too soon to predict response   - Follow-up palliative care discussions with family   - Pain control- cindy oxy 2.5 mg q6, oxy 1-2 tabs q4 prn       Mood disorder (HCC)- (present on admission)  Assessment & Plan  - Psych consult for worsening MDD/depressed mood/anxiety sx  - Psych 1/29 - continue celexa 200 qd for depression, no new meds until delirium resolves      DISPO:  ICU    CODE STATUS: FULL    Quality Measures:  Morales Catheter:  Yes  DVT Prophylaxis:   Enoxaparin  Ulcer Prophylaxis:   Famotidine  Antibiotics: Ceftriaxone, Azithromycin  Lines: Central, PIV

## 2020-01-30 NOTE — ASSESSMENT & PLAN NOTE
Likely an element of bone pain 2/2 mets  Multimodal pain strategy  Continue to optimize narcotic regimen - long acting plus breakthrough  Acetaminophen  Con Gabapentin  Lidoderm patch  Cont home SSRI

## 2020-01-30 NOTE — DISCHARGE PLANNING
Care Transition Team Assessment  In the case of an emergency, pt's NOK is Jazzy Cota (Spouse) [phone].     This RNCM spoke with the patient's spouse, Jazzy, via phone and obtained the information used in this assessment. Jazzy verified accuracy of facesheet. Pt lives in a one story house with spouse, son, and daughter-in-law (IHSS worker). Pt uses the [facility] in [place], [place]. Prior to current hospitalization, pt was dependent on IHSS worker and family for all ADLS/IADLS. Pt is bed bound and has a hospital bed, wheelchair, bedside camode, FWW, and home oxygen ([employer]). [facility] Pt collects social security disability. Pt does not have SA history, but has anxiety and depression. Pt's support system consists of family. Pt does not have an AD. Pt's discharge plan TBD, Pt may need SNF before returning home.     Upon D/C, pt's spouse, Jazzy will provide transport home if applicable.     Information Source  Orientation : Disoriented to Time  Information Given By: Spouse  Informant's Name: Jazzy Cota  Who is responsible for making decisions for patient? : Patient    Readmission Evaluation  Is this a readmission?: Yes - unplanned readmission  Why do you think you were readmitted?: Metastatic breast cancer with flu    Elopement Risk  Legal Hold: No  Ambulatory or Self Mobile in Wheelchair: No-Not an Elopement Risk  Elopement Risk: Not at Risk for Elopement    Interdisciplinary Discharge Planning  Primary Care Physician: Pt has oncologis in [place], [place] unsure of name  Lives with - Patient's Self Care Capacity: Spouse, Adult Children, Other (Comments)(Daughter in law- IHSS worker)  Patient or legal guardian wants to designate a caregiver (see row info): No  Housing / Facility: 1 Story House  Prior Services: Continuous (24 Hour) Care Giving Family, Continuous (24 Hour) Care Giving Per Service(IHSS worker)  Durable Medical Equipment: Home Oxygen, Walker, Commode,  Other - Specify(hospital bed, wheel chair)  DME Provider / Phone: [facility]    Discharge Preparedness  What is your plan after discharge?: Uncertain - pending medical team collaboration, Skilled nursing facility  What are your discharge supports?: Spouse, Other (comment), Child(IHSS worker)  Prior Functional Level: Needs Assist with Activities of Daily Living, Needs Assist with Medication Management, Total Assist, Wheelchair Dependent, Other (Comments)(bed bound)  Difficulity with ADLs: Bathing, Brushing teeth, Dressing, Eating, Toileting, Walking  Difficulty with ADLs Comment: spouse and IHSS worker assists  Difficulity with IADLs: Cooking, Driving, Keeping track of finances, Laundry, Managing medication, Shopping, Using the telephone or computer  Difficulity with IADL Comments: spouse and IHSS worker assists    Functional Assesment  Prior Functional Level: Needs Assist with Activities of Daily Living, Needs Assist with Medication Management, Total Assist, Wheelchair Dependent, Other (Comments)(bed bound)    Finances  Financial Barriers to Discharge: No  Prescription Coverage: Yes    Vision / Hearing Impairment  Vision Impairment : Yes  Right Eye Vision: Impaired, Wears Glasses  Left Eye Vision: Impaired, Wears Glasses  Hearing Impairment : No  Hearing Impairment: Both Ears    Advance Directive  Advance Directive?: None, Clinically incapacitated / Inappropriate    Domestic Abuse  Have you ever been the victim of abuse or violence?: No  Physical Abuse or Sexual Abuse: No  Verbal Abuse or Emotional Abuse: No  Possible Abuse Reported to:: Not Applicable    Psychological Assessment  History of Substance Abuse: None  History of Psychiatric Problems: Yes(anxiety and depression per spouse)  Newly Diagnosed Illness: No    Discharge Risks or Barriers  Discharge risks or barriers?: Complex medical needs, Other (comment)([place]ives [place] with [employer] insurance)    Anticipated Discharge Information  Anticipated discharge  disposition: Discharge needs currently unknown, SNF  Discharge Address: [address] [address]  Discharge Contact Phone Number: [phone]

## 2020-01-30 NOTE — THERAPY
"Speech Language Therapy Clinical Swallow Evaluation completed.  Functional Status: Pt was A&Ox3, disoriented to month (\"March?\"). Slow processing speed noted, as well as suspected reduced hearing acuity as pt often asked for instructions to be repeated. Pt's baseline cognitive status is unclear, though per MD who just spoke with pt's , she is more confused than usual. Pt is currently on 6L of O2 via NC (RN switched from oxymask for assessment). Oral [facility] exam was notable for lack of true dentition, lingual weakness. Vocal quality is clear. Pt reports she uses dentures at home (none are present in room). Oral care completed prior to administration of PO trials. Pt was presented with ice chips (5), thins via tsp/cup (3 oz), purees (4 oz), soft solids (2 oz). Oral prep phase was mildly prolonged for mastication of fibrous soft solids though no significant oral residue was present post swallow. Pharyngeal swallow response was timely. Hyolaryngeal excursion was palpated as complete. Overt reflexive coughing occurred immeidately after swallowing fibrous soft solids (pineapple) and a larger sip of thin liquids. Recommend initiating a diet of Dysphagia 2/NTL with direct supervision for meals. Please wait to pull Cortrak until after pt has consumed 1-2 meals with no s/sx of aspiration.     Recommendations - Diet:  Dysphagia 2/Nectar thick liquid                          Strategies: Direct supervision during meals, Assistance needed for meal tray set-up, No Straws and Head of Bed at 90 Degrees                          Medication Administration:  Float whole in puree  Plan of Care: Will benefit from Speech Therapy 3 times per week  Post-Acute Therapy: Recommend inpatient transitional care services for continued speech therapy services.        See \"Rehab Therapy-Acute\" Patient Summary Report for complete documentation.   "

## 2020-01-30 NOTE — THERAPY
"Occupational Therapy Treatment completed with focus on ADLs, ADL pre-transfers, cognition and upper extremity function.  Functional Status:  Supine>EOB max A, sit>stand mod A, side stepping max A, seated grooming max A, EOB>supine max A.   Plan of Care: Will benefit from Occupational Therapy 3 times per week  Discharge Recommendations:  Equipment Will Continue to Assess for Equipment Needs. Post-acute therapy Recommend post-acute placement for additional occupational therapy services prior to discharge home.    See \"Rehab Therapy-Acute\" Patient Summary Report for complete documentation.     Pt seen for OT treatment to include: bed mobility, sit>stand, grooming. Pt now extubated, communicating verbally. Oriented to place and circumstance, following 1 step commands consistently. Pt c/o sternal pain with facilitated B shoulder flexion. Once EOB pt c/o nausea. Pt participated minimally with hand to face grooming. Did not attempt mobility beyond side stepping due to nausea. Pt demonstrated improved functional mobility, ADL, cognition this session. Will continue to benefit from acute OT.   "

## 2020-01-30 NOTE — CARE PLAN
Problem: Pain Management  Goal: Pain level will decrease to patient's comfort goal  Outcome: PROGRESSING AS EXPECTED  Patient denies pain       Problem: Fluid Volume:  Goal: Will maintain balanced intake and output  Outcome: PROGRESSING AS EXPECTED  Adequate urine output

## 2020-01-30 NOTE — CARE PLAN
Problem: Safety  Goal: Will remain free from falls  Outcome: PROGRESSING AS EXPECTED     Problem: Infection  Goal: Will remain free from infection  Outcome: PROGRESSING AS EXPECTED     Problem: Pain Management  Goal: Pain level will decrease to patient's comfort goal  Outcome: PROGRESSING AS EXPECTED

## 2020-01-31 ENCOUNTER — APPOINTMENT (OUTPATIENT)
Dept: RADIOLOGY | Facility: MEDICAL CENTER | Age: 45
DRG: 871 | End: 2020-01-31
Attending: STUDENT IN AN ORGANIZED HEALTH CARE EDUCATION/TRAINING PROGRAM
Payer: COMMERCIAL

## 2020-01-31 LAB
ANISOCYTOSIS BLD QL SMEAR: ABNORMAL
BACTERIA BLD CULT: NORMAL
BASOPHILS # BLD AUTO: 0.9 % (ref 0–1.8)
BASOPHILS # BLD: 0.08 K/UL (ref 0–0.12)
EOSINOPHIL # BLD AUTO: 0 K/UL (ref 0–0.51)
EOSINOPHIL NFR BLD: 0 % (ref 0–6.9)
ERYTHROCYTE [DISTWIDTH] IN BLOOD BY AUTOMATED COUNT: 70 FL (ref 35.9–50)
HCT VFR BLD AUTO: 26.5 % (ref 37–47)
HGB BLD-MCNC: 8.7 G/DL (ref 12–16)
LYMPHOCYTES # BLD AUTO: 0.81 K/UL (ref 1–4.8)
LYMPHOCYTES NFR BLD: 9.5 % (ref 22–41)
MANUAL DIFF BLD: NORMAL
MCH RBC QN AUTO: 30.4 PG (ref 27–33)
MCHC RBC AUTO-ENTMCNC: 32.8 G/DL (ref 33.6–35)
MCV RBC AUTO: 92.7 FL (ref 81.4–97.8)
MICROCYTES BLD QL SMEAR: ABNORMAL
MONOCYTES # BLD AUTO: 0.52 K/UL (ref 0–0.85)
MONOCYTES NFR BLD AUTO: 6.1 % (ref 0–13.4)
MORPHOLOGY BLD-IMP: NORMAL
NEUTROPHILS # BLD AUTO: 7.1 K/UL (ref 2–7.15)
NEUTROPHILS NFR BLD: 83.5 % (ref 44–72)
NRBC # BLD AUTO: 0.05 K/UL
NRBC BLD-RTO: 0.6 /100 WBC
PLATELET # BLD AUTO: 178 K/UL (ref 164–446)
PLATELET BLD QL SMEAR: NORMAL
PMV BLD AUTO: 9.9 FL (ref 9–12.9)
POIKILOCYTOSIS BLD QL SMEAR: NORMAL
RBC # BLD AUTO: 2.86 M/UL (ref 4.2–5.4)
RBC BLD AUTO: PRESENT
SIGNIFICANT IND 70042: NORMAL
SITE SITE: NORMAL
SOURCE SOURCE: NORMAL
TARGETS BLD QL SMEAR: NORMAL
WBC # BLD AUTO: 8.5 K/UL (ref 4.8–10.8)

## 2020-01-31 PROCEDURE — 700101 HCHG RX REV CODE 250: Performed by: PSYCHIATRY & NEUROLOGY

## 2020-01-31 PROCEDURE — 700102 HCHG RX REV CODE 250 W/ 637 OVERRIDE(OP): Performed by: STUDENT IN AN ORGANIZED HEALTH CARE EDUCATION/TRAINING PROGRAM

## 2020-01-31 PROCEDURE — A9270 NON-COVERED ITEM OR SERVICE: HCPCS | Performed by: STUDENT IN AN ORGANIZED HEALTH CARE EDUCATION/TRAINING PROGRAM

## 2020-01-31 PROCEDURE — 85027 COMPLETE CBC AUTOMATED: CPT

## 2020-01-31 PROCEDURE — 85007 BL SMEAR W/DIFF WBC COUNT: CPT

## 2020-01-31 PROCEDURE — 99233 SBSQ HOSP IP/OBS HIGH 50: CPT | Performed by: PSYCHIATRY & NEUROLOGY

## 2020-01-31 PROCEDURE — A9270 NON-COVERED ITEM OR SERVICE: HCPCS | Performed by: PSYCHIATRY & NEUROLOGY

## 2020-01-31 PROCEDURE — A9270 NON-COVERED ITEM OR SERVICE: HCPCS | Performed by: INTERNAL MEDICINE

## 2020-01-31 PROCEDURE — 770021 HCHG ROOM/CARE - ISO PRIVATE

## 2020-01-31 PROCEDURE — 700102 HCHG RX REV CODE 250 W/ 637 OVERRIDE(OP): Performed by: PSYCHIATRY & NEUROLOGY

## 2020-01-31 PROCEDURE — 700111 HCHG RX REV CODE 636 W/ 250 OVERRIDE (IP): Performed by: STUDENT IN AN ORGANIZED HEALTH CARE EDUCATION/TRAINING PROGRAM

## 2020-01-31 PROCEDURE — 99223 1ST HOSP IP/OBS HIGH 75: CPT | Performed by: RADIOLOGY

## 2020-01-31 PROCEDURE — 700101 HCHG RX REV CODE 250: Performed by: STUDENT IN AN ORGANIZED HEALTH CARE EDUCATION/TRAINING PROGRAM

## 2020-01-31 PROCEDURE — 700102 HCHG RX REV CODE 250 W/ 637 OVERRIDE(OP): Performed by: INTERNAL MEDICINE

## 2020-01-31 PROCEDURE — 71045 X-RAY EXAM CHEST 1 VIEW: CPT

## 2020-01-31 RX ORDER — ACETAMINOPHEN 325 MG/1
650 TABLET ORAL EVERY 6 HOURS PRN
Status: DISCONTINUED | OUTPATIENT
Start: 2020-01-31 | End: 2020-02-03 | Stop reason: HOSPADM

## 2020-01-31 RX ORDER — MIDODRINE HYDROCHLORIDE 5 MG/1
10 TABLET ORAL
Status: DISCONTINUED | OUTPATIENT
Start: 2020-01-31 | End: 2020-02-03 | Stop reason: HOSPADM

## 2020-01-31 RX ORDER — OXYCODONE HYDROCHLORIDE AND ACETAMINOPHEN 5; 325 MG/1; MG/1
1-2 TABLET ORAL EVERY 4 HOURS PRN
Status: DISCONTINUED | OUTPATIENT
Start: 2020-01-31 | End: 2020-02-03 | Stop reason: HOSPADM

## 2020-01-31 RX ORDER — AMOXICILLIN 250 MG
2 CAPSULE ORAL 2 TIMES DAILY
Status: DISCONTINUED | OUTPATIENT
Start: 2020-01-31 | End: 2020-02-03 | Stop reason: HOSPADM

## 2020-01-31 RX ORDER — BISACODYL 10 MG
10 SUPPOSITORY, RECTAL RECTAL
Status: DISCONTINUED | OUTPATIENT
Start: 2020-01-31 | End: 2020-02-03 | Stop reason: HOSPADM

## 2020-01-31 RX ORDER — FUROSEMIDE 10 MG/ML
20 INJECTION INTRAMUSCULAR; INTRAVENOUS ONCE
Status: COMPLETED | OUTPATIENT
Start: 2020-01-31 | End: 2020-01-31

## 2020-01-31 RX ORDER — POLYETHYLENE GLYCOL 3350 17 G/17G
1 POWDER, FOR SOLUTION ORAL
Status: DISCONTINUED | OUTPATIENT
Start: 2020-01-31 | End: 2020-02-03 | Stop reason: HOSPADM

## 2020-01-31 RX ORDER — GABAPENTIN 100 MG/1
100 CAPSULE ORAL 3 TIMES DAILY
Status: DISCONTINUED | OUTPATIENT
Start: 2020-01-31 | End: 2020-02-03 | Stop reason: HOSPADM

## 2020-01-31 RX ORDER — CITALOPRAM 20 MG/1
20 TABLET ORAL DAILY
Status: DISCONTINUED | OUTPATIENT
Start: 2020-02-01 | End: 2020-02-03 | Stop reason: HOSPADM

## 2020-01-31 RX ORDER — CLONAZEPAM 1 MG/1
1 TABLET ORAL
Status: DISCONTINUED | OUTPATIENT
Start: 2020-01-31 | End: 2020-02-03 | Stop reason: HOSPADM

## 2020-01-31 RX ORDER — CALCIUM CARBONATE 500 MG/1
500 TABLET, CHEWABLE ORAL 2 TIMES DAILY
Status: DISCONTINUED | OUTPATIENT
Start: 2020-01-31 | End: 2020-02-03 | Stop reason: HOSPADM

## 2020-01-31 RX ORDER — POTASSIUM CHLORIDE 20 MEQ/1
40 TABLET, EXTENDED RELEASE ORAL ONCE
Status: COMPLETED | OUTPATIENT
Start: 2020-01-31 | End: 2020-01-31

## 2020-01-31 RX ADMIN — OXYCODONE HYDROCHLORIDE AND ACETAMINOPHEN 2 TABLET: 5; 325 TABLET ORAL at 21:01

## 2020-01-31 RX ADMIN — MIDODRINE HYDROCHLORIDE 10 MG: 5 TABLET ORAL at 08:15

## 2020-01-31 RX ADMIN — OXYCODONE HYDROCHLORIDE AND ACETAMINOPHEN 1 TABLET: 5; 325 TABLET ORAL at 00:17

## 2020-01-31 RX ADMIN — POTASSIUM CHLORIDE 40 MEQ: 1500 TABLET, EXTENDED RELEASE ORAL at 12:16

## 2020-01-31 RX ADMIN — LIDOCAINE 1 PATCH: 50 PATCH CUTANEOUS at 05:06

## 2020-01-31 RX ADMIN — GABAPENTIN 100 MG: 100 CAPSULE ORAL at 05:17

## 2020-01-31 RX ADMIN — FUROSEMIDE 20 MG: 10 INJECTION, SOLUTION INTRAMUSCULAR; INTRAVENOUS at 08:15

## 2020-01-31 RX ADMIN — GABAPENTIN 100 MG: 100 CAPSULE ORAL at 18:09

## 2020-01-31 RX ADMIN — MIDODRINE HYDROCHLORIDE 10 MG: 5 TABLET ORAL at 16:59

## 2020-01-31 RX ADMIN — OXYCODONE HYDROCHLORIDE AND ACETAMINOPHEN 2 TABLET: 5; 325 TABLET ORAL at 12:17

## 2020-01-31 RX ADMIN — ANTACID TABLETS 500 MG: 500 TABLET, CHEWABLE ORAL at 05:04

## 2020-01-31 RX ADMIN — GABAPENTIN 100 MG: 100 CAPSULE ORAL at 12:17

## 2020-01-31 RX ADMIN — CITALOPRAM HYDROBROMIDE 20 MG: 20 TABLET ORAL at 05:04

## 2020-01-31 RX ADMIN — CLONAZEPAM 1 MG: 1 TABLET ORAL at 20:11

## 2020-01-31 RX ADMIN — ENOXAPARIN SODIUM 40 MG: 100 INJECTION SUBCUTANEOUS at 05:04

## 2020-01-31 RX ADMIN — OXYCODONE HYDROCHLORIDE AND ACETAMINOPHEN 2 TABLET: 5; 325 TABLET ORAL at 16:59

## 2020-01-31 RX ADMIN — OXYCODONE HYDROCHLORIDE 10 MG: 10 TABLET, FILM COATED, EXTENDED RELEASE ORAL at 05:04

## 2020-01-31 RX ADMIN — ANTACID TABLETS 500 MG: 500 TABLET, CHEWABLE ORAL at 18:09

## 2020-01-31 RX ADMIN — MIDODRINE HYDROCHLORIDE 10 MG: 5 TABLET ORAL at 12:17

## 2020-01-31 RX ADMIN — LIDOCAINE 1 PATCH: 50 PATCH CUTANEOUS at 12:16

## 2020-01-31 RX ADMIN — OXYCODONE HYDROCHLORIDE 10 MG: 10 TABLET, FILM COATED, EXTENDED RELEASE ORAL at 18:08

## 2020-01-31 RX ADMIN — OXYCODONE HYDROCHLORIDE AND ACETAMINOPHEN 2 TABLET: 5; 325 TABLET ORAL at 08:15

## 2020-01-31 ASSESSMENT — ENCOUNTER SYMPTOMS
HEMOPTYSIS: 0
CHILLS: 0
LOSS OF CONSCIOUSNESS: 0
PHOTOPHOBIA: 0
COUGH: 1
SHORTNESS OF BREATH: 0
ABDOMINAL PAIN: 0
NAUSEA: 0
POLYDIPSIA: 0
CONSTIPATION: 0
INSOMNIA: 0
WEAKNESS: 0
HEARTBURN: 0
BLURRED VISION: 0
DOUBLE VISION: 0
MYALGIAS: 0
MEMORY LOSS: 0
STRIDOR: 0
NECK PAIN: 1
NERVOUS/ANXIOUS: 1
SPUTUM PRODUCTION: 0
HEADACHES: 0
PALPITATIONS: 0
BRUISES/BLEEDS EASILY: 0
WHEEZING: 0
DIARRHEA: 0
DEPRESSION: 1
VOMITING: 0
FEVER: 1
DIZZINESS: 0
NECK PAIN: 0
FOCAL WEAKNESS: 0
FEVER: 0
SEIZURES: 0
WEAKNESS: 1
BACK PAIN: 1

## 2020-01-31 ASSESSMENT — LIFESTYLE VARIABLES: SUBSTANCE_ABUSE: 0

## 2020-01-31 NOTE — PROGRESS NOTES
Assumed care of pt. See assessment flow sheet for assessment. Pt c/o of pain,shceduled med for 1200. Pt /o of being cold, warm blankets applied, pt assisted into position of comfort. Oral care complete, and water swabs provided to pt.

## 2020-01-31 NOTE — PROGRESS NOTES
"[facility] ICU Progress Note      Admit Date: 1/24/2020  ICU Day: 2    Resident(s): Lencho Sage M.D.  Attending: LUCY FRENCH/ Dr. Cruz    Date & Time:   1/31/2020   11:18 AM       Patient ID:    Name:             Roxy Cota   YOB: 1975  Age:                 44 y.o.  female   MRN:               [id number]    HPI:  44 y.o. female with hx of breast CA, mets to bone (base of skull and cervical and lumbar spine), s/p right mastectomy, on immunotherapy (started 4 days prior), COPD on 3L home O2,  who was initially admitted 1/25 for SOB and found to have influenza A pneumonia. Initially admitted to the floor. Pt had been having high grade fever in 39.5-40.6C. HR in 100-130s. SBP in 80-100s. Given the change in vitals, there was an increase demand from bedside on monitoring the patient. Pt also had had \"max dose of acetaminophen\" given in the day but on MAR appeared pt only received acetaminophen 650mg PO x 3 doses (total about 2gr). ICU team was consulted, and  bedside examination was notable for bilateral rhonchi and wheezing in all lung fields , no JVD, tachycardic but regular rhythm, no signs of fluid overload. STAT Chest Xray 1/26 revealed worsening mild pulmonary edema and/or multifocal pneumonitis, minimal bilateral pleural effusions, slightly greater on the LEFT, probable underlying COPD. Lactic acid was 0.8. Urine strep pneumo and legionella Ag were ordered.Tamiflu for influenza A x 5 days and C3+doxy for CAP. Midodrine 5 TID was started. Patient was transferred to ICU for closer monitoring and treatment of Influenza A and septic shock. She was treated with azithromycin (doxycycline for 1 day before switch to azithromycin) and ceftriaxone and Zyvox. After MRSA nares resulted negative, Zyvox was discontinued. Tamiflu was given for 5 days. Patient self-extubated on 1/28/20. On 1/30, there was concern for possible aspiration vs pneumonitis, CXR revealed no change from prior. CXR from 1/31 " revealed stable chest x-ray findings.        Consultants:  ICU    Interval Events:  - Overnight- no significant events, UOP 650cc, no BMs overnight  - Adjuncts: Steroids, Vit C, naproxen 200mg BID  - Continue to monitor UOP  - Hgb 8.7  - Dysphagia 2 diet, cortrack in place, plan to assess after Cortrak removal  - PT/OT, palliative follow-up recs  - Psych 1/29- continue celexa 200 qd for depression, no new meds until delirium resolves  - Pain control- cindy Oxycontin 10 mg q12, oxy 1-2 tabs q4 prn, lidocaine patches to areas of pain  - Precedex  - Bilateral crackling on exam, Lasix 20 mg IV once  - Oxygenation requirements to 6-7L  - CXR 1/31- stable chest x-ray findings  - Incentive spirometer, aggressive pulmonary toilet      Review of Systems   Constitutional: Positive for malaise/fatigue. Negative for chills and fever.   HENT: Negative for ear pain, hearing loss and tinnitus.    Eyes: Negative for blurred vision, double vision and photophobia.   Respiratory: Positive for cough. Negative for hemoptysis, sputum production, shortness of breath and wheezing.    Cardiovascular: Negative for palpitations and leg swelling.   Gastrointestinal: Negative for abdominal pain, constipation, diarrhea, heartburn, nausea and vomiting.   Genitourinary: Negative for dysuria, frequency and urgency.   Musculoskeletal: Positive for back pain and joint pain. Negative for myalgias and neck pain.   Skin: Negative for itching and rash.   Neurological: Negative for dizziness, focal weakness, seizures, loss of consciousness, weakness and headaches.   Endo/Heme/Allergies: Negative for polydipsia. Does not bruise/bleed easily.   Psychiatric/Behavioral: Positive for depression. Negative for memory loss, substance abuse and suicidal ideas. The patient is nervous/anxious. The patient does not have insomnia.    All other systems reviewed and are negative.      PHYSICAL EXAM    Vitals:    01/31/20 0504 01/31/20 0600 01/31/20 0800 01/31/20 0900  "  BP: 112/60 113/59 110/56 102/55   Pulse: 71 77 79 60   Resp: (!) 29 (!) 27 (!) 22 (!) 23   Temp:  36.7 °C (98 °F) 36.5 °C (97.7 °F)    TempSrc:  Temporal Temporal    SpO2: 96% 95% 91% 93%   Weight:       Height:         Body mass index is 19.07 kg/m².  /55   Pulse 60   Temp 36.5 °C (97.7 °F) (Temporal)   Resp (!) 23   Ht 1.626 m (5' 4\")   Wt 50.4 kg (111 lb 1.8 oz)   SpO2 93%   BMI 19.07 kg/m²   O2 therapy: Pulse Oximetry: 93 %, O2 (LPM): 5, O2 Delivery: Silicone Nasal Cannula    Physical Exam   Constitutional: She appears distressed.   HENT:   Head: Normocephalic and atraumatic.   Right Ear: External ear normal.   Left Ear: External ear normal.   Nose: Nose normal.   Eyes: Pupils are equal, round, and reactive to light. Conjunctivae and EOM are normal. Right eye exhibits no discharge. Left eye exhibits no discharge. No scleral icterus.   Neck: Normal range of motion. Neck supple. No JVD present. No tracheal deviation present. No thyromegaly present.   Cardiovascular: Normal rate, regular rhythm, normal heart sounds and intact distal pulses. Exam reveals no gallop and no friction rub.   No murmur heard.  Pulmonary/Chest: No stridor. No respiratory distress. She has no wheezes. She has rales. She exhibits tenderness.   Mild bilateral rhonchi and rales/crackling in bilateral lung fields, improved from previous day   Abdominal: Soft. Bowel sounds are normal. She exhibits no distension. There is no tenderness. There is no rebound and no guarding.   Genitourinary:    Genitourinary Comments: Morales catether in place     Musculoskeletal:         General: No deformity or edema.   Neurological: She is alert. She has normal reflexes. No cranial nerve deficit.   A&O 2-3   Skin: No rash noted. She is not diaphoretic. No erythema.   Nursing note and vitals reviewed.      Respiratory:     Respiration: (!) 23, Pulse Oximetry: 93 %    Chest Tube Drains:          HemoDynamics:  Pulse: 60 Blood Pressure: 102/55  "     Neuro:  Intubated    Fluids:  Date 20 0700 - 20 0659   Shift 0011-4380 7810-8307 [id number] 24 Hour Total   INTAKE   Other 60   60     Medications (PO/Enteral Liquids) 60   60   Shift Total 60   60   OUTPUT   Urine 200   200     Output (mL) (Urethral Catheter Non-latex 16 Fr.) 200   200   Shift Total 200   200   NET -140   -140        Intake/Output Summary (Last 24 hours) at 2020 0743  Last data filed at 2020 0600  Gross per 24 hour   Intake 1026.18 ml   Output 2455 ml   Net -1428.82 ml       Weight: 50.4 kg (111 lb 1.8 oz)  Body mass index is 19.07 kg/m².    Recent Labs     20   SODIUM 144 144   POTASSIUM 4.5 3.8   CHLORIDE 113* 105   CO2 27 29   BUN 17 18   CREATININE 0.84 0.73   CALCIUM 8.2* 9.1       GI/Nutrition:  Recent Labs     20  03320  08   ALTSGPT 15 15   ASTSGOT 42 37   ALKPHOSPHAT 64 70   TBILIRUBIN 0.4 0.4   GLUCOSE 118* 115*       Heme:  Recent Labs     20  03320  0340 20   RBC 2.58* 3.02* 2.86*   HEMOGLOBIN 7.8* 8.9* 8.7*   HEMATOCRIT 24.3* 28.1* 26.5*   PLATELETCT 154* 174 178       Infectious Disease:  Temp  Av.7 °C (98.1 °F)  Min: 36.4 °C (97.5 °F)  Max: 37 °C (98.6 °F)  Recent Labs     20  0336 20  0340 20  0820  0343   WBC 7.0 6.1  --  8.5   NEUTSPOLYS 88.80* 73.90*  --  83.50*   LYMPHOCYTES 7.20* 22.60  --  9.50*   MONOCYTES 2.60 2.60  --  6.10   EOSINOPHILS 0.40 0.90  --  0.00   BASOPHILS 0.30 0.00  --  0.90   ASTSGOT 42  --  37  --    ALTSGPT 15  --  15  --    ALKPHOSPHAT 64  --  70  --    TBILIRUBIN 0.4  --  0.4  --        Meds:  • clonazePAM  1 mg     • potassium chloride SA  40 mEq     • calcium carbonate  500 mg     • [START ON 2020] citalopram  20 mg     • gabapentin  100 mg     • midodrine  10 mg     • senna-docusate  2 Tab      And   • polyethylene glycol/lytes  1 Packet      And   • bisacodyl  10 mg     • acetaminophen  650 mg     • oxyCODONE-acetaminophen   1-2 Tab     • lidocaine  1 Patch     • oxyCODONE CR  10 mg     • ipratropium-albuterol  3 mL     • MD Alert...Adult ICU Electrolyte Replacement per Pharmacy       • Respiratory Care per Protocol       • enoxaparin  40 mg     • lidocaine  1 Patch     • albuterol  2 Puff          Procedures:  Intubation- 1/26/20  Bronchoscopy- 1/26/20  Central Line- 1/26/20    Imaging:  DX-CHEST-PORTABLE (1 VIEW)   Final Result      Stable chest x-ray findings.      DX-CHEST-PORTABLE (1 VIEW)   Final Result      No significant change from prior exam.      DX-ABDOMEN FOR TUBE PLACEMENT   Final Result      Enteric tube terminates over the gastric body      OUTSIDE IMAGES-NUCLEAR MEDICINE, PET   Final Result      DX-CHEST-PORTABLE (1 VIEW)   Final Result      Interval extubation. Otherwise stable exam.      DX-CHEST-PORTABLE (1 VIEW)   Final Result      Stable cardiomegaly and interstitial infiltrates.      DX-ABDOMEN FOR TUBE PLACEMENT   Final Result      Enteric tube tip projects over the stomach.      DX-CHEST-FOR LINE PLACEMENT Perform procedure in: Patient's Room   Final Result            1. A right peripherally inserted catheter with tip projects appropriately over the expected area of the superior vena cava.      2. Endotracheal tube with tip projecting over the mid thoracic trachea.      DX-CHEST-2 VIEWS   Final Result      1.  Worsening mild pulmonary edema and/or multifocal pneumonitis.   2.  Minimal bilateral pleural effusions, slightly greater on the LEFT.   3.  Probable underlying COPD.      CT-HEAD W/O   Final Result      1.  No acute intracranial abnormality.   2.  Osseous metastatic disease.      DX-CHEST-PORTABLE (1 VIEW)   Final Result      No acute cardiopulmonary disease evident.          Problem and Plan:    * Sepsis (HCC)  Assessment & Plan  Sepsis Present on admission, soft bp's and sinus tachycardia low threshold for ICU transfer   SIRS criteria: Tachycardia, with heart rate greater than 90 BPM and Tachypnea, with  respirations greater than 20 per minute  Source is presumed to be lungs (Influenza/CAP) at this time, sepsis protocol initiated on admission, fluid resuscitation ordered per protocol. Avoid excess fluids (sepsis 30ml/kg) use early pressors if necessary to prevent ARDS. Monitor for post influenza superimposed pna (MRSA, strep PNA, GAS) serial procal, MRSA nasal Swab and monitor for Viral induced-HLH.  Treated for Tamiflu 5 day course and completed abx tx. BAL- NGTD.      Plan  - Adjuncts: Steroids, Vit C, naproxen 200mg BID  - Continue to monitor UOP  - Hgb 8.9, WBC 6.1  - Dysphagia 2 diet, cortrack in place, plan to assess after Cortrak removal  - CXR 1/31- stable chest x-ray findings  - Incentive spirometer, aggressive pulmonary toilet      Influenza A  Assessment & Plan  Influenza A was ordered by day team 1/25/20 as pt discussed sick contacts at home with fevers. Test initially wasn't read, but after [facility] night float got called about temp 103F, then requested stat flu test, which came back positive for influenza A. They started Tamiflu renally dosed.     Plan:  Completed Tamiflu    Macrocytic anemia- (present on admission)  Assessment & Plan  Patient with chronic anemia, chart review showing normal B12/Folate, likely secondary to bone metastasis. 1/28/20- There has been a drop in hemoglobin to 6.6 (8.6 previous day). Hgb 8.7.    COPD (chronic obstructive pulmonary disease) (HCC)- (present on admission)  Assessment & Plan  Chronic respiratory failure with 3L O2 at home, currently requiring 3L, at baseline. Diffuse expiratory rhonci on exam    - RT protocol  - Albuterol Q6   - Incentive Spirometer    - scheduled duonebs Q4       Delirium  Assessment & Plan  Pyshiatry following- 1/29- continue celexa 200 qd for depression, no new meds until delirium resolves.     - Phone Update: 1/30/2020- 1:30pm- spoke with patient's  [place] over the phone,  was disappointed and angry regarding patient's ongoing  "confusion/tiredness and \"hearing loss\" and care in ICU by RNs, also mad about patient not receiving around the clock water sponges for dry mouth/throat which he stated she requires constantly. He stated for fevers, patient should not be receiving Tylenol or cooling blankets, but instead \"1/2 1/2 of rubbing alcohol and water mixed onto towel and then rubbed on patient's torso\" is method to bring her fever down because she has fibromyalgia. Provided complete update regarding patient's condition, care, and course in the ICU, and verbalized that entire team is trying to provide the best care that is appropriately indicated. Described that patient's confusion/tiredness is likely secondary to her long hospital course and ICU delirium related and that the goal is to get her off sedating medications as much as possible, and attempt to treat her cancer related pain appropriately.He expressed verbal understanding of entire conversation and agreed with plan of care at this time.     Acute respiratory failure with hypoxemia (HCC)  Assessment & Plan  Self-extubated          Urinary retention  Assessment & Plan  Pt presented with 3 days of incontinence. She was found to be retaining >900cc on bladder scan morning of 1/25/20, then had an episode of urination in bed that was not able to be measured; repeat scan showed 510cc in the bladder and we ordered a herndon catheter     Plan:  Herndon cath with strict I&Os and evaluate with physician when it is appropriate to d/c herndon        Displaced fracture of greater trochanter of right femur (HCC)- (present on admission)  Assessment & Plan  Admitted for during last admission (Dec 2019), Ortho consulted and patient deemed non-surgical. PT had recommended home health, per family, patient was denied home health. Did not go to physical therapy as prescribed on last admission      - CTM  -PT/OT consult       Breast cancer (HCC)- (present on admission)  Assessment & Plan  History of Metastatic " Breast Cancer, evaluated by Dr. Wynne during last admission. Mets to L and Spine, Pelvis. CT head on admission showing Mets to skull, no prior records of it. Brought by family for concern given weakness and increased confusion, patient reporting new incontinence. Repeat MR of Spine Chart review shows Dr. Wynne recommended palliative care, palliative was consulted during last admission, Advance Directive stating patient's POA is her , Jones Cota.  Discussed code status, full code at this time.      Plan:  - Spoke to Dr. Khan (Oncology) and she recommends holding patient's Verzenio until the sepsis resolves, managing her pain, and then can restart Verzenio on discharge; 4 days of treatment with Verzenio prior to this admission is too soon to predict response   - Follow-up palliative care discussions with family   - Pain control- cindy Oxycontin 10 mg q12, oxy 1-2 tabs q4 prn, lidocaine patches to areas of pain        Mood disorder (HCC)- (present on admission)  Assessment & Plan  - Psych consult for worsening MDD/depressed mood/anxiety sx  - Psych 1/29 - continue celexa 200 qd for depression, no new meds until delirium resolves       DISPO:  ICU    CODE STATUS: FULL    Quality Measures:  Morales Catheter:  Yes  DVT Prophylaxis:   Enoxaparin  Ulcer Prophylaxis:   Famotidine  Antibiotics: Ceftriaxone, Azithromycin  Lines: Central, PIV

## 2020-01-31 NOTE — PROGRESS NOTES
"   [facility] ICU Transfer Note                                                                                         ICU Admit Date: 1/24/2020       ICU Discharge Date:  01/31/20        Primary Diagnosis:   Sepsis (HCC)        ICU Course Summary (Brief Narrative):    44 y.o. female with hx of breast CA, mets to bone (base of skull and cervical and lumbar spine), s/p right mastectomy, on immunotherapy (started 4 days prior), COPD on 3L home O2,  who was initially admitted 1/25 for SOB and found to have influenza A pneumonia. Initially admitted to the floor. Pt had been having high grade fever in 39.5-40.6C. HR in 100-130s. SBP in 80-100s. Given the change in vitals, there was an increase demand from bedside on monitoring the patient. Pt also had had \"max dose of acetaminophen\" given in the day but on MAR appeared pt only received acetaminophen 650mg PO x 3 doses (total about 2gr). ICU team was consulted, and  bedside examination was notable for bilateral rhonchi and wheezing in all lung fields , no JVD, tachycardic but regular rhythm, no signs of fluid overload. STAT Chest Xray 1/26 revealed worsening mild pulmonary edema and/or multifocal pneumonitis, minimal bilateral pleural effusions, slightly greater on the LEFT, probable underlying COPD. Lactic acid was 0.8. Urine strep pneumo and legionella Ag were ordered.Tamiflu for influenza A x 5 days and C3+doxy for CAP. Midodrine 5 TID was started. Patient was transferred to ICU for closer monitoring and treatment of Influenza A and septic shock. She was treated with azithromycin (doxycycline for 1 day before switch to azithromycin) and ceftriaxone and Zyvox. After MRSA nares resulted negative, Zyvox was discontinued. Tamiflu was given for 5 days. Patient self-extubated on 1/28/20. On 1/30, there was concern for possible aspiration vs pneumonitis, CXR revealed no change from prior. CXR from 1/31 revealed stable chest x-ray findings.      Important Events in the ICU:   - " Central Line: 1/27  - Intubation: 1/27  - Pressors: 1/27  - Morales catheter: Yes  - Tube feeding: Yes  - Antibiotics: None   - Other procedures: None         Labs and imaging studies to be continued with their indications:  - CBC: Routine  - CMP or BMP: Routine   - Magnesium: Routine  - Phosphorus: Routine   - Chest Xray: No; unless having worsening respiratory status  - Other studies: None         Things to follow:   - Droplet isolation precautions  - Received Lasix 20 mg IV once in AM on 1/31  - Follow-up respiratory physical exam on 2/1 if further diuresis needed  - Dysphagia 2 diet at this time, will attempt to remove NGT if tolerating, continue to f/u SLP recs  - Incentive spirometer, aggressive pulmonary toilet  - Follow-up with psychiatry regarding optimizing depression/anxiety medications prior to discharge  - Encourage mobilization  - Follow-up with palliative care discussions and with family regarding long-term goals  - Follow-up with Heme-Onc prior to discharge regarding restarting chemotherapy medications  - PT/OT   - Continue to follow SLP recs regarding advancing diet  - Optimize cancer pain management control- currently cindy Oxycontin 10 mg q12, oxy 1-2 tabs q4 prn, lidocaine patches to areas of pain

## 2020-01-31 NOTE — CARE PLAN
Problem: Fluid Volume:  Goal: Will maintain balanced intake and output  Intervention: Monitor, educate, and encourage compliance with therapeutic intake of liquids  Note:   Patient educated regarding tube feed diet to supplement newly ordered dysphagia diet by SLP. Surveillance for aspiration and 1:1 observation while eating employed.      Problem: Pain Management  Goal: Pain level will decrease to patient's comfort goal  Intervention: Follow pain managment plan developed in collaboration with patient and Interdisciplinary Team  Note:   Patient assessed for severity and location of pain at appropriate intervals. Educated about importance of pain reduction, and pharmacologic/nonpharmacologic measures available to aid in relief. Discussed administration of PRN's per the ordered schedule. Apropriate pain scale for pt's mentation utilized.

## 2020-01-31 NOTE — DIETARY
Nutrition Services: Update   Day 6 of admit.  Roxy Cota is a 44 y.o. female with admitting DX of Metastatic breast cancer     Pt was receiving TF. TF is currently off, however cortrak is still in place. Pt was extubated 1/28. Pt is currently on regular, dysphagia 2 diet with nectar thick liquids and 1:1 supervision. Per chart pt PO 25-75% 2 meals documented. Wt 1/31: 50.4 kg via bed scale - wt variable since admit, question accuracy of wts could be related to scale error or fluids. Per I/Os pt +1.1 L. Wt loss percent is 11% in 6 days, which is severe.     Malnutrition Risk: At risk due to severe wt loss of 11% in 6 days, no other criteria noted at this time.     Recommendations/Plan:  1. Hold TF to encourage PO intake. Recommend keeping cortrak in place until pt can demonstrate adequate PO intake (PO >50% for 3 meals).   2. RD will re-assess need for supplemental TF  3. Encourage intake of meals  4. Document intake of all meals as % taken in ADL's to provide interdisciplinary communication across all shifts.   5. Monitor weight.  6. Nutrition rep will continue to see patient for ongoing meal and snack preferences.  7. Obtain supplement order per RD as needed.    RD following

## 2020-01-31 NOTE — CONSULTS
RADIATION ONCOLOGY CONSULT    DATE OF SERVICE: 1/31/2020    IDENTIFICATION: A 44 y.o. female with Stage IV pT2N1a(sn)M1 ERPR+[id number] breast cancer with significant bone metastasis causing symptomatic pain and right greater trochanter pathologic fracture    She is here at the kind request of Dr. Moon ([place], [place])    HISTORY OF PRESENT ILLNESS:   Patient is a 44-year-old female with metastatic breast cancer.  She was originally diagnosed in June 2017 and underwent a right mastectomy and sentinel lymph node biopsy with pathology showing 3.1 cm tumor 1 of 1 sentinel lymph nodes positive ER UT positive HER-2 negative and MammaPrint low risk.  She was put on tamoxifen and was doing well until recently this past December when she had a fall at home and was admitted to the hospital with CT scan of her hip which showed a right greater trochanteric pathologic fracture.  She had PET scan at [place], [place] on January 16, 2020 which showed multiple lytic sclerotic lesions noted through the skeleton with intense hypermetabolism on PET CT scan.  She does have an ill-defined spiculated density in the right upper lobe 1.3 x 0.8 cm in size as well as a 0.4 x 0.7 nodule in the right upper lobe.  Mild hypermetabolism in the precarinal lymph node no significant FDG avid lymphadenopathy noted.  Patient unfortunately developed sepsis from influenza and required intubation and pressors however is now stabilized and extubated doing better and being transferred to ICU today.  She does complain of some general pain but cannot point to a single area.  She still is slightly altered from her recent sepsis intubation.    PROBLEM LIST:  Patient Active Problem List   Diagnosis   • Lung mass   • History of breast cancer   • COPD (chronic obstructive pulmonary disease) (HCC)   • HCAP (healthcare-associated pneumonia)   • Macrocytic anemia   • Leukocytosis   • Acute bilateral low back pain with sciatica   • Mood disorder (HCC)    • Breast cancer (HCC)   • Weakness of right lower extremity   • Displaced fracture of greater trochanter of right femur (HCC)   • Sepsis (HCC)   • Influenza A   • Urinary retention   • Acute respiratory failure with hypoxemia (HCC)   • Pain   • Delirium        PAST SURGICAL HISTORY:  Past Surgical History:   Procedure Laterality Date   • MASTECTOMY Right        CURRENT MEDICATIONS:  Current Facility-Administered Medications   Medication Dose Route Frequency Provider Last Rate Last Dose   • clonazePAM (KLONOPIN) tablet 1 mg  1 mg Oral QHS Kwame Cruz M.D.       • potassium chloride SA (Kdur) tablet 40 mEq  40 mEq Oral Once Kwame Cruz M.D.       • lidocaine (LIDODERM) 5 % 1 Patch  1 Patch Transdermal Q24HR Kwame Cruz M.D.   1 Patch at 01/30/20 1235   • gabapentin (NEURONTIN) capsule 100 mg  100 mg Enteral Tube TID Kwame Cruz M.D.   100 mg at 01/31/20 0517   • oxyCODONE CR (OXYCONTIN) tablet 10 mg  10 mg Oral Q12HRS Lencho Sage M.D.   10 mg at 01/31/20 0504   • citalopram (CELEXA) tablet 20 mg  20 mg Enteral Tube DAILY Kwame Cruz M.D.   20 mg at 01/31/20 0504   • ipratropium-albuterol (DUONEB) nebulizer solution  3 mL Nebulization Q4H PRN (RT) Kwame Cruz M.D.       • midodrine (PROAMATINE) tablet 10 mg  10 mg Enteral Tube TID WITH MEALS Kwame Cruz M.D.   10 mg at 01/31/20 0815   • acetaminophen (TYLENOL) tablet 650 mg  650 mg Enteral Tube Q6HRS PRN Gustavo Rojas D.O.   650 mg at 01/30/20 0018   • calcium carbonate (TUMS) chewable tab 500 mg  500 mg Enteral Tube BID Gustavo Rojas D.O.   500 mg at 01/31/20 0504   • oxyCODONE-acetaminophen (PERCOCET) 5-325 MG per tablet 1-2 Tab  1-2 Tab Enteral Tube Q4HRS PRN Gustavo Rojas D.O.   2 Tab at 01/31/20 0815   • polyethylene glycol/lytes (MIRALAX) PACKET 1 Packet  1 Packet Enteral Tube QDAY PRN Gustavo Rojas D.O.   1 Packet at 01/28/20 1218    And   • senna-docusate (PERICOLACE or SENOKOT S) 8.6-50 MG per tablet 2 Tab  2 Tab Enteral Tube BID  "Gustavo Rojas D.O.   Stopped at 01/31/20 0508    And   • bisacodyl (DULCOLAX) suppository 10 mg  10 mg Rectal QDAY PRN Gustavo Rojas D.O.   10 mg at 01/28/20 1818   • Pharmacy Consult: Enteral tube insertion - review meds/change route/product selection  1 Each Other PHARMACY TO DOSE Kwame Cruz M.D.       • MD Alert...ICU Electrolyte Replacement per Pharmacy   Other PHARMACY TO DOSE Jose Barreto M.D.       • Respiratory Care per Protocol   Nebulization Continuous RT DONNA Mathias.O.       • enoxaparin (LOVENOX) inj 40 mg  40 mg Subcutaneous DAILY Kasia Barrientos D.O.   40 mg at 01/31/20 0504   • lidocaine (LIDODERM) 5 % 1 Patch  1 Patch Transdermal Q24HRS Kasia Barrientos D.O.   1 Patch at 01/31/20 0506   • albuterol inhaler 2 Puff  2 Puff Inhalation Q4H PRN (RT) Alejandra Tomlinson M.D.           ALLERGIES:    Fish; Levaquin; Lyrica; Morphine; Motrin [ibuprofen]; Neurontin [gabapentin]; and Pcn [penicillins]    FAMILY HISTORY:    family history is not on file.    SOCIAL HISTORY:     reports that she has quit smoking. She smoked 0.00 packs per day. She has never used smokeless tobacco.    REVIEW OF SYSTEMS: 12 point review of systems negative except positive for fatigue and general pain    PHYSICAL EXAM:    ECOG PERFORMANCE STATUS: 1BP 113/59   Pulse 77   Temp 36.7 °C (98 °F) (Temporal)   Resp (!) 27   Ht 1.626 m (5' 4\")   Wt 50.4 kg (111 lb 1.8 oz)   SpO2 95%   BMI 19.07 kg/m²   Physical Exam  Vitals signs reviewed.   Constitutional:       Appearance: Normal appearance.   HENT:      Head: Normocephalic and atraumatic.      Nose:      Comments: Cor-trak in place  Eyes:      Pupils: Pupils are equal, round, and reactive to light.   Neck:      Musculoskeletal: Normal range of motion.   Cardiovascular:      Rate and Rhythm: Normal rate.   Pulmonary:      Effort: Pulmonary effort is normal.   Abdominal:      General: Abdomen is flat.   Neurological:      General: No focal deficit present.      " Mental Status: She is alert.   Psychiatric:         Mood and Affect: Mood normal.          LABORATORY DATA:   Lab Results   Component Value Date/Time    WBC 8.5 01/31/2020 0343    WBC 6.1 01/30/2020 0340    WBC 7.0 01/29/2020 0336    HEMOGLOBIN 8.7 (L) 01/31/2020 0343    HEMOGLOBIN 8.9 (L) 01/30/2020 0340    HEMOGLOBIN 7.8 (L) 01/29/2020 0336    HEMATOCRIT 26.5 (L) 01/31/2020 0343    HEMATOCRIT 28.1 (L) 01/30/2020 0340    HEMATOCRIT 24.3 (L) 01/29/2020 0336    MCV 92.7 01/31/2020 0343    MCV 93.0 01/30/2020 0340    MCV 94.2 01/29/2020 0336    PLATELETCT 178 01/31/2020 0343    PLATELETCT 174 01/30/2020 0340    PLATELETCT 154 (L) 01/29/2020 0336    NEUTS 7.10 01/31/2020 0343    NEUTS 4.51 01/30/2020 0340    NEUTS 6.17 01/29/2020 0336      Lab Results   Component Value Date/Time    SODIUM 144 01/30/2020 0820    SODIUM 144 01/29/2020 0336    SODIUM 140 01/28/2020 0630    POTASSIUM 3.8 01/30/2020 0820    POTASSIUM 4.5 01/29/2020 0336    POTASSIUM 3.5 (L) 01/28/2020 0630    BUN 18 01/30/2020 0820    BUN 17 01/29/2020 0336    BUN 13 01/28/2020 0630    CREATININE 0.73 01/30/2020 0820    CREATININE 0.84 01/29/2020 0336    CREATININE 0.90 01/28/2020 0630    CALCIUM 9.1 01/30/2020 0820    CALCIUM 8.2 (L) 01/29/2020 0336    CALCIUM 7.8 (L) 01/28/2020 0630    ALBUMIN 3.0 (L) 01/30/2020 0820    ALBUMIN 2.6 (L) 01/29/2020 0336    ALBUMIN 2.8 (L) 01/28/2020 0630    ASTSGOT 37 01/30/2020 0820    ASTSGOT 42 01/29/2020 0336    ASTSGOT 42 01/28/2020 0630    ALKPHOSPHAT 70 01/30/2020 0820    ALKPHOSPHAT 64 01/29/2020 0336    ALKPHOSPHAT 60 01/28/2020 0630    IFNOTAFR >60 01/30/2020 0820    IFNOTAFR >60 01/29/2020 0336    IFNOTAFR >60 01/28/2020 0630       RADIOLOGY DATA:  Ct-head W/o    Result Date: 1/25/2020 1/25/2020 12:12 AM HISTORY/REASON FOR EXAM:  Altered mental status. TECHNIQUE/EXAM DESCRIPTION AND NUMBER OF VIEWS: CT of the head without contrast. The study was performed on a [employer] CT scanner. Contiguous 5 mm axial sections were  obtained from the skull base through the vertex. Up to date radiation dose reduction adjustments have been utilized to meet ALARA standards for radiation dose reduction. COMPARISON:  None available FINDINGS: Lytic and sclerotic osseous metastases are noted in the skull base and proximal cervical vertebral bodies Mastoid air cells and visualized paranasal sinuses are clear. The visualized portions of the orbits are normal in appearance. The brain parenchyma is normal in appearance. There is no hemorrhage.  There is no evidence for acute infarct. The ventricular system is normal in size and position. There are no extra-axial fluid collection present.     1.  No acute intracranial abnormality. 2.  Osseous metastatic disease.    Dx-chest-2 Views    Result Date: 1/26/2020 1/26/2020 9:51 AM HISTORY/REASON FOR EXAM:  Cough Weakness. TECHNIQUE/EXAM DESCRIPTION AND NUMBER OF VIEWS: Two views of the chest. COMPARISON:  1/24/2020 FINDINGS: Cardiac mediastinal contour is unchanged. Lungs show hyperinflation. Mild blunting of the costophrenic angles. Mild diffuse prominence of the pulmonary interstitium with subtle patchy airspace opacities in the mid and upper lungs bilaterally. No pneumothorax. Degenerative change of thoracic spine.     1.  Worsening mild pulmonary edema and/or multifocal pneumonitis. 2.  Minimal bilateral pleural effusions, slightly greater on the LEFT. 3.  Probable underlying COPD.    Dx-chest-for Line Placement Perform Procedure In: Patient's Room    Result Date: 1/26/2020 1/26/2020 10:56 PM HISTORY/REASON FOR EXAM:  Central Line and ET tube TECHNIQUE/EXAM DESCRIPTION AND NUMBER OF VIEWS: Single view of the chest. COMPARISON: 1/26/2020 FINDINGS: Limited single view of the chest performed primarily to evaluate PICC position. A right peripherally inserted catheter is seen.  The tip projects appropriately over the expected area of the superior vena cava. Endotracheal tube with tip projecting over the mid  thoracic trachea. Improved aeration with decreased bilateral opacities. No pleural abnormalities are noted. Stable cardiopericardial silhouette.     1. A right peripherally inserted catheter with tip projects appropriately over the expected area of the superior vena cava. 2. Endotracheal tube with tip projecting over the mid thoracic trachea.    Dx-chest-portable (1 View)    Result Date: 1/30/2020 1/30/2020 7:40 AM HISTORY/REASON FOR EXAM:  Shortness of Breath; possible aspiration. TECHNIQUE/EXAM DESCRIPTION AND NUMBER OF VIEWS: Single portable view of the chest. COMPARISON: 1/29/2020 FINDINGS: Cardiac mediastinal contour is unchanged. Diffuse prominence of the interstitium again seen. Minimal blunting of costophrenic angles. No pneumothorax. RIGHT internal jugular catheter tip at the cavoatrial junction level. Feeding tube in place however tip is off the image. Lytic lesion again seen in the RIGHT proximal humerus, possibly metastatic.     No significant change from prior exam.    Dx-chest-portable (1 View)    Result Date: 1/29/2020 1/29/2020 1:57 AM HISTORY/REASON FOR EXAM: Respiratory distress TECHNIQUE/EXAM DESCRIPTION AND NUMBER OF VIEWS: Single portable view of the chest. COMPARISON: Previous date FINDINGS: There is interval extubation. Support devices are otherwise unchanged. There is stable diffuse bilateral interstitial opacities. There is no pleural effusion. The heart is enlarged.     Interval extubation. Otherwise stable exam.    Dx-chest-portable (1 View)    Result Date: 1/28/2020 1/28/2020 1:28 AM HISTORY/REASON FOR EXAM: Respiratory distress TECHNIQUE/EXAM DESCRIPTION AND NUMBER OF VIEWS: Single portable view of the chest. COMPARISON: Previous date FINDINGS: There are multiple supportive devices again seen, stable. There are stable interstitial infiltrates. There is no pleural effusion. The heart is enlarged.     Stable cardiomegaly and interstitial infiltrates.    Dx-chest-portable (1  View)    Result Date: 1/25/2020 1/24/2020 11:44 PM HISTORY/REASON FOR EXAM:  possible sepsis.. TECHNIQUE/EXAM DESCRIPTION AND NUMBER OF VIEWS: Single portable view of the chest. COMPARISON: 12/21/2019 FINDINGS: The osseous structures appear somewhat mottled consistent with known metastatic disease. The heart and mediastinal structures are within normal limits. Pulmonary vascularity is normal. The lung fields are clear. There is no effusion or pneumothorax.     No acute cardiopulmonary disease evident.    Dx-abdomen For Tube Placement    Result Date: 1/29/2020 1/29/2020 10:22 PM HISTORY/REASON FOR EXAM:  Line evaluation. TECHNIQUE/EXAM DESCRIPTION AND NUMBER OF VIEWS:  1 view(s) of the abdomen. COMPARISON:  January 27. FINDINGS: Enteric tube has been placed. The tip projects over the gastric body. The bowel gas pattern is within normal limits.     Enteric tube terminates over the gastric body    Dx-abdomen For Tube Placement    Result Date: 1/27/2020 1/27/2020 12:55 PM HISTORY/REASON FOR EXAM:  Line evaluation. TECHNIQUE/EXAM DESCRIPTION AND NUMBER OF VIEWS:  1 view(s) of the abdomen. COMPARISON:  None. FINDINGS: Enteric tube has been placed. The tip projects over the stomach. The bowel gas pattern is nonspecific.     Enteric tube tip projects over the stomach.      IMPRESSION:    44 y.o. female with Stage IV pT2N1a(sn)M1 ERPR+[id number] breast cancer with significant bone metastasis causing symptomatic pain and right greater trochanter pathologic fracture    Breast cancer (HCC)  Staging form: Breast, AJCC 8th Edition  - Pathologic stage from 12/26/2019: Stage IV (pT2, pN1a(sn), pM1, G3, ER+, HI+, HER2-) - Signed by Aleks Douglass M.D. on 1/31/2020  Neoadjuvant therapy: No  Laterality: Right  Method of lymph node assessment: Jennings lymph node biopsy  Sites of metastasis: Bone  Multigene prognostic tests performed: MammaPrint  Histologic grading system: 3 grade system  Stage used in treatment planning:  Yes      RECOMMENDATIONS:   Patient is recovering well from her recent intubation due to influenza.  She does complain of generalized pain but is not able to characterize the exact location at this time.  I have discussed that there is a role for palliative radiation for the treatment of her right hip and possible SI joints as well to help with her overall pain.  We can plan for CT simulation while she is hospitalized to expedite her treatment.  In terms of number of treatments I would recommend 5 treatments with 20 Barnhart to help stabilize her right hip and palliate pain from her SI joint bone metastasis. Risks and benefits discussed.  In terms of long-term outcome patient would be best served with systemic therapy which it sounds like she is plan to start Verenzio with faslodex once stabilized.    Thank you for the opportunity to participate in her care.  If any questions or comments, please do not hesitate in calling.

## 2020-01-31 NOTE — PROGRESS NOTES
Spoke to pt's , he was requesting her hair be put up into a bun. Pt stated, she did not want her hair in a bun, she wanted it left in a braid. Speech therapy was into see pt. See their note. Thickened water given to patient. Pt denies pain at this time. Will continue with plan of care.

## 2020-01-31 NOTE — PROGRESS NOTES
Critical Care Progress Note    Date of admission  1/24/2020    Chief Complaint  44 y.o. female admitted 1/24/2020 with sepsis, Flu    Hospital Course    1/26 - admitted to ICU for severe sepsis, flu, started on Tamiflu, intubated, pressors started for MAP goals  1/29- Self extubated did well over the day        Interval Problem Update  Reviewed last 24 hour events:  Advanced to dysphagia 2 diet with nectar thick liquids  Tm 37  HR 60-90s  -110s  GCS 15. Intermittently follows commands. Generalized weakness  - BM  U/O 1.17cc/24h  CXR stable infiltrates but with better aeration  WBC 8.5  Scattered crackles this am - 20 lasix given      Review of Systems  Review of Systems   Unable to perform ROS: Acuity of condition   Constitutional: Positive for fever and malaise/fatigue. Negative for chills.   Respiratory: Negative for shortness of breath, wheezing and stridor.    Cardiovascular: Negative for chest pain.   Gastrointestinal: Negative for abdominal pain, nausea and vomiting.   Genitourinary: Negative for urgency.   Musculoskeletal: Positive for back pain and neck pain.   Skin: Negative for rash.   Neurological: Positive for weakness. Negative for loss of consciousness.   Psychiatric/Behavioral: Positive for depression.        Vital Signs for last 24 hours   Temp:  [36.4 °C (97.5 °F)-37 °C (98.6 °F)] 36.5 °C (97.7 °F)  Pulse:  [60-93] 60  Resp:  [16-63] 23  BP: (101-122)/(52-77) 102/55  SpO2:  [90 %-97 %] 93 %    Hemodynamic parameters for last 24 hours       Respiratory Information for the last 24 hours       Physical Exam   Physical Exam  Constitutional:       General: She is not in acute distress.     Appearance: She is not toxic-appearing or diaphoretic.   HENT:      Head: Normocephalic and atraumatic.      Right Ear: External ear normal.      Left Ear: External ear normal.      Nose: Nose normal.      Mouth/Throat:      Pharynx: Oropharynx is clear. No oropharyngeal exudate.   Eyes:      Extraocular  Movements: Extraocular movements intact.      Conjunctiva/sclera: Conjunctivae normal.      Pupils: Pupils are equal, round, and reactive to light.   Neck:      Musculoskeletal: Neck supple.   Cardiovascular:      Rate and Rhythm: Normal rate and regular rhythm.      Pulses: Normal pulses.   Pulmonary:      Effort: Pulmonary effort is normal.      Breath sounds: No wheezing or rales.      Comments: BL crackles  Abdominal:      General: Abdomen is flat. Bowel sounds are normal.   Musculoskeletal:         General: No swelling.   Skin:     General: Skin is warm and dry.      Capillary Refill: Capillary refill takes less than 2 seconds.   Neurological:      General: No focal deficit present.      Mental Status: She is alert and oriented to person, place, and time.      Cranial Nerves: No cranial nerve deficit.      Comments: Generalized weakness throughout   Psychiatric:      Comments: Depressed Mood and congruent affect         Medications  Current Facility-Administered Medications   Medication Dose Route Frequency Provider Last Rate Last Dose   • clonazePAM (KLONOPIN) tablet 1 mg  1 mg Oral QHS Kwame Cruz M.D.       • potassium chloride SA (Kdur) tablet 40 mEq  40 mEq Oral Once Kwame Cruz M.D.       • lidocaine (LIDODERM) 5 % 1 Patch  1 Patch Transdermal Q24HR Kwame Cruz M.D.   1 Patch at 01/30/20 1235   • gabapentin (NEURONTIN) capsule 100 mg  100 mg Enteral Tube TID Kwame Cruz M.D.   100 mg at 01/31/20 0517   • oxyCODONE CR (OXYCONTIN) tablet 10 mg  10 mg Oral Q12HRS Lencho Sage M.D.   10 mg at 01/31/20 0504   • citalopram (CELEXA) tablet 20 mg  20 mg Enteral Tube DAILY Kwame Cruz M.D.   20 mg at 01/31/20 0504   • ipratropium-albuterol (DUONEB) nebulizer solution  3 mL Nebulization Q4H PRN (RT) Kwame Cruz M.D.       • midodrine (PROAMATINE) tablet 10 mg  10 mg Enteral Tube TID WITH MEALS Kwame Cruz M.D.   10 mg at 01/31/20 0815   • acetaminophen (TYLENOL) tablet 650 mg  650  mg Enteral Tube Q6HRS PRN Gustavo Rojas D.O.   650 mg at 01/30/20 0018   • calcium carbonate (TUMS) chewable tab 500 mg  500 mg Enteral Tube BID Gustavo Rojas D.O.   500 mg at 01/31/20 0504   • oxyCODONE-acetaminophen (PERCOCET) 5-325 MG per tablet 1-2 Tab  1-2 Tab Enteral Tube Q4HRS PRN DONNA Pandey.O.   2 Tab at 01/31/20 0815   • polyethylene glycol/lytes (MIRALAX) PACKET 1 Packet  1 Packet Enteral Tube QDAY PRN DONNA Pandey.O.   1 Packet at 01/28/20 1218    And   • senna-docusate (PERICOLACE or SENOKOT S) 8.6-50 MG per tablet 2 Tab  2 Tab Enteral Tube BID DONNA Pandey.O.   Stopped at 01/31/20 0508    And   • bisacodyl (DULCOLAX) suppository 10 mg  10 mg Rectal QDAY PRN Gustavo Rojas D.O.   10 mg at 01/28/20 1818   • Pharmacy Consult: Enteral tube insertion - review meds/change route/product selection  1 Each Other PHARMACY TO DOSE Kwame Cruz M.D.       • MD Alert...ICU Electrolyte Replacement per Pharmacy   Other PHARMACY TO DOSE Jose Barreto M.D.       • Respiratory Care per Protocol   Nebulization Continuous RT Kasia Barrientos D.O.       • enoxaparin (LOVENOX) inj 40 mg  40 mg Subcutaneous DAILY Kasia Barrientos D.O.   40 mg at 01/31/20 0504   • lidocaine (LIDODERM) 5 % 1 Patch  1 Patch Transdermal Q24HRS Kasia Barrientos D.O.   1 Patch at 01/31/20 0506   • albuterol inhaler 2 Puff  2 Puff Inhalation Q4H PRN (RT) Alejandra Tomlinson M.D.           Fluids    Intake/Output Summary (Last 24 hours) at 1/31/2020 1042  Last data filed at 1/31/2020 0900  Gross per 24 hour   Intake 1360 ml   Output 1120 ml   Net 240 ml       Laboratory          Recent Labs     01/29/20  0336 01/30/20  0820   SODIUM 144 144   POTASSIUM 4.5 3.8   CHLORIDE 113* 105   CO2 27 29   BUN 17 18   CREATININE 0.84 0.73   CALCIUM 8.2* 9.1     Recent Labs     01/29/20  0336 01/30/20  0820   ALTSGPT 15 15   ASTSGOT 42 37   ALKPHOSPHAT 64 70   TBILIRUBIN 0.4 0.4   GLUCOSE 118* 115*     Recent Labs     01/29/20  0336 01/30/20  0340  01/30/20  0820 01/31/20  0343   WBC 7.0 6.1  --  8.5   NEUTSPOLYS 88.80* 73.90*  --  83.50*   LYMPHOCYTES 7.20* 22.60  --  9.50*   MONOCYTES 2.60 2.60  --  6.10   EOSINOPHILS 0.40 0.90  --  0.00   BASOPHILS 0.30 0.00  --  0.90   ASTSGOT 42  --  37  --    ALTSGPT 15  --  15  --    ALKPHOSPHAT 64  --  70  --    TBILIRUBIN 0.4  --  0.4  --      Recent Labs     01/29/20  0336 01/30/20  0340 01/31/20  0343   RBC 2.58* 3.02* 2.86*   HEMOGLOBIN 7.8* 8.9* 8.7*   HEMATOCRIT 24.3* 28.1* 26.5*   PLATELETCT 154* 174 178       Imaging  X-Ray:  I have personally reviewed the images and compared with prior images. and My impression is: Stable infiltrates with improved aeration    Assessment/Plan  * Sepsis (HCC)  Assessment & Plan  This is Severe Sepsis Present on admission  SIRS criteria identified on my evaluation include: Fever, with temperature greater than 101 deg F, Tachycardia, with heart rate greater than 90 BPM and Leukocyosis, with WBC greater than 12,000  Source of infection is lungs  Clinical indicators of end organ dysfunction include Systolic blood pressure (SBP) <90 mmHg or mean arterial pressure <65 mmHg and Lactate >2 mmol/L (18.0 mg/dL)  Sepsis protocol initiated  Fluid resuscitation ordered per protocol  IV antibiotics as appropriate for source of sepsis  Reassessment: I have reassessed the patient's hemodynamic status  End organ dysfunction include(s):  Acute respiratory failure and Encephalopathy (metabolic)    Tamiflu completed  Off pressors  MAP goal >65.   Wean midodrine  Fever control      Influenza A  Assessment & Plan  Completed course of tamiflu     COPD (chronic obstructive pulmonary disease) (HCC)- (present on admission)  Assessment & Plan  Not in COPD exacerbation   Duonebs prn.       Pain  Assessment & Plan  Likely an element of bone pain 2/2 mets  Multimodal pain strategy  Continue to optimize narcotic regimen - long acting plus breakthrough  Acetaminophen  Con Gabapentin  Lidoderm patch  Cont home  SSRI    Acute respiratory failure with hypoxemia (HCC)  Assessment & Plan  2/2 to flu  Self extubated 1/29   IS/Pulm toilet  RT/O2 protocols        Breast cancer (HCC)- (present on admission)  Assessment & Plan  S/p mastectomy, chemotherapy. On immunotherapy   Rad Onc following and plan for CT simulation while she is hospitalized to expedite her treatment    Mood disorder (HCC)- (present on admission)  Assessment & Plan  Cont home Celexa    OK to transfer to the floor      VTE:  Lovenox  Ulcer: H2 Antagonist  Lines: Central Line  Ongoing indication addressed and Morales Catheter  Ongoing indication addressed    I have performed a physical exam and reviewed and updated ROS and Plan today (1/31/2020). In review of yesterday's note (1/30/2020), there are no changes except as documented above.     Discussed patient condition and risk of morbidity and/or mortality with RN, RT, Therapies, Pharmacy, , Rehabilitation eval, [employer] [facility] resident, Code status disscussed, Charge nurse / hot rounds and Patient

## 2020-01-31 NOTE — PROGRESS NOTES
Pt assisted 1:1 with dinner. Pt tolerated well. Thickened water provided. Pt sitting position of comfort.

## 2020-02-01 LAB
ANISOCYTOSIS BLD QL SMEAR: ABNORMAL
BACTERIA BLD CULT: NORMAL
BACTERIA BLD CULT: NORMAL
BASOPHILS # BLD AUTO: 0 % (ref 0–1.8)
BASOPHILS # BLD: 0 K/UL (ref 0–0.12)
EOSINOPHIL # BLD AUTO: 0 K/UL (ref 0–0.51)
EOSINOPHIL NFR BLD: 0 % (ref 0–6.9)
ERYTHROCYTE [DISTWIDTH] IN BLOOD BY AUTOMATED COUNT: 69.6 FL (ref 35.9–50)
HCT VFR BLD AUTO: 27.3 % (ref 37–47)
HGB BLD-MCNC: 8.7 G/DL (ref 12–16)
HYPOCHROMIA BLD QL SMEAR: ABNORMAL
LYMPHOCYTES # BLD AUTO: 0.97 K/UL (ref 1–4.8)
LYMPHOCYTES NFR BLD: 10.4 % (ref 22–41)
MACROCYTES BLD QL SMEAR: ABNORMAL
MANUAL DIFF BLD: NORMAL
MCH RBC QN AUTO: 29.8 PG (ref 27–33)
MCHC RBC AUTO-ENTMCNC: 31.9 G/DL (ref 33.6–35)
MCV RBC AUTO: 93.5 FL (ref 81.4–97.8)
MICROCYTES BLD QL SMEAR: ABNORMAL
MONOCYTES # BLD AUTO: 0.57 K/UL (ref 0–0.85)
MONOCYTES NFR BLD AUTO: 6.1 % (ref 0–13.4)
MORPHOLOGY BLD-IMP: NORMAL
MYELOCYTES NFR BLD MANUAL: 1.7 %
NEUTROPHILS # BLD AUTO: 7.61 K/UL (ref 2–7.15)
NEUTROPHILS NFR BLD: 80 % (ref 44–72)
NEUTS BAND NFR BLD MANUAL: 1.8 % (ref 0–10)
NRBC # BLD AUTO: 0.03 K/UL
NRBC BLD-RTO: 0.3 /100 WBC
PLATELET # BLD AUTO: 183 K/UL (ref 164–446)
PLATELET BLD QL SMEAR: NORMAL
PMV BLD AUTO: 10.3 FL (ref 9–12.9)
RBC # BLD AUTO: 2.92 M/UL (ref 4.2–5.4)
RBC BLD AUTO: PRESENT
SIGNIFICANT IND 70042: NORMAL
SIGNIFICANT IND 70042: NORMAL
SITE SITE: NORMAL
SITE SITE: NORMAL
SOURCE SOURCE: NORMAL
SOURCE SOURCE: NORMAL
WBC # BLD AUTO: 9.3 K/UL (ref 4.8–10.8)

## 2020-02-01 PROCEDURE — 700102 HCHG RX REV CODE 250 W/ 637 OVERRIDE(OP): Performed by: PSYCHIATRY & NEUROLOGY

## 2020-02-01 PROCEDURE — A9270 NON-COVERED ITEM OR SERVICE: HCPCS | Performed by: STUDENT IN AN ORGANIZED HEALTH CARE EDUCATION/TRAINING PROGRAM

## 2020-02-01 PROCEDURE — 700101 HCHG RX REV CODE 250: Performed by: STUDENT IN AN ORGANIZED HEALTH CARE EDUCATION/TRAINING PROGRAM

## 2020-02-01 PROCEDURE — 99233 SBSQ HOSP IP/OBS HIGH 50: CPT | Mod: GC | Performed by: INTERNAL MEDICINE

## 2020-02-01 PROCEDURE — A9270 NON-COVERED ITEM OR SERVICE: HCPCS | Performed by: PSYCHIATRY & NEUROLOGY

## 2020-02-01 PROCEDURE — 700102 HCHG RX REV CODE 250 W/ 637 OVERRIDE(OP): Performed by: STUDENT IN AN ORGANIZED HEALTH CARE EDUCATION/TRAINING PROGRAM

## 2020-02-01 PROCEDURE — 85027 COMPLETE CBC AUTOMATED: CPT

## 2020-02-01 PROCEDURE — 770021 HCHG ROOM/CARE - ISO PRIVATE

## 2020-02-01 PROCEDURE — 85007 BL SMEAR W/DIFF WBC COUNT: CPT

## 2020-02-01 PROCEDURE — 700101 HCHG RX REV CODE 250: Performed by: PSYCHIATRY & NEUROLOGY

## 2020-02-01 PROCEDURE — 97530 THERAPEUTIC ACTIVITIES: CPT | Mod: CQ

## 2020-02-01 PROCEDURE — 700111 HCHG RX REV CODE 636 W/ 250 OVERRIDE (IP): Performed by: STUDENT IN AN ORGANIZED HEALTH CARE EDUCATION/TRAINING PROGRAM

## 2020-02-01 RX ORDER — BENZONATATE 100 MG/1
100 CAPSULE ORAL 3 TIMES DAILY
Status: DISCONTINUED | OUTPATIENT
Start: 2020-02-01 | End: 2020-02-03 | Stop reason: HOSPADM

## 2020-02-01 RX ORDER — FUROSEMIDE 40 MG/1
40 TABLET ORAL ONCE
Status: COMPLETED | OUTPATIENT
Start: 2020-02-01 | End: 2020-02-01

## 2020-02-01 RX ADMIN — GABAPENTIN 100 MG: 100 CAPSULE ORAL at 16:46

## 2020-02-01 RX ADMIN — LIDOCAINE 1 PATCH: 50 PATCH CUTANEOUS at 12:32

## 2020-02-01 RX ADMIN — FUROSEMIDE 40 MG: 40 TABLET ORAL at 12:32

## 2020-02-01 RX ADMIN — OXYCODONE HYDROCHLORIDE AND ACETAMINOPHEN 2 TABLET: 5; 325 TABLET ORAL at 01:02

## 2020-02-01 RX ADMIN — ANTACID TABLETS 500 MG: 500 TABLET, CHEWABLE ORAL at 16:46

## 2020-02-01 RX ADMIN — OXYCODONE HYDROCHLORIDE AND ACETAMINOPHEN 2 TABLET: 5; 325 TABLET ORAL at 14:00

## 2020-02-01 RX ADMIN — MIDODRINE HYDROCHLORIDE 10 MG: 5 TABLET ORAL at 04:56

## 2020-02-01 RX ADMIN — ANTACID TABLETS 500 MG: 500 TABLET, CHEWABLE ORAL at 04:56

## 2020-02-01 RX ADMIN — SENNOSIDES AND DOCUSATE SODIUM 2 TABLET: 8.6; 5 TABLET ORAL at 16:46

## 2020-02-01 RX ADMIN — OXYCODONE HYDROCHLORIDE AND ACETAMINOPHEN 2 TABLET: 5; 325 TABLET ORAL at 09:27

## 2020-02-01 RX ADMIN — GABAPENTIN 100 MG: 100 CAPSULE ORAL at 12:28

## 2020-02-01 RX ADMIN — BENZONATATE 100 MG: 100 CAPSULE ORAL at 12:32

## 2020-02-01 RX ADMIN — OXYCODONE HYDROCHLORIDE AND ACETAMINOPHEN 2 TABLET: 5; 325 TABLET ORAL at 22:10

## 2020-02-01 RX ADMIN — BENZONATATE 100 MG: 100 CAPSULE ORAL at 16:46

## 2020-02-01 RX ADMIN — LIDOCAINE 1 PATCH: 50 PATCH CUTANEOUS at 04:57

## 2020-02-01 RX ADMIN — OXYCODONE HYDROCHLORIDE AND ACETAMINOPHEN 2 TABLET: 5; 325 TABLET ORAL at 18:06

## 2020-02-01 RX ADMIN — GABAPENTIN 100 MG: 100 CAPSULE ORAL at 04:57

## 2020-02-01 RX ADMIN — SENNOSIDES AND DOCUSATE SODIUM 2 TABLET: 8.6; 5 TABLET ORAL at 04:55

## 2020-02-01 RX ADMIN — MIDODRINE HYDROCHLORIDE 10 MG: 5 TABLET ORAL at 16:46

## 2020-02-01 RX ADMIN — ENOXAPARIN SODIUM 40 MG: 100 INJECTION SUBCUTANEOUS at 04:55

## 2020-02-01 RX ADMIN — CITALOPRAM HYDROBROMIDE 20 MG: 20 TABLET ORAL at 04:56

## 2020-02-01 RX ADMIN — OXYCODONE HYDROCHLORIDE AND ACETAMINOPHEN 2 TABLET: 5; 325 TABLET ORAL at 04:56

## 2020-02-01 RX ADMIN — CLONAZEPAM 1 MG: 1 TABLET ORAL at 20:17

## 2020-02-01 RX ADMIN — OXYCODONE HYDROCHLORIDE 10 MG: 10 TABLET, FILM COATED, EXTENDED RELEASE ORAL at 16:46

## 2020-02-01 RX ADMIN — OXYCODONE HYDROCHLORIDE 10 MG: 10 TABLET, FILM COATED, EXTENDED RELEASE ORAL at 04:56

## 2020-02-01 RX ADMIN — MIDODRINE HYDROCHLORIDE 10 MG: 5 TABLET ORAL at 12:32

## 2020-02-01 ASSESSMENT — ENCOUNTER SYMPTOMS
NECK PAIN: 0
HEADACHES: 0
VOMITING: 0
ABDOMINAL PAIN: 0
HEARTBURN: 0
NERVOUS/ANXIOUS: 1
FEVER: 0
DIARRHEA: 0
DEPRESSION: 1
CHILLS: 0
MYALGIAS: 0
SHORTNESS OF BREATH: 1
SINUS PAIN: 0
SPUTUM PRODUCTION: 0
CONSTIPATION: 0
WEAKNESS: 0
BACK PAIN: 1
FOCAL WEAKNESS: 0
NAUSEA: 0
SORE THROAT: 0
WHEEZING: 1
PALPITATIONS: 0
DIZZINESS: 0
BLOOD IN STOOL: 0
COUGH: 1

## 2020-02-01 ASSESSMENT — LIFESTYLE VARIABLES: SUBSTANCE_ABUSE: 0

## 2020-02-01 ASSESSMENT — COGNITIVE AND FUNCTIONAL STATUS - GENERAL
WALKING IN HOSPITAL ROOM: TOTAL
STANDING UP FROM CHAIR USING ARMS: A LOT
SUGGESTED CMS G CODE MODIFIER MOBILITY: CM
MOVING FROM LYING ON BACK TO SITTING ON SIDE OF FLAT BED: UNABLE
CLIMB 3 TO 5 STEPS WITH RAILING: TOTAL
MOBILITY SCORE: 7
TURNING FROM BACK TO SIDE WHILE IN FLAT BAD: UNABLE
MOVING TO AND FROM BED TO CHAIR: UNABLE

## 2020-02-01 ASSESSMENT — GAIT ASSESSMENTS: GAIT LEVEL OF ASSIST: UNABLE TO PARTICIPATE

## 2020-02-01 NOTE — PROGRESS NOTES
Daily Progress Note:     Date of Service: 2/1/2020  Primary Team: [facility] IM Yellow Team   Attending: Kwame Cruz M.D.   Senior Resident: Dr. Tomlinson  Intern: Dr. Hermosillo  Contact:  [phone]    Chief Complaint:   Shortness of breath, cough, and fever     44 y.o. female with hx of breast CA, mets to bone (base of skull and cervical and lumbar spine), s/p right mastectomy, on immunotherapy (started 4 days prior), COPD on 3L home O2,  who was initially admitted 1/25 for SOB and found to have influenza A pneumonia.    Subjective: Pt resting in bed this morning, complains of musculoskeletal anterior chest tenderness. Says she has been coughing a lot. Her pain is always there but well controlled with the current regiment. She asked when she may go home but we reminded her that if she wants to set up palliative radiation therapy inpatient, she may have to wait till Monday. No events overnight, no nausea, vomiting, or worsening SOB. She is on 5L of O2 on NC, which is higher then her baseline of 3L at home for COPD. Her NG tube came out yesterday. Morales in place and making good urine.     Hospital course:    2/1/2020  Pt's vital signs are stable this morning, her pain is well controlled on the current regiment. She is planning on meeting with radiation oncology inpatient likely earliest on Monday to discuss starting palliative radiation therapy. Her PO immunotherapy (Verzenio BID) is currently on hold during the acute illness per heme onc recs. She is still complaining of frequent cough so we will give her scheduled tessalon caps. We will also give one dose of PO lasix 40mg as she had bibasilar crackles and reassess volume status tomorrow. NG tube is out, we will see how she tolerates dysphagia 2 diet. Morales is in place. Will have pt work with PT/OT as well.    Consultants/Specialty:   Radiation oncology  Discussed with heme onc   ICU team     Review of Systems:    Review of Systems   Constitutional: Negative for chills and  fever.   HENT: Negative for congestion, sinus pain and sore throat.    Respiratory: Positive for cough, shortness of breath and wheezing. Negative for sputum production.    Cardiovascular: Positive for chest pain. Negative for palpitations and leg swelling.   Gastrointestinal: Negative for abdominal pain, blood in stool, constipation, diarrhea, heartburn, melena, nausea and vomiting.   Genitourinary: Negative for dysuria, frequency, hematuria and urgency.   Musculoskeletal: Positive for back pain and joint pain. Negative for myalgias and neck pain.   Skin: Negative for itching and rash.   Neurological: Negative for dizziness, focal weakness, weakness and headaches.   Psychiatric/Behavioral: Positive for depression. Negative for substance abuse. The patient is nervous/anxious.        Objective Data:   Physical Exam:   Vitals:   Temp:  [36.3 °C (97.3 °F)-37.2 °C (98.9 °F)] 36.8 °C (98.2 °F)  Pulse:  [60-82] 63  Resp:  [16-25] 18  BP: (106-130)/(43-73) 110/52  SpO2:  [92 %-98 %] 96 %   CREATE MACRO BE SURE THAT THIS IS PERTINENT TO YOUR PATIENT!  Physical Exam  Constitutional:       General: She is not in acute distress.     Appearance: Normal appearance. She is not toxic-appearing.      Comments: Thin appearing    HENT:      Head: Normocephalic and atraumatic.      Nose: Nose normal.      Mouth/Throat:      Mouth: Mucous membranes are moist.   Eyes:      Extraocular Movements: Extraocular movements intact.      Conjunctiva/sclera: Conjunctivae normal.      Pupils: Pupils are equal, round, and reactive to light.   Neck:      Musculoskeletal: Normal range of motion and neck supple.   Cardiovascular:      Rate and Rhythm: Normal rate.      Heart sounds: No murmur. No friction rub. No gallop.    Pulmonary:      Effort: Pulmonary effort is normal. No respiratory distress.      Breath sounds: No stridor. Wheezing, rhonchi and rales present.   Chest:      Chest wall: Tenderness present.   Abdominal:      General: Abdomen is  flat. Bowel sounds are normal. There is no distension.      Palpations: Abdomen is soft.      Tenderness: There is no tenderness. There is no guarding or rebound.   Musculoskeletal: Normal range of motion.      Right lower leg: No edema.      Left lower leg: No edema.   Skin:     General: Skin is warm.      Findings: No lesion or rash.   Neurological:      General: No focal deficit present.      Mental Status: She is alert and oriented to person, place, and time.   Psychiatric:         Mood and Affect: Mood normal.         Behavior: Behavior normal.           Labs:   Recent Labs     01/30/20  0340 01/31/20  0343 02/01/20  0252   WBC 6.1 8.5 9.3   RBC 3.02* 2.86* 2.92*   HEMOGLOBIN 8.9* 8.7* 8.7*   HEMATOCRIT 28.1* 26.5* 27.3*   MCV 93.0 92.7 93.5   MCH 29.5 30.4 29.8   RDW 77.0* 70.0* 69.6*   PLATELETCT 174 178 183   MPV 9.7 9.9 10.3   NEUTSPOLYS 73.90* 83.50* 80.00*   LYMPHOCYTES 22.60 9.50* 10.40*   MONOCYTES 2.60 6.10 6.10   EOSINOPHILS 0.90 0.00 0.00   BASOPHILS 0.00 0.90 0.00   RBCMORPHOLO Present Present Present     Recent Labs     01/30/20  0820   SODIUM 144   POTASSIUM 3.8   CHLORIDE 105   CO2 29   GLUCOSE 115*   BUN 18       Imaging:   DX-CHEST-PORTABLE (1 VIEW)   Final Result      Stable chest x-ray findings.      DX-CHEST-PORTABLE (1 VIEW)   Final Result      No significant change from prior exam.      DX-ABDOMEN FOR TUBE PLACEMENT   Final Result      Enteric tube terminates over the gastric body      OUTSIDE IMAGES-NUCLEAR MEDICINE, PET   Final Result      DX-CHEST-PORTABLE (1 VIEW)   Final Result      Interval extubation. Otherwise stable exam.      DX-CHEST-PORTABLE (1 VIEW)   Final Result      Stable cardiomegaly and interstitial infiltrates.      DX-ABDOMEN FOR TUBE PLACEMENT   Final Result      Enteric tube tip projects over the stomach.      DX-CHEST-FOR LINE PLACEMENT Perform procedure in: Patient's Room   Final Result            1. A right peripherally inserted catheter with tip projects  appropriately over the expected area of the superior vena cava.      2. Endotracheal tube with tip projecting over the mid thoracic trachea.      DX-CHEST-2 VIEWS   Final Result      1.  Worsening mild pulmonary edema and/or multifocal pneumonitis.   2.  Minimal bilateral pleural effusions, slightly greater on the LEFT.   3.  Probable underlying COPD.      CT-HEAD W/O   Final Result      1.  No acute intracranial abnormality.   2.  Osseous metastatic disease.      DX-CHEST-PORTABLE (1 VIEW)   Final Result      No acute cardiopulmonary disease evident.            * Sepsis (HCC)  Assessment & Plan  Sepsis Present on admission, soft bp's and sinus tachycardia low threshold for ICU transfer   SIRS criteria: Tachycardia, with heart rate greater than 90 BPM and Tachypnea, with respirations greater than 20 per minute  Source is presumed to be lungs (Influenza/CAP) at this time, sepsis protocol initiated on admission, fluid resuscitation ordered per protocol. Avoid excess fluids (sepsis 30ml/kg) use early pressors if necessary to prevent ARDS. Monitor for post influenza superimposed pna (MRSA, strep PNA, GAS) serial procal, MRSA nasal Swab and monitor for Viral induced-HLH.  Treated for Tamiflu 5 day course and completed abx tx. BAL- NGTD.      Plan  -Give one dose PO lasix   - Continue to monitor   - Hgb 8.7, WBC 9.3  - Dysphagia 2 diet   - CXR 1/31- stable chest x-ray findings  - Incentive spirometer, aggressive pulmonary toilet      Influenza A  Assessment & Plan  Influenza A was ordered by day team 1/25/20 as pt discussed sick contacts at home with fevers. Test initially wasn't read, but after [facility] night float got called about temp 103F, then requested stat flu test, which came back positive for influenza A. They started Tamiflu renally dosed.     Plan:  Completed Tamiflu  Add tessalon caps scheduled for cough   Monitor vitals     Macrocytic anemia- (present on admission)  Assessment & Plan  Patient with chronic anemia,  "chart review showing normal B12/Folate, likely secondary to bone metastasis. 1/28/20- There has been a drop in hemoglobin to 6.6 (8.6 previous day). Hgb 8.7.    COPD (chronic obstructive pulmonary disease) (HCC)- (present on admission)  Assessment & Plan  Chronic respiratory failure with 3L O2 at home, currently requiring 3L, at baseline. Diffuse expiratory rhonci on exam    - RT protocol  - Albuterol Q6   - Incentive Spirometer    - scheduled duonebs Q4       Delirium  Assessment & Plan  Pyshiatry following- 1/29- continue celexa 200 qd for depression, no new meds until delirium resolves.     - Phone Update: 1/30/2020- 1:30pm- spoke with patient's  [place] over the phone,  was disappointed and angry regarding patient's ongoing confusion/tiredness and \"hearing loss\" and care in ICU by RNs, also mad about patient not receiving around the clock water sponges for dry mouth/throat which he stated she requires constantly. He stated for fevers, patient should not be receiving Tylenol or cooling blankets, but instead \"1/2 1/2 of rubbing alcohol and water mixed onto towel and then rubbed on patient's torso\" is method to bring her fever down because she has fibromyalgia. Provided complete update regarding patient's condition, care, and course in the ICU, and verbalized that entire team is trying to provide the best care that is appropriately indicated. Described that patient's confusion/tiredness is likely secondary to her long hospital course and ICU delirium related and that the goal is to get her off sedating medications as much as possible, and attempt to treat her cancer related pain appropriately.He expressed verbal understanding of entire conversation and agreed with plan of care at this time.     Acute respiratory failure with hypoxemia (HCC)  Assessment & Plan  Self-extubated          Urinary retention  Assessment & Plan  Pt presented with 3 days of incontinence. She was found to be retaining >900cc on " bladder scan morning of 1/25/20, then had an episode of urination in bed that was not able to be measured; repeat scan showed 510cc in the bladder and we ordered a herndon catheter     Plan:  Herndon cath with strict I&Os and evaluate with physician when it is appropriate to d/c herndon        Displaced fracture of greater trochanter of right femur (HCC)- (present on admission)  Assessment & Plan  Admitted for during last admission (Dec 2019), Ortho consulted and patient deemed non-surgical. PT had recommended home health, per family, patient was denied home health. Did not go to physical therapy as prescribed on last admission      - CTM  -PT/OT consult       Breast cancer (HCC)- (present on admission)  Assessment & Plan  History of Metastatic Breast Cancer, evaluated by Dr. Wynne during last admission. Mets to L and Spine, Pelvis. CT head on admission showing Mets to skull, no prior records of it. Brought by family for concern given weakness and increased confusion, patient reporting new incontinence. Repeat MR of Spine Chart review shows Dr. Wynne recommended palliative care, palliative was consulted during last admission, Advance Directive stating patient's POA is her , Jones Cota.  Discussed code status, full code at this time.      Plan:  - Spoke to Dr. Khan (Oncology) and she recommends holding patient's Verzenio until the sepsis resolves, managing her pain, and then can restart Verzenio on discharge; 4 days of treatment with Verzenio prior to this admission is too soon to predict response   - Follow-up palliative care discussions with family   - Pain control- cindy Oxycontin 10 mg q12, oxy 1-2 tabs q4 prn, lidocaine patches to areas of pain  -Considering palliative radiation therapy       Mood disorder (HCC)- (present on admission)  Assessment & Plan  - Psych consult for worsening MDD/depressed mood/anxiety sx  - Psych 1/29 - continue celexa 200 qd for depression, no new meds until delirium resolves

## 2020-02-01 NOTE — PROGRESS NOTES
1544 Report received from ROSI Castro RN over the phone.    1730 Received patient from [facility] via w/c accompanied by ROSI Castro RN and spouse.    1809 Patient's sitting up in bed. Medicated with Oxycontin (see MAR - scheduled) for c/o's back pain, rates pain 8/10. No c/o's numbness or tingling at this time. Spouse visiting. FC to DD. Fall Protocol in effect. Call light within reach. Reminded patient to call for assist. Assessment completed. No distress noted. Plan of care reviewed  With the patient and spouse. Verbalized understanding. Isolation precaution observed.    1905  Patient's sitting up in bed. No changes I status. Bedside report given to Oncjonny NOC RN (Champ). Isolation precaution observed.

## 2020-02-01 NOTE — DISCHARGE SUMMARY
Discharge Summary    Date of Admission: 1/24/2020  Date of Discharge: No discharge date for patient encounter.  Discharging Attending: Dr. Cheung  Discharging Senior Resident: Dr. Flores  Discharging Intern: Dr. Lamas    CHIEF COMPLAINT ON ADMISSION  Chief Complaint   Patient presents with   • Altered Mental Status       Reason for Admission  Acute metabolic encephalopathy.    Admission Date  1/24/2020    CODE STATUS  Full Code    HPI & HOSPITAL COURSE  This is a 44 y.o. female here with past medical history of stage 4 breast cancer with metastases to the pelvis, cervical and lumbar spine, recent displaced fracture of greater trochanter of right femur from a ground-level fall,  COPD on baseline 3 L oxygen who was brought to the hospital by family for generalized weakness and altered mental status. Family reported fever and chills as well. On arrival to the ER, she was hypotensive, tachycardic and tachypneic . She was given fluids per sepsis protocol with improvement in mental status and blood pressure. She had a CT head which did not show any acute abnormalities, but did show metastases in the skull base and cervical vertebrae. Chest X ray did not show any acute abnormalities either. She was started on fluids and admitted for further management.     Following admission, patient  was started on ceftriaxone and doxycyline for empiric treatment of community-acquired pneumonia and influenza panel was sent which came back positive for influenza A. She was started on Tamiflu. Patient started having spiking fevers with low blood pressures necessitating transfer to the ICU on day 2 (1/26/2020) of hospital stay. She was intubated and started on pressors. She was briefly treated with linezolid for possible MRSA pneumonia which was discontinued after MRSA nares and bronchoalveolar lavage were negative. Patient was soon weaned off pressors on day 2 of ICU stay. She self-extubated on day 2 as well. She completed 5  days of antibiotics and tamiflu. Patient had stable vitals and she was transferred out of the ICU on 1/31/2020. She received adequate pain control with oral opioids. Per pharmacy, she had 15 day prescription for opioids and Clonazepam. Prescriptions for 15 more days of long acting opioid and short acting opioid for breakthrough pain were provided to allow for adequate pain control while she works to transfer her care from [place], [place] to [place]. Patient was educated on the adverse effects of these medications and the potential life threatening interactions between her opioids and benzodiazepine. She expressed understanding and willingness to take medications as recommended. No data was found on [employer] query and  checks on the day of discharge.     With regards to metastatic breast cancer, her immunotherapy was held after talking with oncology. Palliative care was consulted for discussions on considering hospice, however the patient and family wanted to explore all available options. She was seen by radiation oncology who offered palliative radiation for right hip and SI joints. She underwent first radiation treatment to right hip on 02/03/20. She was seen by both physical and occupational therapy who recommended post discharge acute rehab. However, patient and her spouse preferred discharge home. Her  will arrange for professional home health aid. He also says their adult children will help hi take care of the patient. The patient's home is outside the catchment area of [facility].       Therefore, she is discharged in guarded and stable condition to home with close outpatient follow-up and family support.     The patient met 2-midnight criteria for an inpatient stay at the time of discharge.    Discharge Date  02/03/20      FOLLOW UP ITEMS POST DISCHARGE  Follow with radiation oncology for ongoing radiation treatment.   Follow with medical oncology for continuation of chemo and immunotherapy.    Establish PCP care for ongoing pain management.       DISCHARGE DIAGNOSES  Principal Problem:    Sepsis (HCC) POA: Unknown  Active Problems:    Influenza A POA: Unknown    Neck pain POA: Unknown    COPD (chronic obstructive pulmonary disease) (HCC) POA: Yes    Macrocytic anemia POA: Yes    Pain POA: Unknown    Mood disorder (HCC) POA: Yes    Breast cancer (HCC) (Chronic) POA: Yes    Displaced fracture of greater trochanter of right femur (HCC) POA: Yes    Urinary retention POA: Unknown    Acute respiratory failure with hypoxemia (HCC) POA: Unknown    Delirium POA: Unknown  Resolved Problems:    * No resolved hospital problems. *      FOLLOW UP  Future Appointments   Date Time Provider Department Center   2/3/2020  1:00 PM [facility] [address] [address] [phone]  Schedule an appointment as soon as possible for a visit  Please call to establish with a Primary Care Physicain and schedule your hospital follow up within 10-15 days of discharge. Thank you.    Ricardo Wynne M.D.  [address] [place] [id number] [phone]      Please call to schedule your hospital follow up. Thank you       MEDICATIONS ON DISCHARGE     Medication List      START taking these medications      Instructions   benzonatate 100 MG Caps  Commonly known as:  TESSALON   Take 1 Cap by mouth 3 times a day.  Dose:  100 mg     budesonide-formoterol 80-4.5 MCG/ACT Aero  Commonly known as:  SYMBICORT   Inhale 2 Puffs by mouth 2 Times a Day.  Dose:  2 Puff     calcium carbonate 500 MG Chew  Commonly known as:  TUMS   Take 1 Tab by mouth 2 Times a Day.  Dose:  500 mg     midodrine 10 MG tablet  Commonly known as:  PROAMATINE   Take 1 Tab by mouth 3 times a day, with meals.  Dose:  10 mg     tiotropium 18 MCG Caps  Commonly known as:  Spiriva HandiHaler   Inhale 1 Cap by mouth every day.  Dose:  18 mcg        CONTINUE taking these medications       Instructions   acetaminophen 500 MG Tabs  Commonly known as:  TYLENOL   Take 2 Tabs by mouth every 8 hours for 30 days.  Dose:  1,000 mg     carvedilol 6.25 MG Tabs  Commonly known as:  COREG   Take 6.25 mg by mouth 2 times a day.  Dose:  6.25 mg     cetirizine 10 MG Tabs  Commonly known as:  ZYRTEC   Take 10 mg by mouth every day.  Dose:  10 mg     citalopram 40 MG Tabs  Commonly known as:  CELEXA   Take 40 mg by mouth every day.  Dose:  40 mg     clonazePAM 1 MG Tabs  Commonly known as:  KLONOPIN   Take 1 mg by mouth 3 times a day.  Dose:  1 mg     cyanocobalamin 500 MCG Tabs  Commonly known as:  VITAMIN B-12   Take 1,000 mcg by mouth every day.  Dose:  1,000 mcg     dicyclomine 10 MG Caps  Commonly known as:  BENTYL   Take 10 mg by mouth 3 times a day.  Dose:  10 mg     lidocaine 5 % Ptch  Commonly known as:  LIDODERM   Apply 1 Patch to skin as directed every 24 hours.  Dose:  1 Patch     omeprazole 20 MG delayed-release capsule  Commonly known as:  PRILOSEC   Take 1 Cap by mouth every day.  Dose:  20 mg     oxyCODONE-acetaminophen  MG Tabs  Commonly known as:  PERCOCET-10   Take 1 Tab by mouth every 8 hours as needed for Severe Pain.  Dose:  1 Tab     polyethylene glycol/lytes Pack  Commonly known as:  MIRALAX   Take 1 Packet by mouth every day for 30 days.  Dose:  17 g     senna-docusate 8.6-50 MG Tabs  Commonly known as:  PERICOLACE or SENOKOT S   Take 2 Tabs by mouth 2 Times a Day for 30 days.  Dose:  2 Tab     tamoxifen 10 MG Tabs  Commonly known as:  NOLVADEX   Take 20 mg by mouth.  Dose:  20 mg     tizanidine 4 MG Tabs  Commonly known as:  ZANAFLEX   Take 4 mg by mouth every 8 hours.  Dose:  4 mg     Ventolin  (90 Base) MCG/ACT Aers inhalation aerosol  Generic drug:  albuterol   Inhale 2 Puffs by mouth every 6 hours.  Dose:  2 Puff        STOP taking these medications    celecoxib 100 MG Caps  Commonly known as:  CELEBREX            Allergies  Allergies   Allergen Reactions   • Fish    •  "Levaquin    • Lyrica Hives   • Morphine Unspecified     Was told she is very allergic after surgery.    • Motrin [Ibuprofen] Hives   • Neurontin [Gabapentin] Swelling     Messes with kidneys   • Pcn [Penicillins] Hives     Burning sensation throughout body  Tolerated Ceftriaxone 12/2019       DIET  Orders Placed This Encounter   Procedures   • Diet Order Regular (Dental soft)     Standing Status:   Standing     Number of Occurrences:   1     Order Specific Question:   Diet:     Answer:   Regular [1]     Comments:   Dental soft       ACTIVITY  As tolerated.  Weight bearing as tolerated    CONSULTATIONS  As listed in \"FOLLOW UP\" and \"appointment\" as above.     PROCEDURES  1/26/2020: Conscious sedation, endotracheal intubation, bronchoscopy with BAL & insertion of arterial line for hemodynamic monitoring    "

## 2020-02-01 NOTE — PROGRESS NOTES
Report called to Evangelina RN, pt and  transported with all belongings to [id number]. Once in room, pt assisted into bed and into position of comfort. continuous pulse on. Evangelina at bedside, updates given. Care turned over to Evangelina WARREN.

## 2020-02-01 NOTE — CARE PLAN
Problem: Safety  Goal: Will remain free from injury  Outcome: PROGRESSING AS EXPECTED  Note:   Safety precaution in effect. Call light within reach. Reminded patient to call for assist. Hourly rounds in effect. Discussed with patient and spouse. Verbalized understanding.     Problem: Infection  Goal: Will remain free from infection  Outcome: PROGRESSING AS EXPECTED  Note:   Implement Standard precaution. Discussed with patient and spouse. Hand washing every encounter and before & after patient care. Verbalized understanding.     Problem: Knowledge Deficit  Goal: Knowledge of disease process/condition, treatment plan, diagnostic tests, and medications will improve  Outcome: PROGRESSING AS EXPECTED  Note:   Discussed Plan of care with patient and spouse.  Questions answered. Verbalized understanding.     Problem: Pain Management  Goal: Pain level will decrease to patient's comfort goal  Outcome: PROGRESSING AS EXPECTED  Note:   Educated on pain scale. Encouraged to verbalize pain. On scheduled pain medication. Will medicate as per MAR.

## 2020-02-01 NOTE — THERAPY
"Physical Therapy Treatment completed.   Bed Mobility:  Supine to Sit: Moderate Assist  Transfers: Sit to Stand: Minimal Assist  Gait: Level Of Assist: Unable to Participate      Plan of Care: Will benefit from Physical Therapy 3 times per week  Discharge Recommendations: Equipment: Will Continue to Assess for Equipment Needs. Post-acute therapy Recommend post-acute placement for continued physical therapy services prior to discharge home.    See \"Rehab Therapy-Acute\" Patient Summary Report for complete documentation.     Pt was pleasant and agreeable to therapy session. She has progressed with mobility but fatigues quickly and does experience with pain during all mobility. She required modA for bed mobiltiy and vc for technique. She was able to hold her up at EOB with BUE support and no LOB. PRacticed sit to stands with Perry and HHA. As well standing pre gait activiites such as weight shifting laterally and lifting heel off to initiate single step. Difficulty with standing marching due to weakness at this time. Encouraged to perform simple supine exercises for strengthening. Due to current impairements continue to recommend post acute placement at [place]. Will follow while in house.   "

## 2020-02-01 NOTE — PROGRESS NOTES
0655 Patient's in bed. Bedside report received from BENJIE Oakes RN. Isolation precaution observed.    0927 Patient's sitting up in bed. Spouse at the bedside. Medicated with Percocet (see MAR) for c/o's chest and back pain, rates pain 10/10. On Lidoderm patch. No c/o's numbness or tingling at this time. Fall Protocol in effect. Call light within reach. Reminded patient to call for assist. Assessment completed. No distress noted. Plan of care reviewed with the patient and spouse. Verbalized understanding.    1010 Dr Hermosillo visited. POC discussed with the patient.    1050 CAROL Ferguson worked with the patient and one male Tech. EOB for 15 minutes.    1114 New order received and acknowledged from Dr Hermosillo (see MAR) and labs in am.    1300 Placed a call to MD, awaiting for response.    1305 Received a call from Dr sEparza, notified the said MD that patient's spouse would like to speak with MD. Per the said MD he'lll be over. Notified patient's spouse. Verbalized understanding.    1315 Dr Esparza visited. POC discussed with the patient and spouse.    1400 Medicated with Percocet (see MAR) for c/o's chest and back pain, rates pain 10/10.    1535 Patient's spouse requested for patient's home medications from Pharmacy. Two bags given back to patient's spouse. Witnessed by CHANDRAKANT Sanders.    1650 Heat pack applied to posterior neck for c/o's posterior neck pain.    1806 Medicated with Percocet (see MAR) for c/o's chest, back and posterior neck pain, rates pain 10/10.    1915  Patient's in bed. No changes in status. Bedside report given to Loreto WALDROP RN (Sandy BRUNNER).

## 2020-02-01 NOTE — PROGRESS NOTES
2 RN skin check completed with KELVIN Thorpe.     Devices in place - silicone Oxygen cannula, .  Skin assessed under devices intact.  Noted redness to coccyx, blanchable, mepilex in place.  No skin breakdown noted.

## 2020-02-02 PROBLEM — M54.2 NECK PAIN: Status: ACTIVE | Noted: 2020-02-02

## 2020-02-02 LAB
ANION GAP SERPL CALC-SCNC: 10 MMOL/L (ref 0–11.9)
ANISOCYTOSIS BLD QL SMEAR: ABNORMAL
BASOPHILS # BLD AUTO: 1.7 % (ref 0–1.8)
BASOPHILS # BLD: 0.13 K/UL (ref 0–0.12)
BUN SERPL-MCNC: 13 MG/DL (ref 8–22)
CALCIUM SERPL-MCNC: 8.3 MG/DL (ref 8.5–10.5)
CHLORIDE SERPL-SCNC: 103 MMOL/L (ref 96–112)
CO2 SERPL-SCNC: 30 MMOL/L (ref 20–33)
CREAT SERPL-MCNC: 0.78 MG/DL (ref 0.5–1.4)
EOSINOPHIL # BLD AUTO: 0 K/UL (ref 0–0.51)
EOSINOPHIL NFR BLD: 0 % (ref 0–6.9)
ERYTHROCYTE [DISTWIDTH] IN BLOOD BY AUTOMATED COUNT: 69 FL (ref 35.9–50)
GLUCOSE SERPL-MCNC: 94 MG/DL (ref 65–99)
HCT VFR BLD AUTO: 28.6 % (ref 37–47)
HGB BLD-MCNC: 9 G/DL (ref 12–16)
HYPOCHROMIA BLD QL SMEAR: ABNORMAL
LYMPHOCYTES # BLD AUTO: 1.48 K/UL (ref 1–4.8)
LYMPHOCYTES NFR BLD: 20 % (ref 22–41)
MAGNESIUM SERPL-MCNC: 2.1 MG/DL (ref 1.5–2.5)
MANUAL DIFF BLD: NORMAL
MCH RBC QN AUTO: 29.4 PG (ref 27–33)
MCHC RBC AUTO-ENTMCNC: 31.5 G/DL (ref 33.6–35)
MCV RBC AUTO: 93.5 FL (ref 81.4–97.8)
METAMYELOCYTES NFR BLD MANUAL: 0.9 %
MICROCYTES BLD QL SMEAR: ABNORMAL
MONOCYTES # BLD AUTO: 0.77 K/UL (ref 0–0.85)
MONOCYTES NFR BLD AUTO: 10.4 % (ref 0–13.4)
MORPHOLOGY BLD-IMP: NORMAL
MYELOCYTES NFR BLD MANUAL: 0.9 %
NEUTROPHILS # BLD AUTO: 4.89 K/UL (ref 2–7.15)
NEUTROPHILS NFR BLD: 65.2 % (ref 44–72)
NEUTS BAND NFR BLD MANUAL: 0.9 % (ref 0–10)
NRBC # BLD AUTO: 0.03 K/UL
NRBC BLD-RTO: 0.4 /100 WBC
PLATELET # BLD AUTO: 187 K/UL (ref 164–446)
PLATELET BLD QL SMEAR: NORMAL
PMV BLD AUTO: 10.3 FL (ref 9–12.9)
POTASSIUM SERPL-SCNC: 4.1 MMOL/L (ref 3.6–5.5)
RBC # BLD AUTO: 3.06 M/UL (ref 4.2–5.4)
RBC BLD AUTO: PRESENT
SODIUM SERPL-SCNC: 143 MMOL/L (ref 135–145)
WBC # BLD AUTO: 7.4 K/UL (ref 4.8–10.8)

## 2020-02-02 PROCEDURE — 700101 HCHG RX REV CODE 250: Performed by: STUDENT IN AN ORGANIZED HEALTH CARE EDUCATION/TRAINING PROGRAM

## 2020-02-02 PROCEDURE — 80048 BASIC METABOLIC PNL TOTAL CA: CPT

## 2020-02-02 PROCEDURE — 83735 ASSAY OF MAGNESIUM: CPT

## 2020-02-02 PROCEDURE — A9270 NON-COVERED ITEM OR SERVICE: HCPCS | Performed by: PSYCHIATRY & NEUROLOGY

## 2020-02-02 PROCEDURE — A9270 NON-COVERED ITEM OR SERVICE: HCPCS | Performed by: STUDENT IN AN ORGANIZED HEALTH CARE EDUCATION/TRAINING PROGRAM

## 2020-02-02 PROCEDURE — 700111 HCHG RX REV CODE 636 W/ 250 OVERRIDE (IP): Performed by: STUDENT IN AN ORGANIZED HEALTH CARE EDUCATION/TRAINING PROGRAM

## 2020-02-02 PROCEDURE — 770021 HCHG ROOM/CARE - ISO PRIVATE

## 2020-02-02 PROCEDURE — 92526 ORAL FUNCTION THERAPY: CPT

## 2020-02-02 PROCEDURE — 700102 HCHG RX REV CODE 250 W/ 637 OVERRIDE(OP): Performed by: STUDENT IN AN ORGANIZED HEALTH CARE EDUCATION/TRAINING PROGRAM

## 2020-02-02 PROCEDURE — 99233 SBSQ HOSP IP/OBS HIGH 50: CPT | Mod: GC | Performed by: INTERNAL MEDICINE

## 2020-02-02 PROCEDURE — 85007 BL SMEAR W/DIFF WBC COUNT: CPT

## 2020-02-02 PROCEDURE — 700102 HCHG RX REV CODE 250 W/ 637 OVERRIDE(OP): Performed by: PSYCHIATRY & NEUROLOGY

## 2020-02-02 PROCEDURE — 85027 COMPLETE CBC AUTOMATED: CPT

## 2020-02-02 PROCEDURE — 700101 HCHG RX REV CODE 250: Performed by: PSYCHIATRY & NEUROLOGY

## 2020-02-02 RX ORDER — OXYCODONE HCL 10 MG/1
20 TABLET, FILM COATED, EXTENDED RELEASE ORAL EVERY MORNING
Status: DISCONTINUED | OUTPATIENT
Start: 2020-02-03 | End: 2020-02-03 | Stop reason: HOSPADM

## 2020-02-02 RX ORDER — OXYCODONE HCL 10 MG/1
10 TABLET, FILM COATED, EXTENDED RELEASE ORAL NIGHTLY
Status: DISCONTINUED | OUTPATIENT
Start: 2020-02-02 | End: 2020-02-03 | Stop reason: HOSPADM

## 2020-02-02 RX ORDER — OXYCODONE HCL 10 MG/1
10 TABLET, FILM COATED, EXTENDED RELEASE ORAL DAILY
Status: DISCONTINUED | OUTPATIENT
Start: 2020-02-02 | End: 2020-02-02

## 2020-02-02 RX ORDER — OXYCODONE HCL 10 MG/1
20 TABLET, FILM COATED, EXTENDED RELEASE ORAL DAILY
Status: DISCONTINUED | OUTPATIENT
Start: 2020-02-03 | End: 2020-02-02

## 2020-02-02 RX ORDER — OXYCODONE AND ACETAMINOPHEN 10; 325 MG/1; MG/1
1 TABLET ORAL EVERY 8 HOURS PRN
COMMUNITY
End: 2020-02-21

## 2020-02-02 RX ADMIN — MIDODRINE HYDROCHLORIDE 10 MG: 5 TABLET ORAL at 07:30

## 2020-02-02 RX ADMIN — ENOXAPARIN SODIUM 40 MG: 100 INJECTION SUBCUTANEOUS at 06:00

## 2020-02-02 RX ADMIN — OXYCODONE HYDROCHLORIDE AND ACETAMINOPHEN 2 TABLET: 5; 325 TABLET ORAL at 02:49

## 2020-02-02 RX ADMIN — LIDOCAINE 1 PATCH: 50 PATCH CUTANEOUS at 05:12

## 2020-02-02 RX ADMIN — OXYCODONE HYDROCHLORIDE 10 MG: 10 TABLET, FILM COATED, EXTENDED RELEASE ORAL at 17:01

## 2020-02-02 RX ADMIN — MIDODRINE HYDROCHLORIDE 10 MG: 5 TABLET ORAL at 17:30

## 2020-02-02 RX ADMIN — GABAPENTIN 100 MG: 100 CAPSULE ORAL at 17:01

## 2020-02-02 RX ADMIN — OXYCODONE HYDROCHLORIDE AND ACETAMINOPHEN 2 TABLET: 5; 325 TABLET ORAL at 08:48

## 2020-02-02 RX ADMIN — SENNOSIDES AND DOCUSATE SODIUM 2 TABLET: 8.6; 5 TABLET ORAL at 06:00

## 2020-02-02 RX ADMIN — BENZONATATE 100 MG: 100 CAPSULE ORAL at 06:00

## 2020-02-02 RX ADMIN — CLONAZEPAM 1 MG: 1 TABLET ORAL at 21:09

## 2020-02-02 RX ADMIN — LIDOCAINE 1 PATCH: 50 PATCH CUTANEOUS at 12:46

## 2020-02-02 RX ADMIN — GABAPENTIN 100 MG: 100 CAPSULE ORAL at 05:11

## 2020-02-02 RX ADMIN — OXYCODONE HYDROCHLORIDE AND ACETAMINOPHEN 2 TABLET: 5; 325 TABLET ORAL at 12:46

## 2020-02-02 RX ADMIN — BENZONATATE 100 MG: 100 CAPSULE ORAL at 18:00

## 2020-02-02 RX ADMIN — MIDODRINE HYDROCHLORIDE 10 MG: 5 TABLET ORAL at 12:46

## 2020-02-02 RX ADMIN — OXYCODONE HYDROCHLORIDE AND ACETAMINOPHEN 2 TABLET: 5; 325 TABLET ORAL at 21:09

## 2020-02-02 RX ADMIN — CITALOPRAM HYDROBROMIDE 20 MG: 20 TABLET ORAL at 05:12

## 2020-02-02 RX ADMIN — ANTACID TABLETS 500 MG: 500 TABLET, CHEWABLE ORAL at 05:11

## 2020-02-02 RX ADMIN — GABAPENTIN 100 MG: 100 CAPSULE ORAL at 12:46

## 2020-02-02 RX ADMIN — BENZONATATE 100 MG: 100 CAPSULE ORAL at 12:46

## 2020-02-02 RX ADMIN — OXYCODONE HYDROCHLORIDE 10 MG: 10 TABLET, FILM COATED, EXTENDED RELEASE ORAL at 05:12

## 2020-02-02 RX ADMIN — ANTACID TABLETS 500 MG: 500 TABLET, CHEWABLE ORAL at 17:01

## 2020-02-02 RX ADMIN — OXYCODONE HYDROCHLORIDE AND ACETAMINOPHEN 2 TABLET: 5; 325 TABLET ORAL at 17:01

## 2020-02-02 ASSESSMENT — ENCOUNTER SYMPTOMS
BLOOD IN STOOL: 0
DIZZINESS: 0
BACK PAIN: 1
WEAKNESS: 0
SHORTNESS OF BREATH: 0
NERVOUS/ANXIOUS: 1
NAUSEA: 0
HEADACHES: 0
CONSTIPATION: 0
NECK PAIN: 0
FOCAL WEAKNESS: 0
DEPRESSION: 1
SPUTUM PRODUCTION: 0
MYALGIAS: 0
DIARRHEA: 0
SORE THROAT: 0
SINUS PAIN: 0
COUGH: 1
WHEEZING: 1
VOMITING: 0
CHILLS: 0
ABDOMINAL PAIN: 0
FEVER: 0
PALPITATIONS: 0
HEARTBURN: 0

## 2020-02-02 ASSESSMENT — COGNITIVE AND FUNCTIONAL STATUS - GENERAL
CLIMB 3 TO 5 STEPS WITH RAILING: TOTAL
STANDING UP FROM CHAIR USING ARMS: A LOT
PERSONAL GROOMING: TOTAL
MOVING FROM LYING ON BACK TO SITTING ON SIDE OF FLAT BED: UNABLE
TURNING FROM BACK TO SIDE WHILE IN FLAT BAD: UNABLE
MOBILITY SCORE: 7
WALKING IN HOSPITAL ROOM: TOTAL
HELP NEEDED FOR BATHING: TOTAL
EATING MEALS: TOTAL
MOVING TO AND FROM BED TO CHAIR: UNABLE
DAILY ACTIVITIY SCORE: 6
SUGGESTED CMS G CODE MODIFIER MOBILITY: CM
SUGGESTED CMS G CODE MODIFIER DAILY ACTIVITY: CN
DRESSING REGULAR LOWER BODY CLOTHING: TOTAL
DRESSING REGULAR UPPER BODY CLOTHING: TOTAL
TOILETING: TOTAL

## 2020-02-02 ASSESSMENT — PATIENT HEALTH QUESTIONNAIRE - PHQ9
SUM OF ALL RESPONSES TO PHQ9 QUESTIONS 1 AND 2: 0
2. FEELING DOWN, DEPRESSED, IRRITABLE, OR HOPELESS: NOT AT ALL
1. LITTLE INTEREST OR PLEASURE IN DOING THINGS: NOT AT ALL

## 2020-02-02 ASSESSMENT — LIFESTYLE VARIABLES: SUBSTANCE_ABUSE: 0

## 2020-02-02 NOTE — PROGRESS NOTES
Received report from NOC Sandy WARREN.  Bed in lowest position, wheels locked, call light within reach, all questions answered

## 2020-02-02 NOTE — PROGRESS NOTES
Daily Progress Note:     Date of Service: 2/2/2020  Primary Team: [facility] IM Yellow Team   Attending: Kwame Cruz M.D.   Senior Resident: Dr. Tomlinson  Intern: Dr. Hermosillo  Contact:  [phone]    Chief Complaint:   Shortness of breath, cough, and fever     44 y.o. female with hx of breast CA, mets to bone (base of skull and cervical and lumbar spine), s/p right mastectomy, on immunotherapy (started 4 days prior), COPD on 3L home O2,  who was initially admitted 1/25 for SOB and found to have influenza A pneumonia.    Subjective: Pt resting in bed this morning, no events overnight. Said she slept well and she was sleeping comfortably and easy to awake. Pain is well managed on the current regiment. She is asking for regular diet vs the dysphagia diet. Denies dysphagia, we will assess and consider diet change. Pt also complains of tension type headache for past two days and says her neck feels sore when she moves her head. She has been using a heating pack to help. Her cough is improving along with the chest tenderness.     Hospital course:    2/1/2020  Pt's vital signs are stable this morning, her pain is well controlled on the current regiment. She is planning on meeting with radiation oncology inpatient likely earliest on Monday to discuss starting palliative radiation therapy. Her PO immunotherapy (Verzenio BID) is currently on hold during the acute illness per heme onc recs. She is still complaining of frequent cough so we will give her scheduled tessalon caps. We will also give one dose of PO lasix 40mg as she had bibasilar crackles and reassess volume status tomorrow. NG tube is out, we will see how she tolerates dysphagia 2 diet. Morales is in place. Will have pt work with PT/OT as well.    2/2/2020  Pt worked with physical therapy yesterday. They recommend PT 3x per week or rehab post discharge. Pt lives in [place] and says theres no outpatient pt by where she is at. She is thinking about the rehab option. Pt  really wants to go home and we discussed potential initiation for palliative radiation therapy tomorrow. Will discuss with other consultants before making a decision to discharge to see what else needs to be done inpatient. Will try to contact speech therapy to see if pt can be reevaluated for a regular diet, as she is not a fan of dysphagia 2. Will also try to find [place] opiate records to see what pt has been prescribed recently. We are considering to go up on her long acting pain medicine if she continues to exhibit pain despite the current regiment.     Consultants/Specialty:   Radiation oncology  Discussed with [facility] onc   ICU team     Review of Systems:    Review of Systems   Constitutional: Negative for chills and fever.   HENT: Negative for congestion, sinus pain and sore throat.    Respiratory: Positive for cough (improving ) and wheezing. Negative for sputum production and shortness of breath.    Cardiovascular: Positive for chest pain (improving ). Negative for palpitations and leg swelling.   Gastrointestinal: Negative for abdominal pain, blood in stool, constipation, diarrhea, heartburn, melena, nausea and vomiting.   Genitourinary: Negative for dysuria, frequency, hematuria and urgency.   Musculoskeletal: Positive for back pain and joint pain. Negative for myalgias and neck pain.   Skin: Negative for itching and rash.   Neurological: Negative for dizziness, focal weakness, weakness and headaches.   Psychiatric/Behavioral: Positive for depression. Negative for substance abuse. The patient is nervous/anxious.        Objective Data:   Physical Exam:   Vitals:   Temp:  [36.6 °C (97.9 °F)-36.9 °C (98.4 °F)] 36.8 °C (98.2 °F)  Pulse:  [56-76] 71  Resp:  [16-19] 19  BP: (106-118)/(48-56) 106/56  SpO2:  [93 %-100 %] 100 %   CREATE MACRO BE SURE THAT THIS IS PERTINENT TO YOUR PATIENT!  Physical Exam  Constitutional:       General: She is not in acute distress.     Appearance: Normal appearance. She is not  toxic-appearing.      Comments: Thin appearing    HENT:      Head: Normocephalic and atraumatic.      Nose: Nose normal.      Mouth/Throat:      Mouth: Mucous membranes are moist.   Eyes:      Extraocular Movements: Extraocular movements intact.      Conjunctiva/sclera: Conjunctivae normal.      Pupils: Pupils are equal, round, and reactive to light.   Neck:      Musculoskeletal: Normal range of motion and neck supple.   Cardiovascular:      Rate and Rhythm: Normal rate.      Heart sounds: No murmur. No friction rub. No gallop.    Pulmonary:      Effort: Pulmonary effort is normal. No respiratory distress.      Breath sounds: No stridor. Wheezing, rhonchi and rales present.   Chest:      Chest wall: Tenderness present.   Abdominal:      General: Abdomen is flat. Bowel sounds are normal. There is no distension.      Palpations: Abdomen is soft.      Tenderness: There is no tenderness. There is no guarding or rebound.   Musculoskeletal: Normal range of motion.      Right lower leg: No edema.      Left lower leg: No edema.   Skin:     General: Skin is warm.      Findings: No lesion or rash.   Neurological:      General: No focal deficit present.      Mental Status: She is alert and oriented to person, place, and time.   Psychiatric:         Mood and Affect: Mood normal.         Behavior: Behavior normal.           Labs:   Recent Labs     01/31/20  0343 02/01/20  0252 02/02/20  0300   WBC 8.5 9.3 7.4   RBC 2.86* 2.92* 3.06*   HEMOGLOBIN 8.7* 8.7* 9.0*   HEMATOCRIT 26.5* 27.3* 28.6*   MCV 92.7 93.5 93.5   MCH 30.4 29.8 29.4   RDW 70.0* 69.6* 69.0*   PLATELETCT 178 183 187   MPV 9.9 10.3 10.3   NEUTSPOLYS 83.50* 80.00* 65.20   LYMPHOCYTES 9.50* 10.40* 20.00*   MONOCYTES 6.10 6.10 10.40   EOSINOPHILS 0.00 0.00 0.00   BASOPHILS 0.90 0.00 1.70   RBCMORPHOLO Present Present Present     Recent Labs     02/02/20  0300   SODIUM 143   POTASSIUM 4.1   CHLORIDE 103   CO2 30   GLUCOSE 94   BUN 13       Imaging:   DX-CHEST-PORTABLE  (1 VIEW)   Final Result      Stable chest x-ray findings.      DX-CHEST-PORTABLE (1 VIEW)   Final Result      No significant change from prior exam.      DX-ABDOMEN FOR TUBE PLACEMENT   Final Result      Enteric tube terminates over the gastric body      OUTSIDE IMAGES-NUCLEAR MEDICINE, PET   Final Result      DX-CHEST-PORTABLE (1 VIEW)   Final Result      Interval extubation. Otherwise stable exam.      DX-CHEST-PORTABLE (1 VIEW)   Final Result      Stable cardiomegaly and interstitial infiltrates.      DX-ABDOMEN FOR TUBE PLACEMENT   Final Result      Enteric tube tip projects over the stomach.      DX-CHEST-FOR LINE PLACEMENT Perform procedure in: Patient's Room   Final Result            1. A right peripherally inserted catheter with tip projects appropriately over the expected area of the superior vena cava.      2. Endotracheal tube with tip projecting over the mid thoracic trachea.      DX-CHEST-2 VIEWS   Final Result      1.  Worsening mild pulmonary edema and/or multifocal pneumonitis.   2.  Minimal bilateral pleural effusions, slightly greater on the LEFT.   3.  Probable underlying COPD.      CT-HEAD W/O   Final Result      1.  No acute intracranial abnormality.   2.  Osseous metastatic disease.      DX-CHEST-PORTABLE (1 VIEW)   Final Result      No acute cardiopulmonary disease evident.            * Sepsis (HCC)  Assessment & Plan  Sepsis Present on admission, soft bp's and sinus tachycardia low threshold for ICU transfer   SIRS criteria: Tachycardia, with heart rate greater than 90 BPM and Tachypnea, with respirations greater than 20 per minute  Source is presumed to be lungs (Influenza/CAP) at this time, sepsis protocol initiated on admission, fluid resuscitation ordered per protocol. Avoid excess fluids (sepsis 30ml/kg) use early pressors if necessary to prevent ARDS. Monitor for post influenza superimposed pna (MRSA, strep PNA, GAS) serial procal, MRSA nasal Swab and monitor for Viral  induced-HLH.  Treated for Tamiflu 5 day course and completed abx tx. BAL- NGTD.      Plan  Resolved  - Continue to monitor vitals   - Hgb 9 improved, WBC 7.4  - Dysphagia 2 diet; will try to get a hold of speech therapy to reassess as pt really wants a regular diet   - CXR 1/31- stable chest x-ray findings  - Incentive spirometer, aggressive pulmonary toilet      Neck pain  Assessment & Plan  Pt complains of headache for the past 2 days and neck soreness when moving her head. No nuchal rigidity.     Plan:  Try lidocaine patch and continue heating pad; avoid NSAIDs as pt has an allergy to ibuprofen (hives)     Influenza A  Assessment & Plan  Influenza A was ordered by day team 1/25/20 as pt discussed sick contacts at home with fevers. Test initially wasn't read, but after [facility] night float got called about temp 103F, then requested stat flu test, which came back positive for influenza A. They started Tamiflu renally dosed.     Plan:  Completed Tamiflu  tessalon caps scheduled for cough   Monitor vitals     Macrocytic anemia- (present on admission)  Assessment & Plan  Patient with chronic anemia, chart review showing normal B12/Folate, likely secondary to bone metastasis. HgB 9 today, improved from 8.7.    COPD (chronic obstructive pulmonary disease) (HCC)- (present on admission)  Assessment & Plan  Chronic respiratory failure with 3L O2 at home, currently requiring 3L, at baseline. Diffuse expiratory rhonci on exam    - RT protocol  - Albuterol Q6   - Incentive Spirometer    - scheduled duonebs Q4       Delirium  Assessment & Plan  resolved    Acute respiratory failure with hypoxemia (HCC)  Assessment & Plan  Self-extubated          Urinary retention  Assessment & Plan  Pt presented with 3 days of incontinence. She was found to be retaining >900cc on bladder scan morning of 1/25/20, then had an episode of urination in bed that was not able to be measured; repeat scan showed 510cc in the bladder and we ordered a herndon  catheter     Plan:  [place]don cath with strict I&Os and evaluate with physician when it is appropriate to d/c herndon        Displaced fracture of greater trochanter of right femur (HCC)- (present on admission)  Assessment & Plan  Admitted for during last admission (Dec 2019), Ortho consulted and patient deemed non-surgical. PT had recommended home health, per family, patient was denied home health. Did not go to physical therapy as prescribed on last admission      - CTM  -PT/OT   -PT recommends therapy 3x per day or [facility] if pt agrees, can discuss with case management regarding placement to [facility]     Breast cancer (HCC)- (present on admission)  Assessment & Plan  History of Metastatic Breast Cancer, evaluated by Dr. Wynne during last admission. Mets to L and Spine, Pelvis. CT head on admission showing Mets to skull, no prior records of it. Brought by family for concern given weakness and increased confusion, patient reporting new incontinence. Repeat MR of Spine Chart review shows Dr. Wynne recommended palliative care, palliative was consulted during last admission, Advance Directive stating patient's POA is her , Jones Cota.  Discussed code status, full code at this time.      Plan:  - Spoke to Dr. Khan (Oncology) and she recommends holding patient's Verzenio until the sepsis resolves, managing her pain, and then can restart Verzenio on discharge; 4 days of treatment with Verzenio prior to this admission is too soon to predict response   - Follow-up palliative care discussions with family   - Pain control- cindy Oxycontin 10 mg q12, oxy 1-2 tabs q4 prn, lidocaine patches to areas of pain  -May consider going up on oxycontin if pt not getting adequate pain management and trying to find records of her recent opiate prescriptions, as she likely been getting them in [place]. No [place] records on file.   -Considering palliative radiation therapy Monday 2/3/20 to set up a plan for future treatments prior to discharge        Mood disorder (HCC)- (present on admission)  Assessment & Plan  - Psych consult for worsening MDD/depressed mood/anxiety sx  - Psych 1/29 - continue celexa 200 qd for depression, no new meds until delirium resolves

## 2020-02-02 NOTE — PROGRESS NOTES
Verified patients narcotics with [facility] in [place]  Percocet filled 1/14/20 X 30 day supply   Klonopin filled 1/21/20 x 30 day supply

## 2020-02-02 NOTE — CARE PLAN
Problem: Communication  Goal: The ability to communicate needs accurately and effectively will improve  Outcome: PROGRESSING AS EXPECTED     Problem: Safety  Goal: Will remain free from injury  Outcome: PROGRESSING AS EXPECTED  Goal: Will remain free from falls  Outcome: PROGRESSING AS EXPECTED     Problem: Knowledge Deficit  Goal: Knowledge of disease process/condition, treatment plan, diagnostic tests, and medications will improve  Outcome: PROGRESSING AS EXPECTED  Goal: Knowledge of the prescribed therapeutic regimen will improve  Outcome: PROGRESSING AS EXPECTED     Problem: Skin Integrity  Goal: Risk for impaired skin integrity will decrease  Outcome: PROGRESSING AS EXPECTED

## 2020-02-02 NOTE — THERAPY
"Speech Language Therapy dysphagia treatment completed.   Functional Status:  Patient c/o disliking her current meals. She has [employer] at bedside and has been drinking this for a couple days (non thickened). She verbalized understanding of swallow precautions and signs of aspiration and denies having any of these. She consumed water and soft solids without overt signs of aspiration. Mastication was mildly prolonged (due to poor dentition) but still effective. Recommend upgrade to Dental Soft, thin liquid diet. She complains of not being hungry \"I typically only ever have one meal a day.\" She does report drinking protein shakes in place of meals and drinks one cup of coffee as well as coke due to nausea from her medications. She requested information regarding how to get protein from food sources that aren't meat.  left for RD.   Recommendations: Dental soft, thin liquids. Patient requesting protein shakes   Plan of Care: Will benefit from Speech Therapy 3 times per week  Post-Acute Therapy: Anticipate that the patient will have no further speech therapy needs after discharge from the hospital.      See \"Rehab Therapy-Acute\" Patient Summary Report for complete documentation.     "

## 2020-02-02 NOTE — ASSESSMENT & PLAN NOTE
Pt complains of headache for the past 2 days and neck soreness when moving her head. No nuchal rigidity.     Plan:  Try lidocaine patch and continue heating pad; avoid NSAIDs as pt has an allergy to ibuprofen (hives)

## 2020-02-03 ENCOUNTER — PATIENT OUTREACH (OUTPATIENT)
Dept: HEALTH INFORMATION MANAGEMENT | Facility: OTHER | Age: 45
End: 2020-02-03

## 2020-02-03 VITALS
HEIGHT: 64 IN | SYSTOLIC BLOOD PRESSURE: 118 MMHG | BODY MASS INDEX: 18.97 KG/M2 | HEART RATE: 68 BPM | WEIGHT: 111.11 LBS | OXYGEN SATURATION: 95 % | TEMPERATURE: 98.4 F | DIASTOLIC BLOOD PRESSURE: 45 MMHG | RESPIRATION RATE: 16 BRPM

## 2020-02-03 LAB
ANION GAP SERPL CALC-SCNC: 6 MMOL/L (ref 0–11.9)
BUN SERPL-MCNC: 10 MG/DL (ref 8–22)
CALCIUM SERPL-MCNC: 8.6 MG/DL (ref 8.5–10.5)
CHLORIDE SERPL-SCNC: 104 MMOL/L (ref 96–112)
CO2 SERPL-SCNC: 28 MMOL/L (ref 20–33)
CREAT SERPL-MCNC: 0.73 MG/DL (ref 0.5–1.4)
ERYTHROCYTE [DISTWIDTH] IN BLOOD BY AUTOMATED COUNT: 69 FL (ref 35.9–50)
GLUCOSE SERPL-MCNC: 107 MG/DL (ref 65–99)
HCT VFR BLD AUTO: 29.8 % (ref 37–47)
HGB BLD-MCNC: 9.2 G/DL (ref 12–16)
MCH RBC QN AUTO: 29.8 PG (ref 27–33)
MCHC RBC AUTO-ENTMCNC: 30.9 G/DL (ref 33.6–35)
MCV RBC AUTO: 96.4 FL (ref 81.4–97.8)
PLATELET # BLD AUTO: 175 K/UL (ref 164–446)
PMV BLD AUTO: 10.2 FL (ref 9–12.9)
POTASSIUM SERPL-SCNC: 4 MMOL/L (ref 3.6–5.5)
RBC # BLD AUTO: 3.09 M/UL (ref 4.2–5.4)
SODIUM SERPL-SCNC: 138 MMOL/L (ref 135–145)
WBC # BLD AUTO: 7.9 K/UL (ref 4.8–10.8)

## 2020-02-03 PROCEDURE — 77334 RADIATION TREATMENT AID(S): CPT | Mod: 26 | Performed by: RADIOLOGY

## 2020-02-03 PROCEDURE — 77263 THER RADIOLOGY TX PLNG CPLX: CPT | Performed by: RADIOLOGY

## 2020-02-03 PROCEDURE — 700111 HCHG RX REV CODE 636 W/ 250 OVERRIDE (IP): Performed by: STUDENT IN AN ORGANIZED HEALTH CARE EDUCATION/TRAINING PROGRAM

## 2020-02-03 PROCEDURE — 77470 SPECIAL RADIATION TREATMENT: CPT | Performed by: RADIOLOGY

## 2020-02-03 PROCEDURE — 80048 BASIC METABOLIC PNL TOTAL CA: CPT

## 2020-02-03 PROCEDURE — 99239 HOSP IP/OBS DSCHRG MGMT >30: CPT | Mod: GC | Performed by: INTERNAL MEDICINE

## 2020-02-03 PROCEDURE — 700101 HCHG RX REV CODE 250: Performed by: STUDENT IN AN ORGANIZED HEALTH CARE EDUCATION/TRAINING PROGRAM

## 2020-02-03 PROCEDURE — 700102 HCHG RX REV CODE 250 W/ 637 OVERRIDE(OP): Performed by: STUDENT IN AN ORGANIZED HEALTH CARE EDUCATION/TRAINING PROGRAM

## 2020-02-03 PROCEDURE — 77334 RADIATION TREATMENT AID(S): CPT | Performed by: RADIOLOGY

## 2020-02-03 PROCEDURE — 77470 SPECIAL RADIATION TREATMENT: CPT | Mod: 26 | Performed by: RADIOLOGY

## 2020-02-03 PROCEDURE — A9270 NON-COVERED ITEM OR SERVICE: HCPCS | Performed by: PSYCHIATRY & NEUROLOGY

## 2020-02-03 PROCEDURE — 85027 COMPLETE CBC AUTOMATED: CPT

## 2020-02-03 PROCEDURE — A9270 NON-COVERED ITEM OR SERVICE: HCPCS | Performed by: STUDENT IN AN ORGANIZED HEALTH CARE EDUCATION/TRAINING PROGRAM

## 2020-02-03 PROCEDURE — 77290 THER RAD SIMULAJ FIELD CPLX: CPT | Mod: 26 | Performed by: RADIOLOGY

## 2020-02-03 PROCEDURE — 700101 HCHG RX REV CODE 250: Performed by: PSYCHIATRY & NEUROLOGY

## 2020-02-03 PROCEDURE — 77290 THER RAD SIMULAJ FIELD CPLX: CPT | Performed by: RADIOLOGY

## 2020-02-03 PROCEDURE — 700102 HCHG RX REV CODE 250 W/ 637 OVERRIDE(OP): Performed by: PSYCHIATRY & NEUROLOGY

## 2020-02-03 RX ORDER — TIOTROPIUM BROMIDE 18 UG/1
18 CAPSULE ORAL; RESPIRATORY (INHALATION) DAILY
Qty: 30 CAP | Refills: 0 | Status: SHIPPED | OUTPATIENT
Start: 2020-02-03

## 2020-02-03 RX ORDER — ACETAMINOPHEN 500 MG
1000 TABLET ORAL EVERY 8 HOURS
Qty: 180 TAB | Refills: 0 | Status: SHIPPED | OUTPATIENT
Start: 2020-02-03 | End: 2020-02-21

## 2020-02-03 RX ORDER — BUDESONIDE AND FORMOTEROL FUMARATE DIHYDRATE 80; 4.5 UG/1; UG/1
2 AEROSOL RESPIRATORY (INHALATION) 2 TIMES DAILY
Qty: 1 INHALER | Refills: 0 | Status: SHIPPED | OUTPATIENT
Start: 2020-02-03

## 2020-02-03 RX ORDER — BENZONATATE 100 MG/1
100 CAPSULE ORAL 3 TIMES DAILY
Qty: 60 CAP | Refills: 0 | Status: SHIPPED | OUTPATIENT
Start: 2020-02-03

## 2020-02-03 RX ORDER — AMOXICILLIN 250 MG
2 CAPSULE ORAL 2 TIMES DAILY
Qty: 120 TAB | Refills: 0 | Status: SHIPPED | OUTPATIENT
Start: 2020-02-03 | End: 2020-03-04

## 2020-02-03 RX ORDER — MIDODRINE HYDROCHLORIDE 10 MG/1
10 TABLET ORAL
Qty: 60 TAB | Refills: 0 | Status: SHIPPED | OUTPATIENT
Start: 2020-02-03

## 2020-02-03 RX ORDER — CALCIUM CARBONATE 500 MG/1
500 TABLET, CHEWABLE ORAL 2 TIMES DAILY
Qty: 30 TAB | Refills: 0 | Status: SHIPPED | OUTPATIENT
Start: 2020-02-03

## 2020-02-03 RX ORDER — BUDESONIDE AND FORMOTEROL FUMARATE DIHYDRATE 80; 4.5 UG/1; UG/1
2 AEROSOL RESPIRATORY (INHALATION)
Status: DISCONTINUED | OUTPATIENT
Start: 2020-02-03 | End: 2020-02-03 | Stop reason: HOSPADM

## 2020-02-03 RX ORDER — POLYETHYLENE GLYCOL 3350 17 G/17G
17 POWDER, FOR SOLUTION ORAL DAILY
Qty: 30 EACH | Refills: 0 | Status: SHIPPED | OUTPATIENT
Start: 2020-02-03 | End: 2020-03-04

## 2020-02-03 RX ADMIN — ANTACID TABLETS 500 MG: 500 TABLET, CHEWABLE ORAL at 05:09

## 2020-02-03 RX ADMIN — GABAPENTIN 100 MG: 100 CAPSULE ORAL at 05:09

## 2020-02-03 RX ADMIN — ENOXAPARIN SODIUM 40 MG: 100 INJECTION SUBCUTANEOUS at 05:10

## 2020-02-03 RX ADMIN — BENZONATATE 100 MG: 100 CAPSULE ORAL at 05:09

## 2020-02-03 RX ADMIN — LIDOCAINE 1 PATCH: 50 PATCH CUTANEOUS at 05:10

## 2020-02-03 RX ADMIN — OXYCODONE HYDROCHLORIDE AND ACETAMINOPHEN 2 TABLET: 5; 325 TABLET ORAL at 09:44

## 2020-02-03 RX ADMIN — BENZONATATE 100 MG: 100 CAPSULE ORAL at 11:55

## 2020-02-03 RX ADMIN — CITALOPRAM HYDROBROMIDE 20 MG: 20 TABLET ORAL at 05:09

## 2020-02-03 RX ADMIN — OXYCODONE HYDROCHLORIDE AND ACETAMINOPHEN 2 TABLET: 5; 325 TABLET ORAL at 01:03

## 2020-02-03 RX ADMIN — MIDODRINE HYDROCHLORIDE 10 MG: 5 TABLET ORAL at 11:55

## 2020-02-03 RX ADMIN — LIDOCAINE 1 PATCH: 50 PATCH CUTANEOUS at 11:55

## 2020-02-03 RX ADMIN — OXYCODONE HYDROCHLORIDE AND ACETAMINOPHEN 2 TABLET: 5; 325 TABLET ORAL at 05:09

## 2020-02-03 RX ADMIN — MIDODRINE HYDROCHLORIDE 10 MG: 5 TABLET ORAL at 06:21

## 2020-02-03 RX ADMIN — OXYCODONE HYDROCHLORIDE 20 MG: 10 TABLET, FILM COATED, EXTENDED RELEASE ORAL at 05:09

## 2020-02-03 RX ADMIN — GABAPENTIN 100 MG: 100 CAPSULE ORAL at 11:55

## 2020-02-03 RX ADMIN — OXYCODONE HYDROCHLORIDE AND ACETAMINOPHEN 2 TABLET: 5; 325 TABLET ORAL at 14:17

## 2020-02-03 ASSESSMENT — ENCOUNTER SYMPTOMS
SHORTNESS OF BREATH: 0
HEARTBURN: 0
NECK PAIN: 1
ORTHOPNEA: 0
DIARRHEA: 1
HEMOPTYSIS: 0
SENSORY CHANGE: 0
INSOMNIA: 0
CHILLS: 0
COUGH: 0
DOUBLE VISION: 0
NAUSEA: 0
PHOTOPHOBIA: 0
WEAKNESS: 1
PALPITATIONS: 0
VOMITING: 0
SPUTUM PRODUCTION: 0
DEPRESSION: 1
MEMORY LOSS: 0
CLAUDICATION: 0
DIZZINESS: 0
SORE THROAT: 0
FEVER: 0
BLURRED VISION: 0
BACK PAIN: 1
ABDOMINAL PAIN: 0
WHEEZING: 0
HEADACHES: 0
BLOOD IN STOOL: 0
STRIDOR: 0

## 2020-02-03 NOTE — CARE PLAN
Problem: Nutritional:  Goal: Achieve adequate nutritional intake  Description  Patient will consume >50% of meals   Outcome: MET    Cortrak removed and pt on oral diet. Pt reports eating % of meals. RD to sign off, please consult RD as needed.

## 2020-02-03 NOTE — PROGRESS NOTES
Patient discharged to home at this time. Patient educated on discharge instructions, home medications and follow up appointment. Patient verbalized understanding. Patient discharged in a stable condition.    Central line DC'd, tip intact, patient layed flat for 30 minutes. No signs of bleeding.

## 2020-02-03 NOTE — DISCHARGE INSTRUCTIONS
Discharge Instructions    Discharged to home by car with relative. Discharged via wheelchair, hospital escort: Yes.  Special equipment needed: Not Applicable    Be sure to schedule a follow-up appointment with your primary care doctor or any specialists as instructed.     Discharge Plan:     Follow up with radiation appointment next week.    Diet Plan: Discussed  Activity Level: Discussed  Smoking Cessation Offered: Patient Refused  Confirmed Follow up Appointment: Patient to Call and Schedule Appointment  Confirmed Symptoms Management: Discussed  Medication Reconciliation Updated: Yes  Influenza Vaccine Indication: Patient Refuses    I understand that a diet low in cholesterol, fat, and sodium is recommended for good health. Unless I have been given specific instructions below for another diet, I accept this instruction as my diet prescription.   Other diet: Regular    Special Instructions: None    · Is patient discharged on Warfarin / Coumadin?   No   Influenza, Adult  Influenza, more commonly known as “the flu,” is a viral infection that primarily affects the respiratory tract. The respiratory tract includes organs that help you breathe, such as the lungs, nose, and throat. The flu causes many common cold symptoms, as well as a high fever and body aches.  The flu spreads easily from person to person (is contagious). Getting a flu shot (influenza vaccination) every year is the best way to prevent influenza.  What are the causes?  Influenza is caused by a virus. You can catch the virus by:  · Breathing in droplets from an infected person's cough or sneeze.  · Touching something that was recently contaminated with the virus and then touching your mouth, nose, or eyes.  What increases the risk?  The following factors may make you more likely to get the flu:  · Not cleaning your hands frequently with soap and water or alcohol-based hand .  · Having close contact with many people during cold and flu  season.  · Touching your mouth, eyes, or nose without washing or sanitizing your hands first.  · Not drinking enough fluids or not eating a healthy diet.  · Not getting enough sleep or exercise.  · Being under a high amount of stress.  · Not getting a yearly (annual) flu shot.  You may be at a higher risk of complications from the flu, such as a severe lung infection (pneumonia), if you:  · Are over the age of 65.  · Are pregnant.  · Have a weakened disease-fighting system (immune system). You may have a weakened immune system if you:  ¨ Have HIV or AIDS.  ¨ Are undergoing chemotherapy.  ¨ Are taking medicines that reduce the activity of (suppress) the immune system.  · Have a long-term (chronic) illness, such as heart disease, kidney disease, diabetes, or lung disease.  · Have a liver disorder.  · Are obese.  · Have anemia.  What are the signs or symptoms?  Symptoms of this condition typically last 4-10 days and may include:  · Fever.  · Chills.  · Headache, body aches, or muscle aches.  · Sore throat.  · Cough.  · Runny or congested nose.  · Chest discomfort and cough.  · Poor appetite.  · Weakness or tiredness (fatigue).  · Dizziness.  · Nausea or vomiting.  How is this diagnosed?  This condition may be diagnosed based on your medical history and a physical exam. Your health care provider may do a nose or throat swab test to confirm the diagnosis.  How is this treated?  If influenza is detected early, you can be treated with antiviral medicine that can reduce the length of your illness and the severity of your symptoms. This medicine may be given by mouth (orally) or through an IV tube that is inserted in one of your veins.  The goal of treatment is to relieve symptoms by taking care of yourself at home. This may include taking over-the-counter medicines, drinking plenty of fluids, and adding humidity to the air in your home.  In some cases, influenza goes away on its own. Severe influenza or complications from  influenza may be treated in a hospital.  Follow these instructions at home:  · Take over-the-counter and prescription medicines only as told by your health care provider.  · Use a cool mist humidifier to add humidity to the air in your home. This can make breathing easier.  · Rest as needed.  · Drink enough fluid to keep your urine clear or pale yellow.  · Cover your mouth and nose when you cough or sneeze.  · Wash your hands with soap and water often, especially after you cough or sneeze. If soap and water are not available, use hand .  · Stay home from work or school as told by your health care provider. Unless you are visiting your health care provider, try to avoid leaving home until your fever has been gone for 24 hours without the use of medicine.  · Keep all follow-up visits as told by your health care provider. This is important.  How is this prevented?  · Getting an annual flu shot is the best way to avoid getting the flu. You may get the flu shot in late summer, fall, or winter. Ask your health care provider when you should get your flu shot.  · Wash your hands often or use hand  often.  · Avoid contact with people who are sick during cold and flu season.  · Eat a healthy diet, drink plenty of fluids, get enough sleep, and exercise regularly.  Contact a health care provider if:  · You develop new symptoms.  · You have:  ¨ Chest pain.  ¨ Diarrhea.  ¨ A fever.  · Your cough gets worse.  · You produce more mucus.  · You feel nauseous or you vomit.  Get help right away if:  · You develop shortness of breath or difficulty breathing.  · Your skin or nails turn a bluish color.  · You have severe pain or stiffness in your neck.  · You develop a sudden headache or sudden pain in your face or ear.  · You cannot stop vomiting.  This information is not intended to replace advice given to you by your health care provider. Make sure you discuss any questions you have with your health care  provider.  Document Released: 12/15/2001 Document Revised: 05/25/2017 Document Reviewed: 10/11/2016  [employer] Patient Education © 2017 [employer]      Depression / Suicide Risk    As you are discharged from this [facility] facility, it is important to learn how to keep safe from harming yourself.    Recognize the warning signs:  · Abrupt changes in personality, positive or negative- including increase in energy   · Giving away possessions  · Change in eating patterns- significant weight changes-  positive or negative  · Change in sleeping patterns- unable to sleep or sleeping all the time   · Unwillingness or inability to communicate  · Depression  · Unusual sadness, discouragement and loneliness  · Talk of wanting to die  · Neglect of personal appearance   · Rebelliousness- reckless behavior  · Withdrawal from people/activities they love  · Confusion- inability to concentrate     If you or a loved one observes any of these behaviors or has concerns about self-harm, here's what you can do:  · Talk about it- your feelings and reasons for harming yourself  · Remove any means that you might use to hurt yourself (examples: pills, rope, extension cords, firearm)  · Get professional help from the community (Mental Health, Substance Abuse, psychological counseling)  · Do not be alone:Call your Safe Contact- someone whom you trust who will be there for you.  · Call your local CRISIS HOTLINE 168-6390 or 575-818-0569  · Call your local Children's Mobile Crisis Response Team [place] (727) 896-8037 or www.[employer] 360  · Call the toll free National Suicide Prevention Hotlines   · National Suicide Prevention Lifeline 982-938-SAPB (0743)  · National Hope Line Network 800-SUICIDE (180-2669)

## 2020-02-03 NOTE — PROGRESS NOTES
Daily Progress Note:     Date of Service: 2/3/2020  Primary Team: [facility] FLOYD Yellow Team   Attending: Cata Cheung MD   Senior Resident: Dr. Serna  Intern: Dr. Lamas  Contact:  [phone]    Chief Complaint:   Shortness of breath, cough, and fever      44 y.o. female with hx of breast CA, mets to bone (base of skull and cervical and lumbar spine), s/p right mastectomy, on immunotherapy (started 4 days prior), COPD on 3L home O2,  who was initially admitted 1/25 for SOB and found to have influenza A pneumonia.    Subjective:  The patient lay in bed sipping water. She expressed her desire to go home. She declined rehabilitation. As per the patient, she prefers to do exercises at home. She does report musculoskeletal chest discomfort and chronic neck and back pain controlled with medications.  The patient has had a non productive cough and has always required 3L of supplemental oxygen for  COPD which is her baseline.Speech evaluated the patient and progressed her diet further.     Hospital course:     2/1/2020  Pt's vital signs are stable this morning, her pain is well controlled on the current regiment. She is planning on meeting with radiation oncology inpatient likely earliest on Monday to discuss starting palliative radiation therapy. Her PO immunotherapy (Verzenio BID) is currently on hold during the acute illness per heme onc recs. She is still complaining of frequent cough so we will give her scheduled tessalon caps. We will also give one dose of PO lasix 40mg as she had bibasilar crackles and reassess volume status tomorrow. NG tube is out, we will see how she tolerates dysphagia 2 diet. Morales is in place. Will have pt work with PT/OT as well.     2/2/2020  Pt worked with physical therapy yesterday. They recommend PT 3x per week or rehab post discharge. Pt lives in [place] and says theres no outpatient pt by where she is at. She is thinking about the rehab option. Pt really wants to go home  and we discussed potential initiation for palliative radiation therapy tomorrow. Will discuss with other consultants before making a decision to discharge to see what else needs to be done inpatient. Will try to contact speech therapy to see if pt can be reevaluated for a regular diet, as she is not a fan of dysphagia 2. Will also try to find [place] opiate records to see what pt has been prescribed recently. We are considering to go up on her long acting pain medicine if she continues to exhibit pain despite the current regiment.     2/3/2020  Patient remained hemodynamically stable with hemoglobin of 9.2.  Pt was evaluated by speech therapy recommended advancing her diet.  The patient's medications were reconciled by pharmacy for Percocet and clonazepam.  The last fill being 14th January and 21st January respectively for 30 days.  To be discharged home on 15 days worth of the above medications.  Patient to undergo palliative radiation therapy today.  Given the soft blood pressures she is being discharged on Midodrine.  Patient declined rehabilitation, would prefer to go home. The patient and her  were explained the recommendations by Physical therapy and Occupational Therapy. The the family has 3 full-time helping hands for the patient. Scheduled appointment with PCP and Dr Wynne.      Consultants/Specialty:  Radiation oncology  Discussed with heme onc   ICU team     Review of Systems:  CREATE MACRO  Review of Systems   Constitutional: Negative for chills and fever.   HENT: Negative for congestion, nosebleeds and sore throat.    Eyes: Negative for blurred vision, double vision and photophobia.   Respiratory: Negative for cough, hemoptysis, sputum production, shortness of breath, wheezing and stridor.    Cardiovascular: Negative for chest pain, palpitations, orthopnea, claudication and leg swelling.        Chest wall tenderness more pronounced substernal   Gastrointestinal: Positive for diarrhea. Negative  for abdominal pain, blood in stool, heartburn, nausea and vomiting.   Genitourinary: Negative for dysuria, frequency, hematuria and urgency.   Musculoskeletal: Positive for back pain, joint pain and neck pain.   Neurological: Positive for weakness. Negative for dizziness, sensory change and headaches.   Psychiatric/Behavioral: Positive for depression. Negative for memory loss. The patient does not have insomnia.        Objective Data:   Physical Exam:   Vitals:   Temp:  [36.4 °C (97.6 °F)-37 °C (98.6 °F)] 36.9 °C (98.4 °F)  Pulse:  [64-71] 68  Resp:  [16-20] 16  BP: (107-118)/(42-50) 118/45  SpO2:  [95 %-100 %] 95 %   CREATE MACRO BE SURE THAT THIS IS PERTINENT TO YOUR PATIENT!  Physical Exam  Constitutional:       General: She is not in acute distress.  HENT:      Head: Normocephalic and atraumatic.      Nose: Nose normal. No congestion or rhinorrhea.      Mouth/Throat:      Mouth: Mucous membranes are moist.      Pharynx: No oropharyngeal exudate or posterior oropharyngeal erythema.   Eyes:      General:         Right eye: No discharge.         Left eye: No discharge.      Conjunctiva/sclera: Conjunctivae normal.   Neck:      Musculoskeletal: Normal range of motion and neck supple. No neck rigidity.   Cardiovascular:      Rate and Rhythm: Normal rate and regular rhythm.      Pulses: Normal pulses.      Heart sounds: Normal heart sounds. No murmur.   Pulmonary:      Effort: No respiratory distress.      Breath sounds: No stridor. Wheezing present.      Comments: Decreased respiratory due to pain  Abdominal:      General: Abdomen is flat. Bowel sounds are normal. There is no distension.      Tenderness: There is no tenderness.   Musculoskeletal:         General: No swelling, tenderness or deformity.   Skin:     General: Skin is warm.      Coloration: Skin is not jaundiced or pale.   Neurological:      General: No focal deficit present.      Mental Status: She is alert and oriented to person, place, and time. Mental  status is at baseline.      Sensory: No sensory deficit.   Psychiatric:         Mood and Affect: Mood normal.           Labs:   Lab Results   Component Value Date/Time    SODIUM 138 02/03/2020 03:25 AM    POTASSIUM 4.0 02/03/2020 03:25 AM    CHLORIDE 104 02/03/2020 03:25 AM    CO2 28 02/03/2020 03:25 AM    GLUCOSE 107 (H) 02/03/2020 03:25 AM    BUN 10 02/03/2020 03:25 AM    CREATININE 0.73 02/03/2020 03:25 AM      Lab Results   Component Value Date/Time    WBC 7.9 02/03/2020 03:25 AM    RBC 3.09 (L) 02/03/2020 03:25 AM    HEMOGLOBIN 9.2 (L) 02/03/2020 03:25 AM    HEMATOCRIT 29.8 (L) 02/03/2020 03:25 AM    MCV 96.4 02/03/2020 03:25 AM    MCH 29.8 02/03/2020 03:25 AM    MCHC 30.9 (L) 02/03/2020 03:25 AM    MPV 10.2 02/03/2020 03:25 AM    NEUTSPOLYS 65.20 02/02/2020 03:00 AM    LYMPHOCYTES 20.00 (L) 02/02/2020 03:00 AM    MONOCYTES 10.40 02/02/2020 03:00 AM    EOSINOPHILS 0.00 02/02/2020 03:00 AM    BASOPHILS 1.70 02/02/2020 03:00 AM    HYPOCHROMIA 1+ 02/02/2020 03:00 AM    ANISOCYTOSIS 1+ 02/02/2020 03:00 AM      Imaging:   DX-CHEST-PORTABLE (1 VIEW)   Final Result      Stable chest x-ray findings.      DX-CHEST-PORTABLE (1 VIEW)   Final Result      No significant change from prior exam.      DX-ABDOMEN FOR TUBE PLACEMENT   Final Result      Enteric tube terminates over the gastric body      OUTSIDE IMAGES-NUCLEAR MEDICINE, PET   Final Result      DX-CHEST-PORTABLE (1 VIEW)   Final Result      Interval extubation. Otherwise stable exam.      DX-CHEST-PORTABLE (1 VIEW)   Final Result      Stable cardiomegaly and interstitial infiltrates.      DX-ABDOMEN FOR TUBE PLACEMENT   Final Result      Enteric tube tip projects over the stomach.      DX-CHEST-FOR LINE PLACEMENT Perform procedure in: Patient's Room   Final Result            1. A right peripherally inserted catheter with tip projects appropriately over the expected area of the superior vena cava.      2. Endotracheal tube with tip projecting over the mid thoracic  trachea.      DX-CHEST-2 VIEWS   Final Result      1.  Worsening mild pulmonary edema and/or multifocal pneumonitis.   2.  Minimal bilateral pleural effusions, slightly greater on the LEFT.   3.  Probable underlying COPD.      CT-HEAD W/O   Final Result      1.  No acute intracranial abnormality.   2.  Osseous metastatic disease.      DX-CHEST-PORTABLE (1 VIEW)   Final Result      No acute cardiopulmonary disease evident.          * Sepsis (HCC)  Assessment & Plan  Sepsis Present on admission, soft bp's and sinus tachycardia low threshold for ICU transfer   SIRS criteria: Tachycardia, with heart rate greater than 90 BPM and Tachypnea, with respirations greater than 20 per minute  Source is presumed to be lungs (Influenza/CAP) at this time, sepsis protocol initiated on admission, fluid resuscitation ordered per protocol. Avoid excess fluids (sepsis 30ml/kg) use early pressors if necessary to prevent ARDS. Monitor for post influenza superimposed pna (MRSA, strep PNA, GAS) serial procal, MRSA nasal Swab and monitor for Viral induced-HLH.  Treated for Tamiflu 5 day course and completed abx tx. BAL- NGTD.      Plan  Resolved  - Continue to monitor vitals   - Hgb 9 improved, WBC 7.4  - Dysphagia 2 diet; will try to get a hold of speech therapy to reassess as pt really wants a regular diet   - CXR 1/31- stable chest x-ray findings  - Incentive spirometer    Neck pain  Assessment & Plan  Pt complains of headache for the past 2 days and neck soreness when moving her head. No nuchal rigidity.     Plan:  Try lidocaine patch and continue heating pad; avoid NSAIDs as pt has an allergy to ibuprofen (hives)    Influenza A  Assessment & Plan  Influenza A was ordered by day team 1/25/20 as pt discussed sick contacts at home with fevers. Test initially wasn't read, but after [facility] night float got called about temp 103F, then requested stat flu test, which came back positive for influenza A. They started Tamiflu renally dosed.      Plan:  Completed Tamiflu  tessalon caps scheduled for cough    Macrocytic anemia- (present on admission)  Assessment & Plan  Patient with chronic anemia, chart review showing normal B12/Folate, likely secondary to bone metastasis.   HgB 9.2 today,stable since yesterday, improved from 8.7.    COPD (chronic obstructive pulmonary disease) (HCC)- (present on admission)  Assessment & Plan  Chronic respiratory failure with 3L O2 at home, currently requiring 3L, at baseline. Diffuse expiratory rhonci on exam    - Incentive Spirometer    - Spiriva and Symbicort  - Follow up with PCP    Delirium  Assessment & Plan  resolved    Acute respiratory failure with hypoxemia (HCC)  Assessment & Plan  Self-extubated          Urinary retention  Assessment & Plan  Pt presented with 3 days of incontinence. She was found to be retaining >900cc on bladder scan morning of 1/25/20, then had an episode of urination in bed that was not able to be measured; repeat scan showed 510cc in the bladder and we ordered a herndon catheter     Plan:  Ok to remove herndon and d/c patient    Displaced fracture of greater trochanter of right femur (HCC)- (present on admission)  Assessment & Plan  Admitted for during last admission (Dec 2019), Ortho consulted and patient deemed non-surgical. PT had recommended home health, per family, patient was denied home health. Did not go to physical therapy as prescribed on last admission      - CTM  -PT/OT   -PT recommends therapy 3x per day or [facility] if pt agrees  -Patient declined Rehab, would like to go home    Breast cancer (HCC)- (present on admission)  Assessment & Plan  History of Metastatic Breast Cancer, evaluated by Dr. Wynne during last admission. Mets to L and Spine, Pelvis. CT head on admission showing Mets to skull, no prior records of it. Brought by family for concern given weakness and increased confusion, patient reporting new incontinence. Repeat MR of Spine Chart review shows Dr. Wynne recommended  palliative care, palliative was consulted during last admission, Advance Directive stating patient's POA is her , Jones Cota.  Discussed code status, full code at this time.      Plan:  - Spoke to Dr. Khan (Oncology) and she recommends holding patient's Verzenio until the sepsis resolves, managing her pain, and then can restart Verzenio on discharge; 4 days of treatment with Verzenio prior to this admission is too soon to predict response   - Palliative  has had discussion with Patient and family  - Pain control- cindy Oxycontin 10 mg q12, oxy 1-2 tabs q4 prn, lidocaine patches to areas of pain  - Percocet for 15 days  - palliative radiation therapy Monday 2/3/20   - Patient and  declined rehabilitation, were explained the recommendations by PT and OT  - Follow up appointment with PCP and Dr Wynne scheduled    Mood disorder (HCC)- (present on admission)  Assessment & Plan  - Psych consult for worsening MDD/depressed mood/anxiety sx  - Psych 1/29 - continue celexa 200 qd for depression

## 2020-02-04 ENCOUNTER — HOSPITAL ENCOUNTER (OUTPATIENT)
Dept: RADIATION ONCOLOGY | Facility: MEDICAL CENTER | Age: 45
End: 2020-02-29
Attending: RADIOLOGY
Payer: COMMERCIAL

## 2020-02-05 PROCEDURE — 77295 3-D RADIOTHERAPY PLAN: CPT | Performed by: RADIOLOGY

## 2020-02-05 PROCEDURE — 77334 RADIATION TREATMENT AID(S): CPT | Performed by: RADIOLOGY

## 2020-02-05 PROCEDURE — 77300 RADIATION THERAPY DOSE PLAN: CPT | Performed by: RADIOLOGY

## 2020-02-05 PROCEDURE — 77334 RADIATION TREATMENT AID(S): CPT | Mod: 26 | Performed by: RADIOLOGY

## 2020-02-05 PROCEDURE — 77300 RADIATION THERAPY DOSE PLAN: CPT | Mod: 26 | Performed by: RADIOLOGY

## 2020-02-05 PROCEDURE — 77295 3-D RADIOTHERAPY PLAN: CPT | Mod: 26 | Performed by: RADIOLOGY

## 2020-02-18 ENCOUNTER — HOSPITAL ENCOUNTER (OUTPATIENT)
Dept: RADIATION ONCOLOGY | Facility: MEDICAL CENTER | Age: 45
End: 2020-02-18

## 2020-02-18 LAB
CHEMOTHERAPY INFUSION START DATE: NORMAL
CHEMOTHERAPY RECORDS: 2000
CHEMOTHERAPY RECORDS: 4
CHEMOTHERAPY RECORDS: NORMAL
CHEMOTHERAPY RX CANCER: NORMAL
DATE 1ST CHEMO CANCER: NORMAL
RAD ONC ARIA COURSE LAST TREATMENT DATE: NORMAL
RAD ONC ARIA COURSE TREATMENT ELAPSED DAYS: NORMAL
RAD ONC ARIA REFERENCE POINT DOSAGE GIVEN TO DATE: 4
RAD ONC ARIA REFERENCE POINT DOSAGE GIVEN TO DATE: 4.15
RAD ONC ARIA REFERENCE POINT ID: NORMAL
RAD ONC ARIA REFERENCE POINT ID: NORMAL
RAD ONC ARIA REFERENCE POINT SESSION DOSAGE GIVEN: 4
RAD ONC ARIA REFERENCE POINT SESSION DOSAGE GIVEN: 4.15

## 2020-02-18 PROCEDURE — 77280 THER RAD SIMULAJ FIELD SMPL: CPT | Performed by: RADIOLOGY

## 2020-02-18 PROCEDURE — 77412 RADIATION TX DELIVERY LVL 3: CPT | Performed by: RADIOLOGY

## 2020-02-18 PROCEDURE — 77280 THER RAD SIMULAJ FIELD SMPL: CPT | Mod: 26 | Performed by: RADIOLOGY

## 2020-02-19 ENCOUNTER — PATIENT OUTREACH (OUTPATIENT)
Dept: OTHER | Facility: MEDICAL CENTER | Age: 45
End: 2020-02-19

## 2020-02-19 ENCOUNTER — APPOINTMENT (OUTPATIENT)
Dept: RADIATION ONCOLOGY | Facility: MEDICAL CENTER | Age: 45
End: 2020-02-19
Attending: RADIOLOGY
Payer: COMMERCIAL

## 2020-02-19 NOTE — PROGRESS NOTES
Oncology Nurse Navigation  Received notice that pt has cancelled appts due to transportation barriers.  Pt has completed only one of five fractions.  ONN phoned pt who states she has spoke with a representative from [facility] and secured transportation for Thursday, Friday and Monday.      Referral placed to Oncology Social Worker Araceli Bonds and conference call arranged at 1400 today for full assessment to ensure all barriers are addressed.

## 2020-02-19 NOTE — CT SIMULATION
TREATMENT:     Radiation Treatments       Plan Most Recent Treatment Date Elapsed Course Treatment Days Fractions Treated Prescribed Fraction Dose (cGy) Prescribed Total Dose (cGy)    R_Hip 2/18/2020 0 @ [id number] 1 of 5 400 2,000           Reference Point Most Recent Treatment Date Elapsed Course Treatment Days Most Recent Session Dose (cGy) Total Dose (cGy)    R_Hip 2/18/2020 0 @ [id number] 400 400    R_Hip CP 2/18/2020 0 @ [id number] 415 415                      First Visit Simple Simulation: Called by Truebeam machine to verify treatment parameters including:  treatment site, treatment dose, and treatment setup prior to first treatment. CBCT derived shifts reviewed in Axial, Sagital and Coronal views were reviewed.  Shifts approved.  Patient treated.    I have personally reviewed the relevant data, performed the target localization, and determined all relevant factors for this patient’s simulation.

## 2020-02-19 NOTE — PROGRESS NOTES
"On February 19, 2020, Oncology Social Worker Araceli Bonds contacted pt. via telephone.  Pt. shared she had started her treatment and is coming from [place] every day.  Pt. states she is using a ride service, [email or web address] which is through her [employer].  Pt. states she is having issues with it as they did not show up to pick her up yesterday.  Pt. states she was told she needed to call with 7 days advance notice and she called 14 days in advance.  Pt. shared she contacted her insurance company and they were able to get it \"sorted out.\"  Pt. states lodging would be an issue because she doesn't have the money to pay for meals and also she can't be alone with her \"broken hip.\"  Pt. shared she lives with her .  Pt. shared her son (22 years old) and daughter in law also live in the home.  Pt. shared her daughter in law is her caretaker.  Pt. shared emotionally she is \"not doing good\" but states she is \"okay\".  Pt. shared her  is also disabled.      Pt. shared she is down to the last week of her medication, verancia and states she does not have a medical oncologist in [place] anymore.  Pt. states her previous medical oncologist \"moved away.\"Pt. stated her only oncologist is Dr. Douglass.    "

## 2020-02-20 ENCOUNTER — DOCUMENTATION (OUTPATIENT)
Dept: NUTRITION | Facility: MEDICAL CENTER | Age: 45
End: 2020-02-20

## 2020-02-20 ENCOUNTER — HOSPITAL ENCOUNTER (OUTPATIENT)
Dept: RADIATION ONCOLOGY | Facility: MEDICAL CENTER | Age: 45
End: 2020-02-20
Payer: COMMERCIAL

## 2020-02-20 VITALS
DIASTOLIC BLOOD PRESSURE: 87 MMHG | HEART RATE: 143 BPM | TEMPERATURE: 98.7 F | OXYGEN SATURATION: 98 % | SYSTOLIC BLOOD PRESSURE: 124 MMHG

## 2020-02-20 DIAGNOSIS — C50.919 MALIGNANT NEOPLASM OF FEMALE BREAST, UNSPECIFIED ESTROGEN RECEPTOR STATUS, UNSPECIFIED LATERALITY, UNSPECIFIED SITE OF BREAST (HCC): Chronic | ICD-10-CM

## 2020-02-20 DIAGNOSIS — R91.8 LUNG MASS: ICD-10-CM

## 2020-02-20 LAB
CHEMOTHERAPY INFUSION START DATE: NORMAL
CHEMOTHERAPY RECORDS: 2000
CHEMOTHERAPY RECORDS: 4
CHEMOTHERAPY RECORDS: NORMAL
CHEMOTHERAPY RX CANCER: NORMAL
DATE 1ST CHEMO CANCER: NORMAL
RAD ONC ARIA COURSE LAST TREATMENT DATE: NORMAL
RAD ONC ARIA COURSE TREATMENT ELAPSED DAYS: NORMAL
RAD ONC ARIA REFERENCE POINT DOSAGE GIVEN TO DATE: 8
RAD ONC ARIA REFERENCE POINT DOSAGE GIVEN TO DATE: 8.3
RAD ONC ARIA REFERENCE POINT ID: NORMAL
RAD ONC ARIA REFERENCE POINT ID: NORMAL
RAD ONC ARIA REFERENCE POINT SESSION DOSAGE GIVEN: 4
RAD ONC ARIA REFERENCE POINT SESSION DOSAGE GIVEN: 4.15

## 2020-02-20 PROCEDURE — 77412 RADIATION TX DELIVERY LVL 3: CPT | Performed by: RADIOLOGY

## 2020-02-20 PROCEDURE — 77014 PR CT GUIDANCE PLACEMENT RAD THERAPY FIELDS: CPT | Mod: 26 | Performed by: RADIOLOGY

## 2020-02-20 PROCEDURE — 77387 GUIDANCE FOR RADJ TX DLVR: CPT | Performed by: RADIOLOGY

## 2020-02-20 ASSESSMENT — PAIN SCALES - GENERAL: PAINLEVEL: 7=MODERATE-SEVERE PAIN

## 2020-02-20 NOTE — ON TREATMENT VISIT
ON TREATMENT  NOTE  RADIATION ONCOLOGY DEPARTMENT    Patient name:  Roxy Cota    Primary Physician:  No primary care provider on file. MRN: [id number]  CSN: [id number]   Referring physician:  Leticia Moon : 1975, 44 y.o.     ENCOUNTER DATE:  20    DIAGNOSIS:  Visit Diagnoses     ICD-10-CM   1. Lung mass R91.8     Breast cancer (HCC)  Staging form: Breast, AJCC 8th Edition  - Pathologic stage from 2019: Stage IV (pT2, pN1a(sn), pM1, G3, ER+, ID+, HER2-) - Signed by Aleks Douglass M.D. on 2020  Neoadjuvant therapy: No  Laterality: Right  Method of lymph node assessment: [place] lymph node biopsy  Sites of metastasis: Bone  Multigene prognostic tests performed: MammaPrint  Histologic grading system: 3 grade system  Stage used in treatment planning: Yes      TREATMENT SUMMARY:  [facility] Treatment Information        Some values may be hidden. Unless noted otherwise, only the newest values recorded on each date are displayed.         [facility] Treatment Summary 20   Course First Treatment Date 2020   Course Last Treatment Date 2020   R_Hip Plan from Course C1_Rt_Hip   Fraction 2 of 5   Elapsed Course Days 2 @    Prescribed Fraction Dose 400 cGy   Prescribed Total Dose 2,000 cGy   R_Hip Reference Point from Course C1_Rt_Hip   Elapsed Course Days 2 @    Session Dose 400 cGy   Total Dose 800 cGy   R_Hip CP Reference Point from Course C1_Rt_Hip   Elapsed Course Days 2 @    Session Dose 415 cGy   Total Dose 830 cGy             SUBJECTIVE:  Doing well no side effects.       VITAL SIGNS:  KPS: 90, Able to carry on normal activity; minor signs or symptoms of disease (ECOG equivalent 0)  Encounter Vitals  Temperature: 37.1 °C (98.7 °F)  Temp src: Temporal  Blood Pressure: 124/87  Pulse: (!) 143  Pulse Oximetry: 98 %  O2 Delivery Device: Nasal Cannula  Pain Score: 7=Moderate-Severe Pain  Pain Assessment 2020   Pain Assessment Chronic Pain    Pain Score 7   Pain Loc Back   What increases pain? moving    What decreases pain? pain medication    Describe pain Constant   Some recent data might be hidden          PHYSICAL EXAM:    Physical Exam  Vitals signs reviewed.   Constitutional:       Appearance: Normal appearance.   HENT:      Head: Normocephalic and atraumatic.   Neurological:      Mental Status: She is alert.          Toxicity Assessment 2/20/2020   Toxicity Assessment Bone   Fatigue (lethargy, malaise, asthenia) Moderate (e.g., decrease in performance status by 1 ECOG level or 20% Karnofsky) or causing difficulty performing some activities   Radiation Dermatitis None   Photosensitivity None   Dry Skin Normal   Tumor Pain (onset or exacerbation of tumor pain due to treatment) Moderate pain, pain or analgesics interfering with function, but not interfering with activities of daily living   Musculoskeletal - Other/Please specify None   Bone - Late RT No change from baseline   Joint - Late RT No change from baseline   Skin - Late RT No change from baseline       CURRENT MEDICATIONS:    Current Outpatient Medications:   •  polyethylene glycol/lytes (MIRALAX) Pack, Take 1 Packet by mouth every day for 30 days., Disp: 30 Each, Rfl: 0  •  senna-docusate (PERICOLACE OR SENOKOT S) 8.6-50 MG Tab, Take 2 Tabs by mouth 2 Times a Day for 30 days., Disp: 120 Tab, Rfl: 0  •  acetaminophen (TYLENOL) 500 MG Tab, Take 2 Tabs by mouth every 8 hours for 30 days., Disp: 180 Tab, Rfl: 0  •  benzonatate (TESSALON) 100 MG Cap, Take 1 Cap by mouth 3 times a day., Disp: 60 Cap, Rfl: 0  •  calcium carbonate (TUMS) 500 MG Chew Tab, Take 1 Tab by mouth 2 Times a Day., Disp: 30 Tab, Rfl: 0  •  midodrine (PROAMATINE) 10 MG tablet, Take 1 Tab by mouth 3 times a day, with meals., Disp: 60 Tab, Rfl: 0  •  budesonide-formoterol (SYMBICORT) 80-4.5 MCG/ACT Aerosol, Inhale 2 Puffs by mouth 2 Times a Day., Disp: 1 Inhaler, Rfl: 0  •  tiotropium (SPIRIVA HANDIHALER) 18 MCG Cap, Inhale 1  Cap by mouth every day., Disp: 30 Cap, Rfl: 0  •  oxyCODONE-acetaminophen (PERCOCET-10)  MG Tab, Take 1 Tab by mouth every 8 hours as needed for Severe Pain., Disp: , Rfl:   •  lidocaine (LIDODERM) 5 % Patch, Apply 1 Patch to skin as directed every 24 hours., Disp: 10 Patch, Rfl: 0  •  omeprazole (PRILOSEC) 20 MG delayed-release capsule, Take 1 Cap by mouth every day., Disp: 30 Cap, Rfl: 0  •  albuterol (VENTOLIN HFA) 108 (90 Base) MCG/ACT Aero Soln inhalation aerosol, Inhale 2 Puffs by mouth every 6 hours., Disp: , Rfl:   •  carvedilol (COREG) 6.25 MG Tab, Take 6.25 mg by mouth 2 times a day., Disp: , Rfl:   •  cetirizine (ZYRTEC) 10 MG Tab, Take 10 mg by mouth every day., Disp: , Rfl:   •  citalopram (CELEXA) 40 MG Tab, Take 40 mg by mouth every day., Disp: , Rfl:   •  clonazePAM (KLONOPIN) 1 MG Tab, Take 1 mg by mouth 3 times a day., Disp: , Rfl:   •  cyanocobalamin (VITAMIN B-12) 500 MCG Tab, Take 1,000 mcg by mouth every day., Disp: , Rfl:   •  dicyclomine (BENTYL) 10 MG Cap, Take 10 mg by mouth 3 times a day., Disp: , Rfl:   •  tamoxifen (NOLVADEX) 10 MG Tab, Take 20 mg by mouth., Disp: , Rfl:   •  tizanidine (ZANAFLEX) 4 MG Tab, Take 4 mg by mouth every 8 hours., Disp: , Rfl:     LABORATORY DATA:   Lab Results   Component Value Date/Time    SODIUM 138 02/03/2020 03:25 AM    POTASSIUM 4.0 02/03/2020 03:25 AM    CHLORIDE 104 02/03/2020 03:25 AM    CO2 28 02/03/2020 03:25 AM    GLUCOSE 107 (H) 02/03/2020 03:25 AM    BUN 10 02/03/2020 03:25 AM    CREATININE 0.73 02/03/2020 03:25 AM         Lab Results   Component Value Date/Time    WBC 7.9 02/03/2020 03:25 AM    HEMOGLOBIN 9.2 (L) 02/03/2020 03:25 AM    HEMATOCRIT 29.8 (L) 02/03/2020 03:25 AM    MCV 96.4 02/03/2020 03:25 AM    PLATELETCT 175 02/03/2020 03:25 AM        RADIOLOGY DATA:  Ct-head W/o    Result Date: 1/25/2020 1/25/2020 12:12 AM HISTORY/REASON FOR EXAM:  Altered mental status. TECHNIQUE/EXAM DESCRIPTION AND NUMBER OF VIEWS: CT of the head  without contrast. The study was performed on a GE CT scanner. Contiguous 5 mm axial sections were obtained from the skull base through the vertex. Up to date radiation dose reduction adjustments have been utilized to meet ALARA standards for radiation dose reduction. COMPARISON:  None available FINDINGS: Lytic and sclerotic osseous metastases are noted in the skull base and proximal cervical vertebral bodies Mastoid air cells and visualized paranasal sinuses are clear. The visualized portions of the orbits are normal in appearance. The brain parenchyma is normal in appearance. There is no hemorrhage.  There is no evidence for acute infarct. The ventricular system is normal in size and position. There are no extra-axial fluid collection present.     1.  No acute intracranial abnormality. 2.  Osseous metastatic disease.    Dx-chest-2 Views    Result Date: 1/26/2020 1/26/2020 9:51 AM HISTORY/REASON FOR EXAM:  Cough Weakness. TECHNIQUE/EXAM DESCRIPTION AND NUMBER OF VIEWS: Two views of the chest. COMPARISON:  1/24/2020 FINDINGS: Cardiac mediastinal contour is unchanged. Lungs show hyperinflation. Mild blunting of the costophrenic angles. Mild diffuse prominence of the pulmonary interstitium with subtle patchy airspace opacities in the mid and upper lungs bilaterally. No pneumothorax. Degenerative change of thoracic spine.     1.  Worsening mild pulmonary edema and/or multifocal pneumonitis. 2.  Minimal bilateral pleural effusions, slightly greater on the LEFT. 3.  Probable underlying COPD.    Dx-chest-for Line Placement Perform Procedure In: Patient's Room    Result Date: 1/26/2020 1/26/2020 10:56 PM HISTORY/REASON FOR EXAM:  Central Line and ET tube TECHNIQUE/EXAM DESCRIPTION AND NUMBER OF VIEWS: Single view of the chest. COMPARISON: 1/26/2020 FINDINGS: Limited single view of the chest performed primarily to evaluate PICC position. A right peripherally inserted catheter is seen.  The tip projects appropriately over  the expected area of the superior vena cava. Endotracheal tube with tip projecting over the mid thoracic trachea. Improved aeration with decreased bilateral opacities. No pleural abnormalities are noted. Stable cardiopericardial silhouette.     1. A right peripherally inserted catheter with tip projects appropriately over the expected area of the superior vena cava. 2. Endotracheal tube with tip projecting over the mid thoracic trachea.    Dx-chest-portable (1 View)    Result Date: 1/31/2020 1/31/2020 9:35 AM HISTORY/REASON FOR EXAM:  Cough. TECHNIQUE/EXAM DESCRIPTION AND NUMBER OF VIEWS: Single portable view of the chest. COMPARISON: Yesterday FINDINGS: Right IJ and feeding tube remain in place. Lytic and blastic bone lesions again noted. There are trace effusions. No focal consolidation or pneumothorax identified.     Stable chest x-ray findings.    Dx-chest-portable (1 View)    Result Date: 1/30/2020 1/30/2020 7:40 AM HISTORY/REASON FOR EXAM:  Shortness of Breath; possible aspiration. TECHNIQUE/EXAM DESCRIPTION AND NUMBER OF VIEWS: Single portable view of the chest. COMPARISON: 1/29/2020 FINDINGS: Cardiac mediastinal contour is unchanged. Diffuse prominence of the interstitium again seen. Minimal blunting of costophrenic angles. No pneumothorax. RIGHT internal jugular catheter tip at the cavoatrial junction level. Feeding tube in place however tip is off the image. Lytic lesion again seen in the RIGHT proximal humerus, possibly metastatic.     No significant change from prior exam.    Dx-chest-portable (1 View)    Result Date: 1/29/2020 1/29/2020 1:57 AM HISTORY/REASON FOR EXAM: Respiratory distress TECHNIQUE/EXAM DESCRIPTION AND NUMBER OF VIEWS: Single portable view of the chest. COMPARISON: Previous date FINDINGS: There is interval extubation. Support devices are otherwise unchanged. There is stable diffuse bilateral interstitial opacities. There is no pleural effusion. The heart is enlarged.     Interval  extubation. Otherwise stable exam.    Dx-chest-portable (1 View)    Result Date: 1/28/2020 1/28/2020 1:28 AM HISTORY/REASON FOR EXAM: Respiratory distress TECHNIQUE/EXAM DESCRIPTION AND NUMBER OF VIEWS: Single portable view of the chest. COMPARISON: Previous date FINDINGS: There are multiple supportive devices again seen, stable. There are stable interstitial infiltrates. There is no pleural effusion. The heart is enlarged.     Stable cardiomegaly and interstitial infiltrates.    Dx-chest-portable (1 View)    Result Date: 1/25/2020 1/24/2020 11:44 PM HISTORY/REASON FOR EXAM:  possible sepsis.. TECHNIQUE/EXAM DESCRIPTION AND NUMBER OF VIEWS: Single portable view of the chest. COMPARISON: 12/21/2019 FINDINGS: The osseous structures appear somewhat mottled consistent with known metastatic disease. The heart and mediastinal structures are within normal limits. Pulmonary vascularity is normal. The lung fields are clear. There is no effusion or pneumothorax.     No acute cardiopulmonary disease evident.    Dx-abdomen For Tube Placement    Result Date: 1/29/2020 1/29/2020 10:22 PM HISTORY/REASON FOR EXAM:  Line evaluation. TECHNIQUE/EXAM DESCRIPTION AND NUMBER OF VIEWS:  1 view(s) of the abdomen. COMPARISON:  January 27. FINDINGS: Enteric tube has been placed. The tip projects over the gastric body. The bowel gas pattern is within normal limits.     Enteric tube terminates over the gastric body    Dx-abdomen For Tube Placement    Result Date: 1/27/2020 1/27/2020 12:55 PM HISTORY/REASON FOR EXAM:  Line evaluation. TECHNIQUE/EXAM DESCRIPTION AND NUMBER OF VIEWS:  1 view(s) of the abdomen. COMPARISON:  None. FINDINGS: Enteric tube has been placed. The tip projects over the stomach. The bowel gas pattern is nonspecific.     Enteric tube tip projects over the stomach.      IMPRESSION:  Breast cancer (HCC)  Staging form: Breast, AJCC 8th Edition  - Pathologic stage from 12/26/2019: Stage IV (pT2, pN1a(sn), pM1, G3, ER+, [place]+,  HER2-) - Signed by Aleks Douglass M.D. on 1/31/2020  Neoadjuvant therapy: No  Laterality: Right  Method of lymph node assessment: [place] lymph node biopsy  Sites of metastasis: Bone  Multigene prognostic tests performed: MammaPrint  Histologic grading system: 3 grade system  Stage used in treatment planning: Yes      PLAN:  No change in treatment plan    Disposition:  Treatment plan reviewed. Questions answered. Continue therapy outlined. Referral to hem/onc Dr. Khan.    Aleks Douglass M.D.    No orders of the defined types were placed in this encounter.

## 2020-02-20 NOTE — PROGRESS NOTES
"Nutrition Services: RD Consultation  44 year old female with diagnosis of breast cancer with mets.       Past Medical History:   Diagnosis Date   • Cancer (HCC)     breast ca with mets     Pt requested to see RD for protein information.  Pt presents to appointment with daughter.  is legally deaf.  Mentions feels nauseated all the time and has a hard time eating as a result. States is hard to eat more than a few bites at a time. Mentions was able to eat 1/4 of a [employer] chicken sandwich last night. Reports drinking Ensure 2x per day. States is not tolerating any meat and worries that is not getting enough protein. Reports is edentulous as well so nuts and seeds are hard to eat. Reports smells can bring on nausea. States is taking 3 different nausea medications and none of them work. Relays has a few more days of radiation left. Pt did not express any further nutrition-related questions or concerns at this time.     Assessment:  • Pertinent Labs: Glucose 107  • Pertinent Meds: miralax, senokot, tums, proamatine, prilosec  • Weight: 111 lbs on 1/31/20  • Height: 5'4\"  • BMI: 19  • WNL BMI classification at this time.     Weight History from Chart:  124 lbs on 1/25/20  125 lbs on 12/22/19    Weight Change/Malnutrition Risk: Pt presents with severe chronic disease related malnutrition related to breast cancer with mets as evidenced by severe muscle depletion at temporal, clavicle, and shoulder region as well as severe subcutaneous fat loss at orbital pads; pt also with severe 13 lb weight loss (10%) within 1 month.     Plan/Recommendations:  • Provided pt with handout of high calorie/protein foods as well as high calorie/protein add-ins sheet. Discussed frequent eating and snacking as tolerated. Reviewed easy-to-digest foods.   • Encouraged continuation with Ensure Plus 2x per day, provided sample with coupons.   • Provided sample of Boost Soothe. Discussed may be easier tolerated during episodes of nausea, " provided coupons.   • Provided a variety of protein powder samples from [employer] and [employer]. Discussed ways could mix into meals that is currently consuming or into homemade shakes.   • Discussed cheaper protein powders such as dry milk powder and inexpensive whey protein powder from [employer].   • Increase meal and snack frequency to 4-6 x/day.       Pt reports understanding and was receptive to information provided.   RD provided contact information with email. Encouraged pt to reach out as questions/concerns arise.     RD available PRN.

## 2020-02-21 ENCOUNTER — HOSPITAL ENCOUNTER (OUTPATIENT)
Dept: RADIATION ONCOLOGY | Facility: MEDICAL CENTER | Age: 45
End: 2020-02-21
Payer: COMMERCIAL

## 2020-02-21 DIAGNOSIS — C79.51 BONE METASTASIS: ICD-10-CM

## 2020-02-21 LAB
CHEMOTHERAPY INFUSION START DATE: NORMAL
CHEMOTHERAPY RECORDS: 2000
CHEMOTHERAPY RECORDS: 4
CHEMOTHERAPY RECORDS: NORMAL
CHEMOTHERAPY RX CANCER: NORMAL
DATE 1ST CHEMO CANCER: NORMAL
RAD ONC ARIA COURSE LAST TREATMENT DATE: NORMAL
RAD ONC ARIA COURSE TREATMENT ELAPSED DAYS: NORMAL
RAD ONC ARIA REFERENCE POINT DOSAGE GIVEN TO DATE: 12
RAD ONC ARIA REFERENCE POINT DOSAGE GIVEN TO DATE: 12.44
RAD ONC ARIA REFERENCE POINT ID: NORMAL
RAD ONC ARIA REFERENCE POINT ID: NORMAL
RAD ONC ARIA REFERENCE POINT SESSION DOSAGE GIVEN: 4
RAD ONC ARIA REFERENCE POINT SESSION DOSAGE GIVEN: 4.15

## 2020-02-21 PROCEDURE — 77014 PR CT GUIDANCE PLACEMENT RAD THERAPY FIELDS: CPT | Mod: 26 | Performed by: RADIOLOGY

## 2020-02-21 PROCEDURE — 77387 GUIDANCE FOR RADJ TX DLVR: CPT | Performed by: RADIOLOGY

## 2020-02-21 PROCEDURE — 77412 RADIATION TX DELIVERY LVL 3: CPT | Performed by: RADIOLOGY

## 2020-02-21 RX ORDER — OXYCODONE HCL 10 MG/1
10 TABLET, FILM COATED, EXTENDED RELEASE ORAL EVERY 12 HOURS
Qty: 28 TAB | Refills: 0 | Status: SHIPPED | OUTPATIENT
Start: 2020-02-21 | End: 2020-03-06

## 2020-02-21 RX ORDER — OXYCODONE AND ACETAMINOPHEN 10; 325 MG/1; MG/1
1 TABLET ORAL EVERY 4 HOURS PRN
Qty: 14 TAB | Refills: 0 | Status: SHIPPED | OUTPATIENT
Start: 2020-02-21 | End: 2020-03-06

## 2020-02-24 ENCOUNTER — HOSPITAL ENCOUNTER (OUTPATIENT)
Dept: RADIATION ONCOLOGY | Facility: MEDICAL CENTER | Age: 45
End: 2020-02-24
Payer: COMMERCIAL

## 2020-02-24 LAB
CHEMOTHERAPY INFUSION START DATE: NORMAL
CHEMOTHERAPY RECORDS: 2000
CHEMOTHERAPY RECORDS: 4
CHEMOTHERAPY RECORDS: NORMAL
CHEMOTHERAPY RX CANCER: NORMAL
DATE 1ST CHEMO CANCER: NORMAL
RAD ONC ARIA COURSE LAST TREATMENT DATE: NORMAL
RAD ONC ARIA COURSE TREATMENT ELAPSED DAYS: NORMAL
RAD ONC ARIA REFERENCE POINT DOSAGE GIVEN TO DATE: 16
RAD ONC ARIA REFERENCE POINT DOSAGE GIVEN TO DATE: 16.59
RAD ONC ARIA REFERENCE POINT ID: NORMAL
RAD ONC ARIA REFERENCE POINT ID: NORMAL
RAD ONC ARIA REFERENCE POINT SESSION DOSAGE GIVEN: 4
RAD ONC ARIA REFERENCE POINT SESSION DOSAGE GIVEN: 4.15

## 2020-02-24 PROCEDURE — 77387 GUIDANCE FOR RADJ TX DLVR: CPT | Performed by: RADIOLOGY

## 2020-02-24 PROCEDURE — 77412 RADIATION TX DELIVERY LVL 3: CPT | Performed by: RADIOLOGY

## 2020-02-24 PROCEDURE — 77336 RADIATION PHYSICS CONSULT: CPT | Performed by: RADIOLOGY

## 2020-02-24 PROCEDURE — 77014 PR CT GUIDANCE PLACEMENT RAD THERAPY FIELDS: CPT | Mod: 26 | Performed by: RADIOLOGY

## 2020-02-25 ENCOUNTER — PATIENT OUTREACH (OUTPATIENT)
Dept: OTHER | Facility: MEDICAL CENTER | Age: 45
End: 2020-02-25

## 2020-02-25 ENCOUNTER — HOSPITAL ENCOUNTER (OUTPATIENT)
Dept: RADIATION ONCOLOGY | Facility: MEDICAL CENTER | Age: 45
End: 2020-02-25
Payer: COMMERCIAL

## 2020-02-25 LAB
CHEMOTHERAPY INFUSION START DATE: NORMAL
CHEMOTHERAPY RECORDS: 2000
CHEMOTHERAPY RECORDS: 4
CHEMOTHERAPY RECORDS: NORMAL
CHEMOTHERAPY RX CANCER: NORMAL
DATE 1ST CHEMO CANCER: NORMAL
RAD ONC ARIA COURSE LAST TREATMENT DATE: NORMAL
RAD ONC ARIA COURSE TREATMENT ELAPSED DAYS: NORMAL
RAD ONC ARIA REFERENCE POINT DOSAGE GIVEN TO DATE: 20
RAD ONC ARIA REFERENCE POINT DOSAGE GIVEN TO DATE: 20.74
RAD ONC ARIA REFERENCE POINT ID: NORMAL
RAD ONC ARIA REFERENCE POINT ID: NORMAL
RAD ONC ARIA REFERENCE POINT SESSION DOSAGE GIVEN: 4
RAD ONC ARIA REFERENCE POINT SESSION DOSAGE GIVEN: 4.15

## 2020-02-25 PROCEDURE — 77412 RADIATION TX DELIVERY LVL 3: CPT | Performed by: RADIOLOGY

## 2020-02-25 PROCEDURE — 77427 RADIATION TX MANAGEMENT X5: CPT | Performed by: RADIOLOGY

## 2020-02-25 PROCEDURE — 77014 PR CT GUIDANCE PLACEMENT RAD THERAPY FIELDS: CPT | Mod: 26 | Performed by: RADIOLOGY

## 2020-02-25 PROCEDURE — 77387 GUIDANCE FOR RADJ TX DLVR: CPT | Performed by: RADIOLOGY

## 2020-02-28 LAB
CHEMOTHERAPY INFUSION START DATE: NORMAL
CHEMOTHERAPY INFUSION STOP DATE: NORMAL
CHEMOTHERAPY RECORDS: 2000
CHEMOTHERAPY RECORDS: 4
CHEMOTHERAPY RECORDS: NORMAL
CHEMOTHERAPY RX CANCER: NORMAL
DATE 1ST CHEMO CANCER: NORMAL
RAD ONC ARIA COURSE LAST TREATMENT DATE: NORMAL
RAD ONC ARIA COURSE TREATMENT ELAPSED DAYS: NORMAL
RAD ONC ARIA REFERENCE POINT DOSAGE GIVEN TO DATE: 20
RAD ONC ARIA REFERENCE POINT DOSAGE GIVEN TO DATE: 20.74
RAD ONC ARIA REFERENCE POINT ID: NORMAL
RAD ONC ARIA REFERENCE POINT ID: NORMAL
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
## 2020-04-09 ENCOUNTER — APPOINTMENT (OUTPATIENT)
Dept: RADIATION ONCOLOGY | Facility: MEDICAL CENTER | Age: 45
End: 2020-04-09
Attending: RADIOLOGY
Payer: COMMERCIAL

## 2020-04-23 NOTE — PALLIATIVE CARE
Medical Week 2 Survey      Responses   [facility] patient discharged from?  [facility]   COVID-19 Test Status  Negative   Does the patient have one of the following disease processes/diagnoses(primary or secondary)?  Other   Week 2 attempt successful?  Yes   Call start time  1606   Discharge diagnosis  neg CovidComplicated migraine   Rescheduled  Rescheduled-pt requested   Person spoke with today (if not patient) and relationship  , Tino Covington RN   Palliative Care follow-up  PC RN discussed pt with Dr. Murray who states that pt can make medical decisions. PC RN went to pt's bedside, pt is currently off floor for MRI and xray per her BS RN. PC RN will follow up this afternoon.       Updated: BS RN    Plan: GOC discussion when pt is available.     Thank you for allowing Palliative Care to support this patient and family. Contact [phone] for additional assistance, change in patient status, or with any questions/concerns.

## 2020-06-08 ENCOUNTER — APPOINTMENT (OUTPATIENT)
Dept: RADIATION ONCOLOGY | Facility: MEDICAL CENTER | Age: 45
End: 2020-06-08
Attending: RADIOLOGY
Payer: COMMERCIAL